# Patient Record
Sex: FEMALE | Race: WHITE | NOT HISPANIC OR LATINO
[De-identification: names, ages, dates, MRNs, and addresses within clinical notes are randomized per-mention and may not be internally consistent; named-entity substitution may affect disease eponyms.]

---

## 2017-07-13 ENCOUNTER — RX ONLY (OUTPATIENT)
Age: 47
Setting detail: RX ONLY
End: 2017-07-13

## 2017-07-13 PROBLEM — D22.62 MELANOCYTIC NEVI OF LEFT UPPER LIMB, INCLUDING SHOULDER: Status: ACTIVE | Noted: 2017-07-13

## 2017-07-27 PROBLEM — D49.2 NEOPLASM OF UNSPECIFIED BEHAVIOR OF BONE, SOFT TISSUE, AND SKIN: Status: RESOLVED | Noted: 2017-07-13 | Resolved: 2017-07-27

## 2017-10-23 ENCOUNTER — HOSPITAL ENCOUNTER (OUTPATIENT)
Dept: RADIOLOGY | Facility: MEDICAL CENTER | Age: 47
End: 2017-10-23
Attending: FAMILY MEDICINE
Payer: COMMERCIAL

## 2017-10-23 DIAGNOSIS — E04.9 ENLARGEMENT OF THYROID: ICD-10-CM

## 2017-10-23 PROCEDURE — 76536 US EXAM OF HEAD AND NECK: CPT

## 2017-12-13 ENCOUNTER — HOSPITAL ENCOUNTER (OUTPATIENT)
Dept: RADIOLOGY | Facility: MEDICAL CENTER | Age: 47
End: 2017-12-13
Attending: FAMILY MEDICINE
Payer: COMMERCIAL

## 2017-12-13 DIAGNOSIS — Z12.31 SCREENING MAMMOGRAM, ENCOUNTER FOR: ICD-10-CM

## 2017-12-13 PROCEDURE — G0202 SCR MAMMO BI INCL CAD: HCPCS

## 2017-12-26 ENCOUNTER — HOSPITAL ENCOUNTER (OUTPATIENT)
Dept: RADIOLOGY | Facility: MEDICAL CENTER | Age: 47
End: 2017-12-26
Attending: FAMILY MEDICINE
Payer: COMMERCIAL

## 2017-12-26 DIAGNOSIS — R74.01 NONSPECIFIC ELEVATION OF LEVELS OF TRANSAMINASE OR LACTIC ACID DEHYDROGENASE (LDH): ICD-10-CM

## 2017-12-26 DIAGNOSIS — R74.02 NONSPECIFIC ELEVATION OF LEVELS OF TRANSAMINASE OR LACTIC ACID DEHYDROGENASE (LDH): ICD-10-CM

## 2017-12-26 PROCEDURE — 76705 ECHO EXAM OF ABDOMEN: CPT

## 2018-01-07 ENCOUNTER — OUTPATIENT INFUSION SERVICES (OUTPATIENT)
Dept: ONCOLOGY | Facility: MEDICAL CENTER | Age: 48
End: 2018-01-07
Attending: FAMILY MEDICINE
Payer: COMMERCIAL

## 2018-01-07 VITALS
WEIGHT: 186.07 LBS | TEMPERATURE: 97.6 F | DIASTOLIC BLOOD PRESSURE: 82 MMHG | SYSTOLIC BLOOD PRESSURE: 133 MMHG | HEIGHT: 66 IN | HEART RATE: 92 BPM | RESPIRATION RATE: 20 BRPM | OXYGEN SATURATION: 99 % | BODY MASS INDEX: 29.9 KG/M2

## 2018-01-07 DIAGNOSIS — E83.110 HEMOCHROMATOSIS, HEREDITARY (HCC): ICD-10-CM

## 2018-01-07 LAB
FERRITIN SERPL-MCNC: 551.4 NG/ML (ref 10–291)
HCT VFR BLD CALC: 42 % (ref 37–47)
HGB BLD-MCNC: 14.3 G/DL (ref 12–16)

## 2018-01-07 PROCEDURE — 36415 COLL VENOUS BLD VENIPUNCTURE: CPT

## 2018-01-07 PROCEDURE — 99195 PHLEBOTOMY: CPT

## 2018-01-07 PROCEDURE — 85014 HEMATOCRIT: CPT

## 2018-01-07 PROCEDURE — 82728 ASSAY OF FERRITIN: CPT

## 2018-01-07 RX ORDER — LORAZEPAM 1 MG/1
1 TABLET ORAL EVERY 4 HOURS PRN
COMMUNITY
End: 2019-11-06 | Stop reason: CLARIF

## 2018-01-07 ASSESSMENT — PAIN SCALES - GENERAL: PAINLEVEL: NO PAIN

## 2018-01-08 NOTE — PROGRESS NOTES
Patient arrived to clinic for first therapeutic phlebotomy.  PIV established with good blood return.  Ferritin drawn and H/H.  Hgb 14.3.  500ml whole blood removed from patient.  Bottle had to be changed half way through due to blood clotting.  Patient tolerated well.  Orthostatic VS checked 10 minutes after (see EPIC).  Pt denies any discomfort.  Future appointments scheduled and pt ambulated out of clinic in no apparent distress.

## 2018-01-21 ENCOUNTER — OUTPATIENT INFUSION SERVICES (OUTPATIENT)
Dept: ONCOLOGY | Facility: MEDICAL CENTER | Age: 48
End: 2018-01-21
Attending: FAMILY MEDICINE
Payer: COMMERCIAL

## 2018-01-21 VITALS
TEMPERATURE: 97.4 F | RESPIRATION RATE: 20 BRPM | OXYGEN SATURATION: 98 % | HEART RATE: 112 BPM | BODY MASS INDEX: 29.83 KG/M2 | SYSTOLIC BLOOD PRESSURE: 134 MMHG | DIASTOLIC BLOOD PRESSURE: 82 MMHG | HEIGHT: 66 IN | WEIGHT: 185.63 LBS

## 2018-01-21 LAB
HCT VFR BLD CALC: 39 % (ref 37–47)
HGB BLD-MCNC: 13.3 G/DL (ref 12–16)

## 2018-01-21 PROCEDURE — 85014 HEMATOCRIT: CPT

## 2018-01-21 PROCEDURE — 99195 PHLEBOTOMY: CPT

## 2018-01-21 PROCEDURE — 36415 COLL VENOUS BLD VENIPUNCTURE: CPT

## 2018-01-21 ASSESSMENT — PAIN SCALES - GENERAL: PAINLEVEL: NO PAIN

## 2018-01-22 NOTE — PROGRESS NOTES
Patient arrived to clinic for therapeutic phlebotomy.  PIV established with good blood return.  Hgb drawn with results of 13.3.  500ml whole blood removed from patient per order.  Patient tolerated well.  Orthostatic VS checked 10 min after (see EPIC).  Pt denies any dizziness or other discomfort.  Next appointment scheduled and pt ambulated out of clinic in no apparent distress.

## 2018-02-04 ENCOUNTER — OUTPATIENT INFUSION SERVICES (OUTPATIENT)
Dept: ONCOLOGY | Facility: MEDICAL CENTER | Age: 48
End: 2018-02-04
Attending: FAMILY MEDICINE
Payer: COMMERCIAL

## 2018-02-04 VITALS
SYSTOLIC BLOOD PRESSURE: 130 MMHG | HEART RATE: 90 BPM | DIASTOLIC BLOOD PRESSURE: 96 MMHG | RESPIRATION RATE: 16 BRPM | TEMPERATURE: 98.6 F | OXYGEN SATURATION: 93 %

## 2018-02-04 DIAGNOSIS — E83.110 HEREDITARY HEMOCHROMATOSIS (HCC): ICD-10-CM

## 2018-02-04 LAB
FERRITIN SERPL-MCNC: 542.8 NG/ML (ref 10–291)
HCT VFR BLD CALC: 37 % (ref 37–47)
HGB BLD-MCNC: 12.6 G/DL (ref 12–16)

## 2018-02-04 PROCEDURE — 82728 ASSAY OF FERRITIN: CPT

## 2018-02-04 PROCEDURE — 99195 PHLEBOTOMY: CPT

## 2018-02-04 PROCEDURE — 85014 HEMATOCRIT: CPT

## 2018-02-04 PROCEDURE — 36415 COLL VENOUS BLD VENIPUNCTURE: CPT

## 2018-02-04 NOTE — PROGRESS NOTES
Patient arrived to clinic for therapeutic phlebotomy.  PIV established and labs drawn.  H/H 12.6/37, within parameters to treat.  500ml whole blood removed per order, pt tolerated well.  Orthostatic vital signs taken after 15 minutes (see flowsheet).  Patient denies any discomfort.  PIV flushed, removed, and gauze/coban placed.  Next appointment scheduled and patient ambulated out of clinic in no apparent distress.

## 2018-02-18 ENCOUNTER — OUTPATIENT INFUSION SERVICES (OUTPATIENT)
Dept: ONCOLOGY | Facility: MEDICAL CENTER | Age: 48
End: 2018-02-18
Attending: FAMILY MEDICINE
Payer: COMMERCIAL

## 2018-02-18 VITALS
OXYGEN SATURATION: 96 % | WEIGHT: 187.39 LBS | DIASTOLIC BLOOD PRESSURE: 96 MMHG | BODY MASS INDEX: 30.12 KG/M2 | RESPIRATION RATE: 16 BRPM | HEART RATE: 108 BPM | SYSTOLIC BLOOD PRESSURE: 121 MMHG | TEMPERATURE: 97.6 F | HEIGHT: 66 IN

## 2018-02-18 LAB
HCT VFR BLD CALC: 39 % (ref 37–47)
HGB BLD-MCNC: 13.3 G/DL (ref 12–16)

## 2018-02-18 PROCEDURE — 85014 HEMATOCRIT: CPT

## 2018-02-18 PROCEDURE — 36415 COLL VENOUS BLD VENIPUNCTURE: CPT

## 2018-02-18 PROCEDURE — 99195 PHLEBOTOMY: CPT

## 2018-02-18 ASSESSMENT — PAIN SCALES - GENERAL: PAINLEVEL: NO PAIN

## 2018-02-18 NOTE — PROGRESS NOTES
Patient arrived ambulatory to the [facility] for TP. Reviewed vital signs, labs, and physician order. Patient denies S&S of infection or S&S of bleeding. IV access established in left AC, visualized brisk blood return, labs collected. Hgb13.3/Hct39, within parameters to receive TP. 500ml's of whole blood removed via phlebotomy per MD order without incident. Orthostatic vial signs assessed. Patient provided upcoming apt dates and times. Patient left the [facility] ambulatory in no signs of distress.

## 2018-03-04 ENCOUNTER — OUTPATIENT INFUSION SERVICES (OUTPATIENT)
Dept: ONCOLOGY | Facility: MEDICAL CENTER | Age: 48
End: 2018-03-04
Attending: FAMILY MEDICINE
Payer: COMMERCIAL

## 2018-03-04 VITALS
DIASTOLIC BLOOD PRESSURE: 92 MMHG | SYSTOLIC BLOOD PRESSURE: 143 MMHG | HEIGHT: 66 IN | WEIGHT: 187.39 LBS | OXYGEN SATURATION: 98 % | BODY MASS INDEX: 30.12 KG/M2 | HEART RATE: 107 BPM | TEMPERATURE: 97.9 F | RESPIRATION RATE: 16 BRPM

## 2018-03-04 DIAGNOSIS — E83.110 HEREDITARY HEMOCHROMATOSIS (HCC): ICD-10-CM

## 2018-03-04 LAB
FERRITIN SERPL-MCNC: 103.4 NG/ML (ref 10–291)
HCT VFR BLD CALC: 38 % (ref 37–47)
HGB BLD-MCNC: 12.9 G/DL (ref 12–16)

## 2018-03-04 PROCEDURE — 85014 HEMATOCRIT: CPT

## 2018-03-04 PROCEDURE — 36415 COLL VENOUS BLD VENIPUNCTURE: CPT

## 2018-03-04 PROCEDURE — 99195 PHLEBOTOMY: CPT

## 2018-03-04 PROCEDURE — 82728 ASSAY OF FERRITIN: CPT

## 2018-03-04 ASSESSMENT — PAIN SCALES - GENERAL: PAINLEVEL: NO PAIN

## 2018-03-04 NOTE — PROGRESS NOTES
Mónica arrives for the therapeutic phlebotomy. Labs drawn as ordered by MD.  Mónica's Hgb 12.9 , Hct 38 %. 500 cc blood removed. Mónica tolerated well. Orthostatic vitals stable, see flowsheet. Mónica denies chest pain, shortness of breath, dizziness, or lightheadedness after treatment. Next appointment scheduled. Discharged to self care; no apparent distress noted.

## 2018-03-18 ENCOUNTER — OUTPATIENT INFUSION SERVICES (OUTPATIENT)
Dept: ONCOLOGY | Facility: MEDICAL CENTER | Age: 48
End: 2018-03-18
Attending: FAMILY MEDICINE
Payer: COMMERCIAL

## 2018-03-18 VITALS
HEART RATE: 90 BPM | WEIGHT: 185.85 LBS | DIASTOLIC BLOOD PRESSURE: 85 MMHG | TEMPERATURE: 98.2 F | BODY MASS INDEX: 29.87 KG/M2 | SYSTOLIC BLOOD PRESSURE: 159 MMHG | OXYGEN SATURATION: 97 % | RESPIRATION RATE: 18 BRPM | HEIGHT: 66 IN

## 2018-03-18 LAB
HCT VFR BLD CALC: 34 % (ref 37–47)
HGB BLD-MCNC: 11.6 G/DL (ref 12–16)

## 2018-03-18 PROCEDURE — 85014 HEMATOCRIT: CPT

## 2018-03-18 PROCEDURE — 36415 COLL VENOUS BLD VENIPUNCTURE: CPT

## 2018-03-18 PROCEDURE — 306780 HCHG STAT FOR TRANSFUSION PER CASE

## 2018-03-18 PROCEDURE — 99195 PHLEBOTOMY: CPT

## 2018-03-18 ASSESSMENT — PAIN SCALES - GENERAL: PAINLEVEL: NO PAIN

## 2018-03-18 NOTE — PROGRESS NOTES
Patient arrived to clinic for therapeutic phlebotomy.  PIV established and labs drawn.  Hgb/Hct 11.6/34.  Parameters within limits to treat.  Discussed with patient that cut off for TP is Hgb of 11 per order and she is close to parameter.  Patient denied any discomfort/symtoms and requested to proceed with treatment today.  She stated she had an appointment with Dr. Oh on Tuesday to discuss diagnosis and treatment.  400 ml of whole blood removed and patient stated she was feeling hot and noted to be diaphoretic.  TP stopped.  Apple juice and water given to patient to drink and cool wash cloth applied to forehead.  Patient rested in chair and monitored.  After 20 minutes patient reported 'feeling much better'.  Orthostatic vital signs taken (see flowsheet).  Patient denied any chest pain, shortness of breath, dizziness, or other discomfort.  Next appointment scheduled. PIV removed and pt ambulated out of clinic in no apparent distress.

## 2018-04-01 ENCOUNTER — OUTPATIENT INFUSION SERVICES (OUTPATIENT)
Dept: ONCOLOGY | Facility: MEDICAL CENTER | Age: 48
End: 2018-04-01
Attending: FAMILY MEDICINE
Payer: COMMERCIAL

## 2018-04-01 VITALS
SYSTOLIC BLOOD PRESSURE: 141 MMHG | OXYGEN SATURATION: 100 % | DIASTOLIC BLOOD PRESSURE: 87 MMHG | WEIGHT: 185.85 LBS | HEART RATE: 90 BPM | HEIGHT: 66 IN | BODY MASS INDEX: 29.87 KG/M2 | RESPIRATION RATE: 18 BRPM | TEMPERATURE: 98.3 F

## 2018-04-01 DIAGNOSIS — E83.110 HEREDITARY HEMOCHROMATOSIS (HCC): ICD-10-CM

## 2018-04-01 LAB
FERRITIN SERPL-MCNC: 14 NG/ML (ref 10–291)
HCT VFR BLD CALC: 32 % (ref 37–47)
HGB BLD-MCNC: 10.9 G/DL (ref 12–16)

## 2018-04-01 PROCEDURE — 85014 HEMATOCRIT: CPT

## 2018-04-01 PROCEDURE — 82728 ASSAY OF FERRITIN: CPT

## 2018-04-01 PROCEDURE — 36415 COLL VENOUS BLD VENIPUNCTURE: CPT

## 2018-04-01 ASSESSMENT — PAIN SCALES - GENERAL: PAINLEVEL: NO PAIN

## 2018-04-01 NOTE — PROGRESS NOTES
Patient present for therapeutic phlebotomy today, no concerns voiced at this time. She reports she has not noticed much of a difference in energy level since starting TP. Peripheral IV placed and H/H and ferritin level drawn per order. Hgb 10.9 today via istat; parameters to phlebotomize if Hgb greater than 11, treatment held today. Plan of care reviewed with patient and next appointment on 4/15/18 at 1600 confirmed. Patient discharged ambulatory in no apparent distress, instructed to call clinic with any further concerns.

## 2018-04-15 ENCOUNTER — APPOINTMENT (OUTPATIENT)
Dept: ONCOLOGY | Facility: MEDICAL CENTER | Age: 48
End: 2018-04-15
Attending: FAMILY MEDICINE
Payer: COMMERCIAL

## 2018-04-29 ENCOUNTER — APPOINTMENT (OUTPATIENT)
Dept: ONCOLOGY | Facility: MEDICAL CENTER | Age: 48
End: 2018-04-29
Attending: FAMILY MEDICINE
Payer: COMMERCIAL

## 2018-08-08 ENCOUNTER — APPOINTMENT (RX ONLY)
Dept: URBAN - METROPOLITAN AREA CLINIC 4 | Facility: CLINIC | Age: 48
Setting detail: DERMATOLOGY
End: 2018-08-08

## 2018-08-08 DIAGNOSIS — L81.4 OTHER MELANIN HYPERPIGMENTATION: ICD-10-CM

## 2018-08-08 DIAGNOSIS — D22 MELANOCYTIC NEVI: ICD-10-CM

## 2018-08-08 DIAGNOSIS — D18.0 HEMANGIOMA: ICD-10-CM

## 2018-08-08 DIAGNOSIS — Z71.89 OTHER SPECIFIED COUNSELING: ICD-10-CM

## 2018-08-08 DIAGNOSIS — F42.4 EXCORIATION (SKIN-PICKING) DISORDER: ICD-10-CM

## 2018-08-08 PROBLEM — D22.61 MELANOCYTIC NEVI OF RIGHT UPPER LIMB, INCLUDING SHOULDER: Status: ACTIVE | Noted: 2018-08-08

## 2018-08-08 PROBLEM — D22.62 MELANOCYTIC NEVI OF LEFT UPPER LIMB, INCLUDING SHOULDER: Status: ACTIVE | Noted: 2018-08-08

## 2018-08-08 PROBLEM — D22.39 MELANOCYTIC NEVI OF OTHER PARTS OF FACE: Status: ACTIVE | Noted: 2018-08-08

## 2018-08-08 PROBLEM — D18.01 HEMANGIOMA OF SKIN AND SUBCUTANEOUS TISSUE: Status: ACTIVE | Noted: 2018-08-08

## 2018-08-08 PROBLEM — D22.72 MELANOCYTIC NEVI OF LEFT LOWER LIMB, INCLUDING HIP: Status: ACTIVE | Noted: 2018-08-08

## 2018-08-08 PROBLEM — S00.80XA UNSPECIFIED SUPERFICIAL INJURY OF OTHER PART OF HEAD, INITIAL ENCOUNTER: Status: ACTIVE | Noted: 2018-08-08

## 2018-08-08 PROBLEM — D22.71 MELANOCYTIC NEVI OF RIGHT LOWER LIMB, INCLUDING HIP: Status: ACTIVE | Noted: 2018-08-08

## 2018-08-08 PROBLEM — D22.5 MELANOCYTIC NEVI OF TRUNK: Status: ACTIVE | Noted: 2018-08-08

## 2018-08-08 PROBLEM — D23.61 OTHER BENIGN NEOPLASM OF SKIN OF RIGHT UPPER LIMB, INCLUDING SHOULDER: Status: ACTIVE | Noted: 2018-08-08

## 2018-08-08 PROCEDURE — ? DIAGNOSIS COMMENT

## 2018-08-08 PROCEDURE — 99214 OFFICE O/P EST MOD 30 MIN: CPT

## 2018-08-08 PROCEDURE — ? COUNSELING

## 2018-08-08 ASSESSMENT — LOCATION SIMPLE DESCRIPTION DERM
LOCATION SIMPLE: RIGHT THIGH
LOCATION SIMPLE: LEFT UPPER BACK
LOCATION SIMPLE: CHEST
LOCATION SIMPLE: ABDOMEN
LOCATION SIMPLE: LEFT FOREHEAD
LOCATION SIMPLE: LEFT UPPER ARM
LOCATION SIMPLE: RIGHT FOREHEAD
LOCATION SIMPLE: LEFT THIGH
LOCATION SIMPLE: RIGHT UPPER ARM
LOCATION SIMPLE: RIGHT TEMPLE
LOCATION SIMPLE: UPPER BACK

## 2018-08-08 ASSESSMENT — LOCATION DETAILED DESCRIPTION DERM
LOCATION DETAILED: LEFT ANTERIOR PROXIMAL THIGH
LOCATION DETAILED: RIGHT LATERAL TEMPLE
LOCATION DETAILED: UPPER STERNUM
LOCATION DETAILED: LEFT SUPERIOR MEDIAL UPPER BACK
LOCATION DETAILED: LEFT ANTERIOR DISTAL UPPER ARM
LOCATION DETAILED: LOWER STERNUM
LOCATION DETAILED: RIGHT ANTERIOR PROXIMAL UPPER ARM
LOCATION DETAILED: EPIGASTRIC SKIN
LOCATION DETAILED: MIDDLE STERNUM
LOCATION DETAILED: LEFT ANTERIOR PROXIMAL UPPER ARM
LOCATION DETAILED: LEFT INFERIOR MEDIAL FOREHEAD
LOCATION DETAILED: SUPERIOR THORACIC SPINE
LOCATION DETAILED: RIGHT ANTERIOR PROXIMAL THIGH
LOCATION DETAILED: RIGHT MEDIAL FOREHEAD
LOCATION DETAILED: RIGHT ANTERIOR DISTAL UPPER ARM
LOCATION DETAILED: INFERIOR THORACIC SPINE

## 2018-08-08 ASSESSMENT — LOCATION ZONE DERM
LOCATION ZONE: ARM
LOCATION ZONE: FACE
LOCATION ZONE: LEG
LOCATION ZONE: TRUNK

## 2018-08-08 NOTE — PROCEDURE: DIAGNOSIS COMMENT
Detail Level: Simple
Comment: Lesion previously biopsied as lichen simplex chronicus with an underlying benign nevus

## 2018-08-08 NOTE — PROCEDURE: COUNSELING
Detail Level: Zone
Detail Level: Generalized
Detail Level: Simple
Patient Specific Counseling (Will Not Stick From Patient To Patient): Patient advised to apply Vaseline to affected area BID for 2 weeks. \\nPatient advised to keep fingernails short. \\nPatient advised to contact our office if lesion is not healed within 2 weeks.

## 2018-08-08 NOTE — HPI: EVALUATION OF SKIN LESION(S)
How Severe Are Your Spot(S)?: moderate
Have Your Spot(S) Been Treated In The Past?: has been treated
Hpi Title: Evaluation of Skin Lesions
Additional History: The biopsy results from last year was Lichen Simplex Chronicus with underlying intradermal melanocytic nevus. Patient would like fbe.
When Was It Treated?: Biopsied on 7/13/2017

## 2019-02-21 ENCOUNTER — TELEPHONE (OUTPATIENT)
Dept: SCHEDULING | Facility: IMAGING CENTER | Age: 49
End: 2019-02-21

## 2019-04-22 ENCOUNTER — TELEPHONE (OUTPATIENT)
Dept: SCHEDULING | Facility: IMAGING CENTER | Age: 49
End: 2019-04-22

## 2019-06-12 ENCOUNTER — OFFICE VISIT (OUTPATIENT)
Dept: MEDICAL GROUP | Facility: PHYSICIAN GROUP | Age: 49
End: 2019-06-12
Payer: COMMERCIAL

## 2019-06-12 VITALS
HEART RATE: 100 BPM | RESPIRATION RATE: 16 BRPM | BODY MASS INDEX: 27.83 KG/M2 | HEIGHT: 68 IN | DIASTOLIC BLOOD PRESSURE: 86 MMHG | WEIGHT: 183.6 LBS | SYSTOLIC BLOOD PRESSURE: 124 MMHG | OXYGEN SATURATION: 95 % | TEMPERATURE: 97.1 F

## 2019-06-12 DIAGNOSIS — R53.82 CHRONIC FATIGUE: ICD-10-CM

## 2019-06-12 DIAGNOSIS — I10 ESSENTIAL HYPERTENSION: ICD-10-CM

## 2019-06-12 DIAGNOSIS — F41.9 ANXIETY: ICD-10-CM

## 2019-06-12 DIAGNOSIS — Z00.00 WELLNESS EXAMINATION: ICD-10-CM

## 2019-06-12 DIAGNOSIS — E83.119 HEMOCHROMATOSIS, UNSPECIFIED HEMOCHROMATOSIS TYPE: ICD-10-CM

## 2019-06-12 DIAGNOSIS — K21.9 GASTROESOPHAGEAL REFLUX DISEASE WITHOUT ESOPHAGITIS: ICD-10-CM

## 2019-06-12 DIAGNOSIS — Z12.31 ENCOUNTER FOR SCREENING MAMMOGRAM FOR BREAST CANCER: ICD-10-CM

## 2019-06-12 PROCEDURE — 99204 OFFICE O/P NEW MOD 45 MIN: CPT | Performed by: PHYSICIAN ASSISTANT

## 2019-06-12 RX ORDER — ASPIRIN 81 MG/1
81 TABLET, CHEWABLE ORAL DAILY
COMMUNITY
End: 2022-06-14

## 2019-06-12 RX ORDER — DEXTROAMPHETAMINE SACCHARATE, AMPHETAMINE ASPARTATE, DEXTROAMPHETAMINE SULFATE AND AMPHETAMINE SULFATE 5; 5; 5; 5 MG/1; MG/1; MG/1; MG/1
20 TABLET ORAL 2 TIMES DAILY
COMMUNITY
End: 2019-11-06 | Stop reason: CLARIF

## 2019-06-12 RX ORDER — VIT C/B6/B5/MAGNESIUM/HERB 173 50-5-6-5MG
CAPSULE ORAL
COMMUNITY
End: 2021-05-11

## 2019-06-14 ENCOUNTER — TELEPHONE (OUTPATIENT)
Dept: HEMATOLOGY ONCOLOGY | Facility: MEDICAL CENTER | Age: 49
End: 2019-06-14

## 2019-06-14 NOTE — TELEPHONE ENCOUNTER
1st attempt to contact the patient.  LM on voicemail for patient requesting a call back to schedule a new patient hematology appointment.  NP/Tate Gonzalez/ Hemochromzev/Grace Riddle

## 2019-06-18 NOTE — TELEPHONE ENCOUNTER
2nd attempt to contact the patient.  LM on voicemail for patient requesting a call back to schedule a new patient hematology appointment.  NP/Tate Gonzalez/ Hemochromzev/Grace Riddle

## 2019-06-20 LAB
ALBUMIN SERPL-MCNC: 4.5 G/DL (ref 3.5–5.5)
ALBUMIN/GLOB SERPL: 1.6 {RATIO} (ref 1.2–2.2)
ALP SERPL-CCNC: 66 IU/L (ref 39–117)
ALT SERPL-CCNC: 160 IU/L (ref 0–32)
AST SERPL-CCNC: 151 IU/L (ref 0–40)
BASOPHILS # BLD AUTO: 0 X10E3/UL (ref 0–0.2)
BASOPHILS NFR BLD AUTO: 1 %
BILIRUB SERPL-MCNC: 0.5 MG/DL (ref 0–1.2)
BUN SERPL-MCNC: 11 MG/DL (ref 6–24)
BUN/CREAT SERPL: 13 (ref 9–23)
CALCIUM SERPL-MCNC: 9.1 MG/DL (ref 8.7–10.2)
CHLORIDE SERPL-SCNC: 101 MMOL/L (ref 96–106)
CHOLEST SERPL-MCNC: 115 MG/DL (ref 100–199)
CO2 SERPL-SCNC: 23 MMOL/L (ref 20–29)
CREAT SERPL-MCNC: 0.83 MG/DL (ref 0.57–1)
EOSINOPHIL # BLD AUTO: 0.1 X10E3/UL (ref 0–0.4)
EOSINOPHIL NFR BLD AUTO: 2 %
ERYTHROCYTE [DISTWIDTH] IN BLOOD BY AUTOMATED COUNT: 17.4 % (ref 12.3–15.4)
FERRITIN SERPL-MCNC: 25 NG/ML (ref 15–150)
GLOBULIN SER CALC-MCNC: 2.8 G/DL (ref 1.5–4.5)
GLUCOSE SERPL-MCNC: 129 MG/DL (ref 65–99)
HCT VFR BLD AUTO: 38 % (ref 34–46.6)
HDLC SERPL-MCNC: 61 MG/DL
HGB BLD-MCNC: 11.6 G/DL (ref 11.1–15.9)
IMM GRANULOCYTES # BLD AUTO: 0 X10E3/UL (ref 0–0.1)
IMM GRANULOCYTES NFR BLD AUTO: 0 %
IMMATURE CELLS  115398: ABNORMAL
IRON SATN MFR SERPL: 9 % (ref 15–55)
IRON SATN MFR SERPL: 9 % SATURATION
IRON SERPL-MCNC: 54 UG/DL (ref 27–159)
IRON SERPL-MCNC: 62 UG/DL
LABORATORY COMMENT REPORT: NORMAL
LDLC SERPL CALC-MCNC: 45 MG/DL (ref 0–99)
LYMPHOCYTES # BLD AUTO: 1.7 X10E3/UL (ref 0.7–3.1)
LYMPHOCYTES NFR BLD AUTO: 26 %
MCH RBC QN AUTO: 21.3 PG (ref 26.6–33)
MCHC RBC AUTO-ENTMCNC: 30.5 G/DL (ref 31.5–35.7)
MCV RBC AUTO: 70 FL (ref 79–97)
MONOCYTES # BLD AUTO: 0.4 X10E3/UL (ref 0.1–0.9)
MONOCYTES NFR BLD AUTO: 7 %
MORPHOLOGY BLD-IMP: ABNORMAL
NEUTROPHILS # BLD AUTO: 4.2 X10E3/UL (ref 1.4–7)
NEUTROPHILS NFR BLD AUTO: 64 %
NRBC BLD AUTO-RTO: ABNORMAL %
PLATELET # BLD AUTO: 287 X10E3/UL (ref 150–450)
POTASSIUM SERPL-SCNC: 4.3 MMOL/L (ref 3.5–5.2)
PROT SERPL-MCNC: 7.3 G/DL (ref 6–8.5)
RBC # BLD AUTO: 5.45 X10E6/UL (ref 3.77–5.28)
SODIUM SERPL-SCNC: 142 MMOL/L (ref 134–144)
TIBC SERPL-MCNC: 569 UG/DL (ref 250–450)
TRANSFERRIN SERPL-MCNC: 475 MG/DL
TRIGL SERPL-MCNC: 44 MG/DL (ref 0–149)
UIBC SERPL-MCNC: 515 UG/DL (ref 131–425)
VLDLC SERPL CALC-MCNC: 9 MG/DL (ref 5–40)
WBC # BLD AUTO: 6.5 X10E3/UL (ref 3.4–10.8)

## 2019-06-23 PROBLEM — I10 HYPERTENSION: Status: ACTIVE | Noted: 2019-06-23

## 2019-06-24 ENCOUNTER — OFFICE VISIT (OUTPATIENT)
Dept: HEMATOLOGY ONCOLOGY | Facility: MEDICAL CENTER | Age: 49
End: 2019-06-24
Payer: COMMERCIAL

## 2019-06-24 VITALS
SYSTOLIC BLOOD PRESSURE: 122 MMHG | OXYGEN SATURATION: 96 % | RESPIRATION RATE: 16 BRPM | HEIGHT: 67 IN | HEART RATE: 100 BPM | DIASTOLIC BLOOD PRESSURE: 82 MMHG | TEMPERATURE: 97.8 F | BODY MASS INDEX: 28.55 KG/M2 | WEIGHT: 181.88 LBS

## 2019-06-24 DIAGNOSIS — E83.119 HEMOCHROMATOSIS, UNSPECIFIED HEMOCHROMATOSIS TYPE: ICD-10-CM

## 2019-06-24 PROCEDURE — 99203 OFFICE O/P NEW LOW 30 MIN: CPT | Performed by: INTERNAL MEDICINE

## 2019-06-24 ASSESSMENT — PAIN SCALES - GENERAL: PAINLEVEL: NO PAIN

## 2019-06-24 NOTE — PROGRESS NOTES
06/24/19    Subjective    Chief Complaint:      HPI:      ROS:    PMH:    No Known Allergies    Medications:    Current Outpatient Prescriptions on File Prior to Visit   Medication Sig Dispense Refill   • amphetamine-dextroamphetamine (ADDERALL, 20MG,) 20 MG Tab Take 20 mg by mouth 2 times a day.     • Multiple Vitamin (CALCIUM COMPLEX PO) Take  by mouth.     • MILK THISTLE PO Take  by mouth.     • Turmeric 500 MG Cap Take  by mouth.     • Green Tea, Camillia sinensis, (GREEN TEA PO) Take  by mouth.     • Biotin 5000 MCG Cap Take  by mouth.     • aspirin (ASA) 81 MG Chew Tab chewable tablet Take 81 mg by mouth every day.     • lorazepam (ATIVAN) 1 MG Tab Take 1 mg by mouth every four hours as needed for Anxiety.     • omeprazole (PRILOSEC) 20 MG CPDR Take 20 mg by mouth every day.     • escitalopram (LEXAPRO) 10 MG TABS Take 40 mg by mouth every day.     • lisinopril (PRINIVIL) 20 MG TABS Take 10 mg by mouth every day.     • buPROPion SR (WELLBUTRIN-SR) 150 MG TB12 Take 300 mg by mouth 2 times a day.       No current facility-administered medications on file prior to visit.          Objective    Vitals:    LMP 06/14/2012     Physical Exam:    HEENT - PERRL  Neck - supple  Node - none  Chest - clear  Breasts -   COR - RSR  Abd - No palpable   Ext - No edema  Neuro -   Skin -     Labs:  Results for ELVIS CARRASCO (MRN [id number]) as of 6/24/2019 11:17   Ref. Range 6/18/2019 10:57   WBC Latest Ref Range: 3.4 - 10.8 x10E3/uL 6.5   RBC Latest Ref Range: 3.77 - 5.28 x10E6/uL 5.45 (H)   Hemoglobin Latest Ref Range: 11.1 - 15.9 g/dL 11.6   Hematocrit Latest Ref Range: 34.0 - 46.6 % 38.0   MCV Latest Ref Range: 79 - 97 fL 70 (L)   MCH Latest Ref Range: 26.6 - 33.0 pg 21.3 (L)   MCHC Latest Ref Range: 31.5 - 35.7 g/dL 30.5 (L)   RDW Latest Ref Range: 12.3 - 15.4 % 17.4 (H)   Platelet Count Latest Ref Range: 150 - 450 x10E3/uL 287   Results for ELVIS CARRASCO (MRN [id number]) as of 6/24/2019 11:17   Ref. Range 6/28/2012  17:20 6/17/2014 03:54 6/18/2019 10:57 6/18/2019 10:57   A-G Ratio Latest Ref Range: 1.2 - 2.2  1.1 1.4 1.6    Transferrin Latest Units: mg/dL   475 (H)    Iron Latest Ref Range: 27 - 159 ug/dL 13 (L)  62 54   Total Iron Binding Latest Ref Range: 250 - 450 ug/dL 483 (H)  569 (HH)    % Saturation Latest Ref Range: 15 - 55 % 3 (L)  9 (L) 9 (LL)     :Results for ELVIS CARRASCO (MRN [id number]) as of 6/24/2019 11:17   Ref. Range 1/7/2018 16:33 2/4/2018 14:15 3/4/2018 15:21 4/1/2018 15:20 6/18/2019 10:57   Ferritin Latest Ref Range: 15 - 150 ng/mL 551.4 (H) 542.8 (H) 103.4 14.0 25             Assessment    Imp        Visit Diagnosis:  No diagnosis found.      Plan:      Nik Lawrence M.D.

## 2019-06-24 NOTE — PROGRESS NOTES
"06/24/19    Subjective    Chief Complaint:  New Patient (Hemochromatosis/Grace Riddle)      HPI:  Patient apparently diagnosed as hemochromatosis in December of 2017. Seen by Dr. Oh at that time. Ferritins were greater than 500. Had I unit of blood removed q.o.q until 3/2018. Now Fe is low c/c Fe deficiency. She had a prior hx of menorrhagia and had been on oral Fe BID x 4 years, until she had a hysterectomy. She says she had an MRI of the liver although I cannot find it in Epic including media tab. 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    ROS: Negative for HA, cough, SOB, cardiac, GI,  or musculoskeletal complaints    PMH:  C-Sex x 2 and WILI but no BS&O    No Known Allergies    Medications:    Current Outpatient Prescriptions on File Prior to Visit   Medication Sig Dispense Refill   • amphetamine-dextroamphetamine (ADDERALL, 20MG,) 20 MG Tab Take 20 mg by mouth 2 times a day.     • Multiple Vitamin (CALCIUM COMPLEX PO) Take  by mouth.     • MILK THISTLE PO Take  by mouth.     • Turmeric 500 MG Cap Take  by mouth.     • Green Tea, Camillia sinensis, (GREEN TEA PO) Take  by mouth.     • Biotin 5000 MCG Cap Take  by mouth.     • aspirin (ASA) 81 MG Chew Tab chewable tablet Take 81 mg by mouth every day.     • lorazepam (ATIVAN) 1 MG Tab Take 1 mg by mouth every four hours as needed for Anxiety.     • omeprazole (PRILOSEC) 20 MG CPDR Take 20 mg by mouth every day.     • escitalopram (LEXAPRO) 10 MG TABS Take 40 mg by mouth every day.     • lisinopril (PRINIVIL) 20 MG TABS Take 10 mg by mouth every day.     • buPROPion SR (WELLBUTRIN-SR) 150 MG TB12 Take 300 mg by mouth 2 times a day.       No current facility-administered medications on file prior to visit.    SH  No smoking  MN\o ETOH      Objective    Vitals:    /82 (BP Location: Right arm, Patient Position: Sitting, BP Cuff Size: Adult)   Pulse 100   Temp 36.6 °C (97.8 °F) (Temporal)   Resp 16   Ht 1.702 m (5' 7\")   Wt " 82.5 kg (181 lb 14.1 oz)   LMP 06/14/2012   SpO2 96%   BMI 28.49 kg/m²     Physical Exam: WD WN NAD    HEENT - PERRL  Neck - supple  Node - none  Chest - clear  Breasts - No mass  COR - RSR no murmur  Abd - No palpable liver, spleen  Ext - No edema   Skin - Rosacea    Labs:  Results for ELVIS CARRASCO (MRN [id number]) as of 6/24/2019 15:23   Ref. Range 6/18/2019 10:57   WBC Latest Ref Range: 3.4 - 10.8 x10E3/uL 6.5   RBC Latest Ref Range: 3.77 - 5.28 x10E6/uL 5.45 (H)   Hemoglobin Latest Ref Range: 11.1 - 15.9 g/dL 11.6   Hematocrit Latest Ref Range: 34.0 - 46.6 % 38.0   MCV Latest Ref Range: 79 - 97 fL 70 (L)   MCH Latest Ref Range: 26.6 - 33.0 pg 21.3 (L)   MCHC Latest Ref Range: 31.5 - 35.7 g/dL 30.5 (L)   RDW Latest Ref Range: 12.3 - 15.4 % 17.4 (H)   Platelet Count Latest Ref Range: 150 - 450 x10E3/uL 287   Immature Cells Unknown CANCELED   Neutrophils-Polys Latest Ref Range: Not Estab. % 64   Neutrophils (Absolute) Latest Ref Range: 1.4 - 7.0 x10E3/uL 4.2   Lymphocytes Latest Ref Range: Not Estab. % 26   Lymphs (Absolute) Latest Ref Range: 0.7 - 3.1 x10E3/uL 1.7   Monocytes Latest Ref Range: Not Estab. % 7   Monos (Absolute) Latest Ref Range: 0.1 - 0.9 x10E3/uL 0.4   Eosinophils Latest Ref Range: Not Estab. % 2   Eos (Absolute) Latest Ref Range: 0.0 - 0.4 x10E3/uL 0.1   Basophils Latest Ref Range: Not Estab. % 1   Baso (Absolute) Latest Ref Range: 0.0 - 0.2 x10E3/uL 0.0   Immature Granulocytes Latest Ref Range: Not Estab. % 0   Immature Granulocytes (abs) Latest Ref Range: 0.0 - 0.1 x10E3/uL 0.0   Nucleated RBC Unknown CANCELED   Comments-Diff Unknown CANCELED   Sodium Latest Ref Range: 134 - 144 mmol/L 142   Potassium Latest Ref Range: 3.5 - 5.2 mmol/L 4.3   Chloride Latest Ref Range: 96 - 106 mmol/L 101   Co2 Latest Ref Range: 20 - 29 mmol/L 23   Glucose Latest Ref Range: 65 - 99 mg/dL 129 (H)   Bun Latest Ref Range: 6 - 24 mg/dL 11   Creatinine Latest Ref Range: 0.57 - 1.00 mg/dL 0.83   GFR If African  American Latest Ref Range: >59 mL/min/1.73 97   GFR If Non  Latest Ref Range: >59 mL/min/1.73 84   Bun-Creatinine Ratio Latest Ref Range: 9 - 23  13   Calcium Latest Ref Range: 8.7 - 10.2 mg/dL 9.1   AST(SGOT) Latest Ref Range: 0 - 40 IU/L 151 (H)   ALT(SGPT) Latest Ref Range: 0 - 32 IU/L 160 (H)   Alkaline Phosphatase Latest Ref Range: 39 - 117 IU/L 66   Total Bilirubin Latest Ref Range: 0.0 - 1.2 mg/dL 0.5   Albumin Latest Ref Range: 3.5 - 5.5 g/dL 4.5   Total Protein Latest Ref Range: 6.0 - 8.5 g/dL 7.3   Globulin Latest Ref Range: 1.5 - 4.5 g/dL 2.8   A-G Ratio Latest Ref Range: 1.2 - 2.2  1.6   Transferrin Latest Units: mg/dL 475 (H)   Iron Latest Units: ug/dL 62   Total Iron Binding Latest Ref Range: 250 - 450 ug/dL 569 (HH)   % Saturation Latest Units: % Saturation 9 (L)   Unsat Iron Binding Latest Ref Range: 131 - 425 ug/dL 515 (H)     Results for ELVIS CARRASCO (MRN [id number]) as of 6/24/2019 15:23   Ref. Range 3/18/2018 15:21 4/1/2018 15:20 4/1/2018 15:26 6/18/2019 10:57 6/18/2019 10:57   Ferritin Latest Ref Range: 15 - 150 ng/mL  14.0  25      Assessment    Imp:    Visit Diagnosis:    1. Hemochromatosis, unspecified hemochromatosis type  CBC WITH DIFFERENTIAL    FERRITIN    IRON/TOTAL IRON BIND         Plan:    Records from Dr. Oh  Monitor Ferritin, CBC, Fe/TIBC  RTC 1 mo after lab available      Nik Lawrence M.D.

## 2019-06-24 NOTE — PROGRESS NOTES
Chief Complaint   Patient presents with   • Establish Care     est with nallely britton pcp Isaura       HISTORY OF THE PRESENT ILLNESS: Mónica Keita is a 49 y.o. female new patient to our practice. This pleasant patient is here today to establish care and to discuss the evaluation and management of:    Patient is a pleasant 49-year-old female here today to establish care.  She has a positive medical history for hemochromatosis and anxiety.  She tells me that she is been following up with Dr. Oh hematologist oncologist in regards to hemochromatosis.  States she is past due for lab work.  She tells me last lab work was completed in 12/17.  Will order lab work and refer patient to hematology oncology.  She tells me she has been treating hemochromatosis with tumor, milk thistle, green tea and calcium.  He mentions in 2017 she underwent 6 phlebotomy over a 3-month span.  States she was having phlebotomy completed every 2 weeks.  States she is chronically fatigued.    She tells me for anxiety she is prescribed Wellbutrin 300 mg standard release capsule once daily and Lexapro 40 mg tab once daily.  States she is compliant with medication experience is no side effects or complications medication.  She mentions that she takes Ativan as needed.  She tells me that she follows up with psychiatrist once a month and medications are managed by her psychiatrist.  Denies homicidal or suicidal ideation.    Patient's blood pressure is 124/86 mmHg.  Admits to monitoring blood pressure at home.  She tells me that she takes 10 mg of lisinopril once daily.  Denies side effects or comp occasions or medication.  Denies dry cough.  Denies chest pain, shortness of breath, heart palpations, dizziness, syncope, severe headache, vision changes.    Acid reflux symptoms are managed with omeprazole 20 mg cap once daily.  She tells me that she is compliant with medication expenses no side effects or complications medication.  Denies dry cough,  nausea, vomiting, abdominal pain, pain with swallowing or difficulty with swallowing.  Denies hemoptysis.    She tells me that her liver enzymes have been elevated in the past.  States she is followed up with digestive health in [place] 7/18 and no further work-up was indicated.      She tells me that she eats late at night.  States she does not sleep well.  She mentions in the summer she notices that she experiences more sleep deprivation.  States on average she sleeps 6-7 hours per night.  Admits to waking up often.    Past Medical History:   Diagnosis Date   • Anemia    • Anxiety    • Bronchitis 2009   • Hypertension    • Pneumonia 2009       Past Surgical History:   Procedure Laterality Date   • PELVIC EXAM UNDER ANESTHESIA  6/17/2014    Performed by Enedina Grant M.D. at SURGERY [facility]   • VAGINAL HYSTERECTOMY TOTAL  6/17/2014    Performed by Enedina Grant M.D. at SURGERY [facility]   • HYSTERECTOMY, VAGINAL  06/04/2014    [facility]   • APPENDECTOMY  2014       Family Status   Relation Status   • Mo Alive   • Fa Alive   • Sis Alive   • Bro Alive   • Son Alive   • Tyree Alive     Family History   Problem Relation Age of Onset   • Heart Disease Father    • No Known Problems Sister    • Heart Attack Brother         CAD. 3 MI's.    • No Known Problems Son    • No Known Problems Daughter        Social History   Substance Use Topics   • Smoking status: Never Smoker   • Smokeless tobacco: Not on file   • Alcohol use No       Allergies: Patient has no known allergies.    Current Outpatient Prescriptions Ordered in [facility]   Medication Sig Dispense Refill   • amphetamine-dextroamphetamine (ADDERALL, 20MG,) 20 MG Tab Take 20 mg by mouth 2 times a day.     • Multiple Vitamin (CALCIUM COMPLEX PO) Take  by mouth.     • MILK THISTLE PO Take  by mouth.     • Turmeric 500 MG Cap Take  by mouth.     • Green Tea, Camillia sinensis, (GREEN TEA PO) Take  by mouth.     • Biotin 5000 MCG Cap Take  by  "mouth.     • aspirin (ASA) 81 MG Chew Tab chewable tablet Take 81 mg by mouth every day.     • omeprazole (PRILOSEC) 20 MG CPDR Take 20 mg by mouth every day.     • escitalopram (LEXAPRO) 10 MG TABS Take 40 mg by mouth every day.     • lisinopril (PRINIVIL) 20 MG TABS Take 10 mg by mouth every day.     • buPROPion SR (WELLBUTRIN-SR) 150 MG TB12 Take 300 mg by mouth 2 times a day.     • lorazepam (ATIVAN) 1 MG Tab Take 1 mg by mouth every four hours as needed for Anxiety.       No current Epic-ordered facility-administered medications on file.        Review of Systems   Constitutional: Negative for fever, chills, weight loss. + for malaise/fatigue.   HENT: Negative for ear pain, nosebleeds, congestion, sore throat and neck pain.    Eyes: Negative for blurred vision.   Respiratory: Negative for cough, sputum production, shortness of breath and wheezing.    Cardiovascular: Negative for chest pain, palpitations, orthopnea and leg swelling.   Gastrointestinal: Negative for heartburn, nausea, vomiting and abdominal pain.   Genitourinary: Negative for dysuria, urgency and frequency.   Musculoskeletal: Negative for myalgias, back pain and joint pain.   Skin: Negative for rash and itching.   Neurological: Negative for dizziness, tingling, tremors, sensory change, focal weakness and headaches.   Endo/Heme/Allergies: Does not bruise/bleed easily.   Psychiatric/Behavioral: Negative for depression, anxiety, or memory loss.     All other systems reviewed and are negative except as in HPI.    Exam: /86 (BP Location: Left arm, Patient Position: Sitting, BP Cuff Size: Adult)   Pulse 100   Temp 36.2 °C (97.1 °F) (Temporal)   Resp 16   Ht 1.721 m (5' 7.75\")   Wt 83.3 kg (183 lb 9.6 oz)   SpO2 95%  Body mass index is 28.12 kg/m².  General: Normal appearing. No distress.  HEENT: Normocephalic. Eyes conjunctiva clear lids without ptosis, pupils equal and reactive to light accommodation, ears normal shape and contour, canals " are clear bilaterally, tympanic membranes are benign, nasal mucosa benign, oropharynx is without erythema, edema or exudates.   Neck: Supple without JVD or bruit. Thyroid is not enlarged.  Pulmonary: Clear to ausculation.  Normal effort. No rales, ronchi, or wheezing.  Cardiovascular: Regular rate and rhythm without murmur.   Abdomen: Soft, nontender, nondistended. Normal bowel sounds. Liver and spleen are not palpable  Neurologic: Grossly nonfocal  Lymph: No cervical, supraclavicular or axillary lymph nodes are palpable  Skin: Warm and dry.  No obvious lesions.  Musculoskeletal: Normal gait. No extremity cyanosis, clubbing, or edema.  Psych: Normal mood and affect. Alert and oriented x3. Judgment and insight is normal.      Medical decision-making and discussion:  1. Anxiety  Patient is feeling well on current medications. Will continue. Denies any suicidal or homicidal ideation. Emphasized importance of healthy diet and exercise. Discussed that should the patient have any symptoms they should call suicide prevention hotline or report to the emergency room immediately.  Any following up with psychiatry as indicated.  Continue working on positive talk and developing healthy coping mechanisms.  Continue work on sleep hygiene.    2. Chronic fatigue  Lab work has been ordered to further evaluate patient.  Patient has hemochromatosis.  States since diagnosis she has suffered from chronic fatigue.  Patient has been referred to heme-onc to establish care.  Continue work on diet, exercise, sleep hygiene.  Continue working on hydration.    - CBC WITH DIFFERENTIAL; Future  - Comp Metabolic Panel; Future  - IRON; Future  - IRON/TOTAL IRON BIND; Future  - FERRITIN; Future  - TRANSFERRIN SATURATION      3. Hemochromatosis, unspecified hemochromatosis type  Chronic problem.  Patient has not had lab work completed since 2017.  Lab work will be ordered.  Patient will be contacted with results.  Patient has been referred to  hematology oncology for further evaluation.    - REFERRAL TO HEMATOLOGY ONCOLOGY Referral to? [facility]  - CBC WITH DIFFERENTIAL; Future  - Comp Metabolic Panel; Future  - IRON; Future  - IRON/TOTAL IRON BIND; Future  - FERRITIN; Future  - TRANSFERRIN SATURATION    4. Encounter for screening mammogram for breast cancer  Discussed importance of being screened for breast cancer with patient.  Mammogram has been ordered.  Patient will be contacted with results.  - MA-SCREEN MAMMO W/CAD-BILAT; Future    5. Wellness examination    - CBC WITH DIFFERENTIAL; Future  - Comp Metabolic Panel; Future  - Lipid Profile; Future    6. Essential hypertension  Well-controlled. Labs as indicated. Continue antihypertensive medications. Discussed decreasing salt intake. Emphasized benefits of exercise and diet. Continue to monitor.      7. Gastroesophageal reflux disease without esophagitis  This is a chronic and stable problem. Patient is doing well. No red flags. Continue PPI and monitor.        Please note that this dictation was created using voice recognition software. I have made every reasonable attempt to correct obvious errors, but I expect that there are errors of grammar and possibly content that I did not discover before finalizing the note.      Assessment/Plan  1. Anxiety     2. Chronic fatigue     3. Hemochromatosis, unspecified hemochromatosis type  REFERRAL TO HEMATOLOGY ONCOLOGY Referral to? [facility]    CBC WITH DIFFERENTIAL    Comp Metabolic Panel    IRON    IRON/TOTAL IRON BIND    FERRITIN    TRANSFERRIN SATURATION   4. Encounter for screening mammogram for breast cancer  MA-SCREEN MAMMO W/CAD-BILAT   5. Wellness examination  CBC WITH DIFFERENTIAL    Comp Metabolic Panel    Lipid Profile       Return in about 3 weeks (around 7/3/2019), or if symptoms worsen or fail to improve.

## 2019-06-26 ENCOUNTER — HOSPITAL ENCOUNTER (OUTPATIENT)
Dept: RADIOLOGY | Facility: MEDICAL CENTER | Age: 49
End: 2019-06-26
Attending: PHYSICIAN ASSISTANT
Payer: COMMERCIAL

## 2019-06-26 DIAGNOSIS — Z12.31 ENCOUNTER FOR SCREENING MAMMOGRAM FOR BREAST CANCER: ICD-10-CM

## 2019-06-26 PROCEDURE — 77067 SCR MAMMO BI INCL CAD: CPT

## 2019-07-02 ENCOUNTER — OFFICE VISIT (OUTPATIENT)
Dept: MEDICAL GROUP | Facility: PHYSICIAN GROUP | Age: 49
End: 2019-07-02
Payer: COMMERCIAL

## 2019-07-02 VITALS
BODY MASS INDEX: 27.94 KG/M2 | WEIGHT: 178 LBS | SYSTOLIC BLOOD PRESSURE: 132 MMHG | HEART RATE: 86 BPM | RESPIRATION RATE: 14 BRPM | HEIGHT: 67 IN | DIASTOLIC BLOOD PRESSURE: 72 MMHG | TEMPERATURE: 98 F | OXYGEN SATURATION: 98 %

## 2019-07-02 DIAGNOSIS — K76.0 FATTY INFILTRATION OF LIVER: ICD-10-CM

## 2019-07-02 DIAGNOSIS — E83.119 HEMOCHROMATOSIS, UNSPECIFIED HEMOCHROMATOSIS TYPE: ICD-10-CM

## 2019-07-02 DIAGNOSIS — R73.01 ELEVATED FASTING GLUCOSE: ICD-10-CM

## 2019-07-02 DIAGNOSIS — E11.42 TYPE 2 DIABETES MELLITUS WITH DIABETIC POLYNEUROPATHY, WITHOUT LONG-TERM CURRENT USE OF INSULIN (HCC): ICD-10-CM

## 2019-07-02 DIAGNOSIS — R74.01 TRANSAMINITIS: ICD-10-CM

## 2019-07-02 LAB
HBA1C MFR BLD: 6.6 % (ref 0–5.6)
INT CON NEG: NEGATIVE
INT CON POS: POSITIVE

## 2019-07-02 PROCEDURE — 83036 HEMOGLOBIN GLYCOSYLATED A1C: CPT | Performed by: PHYSICIAN ASSISTANT

## 2019-07-02 PROCEDURE — 99214 OFFICE O/P EST MOD 30 MIN: CPT | Performed by: PHYSICIAN ASSISTANT

## 2019-07-02 ASSESSMENT — PATIENT HEALTH QUESTIONNAIRE - PHQ9: CLINICAL INTERPRETATION OF PHQ2 SCORE: 0

## 2019-07-02 NOTE — PROGRESS NOTES
Chief Complaint   Patient presents with   • Results     Labs        HISTORY OF PRESENT ILLNESS: Mónica Keita is an established 49 y.o. female here to discuss the evaluation and management of:    Patient is a pleasant 49-year-old female here today to follow-up on elevated glucose lab work results, hemochromatosis, and elevated liver enzymes.  POCT hemoglobin was 6.6.  Discussed with patient that she is diabetic.  She admits that she intermittently experiences burning of the bilateral balls of her feet.  Denies polyuria, polydipsia, poor wound healing, dizziness, syncope, nausea, vomiting.    Patient was referred to oncology/hematology for further evaluation of hemochromatosis.  She tells me that her hematologist, Dr. Lawrence does not believe that she has hemochromatosis.  She is undergoing further work-up.    CMP lab work from 6/18/2019 results are as follows:    Glucose 129   65 - 99 mg/dL Final   Bun 11  6 - 24 mg/dL Final   Creatinine 0.83  0.57 - 1.00 mg/dL Final   GFR If Non  84  >59 mL/min/1.73 Final   GFR If  97  >59 mL/min/1.73 Final   Bun-Creatinine Ratio 13  9 - 23 Final   Sodium 142  134 - 144 mmol/L Final   Potassium 4.3  3.5 - 5.2 mmol/L Final   Chloride 101  96 - 106 mmol/L Final   Co2 23  20 - 29 mmol/L Final   Calcium 9.1  8.7 - 10.2 mg/dL Final   Total Protein 7.3  6.0 - 8.5 g/dL Final   Albumin 4.5  3.5 - 5.5 g/dL Final   Globulin 2.8  1.5 - 4.5 g/dL Final   A-G Ratio 1.6  1.2 - 2.2 Final   Total Bilirubin 0.5  0.0 - 1.2 mg/dL Final   Alkaline Phosphatase 66  39 - 117 IU/L Final   AST(SGOT) 151   0 - 40 IU/L Final   ALT(SGPT) 160   0 - 32 IU/L Final     Per chart review right upper quadrant ultrasound results from twelve 7/26/17 results are as follows:    1.  Diffuse fatty infiltration of the liver.    2.  No cholelithiasis or biliary dilatation.    3.  Otherwise negative right upper quadrant ultrasound.    Patient tells me that she was diagnosed with a fatty  liver.  No complications.  Denies history of alcohol abuse.  Denies nausea, vomiting, right upper quadrant pain, changes in bowel habits, melena, hematochezia, fever, chills, unintentional weight loss.        Patient Active Problem List    Diagnosis Date Noted   • Hypertension 06/23/2019   • Anxiety 06/12/2019   • Hemochromatosis 12/15/2017       Allergies:Patient has no known allergies.    Current Outpatient Prescriptions   Medication Sig Dispense Refill   • metFORMIN (GLUCOPHAGE) 500 MG Tab Take 2 tablets by mouth twice a day with food. 120 Tab 3   • amphetamine-dextroamphetamine (ADDERALL, 20MG,) 20 MG Tab Take 20 mg by mouth 2 times a day.     • Multiple Vitamin (CALCIUM COMPLEX PO) Take  by mouth.     • MILK THISTLE PO Take  by mouth.     • Turmeric 500 MG Cap Take  by mouth.     • Green Tea, Camillia sinensis, (GREEN TEA PO) Take  by mouth.     • Biotin 5000 MCG Cap Take  by mouth.     • aspirin (ASA) 81 MG Chew Tab chewable tablet Take 81 mg by mouth every day.     • lorazepam (ATIVAN) 1 MG Tab Take 1 mg by mouth every four hours as needed for Anxiety.     • omeprazole (PRILOSEC) 20 MG CPDR Take 20 mg by mouth every day.     • escitalopram (LEXAPRO) 10 MG TABS Take 40 mg by mouth every day.     • lisinopril (PRINIVIL) 20 MG TABS Take 10 mg by mouth every day.     • buPROPion SR (WELLBUTRIN-SR) 150 MG TB12 Take 300 mg by mouth 2 times a day.       No current facility-administered medications for this visit.        Social History   Substance Use Topics   • Smoking status: Never Smoker   • Smokeless tobacco: Never Used   • Alcohol use No       Family Status   Relation Status   • Mo Alive   • Fa Alive   • Sis Alive   • Bro Alive   • Son Alive   • Tyree Alive     Family History   Problem Relation Age of Onset   • Heart Disease Father    • No Known Problems Sister    • Heart Attack Brother         CAD. 3 MI's.    • No Known Problems Son    • No Known Problems Daughter        ROS:  Review of Systems   Constitutional:  "Negative for fever, chills, weight loss and malaise/fatigue.   HENT: Negative for ear pain, nosebleeds, congestion, sore throat and neck pain.    Eyes: Negative for blurred vision.   Respiratory: Negative for cough, sputum production, shortness of breath and wheezing.    Cardiovascular: Negative for chest pain, palpitations, orthopnea and leg swelling.   Gastrointestinal: Negative for heartburn, nausea, vomiting and abdominal pain.   Genitourinary: Negative for dysuria, urgency and frequency.   Musculoskeletal: Negative for myalgias, back pain and joint pain.   Skin: Negative for rash and itching.   Neurological: Negative for dizziness, tingling, tremors, sensory change, focal weakness and headaches.   Endo/Heme/Allergies: Does not bruise/bleed easily.   Psychiatric/Behavioral: Negative for depression, suicidal ideas and memory loss.  The patient is not nervous/anxious and does not have insomnia.    All other systems reviewed and are negative except as in HPI.    Exam: /72 (BP Location: Left arm, Patient Position: Sitting, BP Cuff Size: Adult)   Pulse 86   Temp 36.7 °C (98 °F) (Temporal)   Resp 14   Ht 1.702 m (5' 7\")   Wt 80.7 kg (178 lb)   SpO2 98%  Body mass index is 27.88 kg/m².  General: Normal appearing. No distress.  HEENT: Normocephalic. Eyes conjunctiva clear lids without ptosis, ears normal shape and contour.  Neck: Supple without JVD or bruit. Thyroid is not enlarged.  Pulmonary: Clear to ausculation.  Normal effort. No rales, ronchi, or wheezing.  Cardiovascular: Regular rate and rhythm without murmur.   Abdomen: Soft, nontender, nondistended.    Neurologic: Grossly nonfocal.  Cranial nerves are normal.   Lymph: No cervical, supraclavicular or axillary lymph nodes are palpable  Skin: Warm and dry.  No rashes or suspicious skin lesions.  Musculoskeletal: Normal gait. No extremity cyanosis, clubbing, or edema.  Psych: Normal mood and affect. Alert and oriented x3. Judgment and insight is " normal.    Medical decision-making and discussion:  1. Type 2 diabetes mellitus with diabetic polyneuropathy, without long-term current use of insulin (HCC)  Fasting glucose from 6/18/2019 was 129.  POCT hemoglobin A1c 6.6.    Patient has been prescribed metformin 500 mg tab.  Advised to the following:  Week 1: Take 1 tablet by mouth once daily with food.  Week 2: Take 1 tablet by mouth twice a day with food.  Week 3: Take 2 tablets by mouth twice a day with food.    [place], side effects and adverse reactions of medication with patient.  Advised patient was, side effect is loose stools.  Patient will follow-up in 2 weeks with diabetic nurse Daysi Mazariegos and myself for reevaluation.  Patient is currently taking lisinopril Milgram tab once daily for hypertension.  Patient is not on a statin medication.  Recent lipid profile lab work results were within normal limits.  Will continue to monitor.    Diabetic foot exam will be completed during follow-up appointment.      - metFORMIN (GLUCOPHAGE) 500 MG Tab; Take 2 tablets by mouth twice a day with food.  Dispense: 120 Tab; Refill: 3    2. Elevated fasting glucose  Fasting glucose from 6/18/2019 was 129.  POCT hemoglobin A1c 6.6.    - POCT Hemoglobin A1C    3. Fatty infiltration of liver  4. Transaminitis    CMP lab work from 6/18/2019 results are as follows:    Glucose 129   65 - 99 mg/dL Final   Bun 11  6 - 24 mg/dL Final   Creatinine 0.83  0.57 - 1.00 mg/dL Final   GFR If Non  84  >59 mL/min/1.73 Final   GFR If  97  >59 mL/min/1.73 Final   Bun-Creatinine Ratio 13  9 - 23 Final   Sodium 142  134 - 144 mmol/L Final   Potassium 4.3  3.5 - 5.2 mmol/L Final   Chloride 101  96 - 106 mmol/L Final   Co2 23  20 - 29 mmol/L Final   Calcium 9.1  8.7 - 10.2 mg/dL Final   Total Protein 7.3  6.0 - 8.5 g/dL Final   Albumin 4.5  3.5 - 5.5 g/dL Final   Globulin 2.8  1.5 - 4.5 g/dL Final   A-G Ratio 1.6  1.2 - 2.2 Final   Total Bilirubin 0.5  0.0 - 1.2  mg/dL Final   Alkaline Phosphatase 66  39 - 117 IU/L Final   AST(SGOT) 151   0 - 40 IU/L Final   ALT(SGPT) 160   0 - 32 IU/L Final     Per chart review right upper quadrant ultrasound results from twelve 7/26/17 results are as follows:    1.  Diffuse fatty infiltration of the liver.    2.  No cholelithiasis or biliary dilatation.    3.  Otherwise negative right upper quadrant ultrasound.      Additional lab work has been ordered to further evaluate patient.  Will discuss during follow-up appointment on 7/7/2019.    - HEP B SURFACE AB; Future  - HEP B SURFACE ANTIGEN; Future  - HEP B CORE AB TOTAL; Future  - US-RUQ; Future  - HEP C VIRUS ANTIBODY; Future      5. Hemochromatosis, unspecified hemochromatosis type  Patient is following up with hematology/oncology.  She is undergoing further work-up.  Continue to monitor.    Please note that this dictation was created using voice recognition software. I have made every reasonable attempt to correct obvious errors, but I expect that there are errors of grammar and possibly content that I did not discover before finalizing the note.    Assessment/Plan:  1. Type 2 diabetes mellitus with diabetic polyneuropathy, without long-term current use of insulin (HCC)  metFORMIN (GLUCOPHAGE) 500 MG Tab   2. Elevated fasting glucose  POCT Hemoglobin A1C   3. Fatty infiltration of liver     4. Elevated liver enzymes     5. Hemochromatosis, unspecified hemochromatosis type         Return in about 2 weeks (around 7/16/2019), or if symptoms worsen or fail to improve.

## 2019-07-17 ENCOUNTER — OFFICE VISIT (OUTPATIENT)
Dept: MEDICAL GROUP | Facility: PHYSICIAN GROUP | Age: 49
End: 2019-07-17
Payer: COMMERCIAL

## 2019-07-17 ENCOUNTER — TELEPHONE (OUTPATIENT)
Dept: MEDICAL GROUP | Facility: PHYSICIAN GROUP | Age: 49
End: 2019-07-17

## 2019-07-17 VITALS
HEIGHT: 67 IN | WEIGHT: 175.6 LBS | SYSTOLIC BLOOD PRESSURE: 114 MMHG | TEMPERATURE: 96.3 F | HEART RATE: 99 BPM | BODY MASS INDEX: 27.56 KG/M2 | DIASTOLIC BLOOD PRESSURE: 76 MMHG | OXYGEN SATURATION: 97 %

## 2019-07-17 DIAGNOSIS — R74.01 TRANSAMINITIS: ICD-10-CM

## 2019-07-17 DIAGNOSIS — K76.0 FATTY INFILTRATION OF LIVER: ICD-10-CM

## 2019-07-17 DIAGNOSIS — E11.9 TYPE 2 DIABETES MELLITUS WITHOUT COMPLICATION, WITHOUT LONG-TERM CURRENT USE OF INSULIN (HCC): ICD-10-CM

## 2019-07-17 PROCEDURE — 99214 OFFICE O/P EST MOD 30 MIN: CPT | Performed by: PHYSICIAN ASSISTANT

## 2019-07-17 NOTE — PROGRESS NOTES
RN-CJE Note    Subjective:     Health changes since last visit/interval Hx: Mónica is a new patient to me.  She has new onset type II DM    Medications (including changes made today)  Current Outpatient Prescriptions   Medication Sig Dispense Refill   • vitamin D (CHOLECALCIFEROL) 1000 UNIT Tab Take 1,000 Units by mouth every day.     • metFORMIN (GLUCOPHAGE) 500 MG Tab Take 2 tablets by mouth twice a day with food. 120 Tab 3   • amphetamine-dextroamphetamine (ADDERALL, 20MG,) 20 MG Tab Take 20 mg by mouth 2 times a day.     • MILK THISTLE PO Take  by mouth.     • Turmeric 500 MG Cap Take  by mouth.     • Green Tea, Camillia sinensis, (GREEN TEA PO) Take  by mouth.     • Biotin 5000 MCG Cap Take  by mouth.     • aspirin (ASA) 81 MG Chew Tab chewable tablet Take 81 mg by mouth every day.     • lorazepam (ATIVAN) 1 MG Tab Take 1 mg by mouth every four hours as needed for Anxiety.     • omeprazole (PRILOSEC) 20 MG CPDR Take 20 mg by mouth every day.     • escitalopram (LEXAPRO) 10 MG TABS Take 40 mg by mouth every day.     • lisinopril (PRINIVIL) 20 MG TABS Take 10 mg by mouth every day.     • buPROPion SR (WELLBUTRIN-SR) 150 MG TB12 Take 300 mg by mouth 2 times a day.     • Multiple Vitamin (CALCIUM COMPLEX PO) Take  by mouth.       No current facility-administered medications for this visit.        Taking daily ASA: Yes  Taking above medications as prescribed: yes  SIDE EFFECTS: Patient denies side effects to medications    Exercise: runs up/down stairs, trampoline, weights  Diet: meals per day on average: 3, healthy in general, high carb breakfast  Patient's body mass index is 27.5 kg/m². Exercise and nutrition counseling were performed at this visit.      Health Maintenance:   There are no preventive care reminders to display for this patient.    Immunizations:   PPSV23: unknown  [id number]: unknown  Tdap: unknown  Flu: N/A  Hep B: unknown    DM:   Last A1c:   Lab Results   Component Value Date/Time    HBA1C 6.6 (A)  07/02/2019 11:31 AM      A1C GOAL: < 7    Glucose monitoring frequency: several times weekly    Hypoglycemic episodes: no    Last Retinal Exam: [facility] Provider: last week Abnormal: early onset cataracts  Daily Foot Exam: No advised  Routine Dental Exams: Yes    No results found for: MICROALBCALC, MALBCRT, MALBEXCR, [id number], MICROALBUR, MICRALB, UMICROALBUM, MICROALBTIM     ACR Albumin/Creatinine Ratio goal <30     HTN:   Blood pressure goal <140/<80 yes.   Currently Rx ACE/ARB: Yes    Dyslipidemia:    Lab Results   Component Value Date/Time    CHOLSTRLTOT 115 06/18/2019 10:57 AM    LDL 45 06/18/2019 10:57 AM    HDL 61 06/18/2019 10:57 AM    TRIGLYCERIDE 44 06/18/2019 10:57 AM       Lab Results   Component Value Date/Time    SODIUM 142 06/18/2019 10:57 AM    SODIUM 136 06/17/2014 03:54 AM    POTASSIUM 4.3 06/18/2019 10:57 AM    POTASSIUM 3.5 (L) 06/17/2014 03:54 AM    CHLORIDE 101 06/18/2019 10:57 AM    CHLORIDE 105 06/17/2014 03:54 AM    CO2 23 06/18/2019 10:57 AM    CO2 21 06/17/2014 03:54 AM    GLUCOSE 129 (H) 06/18/2019 10:57 AM    GLUCOSE 138 (H) 06/17/2014 03:54 AM    BUN 11 06/18/2019 10:57 AM    BUN 11 06/17/2014 03:54 AM    CREATININE 0.83 06/18/2019 10:57 AM    CREATININE 0.68 06/17/2014 03:54 AM    BUNCREATRAT 13 06/18/2019 10:57 AM     Lab Results   Component Value Date/Time    ALKPHOSPHAT 66 06/18/2019 10:57 AM    ALKPHOSPHAT 40 06/17/2014 03:54 AM    ASTSGOT 151 (H) 06/18/2019 10:57 AM    ASTSGOT 30 06/17/2014 03:54 AM    ALTSGPT 160 (H) 06/18/2019 10:57 AM    ALTSGPT 56 (H) 06/17/2014 03:54 AM    TBILIRUBIN 0.5 06/18/2019 10:57 AM    TBILIRUBIN 0.3 06/17/2014 03:54 AM        Currently Rx Statin: No    She  reports that she has never smoked. She has never used smokeless tobacco.    Objective:     Exam:  Monofilament: done   Monofilament testing with a 10 gram force: sensation intact: intact bilaterally  Visual Inspection: Feet without maceration, ulcers, fissures.  Pedal pulses: intact  bilaterally    Plan:     Discussed and educated on:   - All medications, side effects and compliance (discussed carefully)  - Annual eye examinations at Ophthalmology  - Diabetic Meal Plan: foods that contain carbs and plate method  - Factors Affecting Blood Glucose Control: food, illness, medication and stress  - Foot Care: what to look for when checking feet every day and when to contact HCP  - HbA1C: target and what A1C is  - Home glucose monitoring emphasized  - Interpretation of Lab Results  - Long term diabetic complications  - Weight control and daily exercise    Recommended medication changes: none

## 2019-07-17 NOTE — PROGRESS NOTES
Chief Complaint   Patient presents with   • Diabetes       HISTORY OF PRESENT ILLNESS: Mónica Keita is an established 49 y.o. female here to discuss the evaluation and management of:      Type 2 diabetes mellitus without complication, without long-term current use of insulin (HCC)  Patient is a pleasant 49-year-old female here today to follow-up on diabetes.  POCT Hemoglobin A1C was 6.6 during her last appointment on 07/02/19.   During patient's last appointment on 7/2/2019 patient was prescribed metformin and was provided handwritten instructions on how to increase metformin dosage.  Patient tells me that she is currently taking metformin 500 mg twice daily.  She tells me that she is tolerating medication well.  States when first initiating medication she did experience intermittent episodes of loose stools but symptoms have resolved.  States her diet has improved and she is exercising more frequently.  Patient experiences intermittent episodes of burning of bilateral balls of feet.    Fatty infiltration of liver  Transaminitis  Also during her last appointment we discussed elevated liver enzymes.  Additional lab work was ordered to further evaluate patient.  She tells me that she has been extremely busy at work and has been able to complete lab work.  Advised patient to do so as soon as possible.     CMP lab work from 6/18/2019 results are as follows:     Glucose 129   65 - 99 mg/dL Final   Bun 11  6 - 24 mg/dL Final   Creatinine 0.83  0.57 - 1.00 mg/dL Final   GFR If Non  84  >59 mL/min/1.73 Final   GFR If  97  >59 mL/min/1.73 Final   Bun-Creatinine Ratio 13  9 - 23 Final   Sodium 142  134 - 144 mmol/L Final   Potassium 4.3  3.5 - 5.2 mmol/L Final   Chloride 101  96 - 106 mmol/L Final   Co2 23  20 - 29 mmol/L Final   Calcium 9.1  8.7 - 10.2 mg/dL Final   Total Protein 7.3  6.0 - 8.5 g/dL Final   Albumin 4.5  3.5 - 5.5 g/dL Final   Globulin 2.8  1.5 - 4.5 g/dL Final   A-G  Ratio 1.6  1.2 - 2.2 Final   Total Bilirubin 0.5  0.0 - 1.2 mg/dL Final   Alkaline Phosphatase 66  39 - 117 IU/L Final   AST(SGOT) 151   0 - 40 IU/L Final   ALT(SGPT) 160   0 - 32 IU/L Final      Per chart review right upper quadrant ultrasound results from twelve 7/26/17 results are as follows:     1.  Diffuse fatty infiltration of the liver.    2.  No cholelithiasis or biliary dilatation.    3.  Otherwise negative right upper quadrant ultrasound.     Patient tells me that she was diagnosed with a fatty liver in the past.  No complications.  Denies history of alcohol abuse.  Denies nausea, vomiting, right upper quadrant pain, changes in bowel habits, melena, hematochezia, fever, chills, unintentional weight loss.          Patient Active Problem List    Diagnosis Date Noted   • Type 2 diabetes mellitus without complication, without long-term current use of insulin (HCC) 07/17/2019   • Hypertension 06/23/2019   • Anxiety 06/12/2019   • Hemochromatosis 12/15/2017       Allergies:Patient has no known allergies.    Current Outpatient Prescriptions   Medication Sig Dispense Refill   • vitamin D (CHOLECALCIFEROL) 1000 UNIT Tab Take 1,000 Units by mouth every day.     • metFORMIN (GLUCOPHAGE) 500 MG Tab Take 2 tablets by mouth twice a day with food. 120 Tab 3   • amphetamine-dextroamphetamine (ADDERALL, 20MG,) 20 MG Tab Take 20 mg by mouth 2 times a day.     • MILK THISTLE PO Take  by mouth.     • Turmeric 500 MG Cap Take  by mouth.     • Green Tea, Camillia sinensis, (GREEN TEA PO) Take  by mouth.     • Biotin 5000 MCG Cap Take  by mouth.     • aspirin (ASA) 81 MG Chew Tab chewable tablet Take 81 mg by mouth every day.     • lorazepam (ATIVAN) 1 MG Tab Take 1 mg by mouth every four hours as needed for Anxiety.     • omeprazole (PRILOSEC) 20 MG CPDR Take 20 mg by mouth every day.     • escitalopram (LEXAPRO) 10 MG TABS Take 40 mg by mouth every day.     • lisinopril (PRINIVIL) 20 MG TABS Take 10 mg by mouth every day.      "  • buPROPion SR (WELLBUTRIN-SR) 150 MG TB12 Take 300 mg by mouth 2 times a day.     • Multiple Vitamin (CALCIUM COMPLEX PO) Take  by mouth.       No current facility-administered medications for this visit.        Social History   Substance Use Topics   • Smoking status: Never Smoker   • Smokeless tobacco: Never Used   • Alcohol use No       Family Status   Relation Status   • Mo Alive   • Fa Alive   • Sis Alive   • Bro Alive   • Son Alive   • Tyree Alive     Family History   Problem Relation Age of Onset   • Heart Disease Father    • No Known Problems Sister    • Heart Attack Brother         CAD. 3 MI's.    • No Known Problems Son    • No Known Problems Daughter        ROS:  Review of Systems   Constitutional: Negative for fever, chills, weight loss and malaise/fatigue.   HENT: Negative for ear pain, nosebleeds, congestion, sore throat and neck pain.    Eyes: Negative for blurred vision.   Respiratory: Negative for cough, sputum production, shortness of breath and wheezing.    Cardiovascular: Negative for chest pain, palpitations, orthopnea and leg swelling.   Gastrointestinal: Negative for heartburn, nausea, vomiting and abdominal pain.   Genitourinary: Negative for dysuria, urgency and frequency.   Musculoskeletal: Negative for myalgias, back pain and joint pain.   Skin: Negative for rash and itching.   Neurological: Negative for dizziness, tingling, tremors, sensory change, focal weakness and headaches.   Endo/Heme/Allergies: Does not bruise/bleed easily.   Psychiatric/Behavioral: Negative for depression, suicidal ideas and memory loss.  The patient is not nervous/anxious and does not have insomnia.    All other systems reviewed and are negative except as in HPI.    Exam: /76   Pulse 99   Temp (!) 35.7 °C (96.3 °F)   Ht 1.702 m (5' 7\")   Wt 79.7 kg (175 lb 9.6 oz)   SpO2 97%  Body mass index is 27.5 kg/m².  General: Normal appearing. No distress.  HEENT: Normocephalic. Eyes conjunctiva clear lids " without ptosis, ears normal shape and contour.  Neck: Supple without JVD or bruit. Thyroid is not enlarged.  Pulmonary: Clear to ausculation.  Normal effort. No rales, ronchi, or wheezing.  Cardiovascular: Regular rate and rhythm without murmur.   Abdomen: Soft, nontender, nondistended.   Neurologic: Grossly nonfocal.  Cranial nerves are normal.   Lymph: No cervical, supraclavicular or axillary lymph nodes are palpable  Skin: Warm and dry.  No rashes or suspicious skin lesions.  Musculoskeletal: Normal gait. No extremity cyanosis, clubbing, or edema.  Psych: Normal mood and affect. Alert and oriented x3. Judgment and insight is normal.    Medical decision-making and discussion:  1. Type 2 diabetes mellitus without complication, without long-term current use of insulin (HCC)  Diabetic foot exam was completed by Daysi Mazariegos.  No abnormal findings.  Additional lab work has been ordered.  Patient will complete microalbumin creatinine ratio in the near future.  Hemoglobin A1c will be completed prior to follow-up appointment in 4 months.  Patient will increase metformin 500 mg twice daily to taking 2 tablets of metformin 500 mg by mouth twice daily.  Continue work on diet and exercise.  Patient is referred to diabetic education.    She tells me that her retinal screening is up-to-date.  DELBERT has been signed by patient to obtain ophthalmology results.  Patient declined pneumonia and hep B vaccination during today's appointment.    - MICROALBUMIN CREAT RATIO URINE; Future  - Diabetic Monofilament LE Exam  - REFERRAL TO DIABETIC EDUCATION Diabetes Self Management Education / Training (DSME/T) and Medical Nutrition Therapy (MNT): Initial Group DSME/MNT as authorized by payor, Follow-Up DSME/MNT as authorized by payor; DSME/T Content: Monitoring Diabete...  - HEMOGLOBIN A1C; Future    2. Fatty infiltration of liver  3. Transaminitis  Additional lab work has been ordered to further evaluate patient.    If lab work findings  warrant a follow-up appointment a follow-up appointment will be made at that time.  Continue to monitor.  Continue work on diet and exercise.    Please note that this dictation was created using voice recognition software. I have made every reasonable attempt to correct obvious errors, but I expect that there are errors of grammar and possibly content that I did not discover before finalizing the note.    Assessment/Plan:  1. Type 2 diabetes mellitus without complication, without long-term current use of insulin (HCC)  MICROALBUMIN CREAT RATIO URINE    Diabetic Monofilament LE Exam    REFERRAL TO DIABETIC EDUCATION Diabetes Self Management Education / Training (DSME/T) and Medical Nutrition Therapy (MNT): Initial Group DSME/MNT as authorized by payor, Follow-Up DSME/MNT as authorized by payor; DSME/T Content: Monitoring Diabete...    HEMOGLOBIN A1C   2. Fatty infiltration of liver     3. Transaminitis         Return in about 4 months (around 11/17/2019), or if symptoms worsen or fail to improve.

## 2019-07-17 NOTE — LETTER
[facility]  Grace Riddle P.A.-C.  [address]  [place] [address]  Fax: [phone]   Authorization for Release/Disclosure of   Protected Health Information   Name: MÓNICA ANTUNEZGINI : 1970 SSN: [id number]   Address: [address] [place] [address] [address] [place] [address] Phone:    [phone] (home) [phone] (work)   I authorize the entity listed below to release/disclose the PHI below to:   [facility]/Grace Riddle P.A.-C. and Grace Riddle P.A.-C.   Provider or Entity Name:  LOLLY GORE    Address   City, [facility], [facility]   Phone:      Fax:  [phone]   Reason for request: continuity of care   Information to be released:    [  ] LAST COLONOSCOPY,  including any PATH REPORT and follow-up  [  ] LAST FIT/COLOGUARD RESULT [  ] LAST DEXA  [  ] LAST MAMMOGRAM  [  ] LAST PAP  [  ] LAST LABS [x ] RETINA EXAM REPORT  [  ] IMMUNIZATION RECORDS  [  ] Release all info      [  ] Check here and initial the line next to each item to release ALL health information INCLUDING  _____ Care and treatment for drug and / or alcohol abuse  _____ HIV testing, infection status, or AIDS  _____ Genetic Testing    DATES OF SERVICE OR TIME PERIOD TO BE DISCLOSED: _____________  I understand and acknowledge that:  * This Authorization may be revoked at any time by you in writing, except if your health information has already been used or disclosed.  * Your health information that will be used or disclosed as a result of you signing this authorization could be re-disclosed by the recipient. If this occurs, your re-disclosed health information may no longer be protected by State or Federal laws.  * You may refuse to sign this Authorization. Your refusal will not affect your ability to obtain treatment.  * This Authorization becomes effective upon signing and will  on (date) __________.      If no date is indicated, this Authorization will  one (1) year from the signature date.    Name: Mónica Roy Bossman    Signature:   Date:     7/17/2019       PLEASE FAX REQUESTED RECORDS BACK TO: [phone]

## 2019-07-17 NOTE — TELEPHONE ENCOUNTER
Phone Number Called: [phone] (home) [phone] (work)      Call outcome: left message for patient to call back regarding message below    Message: Told Pt that I misunderstood when making her appt and that she only needs to see Grace for the future appt we made together. I said that we have changed her appt time from 1 to 1:20PM on 11/6 and to call if she has any further questions.

## 2019-07-22 ENCOUNTER — HOSPITAL ENCOUNTER (OUTPATIENT)
Dept: LAB | Facility: MEDICAL CENTER | Age: 49
End: 2019-07-22
Attending: INTERNAL MEDICINE
Payer: COMMERCIAL

## 2019-07-22 ENCOUNTER — HOSPITAL ENCOUNTER (OUTPATIENT)
Dept: LAB | Facility: MEDICAL CENTER | Age: 49
End: 2019-07-22
Attending: PHYSICIAN ASSISTANT
Payer: COMMERCIAL

## 2019-07-22 ENCOUNTER — OFFICE VISIT (OUTPATIENT)
Dept: HEMATOLOGY ONCOLOGY | Facility: MEDICAL CENTER | Age: 49
End: 2019-07-22
Payer: COMMERCIAL

## 2019-07-22 VITALS
DIASTOLIC BLOOD PRESSURE: 68 MMHG | SYSTOLIC BLOOD PRESSURE: 124 MMHG | TEMPERATURE: 96.7 F | HEART RATE: 100 BPM | WEIGHT: 176.92 LBS | RESPIRATION RATE: 18 BRPM | OXYGEN SATURATION: 95 % | BODY MASS INDEX: 27.71 KG/M2

## 2019-07-22 DIAGNOSIS — E11.9 TYPE 2 DIABETES MELLITUS WITHOUT COMPLICATION, WITHOUT LONG-TERM CURRENT USE OF INSULIN (HCC): ICD-10-CM

## 2019-07-22 DIAGNOSIS — E83.110 HEREDITARY HEMOCHROMATOSIS (HCC): ICD-10-CM

## 2019-07-22 DIAGNOSIS — E83.119 HEMOCHROMATOSIS, UNSPECIFIED HEMOCHROMATOSIS TYPE: ICD-10-CM

## 2019-07-22 DIAGNOSIS — R74.01 TRANSAMINITIS: ICD-10-CM

## 2019-07-22 LAB
ANISOCYTOSIS BLD QL SMEAR: ABNORMAL
BASOPHILS # BLD AUTO: 0.6 % (ref 0–1.8)
BASOPHILS # BLD: 0.03 K/UL (ref 0–0.12)
BURR CELLS BLD QL SMEAR: NORMAL
COMMENT 1642: NORMAL
EOSINOPHIL # BLD AUTO: 0.07 K/UL (ref 0–0.51)
EOSINOPHIL NFR BLD: 1.4 % (ref 0–6.9)
ERYTHROCYTE [DISTWIDTH] IN BLOOD BY AUTOMATED COUNT: 46.1 FL (ref 35.9–50)
FERRITIN SERPL-MCNC: 5.3 NG/ML (ref 10–291)
HBV CORE AB SERPL QL IA: NEGATIVE
HBV SURFACE AB SERPL IA-ACNC: 3.93 MIU/ML (ref 0–10)
HBV SURFACE AG SER QL: NEGATIVE
HCT VFR BLD AUTO: 37.4 % (ref 37–47)
HCV AB SER QL: NEGATIVE
HGB BLD-MCNC: 10.9 G/DL (ref 12–16)
HYPOCHROMIA BLD QL SMEAR: ABNORMAL
IMM GRANULOCYTES # BLD AUTO: 0.01 K/UL (ref 0–0.11)
IMM GRANULOCYTES NFR BLD AUTO: 0.2 % (ref 0–0.9)
IRON SATN MFR SERPL: 3 % (ref 15–55)
IRON SERPL-MCNC: 20 UG/DL (ref 40–170)
LG PLATELETS BLD QL SMEAR: NORMAL
LYMPHOCYTES # BLD AUTO: 1.86 K/UL (ref 1–4.8)
LYMPHOCYTES NFR BLD: 37 % (ref 22–41)
MCH RBC QN AUTO: 21.9 PG (ref 27–33)
MCHC RBC AUTO-ENTMCNC: 29.1 G/DL (ref 33.6–35)
MCV RBC AUTO: 75.3 FL (ref 81.4–97.8)
MICROCYTES BLD QL SMEAR: ABNORMAL
MONOCYTES # BLD AUTO: 0.33 K/UL (ref 0–0.85)
MONOCYTES NFR BLD AUTO: 6.6 % (ref 0–13.4)
MORPHOLOGY BLD-IMP: NORMAL
NEUTROPHILS # BLD AUTO: 2.73 K/UL (ref 2–7.15)
NEUTROPHILS NFR BLD: 54.2 % (ref 44–72)
NRBC # BLD AUTO: 0 K/UL
NRBC BLD-RTO: 0 /100 WBC
OVALOCYTES BLD QL SMEAR: NORMAL
PLATELET # BLD AUTO: 246 K/UL (ref 164–446)
PLATELET BLD QL SMEAR: NORMAL
PMV BLD AUTO: 10.6 FL (ref 9–12.9)
POIKILOCYTOSIS BLD QL SMEAR: NORMAL
POLYCHROMASIA BLD QL SMEAR: NORMAL
RBC # BLD AUTO: 4.97 M/UL (ref 4.2–5.4)
RBC BLD AUTO: PRESENT
TIBC SERPL-MCNC: 585 UG/DL (ref 250–450)
WBC # BLD AUTO: 5 K/UL (ref 4.8–10.8)

## 2019-07-22 PROCEDURE — 87340 HEPATITIS B SURFACE AG IA: CPT

## 2019-07-22 PROCEDURE — 83540 ASSAY OF IRON: CPT

## 2019-07-22 PROCEDURE — 82728 ASSAY OF FERRITIN: CPT

## 2019-07-22 PROCEDURE — 86706 HEP B SURFACE ANTIBODY: CPT

## 2019-07-22 PROCEDURE — 86803 HEPATITIS C AB TEST: CPT

## 2019-07-22 PROCEDURE — 86704 HEP B CORE ANTIBODY TOTAL: CPT

## 2019-07-22 PROCEDURE — 85025 COMPLETE CBC W/AUTO DIFF WBC: CPT

## 2019-07-22 PROCEDURE — 83550 IRON BINDING TEST: CPT

## 2019-07-22 PROCEDURE — 36415 COLL VENOUS BLD VENIPUNCTURE: CPT

## 2019-07-22 PROCEDURE — 99212 OFFICE O/P EST SF 10 MIN: CPT | Performed by: INTERNAL MEDICINE

## 2019-07-22 ASSESSMENT — PAIN SCALES - GENERAL: PAINLEVEL: NO PAIN

## 2019-07-23 DIAGNOSIS — E83.110 HEREDITARY HEMOCHROMATOSIS (HCC): Primary | ICD-10-CM

## 2019-07-24 ENCOUNTER — TELEPHONE (OUTPATIENT)
Dept: MEDICAL GROUP | Facility: PHYSICIAN GROUP | Age: 49
End: 2019-07-24

## 2019-07-24 NOTE — TELEPHONE ENCOUNTER
----- Message from Grace Riddle P.A.-C. sent at 7/23/2019  6:01 PM PDT -----  Please call patient. I have reviewed patient's lab work and results are negative for hep C and hep B.  Patient has never been vaccinated for hep B.  We can discuss during follow-up appointment.    Thank you,    Grisel HELTON

## 2019-07-24 NOTE — TELEPHONE ENCOUNTER
Phone Number Called: [phone] (home)   Call outcome: left message for patient to call back regarding message below    Message: Left message for patient to call back for lab results. Patient has appointment 7/25/2019 to discuss

## 2019-07-25 ENCOUNTER — HOSPITAL ENCOUNTER (OUTPATIENT)
Dept: RADIOLOGY | Facility: MEDICAL CENTER | Age: 49
End: 2019-07-25
Attending: PHYSICIAN ASSISTANT
Payer: COMMERCIAL

## 2019-07-25 ENCOUNTER — TELEPHONE (OUTPATIENT)
Dept: HEMATOLOGY ONCOLOGY | Facility: MEDICAL CENTER | Age: 49
End: 2019-07-25

## 2019-07-25 DIAGNOSIS — R74.01 TRANSAMINITIS: ICD-10-CM

## 2019-07-25 PROCEDURE — 76705 ECHO EXAM OF ABDOMEN: CPT

## 2019-07-25 NOTE — TELEPHONE ENCOUNTER
Patient called she stated that she would like to ask some questions in regards to her imaging results?  I also asked her if Dr. Lawrence is not able to call her if a nurse called would that be okay patient states yes that would be fine.  We may contact Mónica at [phone].

## 2019-07-25 NOTE — TELEPHONE ENCOUNTER
"Returned pt's call to find out which test results she had questions about.  Pt stated that she wanted to know why her blood counts were so low.  Explained to pt that there were no significant changes since the last blood test even though the flags were slightly different.  Also explained that when the number are near the threshold, small changes can then become \"flagged\" values.  Pt verbalized understanding and stated that she had no additional questions.  "

## 2019-07-26 NOTE — TELEPHONE ENCOUNTER
"After review with Dr. Lawrence, called pt back to give additional information and explanation re \"flagged\" values.  Explained that those flags are low because she is now iron deficient which is the goal in patients with hemochromatosis.  Pt indicated she was grateful for the additional explanation and stated no further questions.  "

## 2019-08-28 ENCOUNTER — NON-PROVIDER VISIT (OUTPATIENT)
Dept: MEDICAL GROUP | Facility: MEDICAL CENTER | Age: 49
End: 2019-08-28
Payer: COMMERCIAL

## 2019-08-28 DIAGNOSIS — E11.9 TYPE 2 DIABETES MELLITUS WITHOUT COMPLICATION, WITHOUT LONG-TERM CURRENT USE OF INSULIN (HCC): ICD-10-CM

## 2019-08-28 PROCEDURE — G0109 DIAB MANAGE TRN IND/GROUP: HCPCS | Performed by: INTERNAL MEDICINE

## 2019-08-28 NOTE — PROGRESS NOTES
Jessica a 49 year old female with a new diagnosis of type 2 diabetes.  She attended the first part of the Diabetes Self Management Class today.   She is currently on Metformin 1000 mg bid with meals  Exercise is variable.     Diabetes Education Content    Introduction To Diabetes   Define Type 2 diabetes: Education taught  Define Type 1 diabetes: Education taught  Understand feaures and benefits of education and management: Education taught  Describe who is responsible for diabetes management: Education taught  Describe impact of diabetes on family/friends: Education taught  Discuss role of significant other in management: Education taught  Diabetes Lifestyle Changes / Goals  State benefits of making appropriate lifestyle changes: Education taught  Identify lifestyle behaviors participant wants to change: Education taught  Identify risk factors that interfere with health and strategies to reduce : Education taught  Verbalize need for and frequency of health care follow-up: Education taught  Develop behavioral objectives and expected health outcomes: Education taught  Diabetes Exercise and Activity  Describe role of exercise in diabetes management: Education taught  State relationship of exercise to blood sugar: Education taught  State the benefits/risk(s) of exercise and precautions to follow: Education taught  Diabetes Self Blood Glucose Monitoring  Discuss rationale and importance of SBGM: Education taught  Discuss appropriate record keeping: Education taught  Discuss how to use results from blood glucose testing: Education taught  Evaluation and interpretation of blood glucose patterns: Education taught  Diabetes Disease Process  Discuss signs, symptoms, TX and prevention of hyperglycemia: Education taught  Discuss beta cell dysfunction and insulin resistance: Education taught  Discuss Insulin and its role in the body: Education taught  Discuss the role of the liver in glucose metabolism: Education taught  Discuss  "hormonal regulation: Education taught  Define benefits of good control and discuss what it means to be in \"good control\": Education taught  Discuss impact of exercise, food, meds, stress, and special factors on diabetes: Education taught  Discuss when to confer with HCP for possible treatment plan adjustments: Education taught  Diabetes Insulin and Medications  Action of oral medications, onset and duration: Education taught  Discuss incretin secretagogs and their use in diabetes management: Education taught  Identify the onset, peak and duration of different insulin: Education taught  Discuss proper injection technique and site rotation: Education taught  Discuss storage of insulin and disposal of sharps: Education taught  Hypoglycemia  List signs, symptoms and causes of hypoglycemia: Education taught  Discuss physiology of hypoglycemic reactions: Education taught  Accurately describe appropriate treatment and prevention: Education taught  Discuss when to contact HCP: Education taught  Sick Day Care  Verbalize important items to monitor when sick and when to contact HCP: Education taught  Review sick day box: Education taught  State diabetes medication adjustments on sick days: Education taught  Complications (Chronic)  Explain prevention, TX, signs/symptoms of: retinopathy, neuropathy, nephropathy, infections: Education taught  Explain prevention, TX, signs/symptoms of: CAD, cerebral-vascular disease and sexual dysfunction: Education taught  Identify when to notify HCP of complications: Education taught  State principles of skin, dental and foot care.  Discuss proper foot care, prevention of foot probelms when to notify HCP>: Education taught  Demonstrate how to examine feet and what to look for: Education taught  Discuss immunizations: Education taught  Psychosocial adjustment  Identify sources of stress: Education taught  Identify resources and techniques for stress reduction: Education taught  Discuss health " care referral network / community resources for support: Education taught  Define proper precautions to use when traveling: Education taught     Define proper precautions to use for emergency preparedness: Education taught  Discuss emergency preparedness with diabetes: Education taught

## 2019-08-28 NOTE — LETTER
RE: Mónica ELDRIDGE 1970    Dear: Grace Riddle PA-c    The above referenced patient received 2.5 hours of diabetes education from [facility].    Topics taught (may include but not limited to):  Introduction to diabetes, benefits and responsibilities of patient, physiology of diabetes and the diease process, benefits of blood glucose monitoring and record keeping, medication action and possible side effects, hypoglycemia, sick day management, exercise, stress reduction, disaster preparedness and travel with diabetes.     Provided meter and instructed in use: no.  Patient currently testing his/her blood sugarsyes    Dilated eye exam within the past year: yes Goal is for patient to have yearly.   Dental exam within the past year yes   Monofilament exam performed by HCP in the past year yes .  Patient currently checking their feet daily yes   Most recent A1c 6.6 dated on 07/02/2019, goal is for A1c to be less than 7  Current Medications used to manage diabetes  Metformin 1000 mg bid  Patient currently exercisingyes Goal is for 30 minutes per day or 150 minutes per week.   Patient made any adjustments to eating since diagnosis of diabetes  yes   Eating less sweets, tends to graze throughout the day  Patient will follow up with dietitian for further meal planning on 08/30/2019    Patient/caregiver appeared to understand the content as demonstrated by appropriate questions.         Thank you for allowing me to participate in this patients care.   Brigette Sandoval RN, CDE  Diabetes Nurse Specialist

## 2019-08-30 ENCOUNTER — NON-PROVIDER VISIT (OUTPATIENT)
Dept: MEDICAL GROUP | Facility: MEDICAL CENTER | Age: 49
End: 2019-08-30
Payer: COMMERCIAL

## 2019-08-30 DIAGNOSIS — E11.9 TYPE 2 DIABETES MELLITUS WITHOUT COMPLICATION, WITHOUT LONG-TERM CURRENT USE OF INSULIN (HCC): ICD-10-CM

## 2019-08-30 PROCEDURE — G0109 DIAB MANAGE TRN IND/GROUP: HCPCS | Performed by: INTERNAL MEDICINE

## 2019-08-30 NOTE — PROGRESS NOTES
8/30/2019    Grace Riddle P.A.-C.  49 y.o.   Time in/out: 900 - 1107    Subjective:  -Here for day 2 of type 2 class    Nutrition Diagnosis (PES Statement)    Altered nutrition-related lab values related to endocrine dysfunction as evidenced by HbA1c of 6.6%.    Biochemical data, medical test and procedures  Lab Results   Component Value Date/Time    HBA1C 6.6 (A) 07/02/2019 11:31 AM     Lab Results   Component Value Date/Time    CHOLSTRLTOT 115 06/18/2019 10:57 AM    LDL 45 06/18/2019 10:57 AM    HDL 61 06/18/2019 10:57 AM    TRIGLYCERIDE 44 06/18/2019 10:57 AM         Nutrition Intervention  Meal and Snack  Recommend a general/healthful diet    Comprehensive Nutrition education Instruction or training leading to in-depth nutrition related knowledge about:  Benefits to following meal plan, Combine carb, protein and fat at each meal, Eating out, Fast food, Meal timing and spacing, Menu Planning, Metabolism of carb, protein, fat, Physical activity/exercise, Portion control, Sweets and alcohol in moderation, Heart-healthy guidelines, Increasing/Decreasing PO intake, Label Reading and Handouts provided regarding topics discussed    Monitoring & Evaluation Plan  Behavioral-Environmental:  Behavior:  Consistent CHO intake throughout the day  Physical activity:  Increase as tolerated    Food / Nutrient Intake:  Food intake:  Use the plate method recommendations for portions/balance at meals  Macronutrient intake:  Up to 45 grams CHO with meals, 15-20 grams CHO with snacks    Physical Signs / Symptoms:  HbA1c profiles:  Within ADA guidelines      Assessment Notes:  Mónica attended day 2 of the Type 2 class today.  She was taught that exercise works as a medication for controlling DM.  Different exercises were shown that can be done easily at home, like walking in place, lifting light weights while sitting, etc.  It was emphasized that if exercise is a consistent part of Mónica’s habits that DM control will be the most  ideal it can be.     Food was then discussed next, with a brief review of Mónica’s current eating habits.  She was taught the differences between CHO/protein/fat and their effects on BG’s.  This information was related to the plate method to help with meal planning/portions at meals; she was also taught to use their hands as measuring tools (fist for starch, palm for protein) to help when eating out or on a large plate.  Non-starchy vegetables were emphasized as foods that will help satisfy without raising BG’s at meals and snacks.  I stressed to the patient to not go more than 4 hours without eating and if a snack is necessary she was taught how to construct a proper snack of ~15 grams CHO and ~7 grams protein.  Finally, we moved onto the food label and how to effectively read a label using serving size, trans fat, total CHO, and % sodium to evaluate a food.  An alternative way of meal planning was given by using the food label and CHO counting; the patient can eat up to 45 grams CHO at meals and 15-20 grams CHO at snacks, if needed.  Mónica set goals to try to achieve in the next 3 months to help with DM control; she can follow-up with me, if needed, for a more intensive meal plan and CHO counting education.  F/u prn.

## 2019-08-30 NOTE — LETTER
Grace Riddle,    Your patient, Mónica Dunlap, was seen August 30, 2019 for 2 hours regarding nutrition/exercise recommendations for diabetes as part of day 2 of our Type 2 diabetes management class.  She was taught the following information:     -The basics of nutrition  -The plate method for portion control (¼ plate starch, ¼ plate protein, ½ plate non-starchy vegetables)  -Appropriate snacking recommendations  -Heart-healthy eating, including controlling/managing/improving hyperlipidemia and HTN  -Food label reading, including CHO counting  -Exercise recommendations of at least 150 minutes/week of moderate-intensity exercise on a minimum of 3 days each week    Mónica was encouraged to use community resources to help improve their glycemic control and given a number of resources available to her.  She was also given our phone number in the case of any question that may arise.      Thank you for your referral for this patient.  Feel free to contact us with any questions you may have at [phone].    Sincerely,    ALVARO Smiley Jr, GUSTAVO, LD, CDE  Outpatient Dietary Educator  [facility]

## 2019-10-22 RX ORDER — LISINOPRIL 20 MG/1
10 TABLET ORAL DAILY
Qty: 90 TAB | Refills: 1 | Status: SHIPPED | OUTPATIENT
Start: 2019-10-22 | End: 2020-10-22 | Stop reason: SDUPTHER

## 2019-11-06 ENCOUNTER — OFFICE VISIT (OUTPATIENT)
Dept: MEDICAL GROUP | Facility: PHYSICIAN GROUP | Age: 49
End: 2019-11-06
Payer: COMMERCIAL

## 2019-11-06 ENCOUNTER — HOSPITAL ENCOUNTER (OUTPATIENT)
Facility: MEDICAL CENTER | Age: 49
End: 2019-11-06
Attending: PHYSICIAN ASSISTANT
Payer: COMMERCIAL

## 2019-11-06 VITALS
HEIGHT: 67 IN | BODY MASS INDEX: 26.81 KG/M2 | WEIGHT: 170.8 LBS | HEART RATE: 96 BPM | SYSTOLIC BLOOD PRESSURE: 122 MMHG | RESPIRATION RATE: 16 BRPM | TEMPERATURE: 97 F | OXYGEN SATURATION: 98 % | DIASTOLIC BLOOD PRESSURE: 82 MMHG

## 2019-11-06 DIAGNOSIS — K21.9 GASTROESOPHAGEAL REFLUX DISEASE WITHOUT ESOPHAGITIS: ICD-10-CM

## 2019-11-06 DIAGNOSIS — E11.9 TYPE 2 DIABETES MELLITUS WITHOUT COMPLICATION, WITHOUT LONG-TERM CURRENT USE OF INSULIN (HCC): ICD-10-CM

## 2019-11-06 LAB
HBA1C MFR BLD: 5.5 % (ref 0–5.6)
INT CON NEG: NEGATIVE
INT CON POS: POSITIVE

## 2019-11-06 PROCEDURE — 83036 HEMOGLOBIN GLYCOSYLATED A1C: CPT | Performed by: PHYSICIAN ASSISTANT

## 2019-11-06 PROCEDURE — 82043 UR ALBUMIN QUANTITATIVE: CPT

## 2019-11-06 PROCEDURE — 99213 OFFICE O/P EST LOW 20 MIN: CPT | Performed by: PHYSICIAN ASSISTANT

## 2019-11-06 PROCEDURE — 82570 ASSAY OF URINE CREATININE: CPT

## 2019-11-06 RX ORDER — DEXTROAMPHETAMINE SACCHARATE, AMPHETAMINE ASPARTATE, DEXTROAMPHETAMINE SULFATE AND AMPHETAMINE SULFATE 5; 5; 5; 5 MG/1; MG/1; MG/1; MG/1
1 TABLET ORAL DAILY
COMMUNITY
End: 2020-05-07

## 2019-11-06 RX ORDER — LORAZEPAM 0.5 MG/1
0.5 TABLET ORAL EVERY 8 HOURS PRN
Refills: 2 | COMMUNITY
Start: 2019-08-21 | End: 2022-07-25

## 2019-11-06 RX ORDER — OMEPRAZOLE 20 MG/1
20 CAPSULE, DELAYED RELEASE ORAL DAILY
Qty: 30 CAP | Refills: 11 | Status: SHIPPED | OUTPATIENT
Start: 2019-11-06 | End: 2020-10-05

## 2019-11-06 RX ORDER — BUPROPION HYDROCHLORIDE 150 MG/1
1 TABLET, EXTENDED RELEASE ORAL DAILY
COMMUNITY
End: 2021-12-10

## 2019-11-06 NOTE — PROGRESS NOTES
Chief Complaint   Patient presents with   • Diabetes     follow up       HISTORY OF PRESENT ILLNESS: Mónica Keita is an established 49 y.o. female here to discuss the evaluation and management of:    Type 2 diabetes mellitus without complication, without long-term current use of insulin ([facility])  Chronic with stable problem.  Patient is currently taking 2, 500 mg metformin tablets by mouth twice a day.  She tells me she is compliant with medication and experiences no side effects or complications medication.  States since her last appointment she has been working on her diet and and exercise routine.  States on average she walks 1 mile 5 days a week.  States she is also been jumping on a trampoline and walking the steps at her work.  States when she walks the stairs she will do calf raises.  States she is decrease sugar consumption and carbohydrate consumption.  Patient has lost 6 lbs since her last appointment on 7/22/2019.  She tells me that she is also taking lisinopril and aspirin as prescribed.  Denies dry cough.  Denies polyuria, polydipsia, poor wound healing, vision changes.  Patient did not complete micro-BUN/creatinine ratio lab work or hemoglobin A1c lab work prior to today's appointment.  POCT hemoglobin will be completed during today's appointment.    Gastroesophageal reflux disease without esophagitis  Chronic but stable problem.  Patient currently takes Prilosec 20 mg delayed release capsule once daily.  States he is compliant with medication and experience is no side effects or complications medication.  States medication manages symptoms.  Patient is requesting refill.  Denies dry cough, hoarseness of voice, pain with swelling, hemoptysis, nausea, vomiting, abdominal pain.      Patient Active Problem List    Diagnosis Date Noted   • Type 2 diabetes mellitus without complication, without long-term current use of insulin (H[facility]) 07/17/2019   • Hypertension 06/23/2019   • Anxiety 06/12/2019   •  Hemochromatosis 12/15/2017       Allergies:Patient has no known allergies.    Current Outpatient Medications   Medication Sig Dispense Refill   • omeprazole (PRILOSEC) 20 MG delayed-release capsule Take 1 Cap by mouth every day. 30 Cap 11   • amphetamine-dextroamphetamine (ADDERALL) 20 MG Tab Take 1 tablet by mouth every day.     • buPROPion SR (WELLBUTRIN-SR) 150 MG TABLET SR 12 HR sustained-release tablet Take 1 Tab by mouth every day.     • Cholecalciferol (VITAMIN D3) 5000 UNIT/0.5ML Liquid Take 1 Tab by mouth every day.     • Coenzyme Q10 (COQ10 PO) Take 1 Tab by mouth every day.     • LORazepam (ATIVAN) 0.5 MG Tab   2   • metFORMIN (GLUCOPHAGE) 500 MG Tab TAKE TWO TABLETS BY MOUTH TWICE DAILY WITH FOOD 180 Tab 2   • lisinopril (PRINIVIL) 20 MG Tab Take 0.5 Tabs by mouth every day. 90 Tab 1   • Multiple Vitamin (CALCIUM COMPLEX PO) Take  by mouth.     • MILK THISTLE PO Take  by mouth.     • Turmeric 500 MG Cap Take  by mouth.     • Green Tea, Camillia sinensis, (GREEN TEA PO) Take  by mouth.     • Biotin 5000 MCG Cap Take  by mouth.     • aspirin (ASA) 81 MG Chew Tab chewable tablet Take 81 mg by mouth every day.     • escitalopram (LEXAPRO) 10 MG TABS Take 40 mg by mouth every day.       No current facility-administered medications for this visit.        Social History     Tobacco Use   • Smoking status: Never Smoker   • Smokeless tobacco: Never Used   Substance Use Topics   • Alcohol use: No   • Drug use: No       Family Status   Relation Name Status   • Mo  Alive   • Fa  Alive   • Sis  Alive   • Bro  Alive   • Son  Alive   • Tyree  Alive     Family History   Problem Relation Age of Onset   • Heart Disease Father    • No Known Problems Sister    • Heart Attack Brother         CAD. 3 MI's.    • No Known Problems Son    • No Known Problems Daughter        ROS:  Review of Systems   Constitutional: Negative for fever, chills, weight loss and malaise/fatigue.   HENT: Negative for ear pain, nosebleeds, congestion,  "sore throat and neck pain.    Eyes: Negative for blurred vision.   Respiratory: Negative for cough, sputum production, shortness of breath and wheezing.    Cardiovascular: Negative for chest pain, palpitations, orthopnea and leg swelling.   Gastrointestinal: Negative for heartburn, nausea, vomiting and abdominal pain.   Genitourinary: Negative for dysuria, urgency and frequency.   Musculoskeletal: Negative for myalgias, back pain and joint pain.   Skin: Negative for rash and itching.   Neurological: Negative for dizziness, tingling, tremors, sensory change, focal weakness and headaches.   Endo/Heme/Allergies: Does not bruise/bleed easily.   Psychiatric/Behavioral: Negative for depression, suicidal ideas and memory loss.  The patient is not nervous/anxious and does not have insomnia.    All other systems reviewed and are negative except as in HPI.    Exam: /82 (BP Location: Left arm, Patient Position: Sitting)   Pulse 96   Temp 36.1 °C (97 °F) (Temporal)   Resp 16   Ht 1.702 m (5' 7\")   Wt 77.5 kg (170 lb 12.8 oz)   SpO2 98%  Body mass index is 26.75 kg/m².  General: Normal appearing. No distress.  HEENT: Normocephalic. Eyes conjunctiva clear lids without ptosis, ears normal shape and contour.  Neck: Supple without JVD. Thyroid is not enlarged.  Pulmonary: Clear to ausculation.  Normal effort. No rales, ronchi, or wheezing.  Cardiovascular: Regular rate and rhythm without murmur.   Abdomen:  nondistended.  Neurologic: Grossly nonfocal.  Cranial nerves are normal.   Skin: Warm and dry.  No rashes or suspicious skin lesions.  Musculoskeletal: Normal gait. No extremity cyanosis, clubbing, or edema.  Psych: Normal mood and affect. Alert and oriented x3. Judgment and insight is normal.    Medical decision-making and discussion:  1. Type 2 diabetes mellitus without complication, without long-term current use of insulin ([facility])  POCT hemoglobin A1c: 5.5.    Patient's hemoglobin A1c results are trending " downward.  Diabetes currently controlled. Continue current medications. Patient also taking ACE-I and ASA as instructed. Labwork as indicated. Diabetic foot exam and eye exams up to date.  She will follow-up in 6 months for reevaluation.  She will complete lab work 1 week prior to follow-up appointment so we can discuss during her follow-up appointment.    Advised patient to continue working on diet and exercise.    - POCT Hemoglobin A1C  - Comp Metabolic Panel; Future  - Lipid Profile; Future  - HEMOGLOBIN A1C; Future    2. Gastroesophageal reflux disease without esophagitis  This is a chronic and stable problem. Patient is doing well. No red flags. Continue PPI and monitor.    - omeprazole (PRILOSEC) 20 MG delayed-release capsule; Take 1 Cap by mouth every day.  Dispense: 30 Cap; Refill: 11      Please note that this dictation was created using voice recognition software. I have made every reasonable attempt to correct obvious errors, but I expect that there are errors of grammar and possibly content that I did not discover before finalizing the note.    Assessment/Plan:  1. Type 2 diabetes mellitus without complication, without long-term current use of insulin ([facility])  POCT Hemoglobin A1C    Comp Metabolic Panel    Lipid Profile    HEMOGLOBIN A1C   2. Gastroesophageal reflux disease without esophagitis  omeprazole (PRILOSEC) 20 MG delayed-release capsule       No follow-ups on file.

## 2019-11-07 LAB
CREAT UR-MCNC: 50.4 MG/DL
MICROALBUMIN UR-MCNC: <0.7 MG/DL
MICROALBUMIN/CREAT UR: NORMAL MG/G (ref 0–30)

## 2019-12-19 ENCOUNTER — OFFICE VISIT (OUTPATIENT)
Dept: MEDICAL GROUP | Facility: PHYSICIAN GROUP | Age: 49
End: 2019-12-19
Payer: COMMERCIAL

## 2019-12-19 VITALS
TEMPERATURE: 98.1 F | RESPIRATION RATE: 16 BRPM | HEART RATE: 84 BPM | HEIGHT: 67 IN | OXYGEN SATURATION: 98 % | WEIGHT: 170.8 LBS | BODY MASS INDEX: 26.81 KG/M2 | DIASTOLIC BLOOD PRESSURE: 72 MMHG | SYSTOLIC BLOOD PRESSURE: 116 MMHG

## 2019-12-19 DIAGNOSIS — M77.12 LATERAL EPICONDYLITIS OF LEFT ELBOW: ICD-10-CM

## 2019-12-19 PROCEDURE — 99214 OFFICE O/P EST MOD 30 MIN: CPT | Performed by: PHYSICIAN ASSISTANT

## 2019-12-23 RX ORDER — MELOXICAM 15 MG/1
15 TABLET ORAL DAILY
Qty: 30 TAB | Refills: 0 | Status: SHIPPED | OUTPATIENT
Start: 2019-12-23 | End: 2020-05-07

## 2019-12-24 NOTE — PROGRESS NOTES
Chief Complaint   Patient presents with   • Elbow Pain     L,pt states hurt when  objects x few months       HISTORY OF PRESENT ILLNESS: Mónica Keita is an established 49 y.o. female here to discuss the evaluation and management of:    Neck problem.  Patient states for the past few months she has been experiencing left elbow pain.  States for the past 1+ months pain symptoms have become constant.  Describes pain as an aching pain she tells me lateral epicondyle is slightly tender to palpation.  Activity makes symptoms worse.  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  States she is tried over-the-counter creams and a counterforce brace with no improvement of symptoms.  States ibuprofen does not help.  States she has been doing some stretches for her elbow with no improvement of symptoms.  Patient is inquiring what treatment options.      Patient Active Problem List    Diagnosis Date Noted   • Type 2 diabetes mellitus without complication, without long-term current use of insulin ([facility]) 07/17/2019   • Hypertension 06/23/2019   • Anxiety 06/12/2019   • Hemochromatosis 12/15/2017       Allergies:Patient has no known allergies.    Current Outpatient Medications   Medication Sig Dispense Refill   • Diclofenac Sodium 1 % Gel Apply 2 g of 1% gel to affected area 4 times daily (maximum: 8 g per joint per day) 1 Tube 2   • meloxicam (MOBIC) 15 MG tablet Take 1 Tab by mouth every day. 30 Tab 0   • amphetamine-dextroamphetamine (ADDERALL) 20 MG Tab Take 1 tablet by mouth every day.     • buPROPion SR (WELLBUTRIN-SR) 150 MG TABLET SR 12 HR sustained-release tablet Take 1 Tab by mouth every day.     • omeprazole (PRILOSEC) 20 MG delayed-release capsule Take 1 Cap by mouth every day. 30 Cap 11   • Cholecalciferol (VITAMIN D3) 5000 UNIT/0.5ML Liquid Take 1 Tab by mouth every day.     • Coenzyme Q10 (COQ10 PO) Take 1 Tab by mouth every day.     • LORazepam  (ATIVAN) 0.5 MG Tab   2   • metFORMIN (GLUCOPHAGE) 500 MG Tab TAKE TWO TABLETS BY MOUTH TWICE DAILY WITH FOOD 180 Tab 2   • lisinopril (PRINIVIL) 20 MG Tab Take 0.5 Tabs by mouth every day. 90 Tab 1   • Multiple Vitamin (CALCIUM COMPLEX PO) Take  by mouth.     • MILK THISTLE PO Take  by mouth.     • Turmeric 500 MG Cap Take  by mouth.     • Green Tea, Camillia sinensis, (GREEN TEA PO) Take  by mouth.     • Biotin 5000 MCG Cap Take  by mouth.     • aspirin (ASA) 81 MG Chew Tab chewable tablet Take 81 mg by mouth every day.     • escitalopram (LEXAPRO) 10 MG TABS Take 40 mg by mouth every day.       No current facility-administered medications for this visit.        Social History     Tobacco Use   • Smoking status: Never Smoker   • Smokeless tobacco: Never Used   Substance Use Topics   • Alcohol use: No   • Drug use: No       Family Status   Relation Name Status   • Mo  Alive   • Fa  Alive   • Sis  Alive   • Bro  Alive   • Son  Alive   • Tyree  Alive     Family History   Problem Relation Age of Onset   • Heart Disease Father    • No Known Problems Sister    • Heart Attack Brother         CAD. 3 MI's.    • No Known Problems Son    • No Known Problems Daughter        ROS:  Review of Systems   Constitutional: Negative for fever, chills, weight loss and malaise/fatigue.   HENT: Negative for ear pain, nosebleeds, congestion, sore throat and neck pain.    Eyes: Negative for blurred vision.   Respiratory: Negative for cough, sputum production, shortness of breath and wheezing.    Cardiovascular: Negative for chest pain, palpitations, orthopnea and leg swelling.   Gastrointestinal: Negative for heartburn, nausea, vomiting and abdominal pain.   Genitourinary: Negative for dysuria, urgency and frequency.   Musculoskeletal: Negative for myalgias, back pain.  Positive for left elbow pain.  Skin: Negative for rash and itching.   Neurological: Negative for dizziness, tingling, tremors, sensory change, focal weakness and headaches.  "  Endo/Heme/Allergies: Does not bruise/bleed easily.   Psychiatric/Behavioral: Negative for depression, suicidal ideas and memory loss.  The patient is not nervous/anxious and does not have insomnia.    All other systems reviewed and are negative except as in HPI.    Exam: /72 (BP Location: Left arm, Patient Position: Sitting)   Pulse 84   Temp 36.7 °C (98.1 °F) (Temporal)   Resp 16   Ht 1.702 m (5' 7\")   Wt 77.5 kg (170 lb 12.8 oz)   SpO2 98%  Body mass index is 26.75 kg/m².  General: Normal appearing. No distress.  HEENT: Normocephalic. Eyes conjunctiva clear lids without ptosis, ears normal shape and contour.  Neck: Supple without JVD. Thyroid is not enlarged.  Pulmonary: Clear to ausculation.  Normal effort. No rales, ronchi, or wheezing.  Cardiovascular: Regular rate and rhythm without murmur.   Abdomen: Soft, nontender, nondistended. Normal bowel sounds. Liver and spleen are not palpable  Neurologic: Grossly nonfocal.  Cranial nerves are normal.   Lymph: No cervical or supraclavicular lymph nodes are palpable  Skin: Warm and dry.  No rashes or suspicious skin lesions.  Musculoskeletal: Normal gait. No extremity cyanosis, clubbing, or edema.  Left lateral epicondyle region is tender to palpation.  Positive for pain with flexion.  No signs of trauma.  Psych: Normal mood and affect. Alert and oriented x3. Judgment and insight is normal.    Medical decision-making and discussion:   1. Lateral epicondylitis of left elbow  Patient has been referred to sports medicine for further evaluation.  Patient has been prescribed Mobic 50 mg tab advised take 1 tab by mouth every day for 2 weeks.  Advised patient take medication with food.  Discussed common side effects and adverse reactions of medication.  Do not use NSAIDs for chronic use.  Discussed side effects associated with chronic use of NSAIDs.  Topical diclofenac has also been prescribed.  Discussed comments effects and adverse reactions medication with " patient.   Patient with up-to-date eccentric exercises to do.  Suggested patient use a counterforce brace.  Avoid known triggers.    - REFERRAL TO SPORTS MEDICINE  - Diclofenac Sodium 1 % Gel; Apply 2 g of 1% gel to affected area 4 times daily (maximum: 8 g per joint per day)  Dispense: 1 Tube; Refill: 2  - meloxicam (MOBIC) 15 MG tablet; Take 1 Tab by mouth every day.  Dispense: 30 Tab; Refill: 0      Please note that this dictation was created using voice recognition software. I have made every reasonable attempt to correct obvious errors, but I expect that there are errors of grammar and possibly content that I did not discover before finalizing the note.    Assessment/Plan:  1. Lateral epicondylitis of left elbow  REFERRAL TO SPORTS MEDICINE    Diclofenac Sodium 1 % Gel    meloxicam (MOBIC) 15 MG tablet       No follow-ups on file.

## 2020-03-01 DIAGNOSIS — E11.42 TYPE 2 DIABETES MELLITUS WITH DIABETIC POLYNEUROPATHY, WITHOUT LONG-TERM CURRENT USE OF INSULIN (HCC): ICD-10-CM

## 2020-05-04 ENCOUNTER — HOSPITAL ENCOUNTER (OUTPATIENT)
Dept: LAB | Facility: MEDICAL CENTER | Age: 50
End: 2020-05-04
Attending: PHYSICIAN ASSISTANT
Payer: COMMERCIAL

## 2020-05-04 ENCOUNTER — HOSPITAL ENCOUNTER (OUTPATIENT)
Dept: LAB | Facility: MEDICAL CENTER | Age: 50
End: 2020-05-04
Attending: INTERNAL MEDICINE
Payer: COMMERCIAL

## 2020-05-04 DIAGNOSIS — E11.9 TYPE 2 DIABETES MELLITUS WITHOUT COMPLICATION, WITHOUT LONG-TERM CURRENT USE OF INSULIN (HCC): ICD-10-CM

## 2020-05-04 DIAGNOSIS — E83.110 HEREDITARY HEMOCHROMATOSIS (HCC): ICD-10-CM

## 2020-05-04 LAB
ALBUMIN SERPL BCP-MCNC: 4.2 G/DL (ref 3.2–4.9)
ALBUMIN/GLOB SERPL: 1.6 G/DL
ALP SERPL-CCNC: 48 U/L (ref 30–99)
ALT SERPL-CCNC: 70 U/L (ref 2–50)
ANION GAP SERPL CALC-SCNC: 11 MMOL/L (ref 7–16)
ANISOCYTOSIS BLD QL SMEAR: ABNORMAL
AST SERPL-CCNC: 56 U/L (ref 12–45)
BASOPHILS # BLD AUTO: 0.9 % (ref 0–1.8)
BASOPHILS # BLD: 0.05 K/UL (ref 0–0.12)
BILIRUB SERPL-MCNC: 0.3 MG/DL (ref 0.1–1.5)
BUN SERPL-MCNC: 8 MG/DL (ref 8–22)
CALCIUM SERPL-MCNC: 8.4 MG/DL (ref 8.5–10.5)
CHLORIDE SERPL-SCNC: 99 MMOL/L (ref 96–112)
CHOLEST SERPL-MCNC: 117 MG/DL (ref 100–199)
CO2 SERPL-SCNC: 26 MMOL/L (ref 20–33)
CREAT SERPL-MCNC: 0.71 MG/DL (ref 0.5–1.4)
EOSINOPHIL # BLD AUTO: 0.23 K/UL (ref 0–0.51)
EOSINOPHIL NFR BLD: 4.3 % (ref 0–6.9)
ERYTHROCYTE [DISTWIDTH] IN BLOOD BY AUTOMATED COUNT: 41.9 FL (ref 35.9–50)
EST. AVERAGE GLUCOSE BLD GHB EST-MCNC: 131 MG/DL
FERRITIN SERPL-MCNC: 8.7 NG/ML (ref 10–291)
GLOBULIN SER CALC-MCNC: 2.7 G/DL (ref 1.9–3.5)
GLUCOSE SERPL-MCNC: 87 MG/DL (ref 65–99)
HBA1C MFR BLD: 6.2 % (ref 0–5.6)
HCT VFR BLD AUTO: 37 % (ref 37–47)
HDLC SERPL-MCNC: 57 MG/DL
HGB BLD-MCNC: 10.9 G/DL (ref 12–16)
IRON SATN MFR SERPL: 8 % (ref 15–55)
IRON SERPL-MCNC: 40 UG/DL (ref 40–170)
LDLC SERPL CALC-MCNC: 52 MG/DL
LYMPHOCYTES # BLD AUTO: 1.53 K/UL (ref 1–4.8)
LYMPHOCYTES NFR BLD: 28.4 % (ref 22–41)
MANUAL DIFF BLD: NORMAL
MCH RBC QN AUTO: 21.8 PG (ref 27–33)
MCHC RBC AUTO-ENTMCNC: 29.5 G/DL (ref 33.6–35)
MCV RBC AUTO: 74.1 FL (ref 81.4–97.8)
MICROCYTES BLD QL SMEAR: ABNORMAL
MONOCYTES # BLD AUTO: 0.14 K/UL (ref 0–0.85)
MONOCYTES NFR BLD AUTO: 2.6 % (ref 0–13.4)
MORPHOLOGY BLD-IMP: NORMAL
NEUTROPHILS # BLD AUTO: 3.45 K/UL (ref 2–7.15)
NEUTROPHILS NFR BLD: 63.8 % (ref 44–72)
NRBC # BLD AUTO: 0 K/UL
NRBC BLD-RTO: 0 /100 WBC
PLATELET # BLD AUTO: 253 K/UL (ref 164–446)
PLATELET BLD QL SMEAR: NORMAL
PMV BLD AUTO: 9.7 FL (ref 9–12.9)
POIKILOCYTOSIS BLD QL SMEAR: NORMAL
POTASSIUM SERPL-SCNC: 4.1 MMOL/L (ref 3.6–5.5)
PROT SERPL-MCNC: 6.9 G/DL (ref 6–8.2)
RBC # BLD AUTO: 4.99 M/UL (ref 4.2–5.4)
RBC BLD AUTO: PRESENT
SODIUM SERPL-SCNC: 136 MMOL/L (ref 135–145)
TIBC SERPL-MCNC: 486 UG/DL (ref 250–450)
TRIGL SERPL-MCNC: 38 MG/DL (ref 0–149)
UIBC SERPL-MCNC: 446 UG/DL (ref 110–370)
WBC # BLD AUTO: 5.4 K/UL (ref 4.8–10.8)

## 2020-05-04 PROCEDURE — 82728 ASSAY OF FERRITIN: CPT

## 2020-05-04 PROCEDURE — 80053 COMPREHEN METABOLIC PANEL: CPT

## 2020-05-04 PROCEDURE — 85027 COMPLETE CBC AUTOMATED: CPT

## 2020-05-04 PROCEDURE — 83550 IRON BINDING TEST: CPT

## 2020-05-04 PROCEDURE — 83036 HEMOGLOBIN GLYCOSYLATED A1C: CPT

## 2020-05-04 PROCEDURE — 80061 LIPID PANEL: CPT

## 2020-05-04 PROCEDURE — 85007 BL SMEAR W/DIFF WBC COUNT: CPT

## 2020-05-04 PROCEDURE — 83540 ASSAY OF IRON: CPT

## 2020-05-07 ENCOUNTER — TELEMEDICINE (OUTPATIENT)
Dept: MEDICAL GROUP | Facility: PHYSICIAN GROUP | Age: 50
End: 2020-05-07
Payer: COMMERCIAL

## 2020-05-07 VITALS
HEIGHT: 67 IN | TEMPERATURE: 97.7 F | DIASTOLIC BLOOD PRESSURE: 96 MMHG | SYSTOLIC BLOOD PRESSURE: 134 MMHG | RESPIRATION RATE: 18 BRPM | BODY MASS INDEX: 26.37 KG/M2 | HEART RATE: 86 BPM | WEIGHT: 168 LBS

## 2020-05-07 DIAGNOSIS — I10 ESSENTIAL HYPERTENSION: ICD-10-CM

## 2020-05-07 DIAGNOSIS — E11.9 TYPE 2 DIABETES MELLITUS WITHOUT COMPLICATION, WITHOUT LONG-TERM CURRENT USE OF INSULIN (HCC): ICD-10-CM

## 2020-05-07 DIAGNOSIS — R74.01 TRANSAMINITIS: ICD-10-CM

## 2020-05-07 DIAGNOSIS — E83.110 HEREDITARY HEMOCHROMATOSIS (HCC): ICD-10-CM

## 2020-05-07 PROCEDURE — 99214 OFFICE O/P EST MOD 30 MIN: CPT | Mod: 95,CR | Performed by: PHYSICIAN ASSISTANT

## 2020-05-07 ASSESSMENT — FIBROSIS 4 INDEX: FIB4 SCORE: 1.27

## 2020-05-07 NOTE — PROGRESS NOTES
Telemedicine Visit: Established Patient     This encounter was conducted via Zoom .   Verbal consent was obtained. Patient's identity was verified.    Subjective:   CC: Lab work results and diabetes  Mónica Keita is a 50 y.o. female presenting for evaluation and management of:    Type 2 diabetes mellitus without complication, without long-term current use of insulin (HCC)  Reviewed patient's hemoglobin A1c results from 5/4/2020.  Hemoglobin A1c 6.2.  Discussed with patient hemoglobin A1c is trending upward.  She tells me that she feels her diet is healthy but feels that she could still make improvements.  She tells me that she is exercising daily and exercise routine consist of walking 1 mile per day.  Patient is currently taking metformin 500 mg twice daily.  She tells me that she is compliant with medication experiences no side effects or complications or medication.    Essential hypertension  Patient has been monitoring her blood pressure at home.  Today's reading 138/96 mmHg.  Patient's blood pressure monitor has not been evaluated.  Is currently taking lisinopril 10 mg once daily.  States she is compliant with medication experiences no side effects or complications of medication.  Denies dry cough.  Denies chest pain, shortness breath, heart palpitations, dizziness, syncope, severity, vision changes, peripheral edema.    Hereditary hemochromatosis (HCC)  Chronic but stable problem.  Reviewed patient's most recent lab work results.  Positive for possible anemia.  Advised patient to follow-up with hematology oncology.    Transaminitis  Chronic but stable problem.  Discussed with patient liver enzymes are trending downward.  Continue to monitor.  Patient is asymptomatic.      ROS   Denies any recent fevers or chills. No nausea or vomiting. No chest pains or shortness of breath.     No Known Allergies    Current medicines (including changes today)  Current Outpatient Medications   Medication Sig Dispense  Refill   • metFORMIN (GLUCOPHAGE) 500 MG Tab TAKE TWO TABLETS BY MOUTH TWICE A DAY WITH FOOD 180 Tab 0   • buPROPion SR (WELLBUTRIN-SR) 150 MG TABLET SR 12 HR sustained-release tablet Take 1 Tab by mouth every day.     • omeprazole (PRILOSEC) 20 MG delayed-release capsule Take 1 Cap by mouth every day. 30 Cap 11   • Cholecalciferol (VITAMIN D3) 5000 UNIT/0.5ML Liquid Take 1 Tab by mouth every day.     • Coenzyme Q10 (COQ10 PO) Take 1 Tab by mouth every day.     • LORazepam (ATIVAN) 0.5 MG Tab   2   • lisinopril (PRINIVIL) 20 MG Tab Take 0.5 Tabs by mouth every day. 90 Tab 1   • Multiple Vitamin (CALCIUM COMPLEX PO) Take  by mouth.     • MILK THISTLE PO Take  by mouth.     • Turmeric 500 MG Cap Take  by mouth.     • Green Tea, Camillia sinensis, (GREEN TEA PO) Take  by mouth.     • Biotin 5000 MCG Cap Take  by mouth.     • aspirin (ASA) 81 MG Chew Tab chewable tablet Take 81 mg by mouth every day.     • escitalopram (LEXAPRO) 10 MG TABS Take 40 mg by mouth every day.       No current facility-administered medications for this visit.        Patient Active Problem List    Diagnosis Date Noted   • Type 2 diabetes mellitus without complication, without long-term current use of insulin (HCC) 07/17/2019   • Hypertension 06/23/2019   • Anxiety 06/12/2019   • Hemochromatosis 12/15/2017       Family History   Problem Relation Age of Onset   • Heart Disease Father    • No Known Problems Sister    • Heart Attack Brother         CAD. 3 MI's.    • No Known Problems Son    • No Known Problems Daughter        She  has a past medical history of Anemia, Anxiety, Bronchitis (2009), Hypertension, and Pneumonia (2009).  She  has a past surgical history that includes hysterectomy, vaginal (06/04/2014); pelvic exam under anesthesia (6/17/2014); vaginal hysterectomy total (6/17/2014); and appendectomy (2014).       Objective:   Vitals obtained by patient:  5/7/20 1:19 PM       BP  134/96     BP Location       Patient Position  Sitting      "Pulse  86     Resp  18     Temp  36.5 C ( 97.7 F)     Temp src  Oral     Weight   76.2 kg (168 lb)     Height  1.702 m (5' 7\")          Physical Exam:  Constitutional: Alert, no distress, well-groomed.  Skin: No rashes in visible areas.  Eye: Round. Conjunctiva clear, lids normal. No icterus.   ENMT: Lips pink without lesions, good dentition, moist mucous membranes. Phonation normal.  Neck: No masses, no thyromegaly. Moves freely without pain.  CV: Pulse as reported by patient  Respiratory: Unlabored respiratory effort, no cough or audible wheeze  Psych: Alert and oriented x3, normal affect and mood.       Assessment and Plan:   The following treatment plan was discussed:     1. Type 2 diabetes mellitus without complication, without long-term current use of insulin (HCC)  Chronic but stable problem.  Discussed recent hemoglobin A1c results with patient.  Hemoglobin A1c is trending upward but is still controlled.  Continue metformin as prescribed.  Continue lisinopril and aspirin.     Diabetic foot exam and eye exams up to date.    Patient will complete lab work prior to follow-up appointment in 6 months.  We will discuss lab work results and follow-up appointment.    - HEMOGLOBIN A1C; Future  - MICROALB/CREAT RATIO RAND. UR    2. Essential hypertension  Chronic problem.  Unknown is stable.  Patient is at home blood pressure monitor has not been evaluated.  Advised patient if readings are consistently elevated to make a follow-up appointment for further evaluation of blood pressure.  Patient read to plan.  Advised patient to bring in at home document blood pressure readings and blood pressure monitor to follow-up appointment.  Continue taking lisinopril as prescribed.  Continue work on diet, exercise, sleep hygiene, hydration, decreasing sodium consumption.    - MICROALB/CREAT RATIO RAND. UR    3. Hereditary hemochromatosis (HCC)  Chronic with stable problem.  Advised patient to follow-up with hematology/oncology for " further evaluation of recent lab work results.  Positive for possible anemia.    4. Transaminitis  Chronic with stable problem.  Liver enzymes are trending downward.  Patient repeat lab work in 6 months.  We will discuss lab work results during follow-up appoint.  Continue work on diet and exercise.      - HEPATIC FUNCTION PANEL; Future        Follow-up: Return in about 6 months (around 11/7/2020).

## 2020-05-14 DIAGNOSIS — E11.42 TYPE 2 DIABETES MELLITUS WITH DIABETIC POLYNEUROPATHY, WITHOUT LONG-TERM CURRENT USE OF INSULIN (HCC): ICD-10-CM

## 2020-05-14 NOTE — TELEPHONE ENCOUNTER
Received request via: Patient    Was the patient seen in the last year in this department? Yes    Does the patient have an active prescription (recently filled or refills available) for medication(s) requested? No  
18-Oct-2019 18:20

## 2020-07-23 DIAGNOSIS — E11.42 TYPE 2 DIABETES MELLITUS WITH DIABETIC POLYNEUROPATHY, WITHOUT LONG-TERM CURRENT USE OF INSULIN (HCC): ICD-10-CM

## 2020-08-07 DIAGNOSIS — E11.42 TYPE 2 DIABETES MELLITUS WITH DIABETIC POLYNEUROPATHY, WITHOUT LONG-TERM CURRENT USE OF INSULIN (HCC): ICD-10-CM

## 2020-08-07 NOTE — TELEPHONE ENCOUNTER
Received request via: Pharmacy    Was the patient seen in the last year in this department? Yes    Does the patient have an active prescription (recently filled or refills available) for medication(s) requested? No     Future Appointments       Provider Department Center    11/10/2020 1:20 PM Grace Riddle P.A.-C. [facility] - [address]

## 2020-09-09 DIAGNOSIS — E11.42 TYPE 2 DIABETES MELLITUS WITH DIABETIC POLYNEUROPATHY, WITHOUT LONG-TERM CURRENT USE OF INSULIN (HCC): ICD-10-CM

## 2020-10-05 DIAGNOSIS — K21.9 GASTROESOPHAGEAL REFLUX DISEASE WITHOUT ESOPHAGITIS: ICD-10-CM

## 2020-10-05 RX ORDER — OMEPRAZOLE 20 MG/1
CAPSULE, DELAYED RELEASE ORAL
Qty: 30 CAP | Refills: 0 | Status: SHIPPED | OUTPATIENT
Start: 2020-10-05 | End: 2020-11-23

## 2020-10-22 RX ORDER — LISINOPRIL 20 MG/1
10 TABLET ORAL DAILY
Qty: 90 TAB | Refills: 0 | Status: SHIPPED | OUTPATIENT
Start: 2020-10-22 | End: 2021-04-19

## 2020-11-07 ENCOUNTER — HOSPITAL ENCOUNTER (OUTPATIENT)
Dept: LAB | Facility: MEDICAL CENTER | Age: 50
End: 2020-11-07
Attending: PHYSICIAN ASSISTANT
Payer: COMMERCIAL

## 2020-11-07 DIAGNOSIS — R74.01 TRANSAMINITIS: ICD-10-CM

## 2020-11-07 DIAGNOSIS — E11.9 TYPE 2 DIABETES MELLITUS WITHOUT COMPLICATION, WITHOUT LONG-TERM CURRENT USE OF INSULIN (HCC): ICD-10-CM

## 2020-11-07 LAB
ALBUMIN SERPL BCP-MCNC: 4.7 G/DL (ref 3.2–4.9)
ALP SERPL-CCNC: 60 U/L (ref 30–99)
ALT SERPL-CCNC: 168 U/L (ref 2–50)
AST SERPL-CCNC: 124 U/L (ref 12–45)
BILIRUB CONJ SERPL-MCNC: <0.2 MG/DL (ref 0.1–0.5)
BILIRUB INDIRECT SERPL-MCNC: ABNORMAL MG/DL (ref 0–1)
BILIRUB SERPL-MCNC: 0.4 MG/DL (ref 0.1–1.5)
CREAT UR-MCNC: 90.06 MG/DL
EST. AVERAGE GLUCOSE BLD GHB EST-MCNC: 137 MG/DL
HBA1C MFR BLD: 6.4 % (ref 0–5.6)
MICROALBUMIN UR-MCNC: <1.2 MG/DL
MICROALBUMIN/CREAT UR: NORMAL MG/G (ref 0–30)
PROT SERPL-MCNC: 7.5 G/DL (ref 6–8.2)

## 2020-11-07 PROCEDURE — 82570 ASSAY OF URINE CREATININE: CPT

## 2020-11-07 PROCEDURE — 83036 HEMOGLOBIN GLYCOSYLATED A1C: CPT

## 2020-11-07 PROCEDURE — 80076 HEPATIC FUNCTION PANEL: CPT

## 2020-11-07 PROCEDURE — 36415 COLL VENOUS BLD VENIPUNCTURE: CPT

## 2020-11-07 PROCEDURE — 82043 UR ALBUMIN QUANTITATIVE: CPT

## 2020-11-10 ENCOUNTER — OFFICE VISIT (OUTPATIENT)
Dept: MEDICAL GROUP | Facility: PHYSICIAN GROUP | Age: 50
End: 2020-11-10
Payer: COMMERCIAL

## 2020-11-10 ENCOUNTER — HOSPITAL ENCOUNTER (OUTPATIENT)
Dept: RADIOLOGY | Facility: MEDICAL CENTER | Age: 50
End: 2020-11-10
Attending: PHYSICIAN ASSISTANT
Payer: COMMERCIAL

## 2020-11-10 VITALS
DIASTOLIC BLOOD PRESSURE: 76 MMHG | OXYGEN SATURATION: 97 % | WEIGHT: 176 LBS | TEMPERATURE: 97.2 F | RESPIRATION RATE: 16 BRPM | BODY MASS INDEX: 27.62 KG/M2 | SYSTOLIC BLOOD PRESSURE: 110 MMHG | HEART RATE: 76 BPM | HEIGHT: 67 IN

## 2020-11-10 DIAGNOSIS — I10 ESSENTIAL HYPERTENSION: ICD-10-CM

## 2020-11-10 DIAGNOSIS — R74.01 TRANSAMINITIS: ICD-10-CM

## 2020-11-10 DIAGNOSIS — E11.9 TYPE 2 DIABETES MELLITUS WITHOUT COMPLICATION, WITHOUT LONG-TERM CURRENT USE OF INSULIN (HCC): ICD-10-CM

## 2020-11-10 DIAGNOSIS — Z12.31 VISIT FOR SCREENING MAMMOGRAM: ICD-10-CM

## 2020-11-10 DIAGNOSIS — E83.110 HEREDITARY HEMOCHROMATOSIS (HCC): ICD-10-CM

## 2020-11-10 PROCEDURE — 99214 OFFICE O/P EST MOD 30 MIN: CPT | Performed by: PHYSICIAN ASSISTANT

## 2020-11-10 PROCEDURE — 77067 SCR MAMMO BI INCL CAD: CPT

## 2020-11-10 RX ORDER — TRIAMCINOLONE ACETONIDE 5 MG/G
CREAM TOPICAL
COMMUNITY
End: 2020-11-10

## 2020-11-10 RX ORDER — AZITHROMYCIN 250 MG/1
TABLET, FILM COATED ORAL
COMMUNITY
End: 2020-11-10

## 2020-11-10 RX ORDER — AZELASTINE HYDROCHLORIDE, FLUTICASONE PROPIONATE 137; 50 UG/1; UG/1
SPRAY, METERED NASAL
COMMUNITY
End: 2020-11-10

## 2020-11-10 RX ORDER — CODEINE PHOSPHATE AND GUAIFENESIN 10; 100 MG/5ML; MG/5ML
10 SOLUTION ORAL
COMMUNITY
End: 2020-11-10

## 2020-11-10 RX ORDER — ESCITALOPRAM OXALATE 20 MG/1
40 TABLET ORAL DAILY
COMMUNITY
End: 2022-06-14 | Stop reason: SDUPTHER

## 2020-11-10 RX ORDER — LISINOPRIL 10 MG/1
TABLET ORAL
COMMUNITY
End: 2020-11-10

## 2020-11-10 ASSESSMENT — PATIENT HEALTH QUESTIONNAIRE - PHQ9: CLINICAL INTERPRETATION OF PHQ2 SCORE: 0

## 2020-11-10 ASSESSMENT — FIBROSIS 4 INDEX: FIB4 SCORE: 1.82

## 2020-11-10 NOTE — PROGRESS NOTES
Chief Complaint   Patient presents with   • Follow-Up     DM        HISTORY OF PRESENT ILLNESS: Mónica Keita is an established 50 y.o. female here to discuss the evaluation and management of:    Type 2 diabetes mellitus without complication, without long-term current use of insulin (HCC)  Chronic but stable problem.  Patient is currently taking Metformin 1000 mg twice daily, 10 mg tablets daily, 81 mg aspirin once daily.  Denies side effects or complications of medication.  Discussed with patient recent hemoglobin A1c results from 9/7/2020 indicated hemoglobin A1c of 6.4.  Discussed with patient hemoglobin A1c is trending up.  She tells me that she has a sweet tooth and has been eating more sweets than she should.  States she does not have a regular exercise routine at this time.  Denies polyuria, polydipsia, poor wound healing, vision changes, peripheral neuropathy.    Transaminitis  Chronic problem.  Discussed recent hepatic panel results with patient.  AST and ALT are elevated.  Suspect this is secondary to hereditary hemochromatosis.  Patient states she has not followed back up with hematology with plans to in the near future.    Hereditary hemochromatosis (HCC)  Chronic problem.  Unknown if stable.  Patient has not followed back up with hematology.  States she is feeling well.  Advised patient to follow-up with hematology.  Continue to monitor.    Essential hypertension  Chronic but stable problem.  Patient's blood pressure during today's point 110/76 of Hg.  She is taking lisinopril 10 mg tab once daily.  Denies side effects or complications of medication.  Denies dry cough.  Denies chest pain, shortness breath, heart palpitations, dizziness, syncope constipated, vision changes, peripheral edema.        Patient Active Problem List    Diagnosis Date Noted   • Transaminitis 05/07/2020   • Type 2 diabetes mellitus without complication, without long-term current use of insulin (HCC) 07/17/2019   •  Hypertension 06/23/2019   • Anxiety 06/12/2019   • Hemochromatosis 12/15/2017       Allergies:Patient has no known allergies.    Current Outpatient Medications   Medication Sig Dispense Refill   • escitalopram (LEXAPRO) 20 MG tablet Lexapro 20 mg tablet   Take 2 tablets every day by oral route.     • omeprazole (PRILOSEC) 20 MG delayed-release capsule TAKE ONE CAPSULE BY MOUTH ONE TIME DAILY  30 Cap 0   • metFORMIN (GLUCOPHAGE) 500 MG Tab TAKE TWO TABLETS BY MOUTH TWICE DAILY with food 180 Tab 0   • buPROPion SR (WELLBUTRIN-SR) 150 MG TABLET SR 12 HR sustained-release tablet Take 1 Tab by mouth every day.     • Cholecalciferol (VITAMIN D3) 5000 UNIT/0.5ML Liquid Take 1 Tab by mouth every day.     • Coenzyme Q10 (COQ10 PO) Take 1 Tab by mouth every day.     • LORazepam (ATIVAN) 0.5 MG Tab   2   • MILK THISTLE PO Take  by mouth.     • Green Tea, Camillia sinensis, (GREEN TEA PO) Take  by mouth.     • Biotin 5000 MCG Cap Take  by mouth.     • aspirin (ASA) 81 MG Chew Tab chewable tablet Take 81 mg by mouth every day.     • lisinopril (PRINIVIL) 20 MG Tab Take 0.5 Tabs by mouth every day. (Patient not taking: Reported on 11/10/2020) 90 Tab 0   • Multiple Vitamin (CALCIUM COMPLEX PO) Take  by mouth.     • Turmeric 500 MG Cap Take  by mouth.       No current facility-administered medications for this visit.        Social History     Tobacco Use   • Smoking status: Never Smoker   • Smokeless tobacco: Never Used   Substance Use Topics   • Alcohol use: No   • Drug use: No       Family Status   Relation Name Status   • Mo  Alive   • Fa  Alive   • Sis  Alive   • Bro  Alive   • Son  Alive   • Tyree  Alive     Family History   Problem Relation Age of Onset   • Heart Disease Father    • No Known Problems Sister    • Heart Attack Brother         CAD. 3 MI's.    • No Known Problems Son    • No Known Problems Daughter        ROS:  Review of Systems   Constitutional: Negative for fever, chills, weight loss and malaise/fatigue.   HENT:  "Negative for ear pain, nosebleeds, congestion, sore throat and neck pain.    Eyes: Negative for blurred vision.   Respiratory: Negative for cough, sputum production, shortness of breath and wheezing.    Cardiovascular: Negative for chest pain, palpitations, orthopnea and leg swelling.   Gastrointestinal: Negative for heartburn, nausea, vomiting and abdominal pain.   Genitourinary: Negative for dysuria, urgency and frequency.   Musculoskeletal: Negative for myalgias, back pain and joint pain.   Skin: Negative for rash and itching.   Neurological: Negative for dizziness, tingling, tremors, sensory change, focal weakness and headaches.   Endo/Heme/Allergies: Does not bruise/bleed easily.   Psychiatric/Behavioral: Negative for depression, suicidal ideas and memory loss.  The patient is not nervous/anxious and does not have insomnia.    All other systems reviewed and are negative except as in HPI.    Exam: /76 (BP Location: Left arm, Patient Position: Sitting, BP Cuff Size: Adult)   Pulse 76   Temp 36.2 °C (97.2 °F) (Temporal)   Resp 16   Ht 1.702 m (5' 7\")   Wt 79.8 kg (176 lb)   SpO2 97%  Body mass index is 27.57 kg/m².  General: Normal appearing. No distress.  HEENT: Normocephalic. Eyes conjunctiva clear lids without ptosis,ears normal shape and contour.  Neck: Supple without JVD. Thyroid is not enlarged.  Pulmonary: Clear to ausculation.  Normal effort. No rales, ronchi, or wheezing.  Cardiovascular: Regular rate and rhythm without murmur.   Abdomen: Nondistended.    Neurologic: Grossly nonfocal.  Cranial nerves are normal.   Skin: Warm and dry.  No rashes or suspicious skin lesions.  Musculoskeletal: Normal gait. No extremity cyanosis, clubbing, or edema.  Psych: Normal mood and affect. Alert and oriented x3. Judgment and insight is normal.    Medical decision-making and discussion:  1. Type 2 diabetes mellitus without complication, without long-term current use of insulin (HCC)  Diabetes currently " controlled.  Discussed with patient hemoglobin A1c is trending upward.  Diabetes is still controlled.  Hemoglobin A1c from 11/7/2020 was 6.4.  Continue current medications. Patient also taking  ACE-I and ASA as instructed. Labwork as indicated.  Patient will follow-up in 6 months for evaluation.    - Comp Metabolic Panel; Future  - Lipid Profile; Future  - HEMOGLOBIN A1C; Future  - MICROALB/CREAT RATIO RAND. UR    2. Transaminitis  Chronic but stable problem.  Suspect transaminitis is secondary to hereditary hemochromatosis.  Continue to monitor.  Patient repeat lab work in 6 months.  - Comp Metabolic Panel; Future    3. Hereditary hemochromatosis (HCC)  Chronic Problem.  Unknown is stable.  Advised patient to follow-up with hematology.  Continue monitor.    Follow-up for worsening symptoms,lack of expected recovery, or should new symptoms or problems arise.        4. Essential hypertension  Well-controlled. Labs as indicated. Continue antihypertensive medications. Discussed decreasing salt intake. Emphasized benefits of exercise and diet. Continue to monitor.    - Comp Metabolic Panel; Future  - MICROALB/CREAT RATIO RAND. UR      Please note that this dictation was created using voice recognition software. I have made every reasonable attempt to correct obvious errors, but I expect that there are errors of grammar and possibly content that I did not discover before finalizing the note.    Assessment/Plan:  1. Type 2 diabetes mellitus without complication, without long-term current use of insulin (HCC)  Comp Metabolic Panel    Lipid Profile    HEMOGLOBIN A1C    MICROALB/CREAT RATIO RAND. UR   2. Transaminitis  Comp Metabolic Panel   3. Hereditary hemochromatosis (HCC)     4. Essential hypertension  Comp Metabolic Panel    MICROALB/CREAT RATIO RAND. UR       Return in about 6 months (around 5/10/2021) for DM, HTN, HLD.

## 2020-12-27 DIAGNOSIS — E11.42 TYPE 2 DIABETES MELLITUS WITH DIABETIC POLYNEUROPATHY, WITHOUT LONG-TERM CURRENT USE OF INSULIN (HCC): ICD-10-CM

## 2021-01-07 ENCOUNTER — OFFICE VISIT (OUTPATIENT)
Dept: MEDICAL GROUP | Facility: PHYSICIAN GROUP | Age: 51
End: 2021-01-07
Payer: COMMERCIAL

## 2021-01-07 VITALS
DIASTOLIC BLOOD PRESSURE: 80 MMHG | TEMPERATURE: 97.8 F | RESPIRATION RATE: 15 BRPM | WEIGHT: 181 LBS | BODY MASS INDEX: 28.41 KG/M2 | HEIGHT: 67 IN | SYSTOLIC BLOOD PRESSURE: 125 MMHG | OXYGEN SATURATION: 98 % | HEART RATE: 91 BPM

## 2021-01-07 DIAGNOSIS — G89.29 CHRONIC RIGHT SHOULDER PAIN: ICD-10-CM

## 2021-01-07 DIAGNOSIS — M25.511 CHRONIC RIGHT SHOULDER PAIN: ICD-10-CM

## 2021-01-07 PROCEDURE — 99214 OFFICE O/P EST MOD 30 MIN: CPT | Performed by: PHYSICIAN ASSISTANT

## 2021-01-07 RX ORDER — MELOXICAM 15 MG/1
15 TABLET ORAL DAILY
Qty: 30 TAB | Refills: 0 | Status: SHIPPED | OUTPATIENT
Start: 2021-01-07 | End: 2021-05-11

## 2021-01-07 ASSESSMENT — PATIENT HEALTH QUESTIONNAIRE - PHQ9: CLINICAL INTERPRETATION OF PHQ2 SCORE: 0

## 2021-01-07 ASSESSMENT — FIBROSIS 4 INDEX: FIB4 SCORE: 1.82

## 2021-01-07 NOTE — PROGRESS NOTES
Chief Complaint   Patient presents with   • Pain     elbow        HISTORY OF PRESENT ILLNESS: Mónica Keita is an established 50 y.o. female here to discuss the evaluation and management of:    Patient is a pleasant 51-year-old female here today to discuss chronic right shoulder pain symptoms.  She tells me for the past 3 months she has been experiencing intermittent aching pain of right shoulder and pain symptoms vary in severity.  Shoulder is nontender to palpation.  States pain symptoms are exacerbated if she sleeps on her shoulder.  She notices symptoms when she first wakes up and symptoms improve with activity.  She states she tried over-the-counter Advil with no improvement of symptoms.  She is also tried 500 mg of Tylenol 2-3 times a day with no improvement of symptoms.  States she is also tried over-the-counter Salonpas with minimal improvement of symptoms.  Patient states she cannot tolerate topical diclofenac gel.  She does not like the smell of it.  Patient is inquiring what treatment options.  She denies muscle atrophy, weakness, decreased  strength, numbness/tingling of upper extremity.  Denies history of neck injury or upper extremity injuries.      Patient Active Problem List    Diagnosis Date Noted   • Transaminitis 05/07/2020   • Type 2 diabetes mellitus without complication, without long-term current use of insulin (HCC) 07/17/2019   • Hypertension 06/23/2019   • Anxiety 06/12/2019   • Hemochromatosis 12/15/2017       Allergies:Patient has no known allergies.    Current Outpatient Medications   Medication Sig Dispense Refill   • meloxicam (MOBIC) 15 MG tablet Take 1 Tab by mouth every day. 30 Tab 0   • metFORMIN (GLUCOPHAGE) 500 MG Tab TAKE TWO TABLETS BY MOUTH TWICE DAILY WITH FOOD 180 Tab 0   • omeprazole (PRILOSEC) 20 MG delayed-release capsule TAKE ONE CAPSULE BY MOUTH ONE TIME DAILY  30 Cap 6   • escitalopram (LEXAPRO) 20 MG tablet Lexapro 20 mg tablet   Take 2 tablets every day by  oral route.     • lisinopril (PRINIVIL) 20 MG Tab Take 0.5 Tabs by mouth every day. 90 Tab 0   • buPROPion SR (WELLBUTRIN-SR) 150 MG TABLET SR 12 HR sustained-release tablet Take 1 Tab by mouth every day.     • Cholecalciferol (VITAMIN D3) 5000 UNIT/0.5ML Liquid Take 1 Tab by mouth every day.     • Coenzyme Q10 (COQ10 PO) Take 1 Tab by mouth every day.     • LORazepam (ATIVAN) 0.5 MG Tab   2   • Multiple Vitamin (CALCIUM COMPLEX PO) Take  by mouth.     • MILK THISTLE PO Take  by mouth.     • Turmeric 500 MG Cap Take  by mouth.     • Green Tea, Camillia sinensis, (GREEN TEA PO) Take  by mouth.     • Biotin 5000 MCG Cap Take  by mouth.     • aspirin (ASA) 81 MG Chew Tab chewable tablet Take 81 mg by mouth every day.       No current facility-administered medications for this visit.        Social History     Tobacco Use   • Smoking status: Never Smoker   • Smokeless tobacco: Never Used   Substance Use Topics   • Alcohol use: No   • Drug use: No       Family Status   Relation Name Status   • Mo  Alive   • Fa  Alive   • Sis  Alive   • Bro  Alive   • Son  Alive   • Tyree  Alive     Family History   Problem Relation Age of Onset   • Heart Disease Father    • No Known Problems Sister    • Heart Attack Brother         CAD. 3 MI's.    • No Known Problems Son    • No Known Problems Daughter        ROS:  Review of Systems   Constitutional: Negative for fever, chills, weight loss and malaise/fatigue.   HENT: Negative for ear pain, nosebleeds, congestion, sore throat and neck pain.    Eyes: Negative for blurred vision.   Respiratory: Negative for cough, sputum production, shortness of breath and wheezing.    Cardiovascular: Negative for chest pain, palpitations, orthopnea and leg swelling.   Gastrointestinal: Negative for heartburn, nausea, vomiting and abdominal pain.   Genitourinary: Negative for dysuria, urgency and frequency.   Musculoskeletal: Negative for myalgias, back pain. + for right shoulder pain.   Skin: Negative for  "rash and itching.   Neurological: Negative for dizziness, tingling, tremors, sensory change, focal weakness and headaches.   Endo/Heme/Allergies: Does not bruise/bleed easily.   Psychiatric/Behavioral: Negative for depression, suicidal ideas and memory loss.  The patient is not nervous/anxious and does not have insomnia.    All other systems reviewed and are negative except as in HPI.    Exam: /80 (BP Location: Left arm, Patient Position: Sitting, BP Cuff Size: Adult)   Pulse 91   Temp 36.6 °C (97.8 °F) (Temporal)   Resp 15   Ht 1.702 m (5' 7\")   Wt 82.1 kg (181 lb)   SpO2 98%  Body mass index is 28.35 kg/m².  General: Normal appearing. No distress.  HEENT: Normocephalic. Eyes conjunctiva clear lids without ptosis, ears normal shape and contour.  Neck: Supple without JVD. Thyroid is not enlarged.  Pulmonary: Clear to ausculation.  Normal effort. No rales, ronchi, or wheezing.  Cardiovascular: Regular rate and rhythm without murmur.   Abdomen: Soft, nontender, nondistended.   Neurologic: Grossly nonfocal.  Cranial nerves are normal.   Skin: Warm and dry.  No rashes or suspicious skin lesions.  Musculoskeletal: Normal gait. No extremity cyanosis, clubbing, or edema.  Normal range of motion of bilateral upper extremities.  Right shoulder is nontender to palpation.  Bicipital groove is nontender to palpation.   strength is equal bilaterally.  Psych: Normal mood and affect. Alert and oriented x3. Judgment and insight is normal.    Medical decision-making and discussion:  1. Chronic right shoulder pain  X-ray of right shoulder has been ordered.  Patient will follow up in 2 weeks to discuss lab work results.  Continue work on range of motion.  Continue stretching.  Use ice and heat as needed.  Suggested over-the-counter Tylenol and advised patient to not exceed more than 3000 mg of Tylenol in a 24-hour span.  Patient has prescribed Mobic 50 mg tab advised take 1 tab by mouth once daily with food for 10-14 " days.  Discussed common side effects and adverse reaction medication.  Discussed chronic side effects associated with chronic NSAID use.    - DX-SHOULDER 2+ RIGHT; Future  - meloxicam (MOBIC) 15 MG tablet; Take 1 Tab by mouth every day.  Dispense: 30 Tab; Refill: 0      Please note that this dictation was created using voice recognition software. I have made every reasonable attempt to correct obvious errors, but I expect that there are errors of grammar and possibly content that I did not discover before finalizing the note.    Assessment/Plan:  1. Chronic right shoulder pain  DX-SHOULDER 2+ RIGHT    meloxicam (MOBIC) 15 MG tablet       Return in about 2 weeks (around 1/21/2021).

## 2021-01-15 ENCOUNTER — HOSPITAL ENCOUNTER (OUTPATIENT)
Dept: RADIOLOGY | Facility: MEDICAL CENTER | Age: 51
End: 2021-01-15
Attending: PHYSICIAN ASSISTANT
Payer: COMMERCIAL

## 2021-01-15 DIAGNOSIS — M25.511 ACUTE PAIN OF RIGHT SHOULDER: ICD-10-CM

## 2021-01-15 PROCEDURE — 73030 X-RAY EXAM OF SHOULDER: CPT | Mod: RT

## 2021-01-20 ENCOUNTER — OFFICE VISIT (OUTPATIENT)
Dept: MEDICAL GROUP | Facility: PHYSICIAN GROUP | Age: 51
End: 2021-01-20
Payer: COMMERCIAL

## 2021-01-20 VITALS
WEIGHT: 179 LBS | TEMPERATURE: 97.1 F | DIASTOLIC BLOOD PRESSURE: 60 MMHG | RESPIRATION RATE: 15 BRPM | HEART RATE: 88 BPM | BODY MASS INDEX: 28.09 KG/M2 | HEIGHT: 67 IN | OXYGEN SATURATION: 99 % | SYSTOLIC BLOOD PRESSURE: 120 MMHG

## 2021-01-20 DIAGNOSIS — M19.019 OSTEOARTHRITIS OF AC (ACROMIOCLAVICULAR) JOINT: ICD-10-CM

## 2021-01-20 DIAGNOSIS — E11.9 TYPE 2 DIABETES MELLITUS WITHOUT COMPLICATION, WITHOUT LONG-TERM CURRENT USE OF INSULIN (HCC): ICD-10-CM

## 2021-01-20 PROCEDURE — 99214 OFFICE O/P EST MOD 30 MIN: CPT | Performed by: PHYSICIAN ASSISTANT

## 2021-01-20 ASSESSMENT — FIBROSIS 4 INDEX: FIB4 SCORE: 1.85

## 2021-01-25 DIAGNOSIS — E83.110 HEREDITARY HEMOCHROMATOSIS (HCC): ICD-10-CM

## 2021-01-26 NOTE — PROGRESS NOTES
Chief Complaint   Patient presents with   • Elbow Pain     little bit better        HISTORY OF PRESENT ILLNESS: Mónica Keita is an established 51 y.o. female here to discuss the evaluation and management of:    Osteoarthritis of AC (acromioclavicular) joint  Discussed right shoulder x-ray results from 1/15/2021 with patient.  Results are as follows:    IMPRESSION:     Mild AC joint osteoarthritis     Mild humeral head cystic change may indicate internal impingement    Pain symptoms are unchanged from following appointment.  Patient experiences an intermittent aching pain of right shoulder.  Shoulder is nontender to palpation.  Denies decreased range of motion.  Oral analgesics do not help alleviate symptoms.  Patient is inquiring about treatment options.    Type 2 diabetes mellitus without complication, without long-term current use of insulin (HCC)  Chronic with stable problem.  Patient is currently taking Metformin 1000 mg twice daily.  Hemoglobin A1c is up-to-date.  Patient states for the past few months she has been experiencing loose stools.  Patient is concerned that it could be due to Metformin.  She is inquiring about other treatment options.  She denies recent antibiotic use.  Admits that she has been more stressed than usual.  Denies recent travel.  Denies fever, chills, nausea, vomiting, unintentional weight loss, melena, hematochezia, lymphadenopathy.  Denies polyuria competency, poor wound healing, vision changes, peripheral neuropathy.      Patient Active Problem List    Diagnosis Date Noted   • Transaminitis 05/07/2020   • Type 2 diabetes mellitus without complication, without long-term current use of insulin (HCC) 07/17/2019   • Hypertension 06/23/2019   • Anxiety 06/12/2019   • Hemochromatosis 12/15/2017       Allergies:Patient has no known allergies.    Current Outpatient Medications   Medication Sig Dispense Refill   • meloxicam (MOBIC) 15 MG tablet Take 1 Tab by mouth every day. 30 Tab 0    • metFORMIN (GLUCOPHAGE) 500 MG Tab TAKE TWO TABLETS BY MOUTH TWICE DAILY WITH FOOD 180 Tab 0   • omeprazole (PRILOSEC) 20 MG delayed-release capsule TAKE ONE CAPSULE BY MOUTH ONE TIME DAILY  30 Cap 6   • escitalopram (LEXAPRO) 20 MG tablet Lexapro 20 mg tablet   Take 2 tablets every day by oral route.     • lisinopril (PRINIVIL) 20 MG Tab Take 0.5 Tabs by mouth every day. 90 Tab 0   • buPROPion SR (WELLBUTRIN-SR) 150 MG TABLET SR 12 HR sustained-release tablet Take 1 Tab by mouth every day.     • Cholecalciferol (VITAMIN D3) 5000 UNIT/0.5ML Liquid Take 1 Tab by mouth every day.     • Coenzyme Q10 (COQ10 PO) Take 1 Tab by mouth every day.     • LORazepam (ATIVAN) 0.5 MG Tab   2   • Multiple Vitamin (CALCIUM COMPLEX PO) Take  by mouth.     • MILK THISTLE PO Take  by mouth.     • Turmeric 500 MG Cap Take  by mouth.     • Green Tea, Camillia sinensis, (GREEN TEA PO) Take  by mouth.     • Biotin 5000 MCG Cap Take  by mouth.     • aspirin (ASA) 81 MG Chew Tab chewable tablet Take 81 mg by mouth every day.       No current facility-administered medications for this visit.        Social History     Tobacco Use   • Smoking status: Never Smoker   • Smokeless tobacco: Never Used   Substance Use Topics   • Alcohol use: No   • Drug use: No       Family Status   Relation Name Status   • Mo  Alive   • Fa  Alive   • Sis  Alive   • Bro  Alive   • Son  Alive   • Tyree  Alive     Family History   Problem Relation Age of Onset   • Heart Disease Father    • No Known Problems Sister    • Heart Attack Brother         CAD. 3 MI's.    • No Known Problems Son    • No Known Problems Daughter        ROS:  Review of Systems   Constitutional: Negative for fever, chills, weight loss and malaise/fatigue.   HENT: Negative for ear pain, nosebleeds, congestion, sore throat and neck pain.    Eyes: Negative for blurred vision.   Respiratory: Negative for cough, sputum production, shortness of breath and wheezing.    Cardiovascular: Negative for chest  "pain, palpitations, orthopnea and leg swelling.   Gastrointestinal: Negative for heartburn, nausea, vomiting and abdominal pain.   Genitourinary: Negative for dysuria, urgency and frequency.   Musculoskeletal: Negative for myalgias, back pain. + for joint pain.   Skin: Negative for rash and itching.   Neurological: Negative for dizziness, tingling, tremors, sensory change, focal weakness and headaches.   Endo/Heme/Allergies: Does not bruise/bleed easily.   Psychiatric/Behavioral: Negative for depression, suicidal ideas and memory loss.  The patient is not nervous/anxious and does not have insomnia.    All other systems reviewed and are negative except as in HPI.    Exam: /60 (BP Location: Left arm, Patient Position: Sitting, BP Cuff Size: Adult)   Pulse 88   Temp 36.2 °C (97.1 °F) (Temporal)   Resp 15   Ht 1.702 m (5' 7\")   Wt 81.2 kg (179 lb)   SpO2 99%  Body mass index is 28.04 kg/m².  General: Normal appearing. No distress.  HEENT: Normocephalic. Eyes conjunctiva clear lids without ptosis, ears normal shape and contour.  Neck: Supple without JVD. Thyroid is not enlarged.  Pulmonary: Clear to ausculation.  Normal effort. No rales, ronchi, or wheezing.  Cardiovascular: Regular rate and rhythm without murmur.  Abdomen: Soft, nontender, nondistended. Normal bowel sounds. Liver and spleen are not palpable  Neurologic: Grossly nonfocal.  Cranial nerves are normal.   Lymph: No cervical, supraclavicular or axillary lymph nodes are palpable  Skin: Warm and dry.  No rashes or suspicious skin lesions.  Musculoskeletal: Normal gait. No extremity cyanosis, clubbing, or edema.  Right shoulder is nontender to palpation.  Positive for right upper extremity stiffness with abduction.  Strength equal bilaterally.  Psych: Normal mood and affect. Alert and oriented x3. Judgment and insight is normal.    Medical decision-making and discussion:  1. Osteoarthritis of AC (acromioclavicular) joint  Reviewed right shoulder " x-ray results with patient.  Positive for possible impingement.  Positive for AC joint arthritis.  Advised patient to continue to proper body mechanics.  Suggested patient take over-the-counter Tylenol.  Do not exceed more than 3000 mg in 24-hour span.  Suggested weightbearing exercises.  Continue stretching and working on range of motion.  Patient deferred physical therapy for further evaluation.    - REFERRAL TO PHYSICAL THERAPY    2. Type 2 diabetes mellitus without complication, without long-term current use of insulin (HCC)  Chronic but stable problem.  Advised patient to decrease Metformin from 1000 mg twice daily to 500 mg twice daily.  Continue work on diet and exercise.  Patient repeat lab work prior to follow-up appointment on May 11, 2021.  We will discuss lab work results at that time.    Follow-up for worsening symptoms,lack of expected recovery, or should new symptoms or problems arise.          Please note that this dictation was created using voice recognition software. I have made every reasonable attempt to correct obvious errors, but I expect that there are errors of grammar and possibly content that I did not discover before finalizing the note.    Assessment/Plan:  1. Osteoarthritis of AC (acromioclavicular) joint  REFERRAL TO PHYSICAL THERAPY   2. Type 2 diabetes mellitus without complication, without long-term current use of insulin (HCC)         Return if symptoms worsen or fail to improve.

## 2021-01-29 NOTE — PROGRESS NOTES
02/01/21    Subjective    Chief Complaint:  Hemochromatosis heterozygosity    HPI:  51 female seen by me in June 2019 with history of heterozygosity for hemochromatosis H63D by 25 and Me. Previously seen by Dr. Oh. Has not been seen in follow up for 18 months. Serial ferritins however have all been below 20. RBCs are microcytic and when tested in July 2019 she was iron deficient. Requested but never receioved records from Dr. Oh. No recent lab.     ROS:    Constitutional: No weight loss  Skin: No rash or jaundice  HENT: No change in eyesight or hearing  Cardiovascular:No chest pain or arrythmia  Respiratory:No cough or SOB  GI:No nausea, vomiting, diarrhea, constipation  :No dysuria or frequency  Musculoskeletal: Shoulder issues that she relates to having to  her 21 yo daughter who was injured in a MVA  Neuro:No sx's of neuropathy  Psych: No complaints    PMH:      No Known Allergies    Past Medical History:   Diagnosis Date   • Anemia    • Anxiety    • Bronchitis 2009   • Hypertension    • Pneumonia 2009        Past Surgical History:   Procedure Laterality Date   • PELVIC EXAM UNDER ANESTHESIA  6/17/2014    Performed by Enedina Grant M.D. at [facility]   • VAGINAL HYSTERECTOMY TOTAL  6/17/2014    Performed by Enedina Grant M.D. at [facility]   • HYSTERECTOMY, VAGINAL  06/04/2014    [facility]   • APPENDECTOMY  2014        Medications:    Current Outpatient Medications on File Prior to Visit   Medication Sig Dispense Refill   • metFORMIN (GLUCOPHAGE) 500 MG Tab TAKE TWO TABLETS BY MOUTH TWICE DAILY WITH FOOD 180 Tab 0   • omeprazole (PRILOSEC) 20 MG delayed-release capsule TAKE ONE CAPSULE BY MOUTH ONE TIME DAILY  30 Cap 6   • escitalopram (LEXAPRO) 20 MG tablet Lexapro 20 mg tablet   Take 2 tablets every day by oral route.     • lisinopril (PRINIVIL) 20 MG Tab Take 0.5 Tabs by mouth every day. 90 Tab 0   • buPROPion SR (WELLBUTRIN-SR) 150 MG TABLET SR  "12 HR sustained-release tablet Take 1 Tab by mouth every day.     • Cholecalciferol (VITAMIN D3) 5000 UNIT/0.5ML Liquid Take 1 Tab by mouth every day.     • Coenzyme Q10 (COQ10 PO) Take 1 Tab by mouth every day.     • LORazepam (ATIVAN) 0.5 MG Tab   2   • Multiple Vitamin (CALCIUM COMPLEX PO) Take  by mouth.     • MILK THISTLE PO Take  by mouth.     • Turmeric 500 MG Cap Take  by mouth.     • Green Tea, Camillia sinensis, (GREEN TEA PO) Take  by mouth.     • Biotin 5000 MCG Cap Take  by mouth.     • aspirin (ASA) 81 MG Chew Tab chewable tablet Take 81 mg by mouth every day.     • meloxicam (MOBIC) 15 MG tablet Take 1 Tab by mouth every day. (Patient not taking: Reported on 2/1/2021) 30 Tab 0     No current facility-administered medications on file prior to visit.        Social History     Tobacco Use   • Smoking status: Never Smoker   • Smokeless tobacco: Never Used   Substance Use Topics   • Alcohol use: No        Family History   Problem Relation Age of Onset   • Heart Disease Father    • No Known Problems Sister    • Heart Attack Brother         CAD. 3 MI's.    • No Known Problems Son    • No Known Problems Daughter         Objective    Vitals:    /78 (BP Location: Right arm, Patient Position: Sitting, BP Cuff Size: Adult)   Pulse 100   Temp 36.2 °C (97.2 °F) (Temporal)   Resp 16   Ht 1.715 m (5' 7.52\")   Wt 80.6 kg (177 lb 11.1 oz)   LMP 06/14/2012   SpO2 93%   BMI 27.40 kg/m²     Physical Exam:    Appears well-developed and well-nourished. No distress.    Head -  Normocephalic .   Eyes - Pupils are equal, round, and reactive to light. Conjunctivae and EOM are normal. No scleral icterus.   Ears - normal hearing  Neck - Normal range of motion. Neck supple. No thyromegaly  Cardiovascular - Normal rate, regular rhythm, normal heart sounds and intact distal pulses. No  gallop, murmur or rub  Pulmonary - Normal breath sounds.  No wheeze, rales or rhonci  Breast - symmetrical. No mass on " indentation.  Abdominal -Soft. No distension, tenderness, organomegaly or mass  Extremities-  No edema or tenderness.    Nodes - No submental, submandibular, preauricular, cervical, axillary or inguinal adenopathy.    Neurological -   Alert and oriented to person, place, and time. No focal findings  Skin - Skin is warm and dry. No rash noted. Not diaphoretic. No erythema. No pallor. No jaundice   Psychiatric -  Normal mood and affect.    Labs:    Results for ELVIS CARRASCO (MRN [id number])    Ref. Range 4/1/2018 15:20 6/18/2019 10:57 7/22/2019 11:44 7/22/2019 11:45 5/4/2020 12:43   Ferritin Latest Ref Range: 10.0 - 291.0 ng/mL 14.0 25 5.3 (L)  8.7 (L)     Assessment    Imp:    Visit Diagnosis:    1. Hereditary hemochromatosis (HCC)  CBC WITH DIFFERENTIAL    IRON/TOTAL IRON BIND    FERRITIN         Plan:    Above lab and then call or [employer] message  If still Fe deficient needs GI w/u  If ferritins back up may need therapeutic phlebotomies    Nik Lawrence M.D.

## 2021-02-01 ENCOUNTER — OFFICE VISIT (OUTPATIENT)
Dept: HEMATOLOGY ONCOLOGY | Facility: MEDICAL CENTER | Age: 51
End: 2021-02-01
Payer: COMMERCIAL

## 2021-02-01 VITALS
WEIGHT: 177.69 LBS | HEIGHT: 68 IN | DIASTOLIC BLOOD PRESSURE: 78 MMHG | BODY MASS INDEX: 26.93 KG/M2 | RESPIRATION RATE: 16 BRPM | HEART RATE: 100 BPM | TEMPERATURE: 97.2 F | SYSTOLIC BLOOD PRESSURE: 124 MMHG | OXYGEN SATURATION: 93 %

## 2021-02-01 DIAGNOSIS — E83.110 HEREDITARY HEMOCHROMATOSIS (HCC): ICD-10-CM

## 2021-02-01 PROCEDURE — 99214 OFFICE O/P EST MOD 30 MIN: CPT | Performed by: INTERNAL MEDICINE

## 2021-02-01 ASSESSMENT — PAIN SCALES - GENERAL: PAINLEVEL: NO PAIN

## 2021-02-01 ASSESSMENT — FIBROSIS 4 INDEX: FIB4 SCORE: 1.85

## 2021-02-05 ENCOUNTER — HOSPITAL ENCOUNTER (OUTPATIENT)
Dept: LAB | Facility: MEDICAL CENTER | Age: 51
End: 2021-02-05
Attending: INTERNAL MEDICINE
Payer: COMMERCIAL

## 2021-02-05 DIAGNOSIS — E83.110 HEREDITARY HEMOCHROMATOSIS (HCC): ICD-10-CM

## 2021-02-05 LAB
BASOPHILS # BLD AUTO: 0.5 % (ref 0–1.8)
BASOPHILS # BLD: 0.03 K/UL (ref 0–0.12)
COMMENT 1642: NORMAL
EOSINOPHIL # BLD AUTO: 0.1 K/UL (ref 0–0.51)
EOSINOPHIL NFR BLD: 1.5 % (ref 0–6.9)
ERYTHROCYTE [DISTWIDTH] IN BLOOD BY AUTOMATED COUNT: 43.2 FL (ref 35.9–50)
HCT VFR BLD AUTO: 35.3 % (ref 37–47)
HGB BLD-MCNC: 10.4 G/DL (ref 12–16)
IMM GRANULOCYTES # BLD AUTO: 0.02 K/UL (ref 0–0.11)
IMM GRANULOCYTES NFR BLD AUTO: 0.3 % (ref 0–0.9)
LYMPHOCYTES # BLD AUTO: 1.95 K/UL (ref 1–4.8)
LYMPHOCYTES NFR BLD: 29.6 % (ref 22–41)
MCH RBC QN AUTO: 21 PG (ref 27–33)
MCHC RBC AUTO-ENTMCNC: 29.5 G/DL (ref 33.6–35)
MCV RBC AUTO: 71.3 FL (ref 81.4–97.8)
MONOCYTES # BLD AUTO: 0.39 K/UL (ref 0–0.85)
MONOCYTES NFR BLD AUTO: 5.9 % (ref 0–13.4)
MORPHOLOGY BLD-IMP: NORMAL
NEUTROPHILS # BLD AUTO: 4.09 K/UL (ref 2–7.15)
NEUTROPHILS NFR BLD: 62.2 % (ref 44–72)
NRBC # BLD AUTO: 0 K/UL
NRBC BLD-RTO: 0 /100 WBC
OVALOCYTES BLD QL SMEAR: NORMAL
PLATELET # BLD AUTO: 263 K/UL (ref 164–446)
PLATELET BLD QL SMEAR: NORMAL
PMV BLD AUTO: 10.2 FL (ref 9–12.9)
POIKILOCYTOSIS BLD QL SMEAR: NORMAL
RBC # BLD AUTO: 4.95 M/UL (ref 4.2–5.4)
RBC BLD AUTO: PRESENT
WBC # BLD AUTO: 6.6 K/UL (ref 4.8–10.8)

## 2021-02-05 PROCEDURE — 83540 ASSAY OF IRON: CPT

## 2021-02-05 PROCEDURE — 85025 COMPLETE CBC W/AUTO DIFF WBC: CPT

## 2021-02-05 PROCEDURE — 82728 ASSAY OF FERRITIN: CPT

## 2021-02-05 PROCEDURE — 83550 IRON BINDING TEST: CPT

## 2021-02-05 PROCEDURE — 36415 COLL VENOUS BLD VENIPUNCTURE: CPT

## 2021-02-06 LAB
FERRITIN SERPL-MCNC: 14.1 NG/ML (ref 10–291)
IRON SATN MFR SERPL: 6 % (ref 15–55)
IRON SERPL-MCNC: 31 UG/DL (ref 40–170)
TIBC SERPL-MCNC: 533 UG/DL (ref 250–450)
UIBC SERPL-MCNC: 502 UG/DL (ref 110–370)

## 2021-02-09 ENCOUNTER — TELEPHONE (OUTPATIENT)
Dept: MEDICAL GROUP | Facility: PHYSICIAN GROUP | Age: 51
End: 2021-02-09

## 2021-02-09 DIAGNOSIS — D50.9 IRON DEFICIENCY ANEMIA, UNSPECIFIED IRON DEFICIENCY ANEMIA TYPE: ICD-10-CM

## 2021-02-10 NOTE — TELEPHONE ENCOUNTER
Patient LVM returning call.    Phone Number Called: [phone] (home) [phone] (work)    Call outcome: Spoke to patient regarding message below.    Message: Patient informed GI referral has been placed by PCP.

## 2021-02-10 NOTE — TELEPHONE ENCOUNTER
----- Message from Grace Riddle P.A.-C. sent at 2/9/2021  9:09 AM PST -----  Please let patient know referred to gastroenterologist.  Referral department should be calling her in 7-10 days.  If she does not hear back from the referral department to call them in 7-10 days.      Thank you,    Grisel HELTON  ----- Message -----  From: Nik Lawrence M.D.  Sent: 2/8/2021  10:01 AM PST  To: Grace Riddle P.A.-C.    Despite her diagnosis of hemochromatosis she has not had a phlebotomy in over a year and she is iron deficient. I told her via [employer] she should see GI for w/u. Do you mind placing a referral to your favorite gastroenterologist?Thanks.  Ruslan

## 2021-02-10 NOTE — TELEPHONE ENCOUNTER
Phone Number Called:   Telephone Information:   Mobile [phone]       Call outcome: Did not leave a detailed message. Requested patient to call back.    Message: LVM for Pt to cb about message below.

## 2021-02-11 ENCOUNTER — APPOINTMENT (OUTPATIENT)
Dept: PHYSICAL THERAPY | Facility: REHABILITATION | Age: 51
End: 2021-02-11
Attending: PHYSICIAN ASSISTANT
Payer: COMMERCIAL

## 2021-03-15 ENCOUNTER — PHYSICAL THERAPY (OUTPATIENT)
Dept: PHYSICAL THERAPY | Facility: REHABILITATION | Age: 51
End: 2021-03-15
Attending: PHYSICIAN ASSISTANT
Payer: COMMERCIAL

## 2021-03-15 DIAGNOSIS — M19.019 OSTEOARTHRITIS OF AC (ACROMIOCLAVICULAR) JOINT: ICD-10-CM

## 2021-03-15 PROCEDURE — 97110 THERAPEUTIC EXERCISES: CPT

## 2021-03-15 PROCEDURE — 97162 PT EVAL MOD COMPLEX 30 MIN: CPT

## 2021-03-15 SDOH — ECONOMIC STABILITY: GENERAL: QUALITY OF LIFE: GOOD

## 2021-03-15 ASSESSMENT — ENCOUNTER SYMPTOMS
EXACERBATED BY: SLEEPING
QUALITY: SHARP
PAIN SCALE: 2
EXACERBATED BY: LIFTING
PAIN SCALE AT HIGHEST: 6
PAIN TIMING: OCCASIONAL
QUALITY: DULL ACHE
QUALITY: THROBBING
ALLEVIATING FACTORS: COLD/ICE
PAIN SCALE AT LOWEST: 0
AWAKENINGS PER NIGHT: 3
PAIN TIMING: INTERMITTENT
ALLEVIATING FACTORS: HEAT APPLICATION

## 2021-03-15 NOTE — OP THERAPY EVALUATION
Outpatient Physical Therapy  INITIAL EVALUATION    [facility] [place]  [address] [facility], [address]  [place] [address]  Phone:  [phone]    Date of Evaluation: 03/15/2021    Patient: Mónica Keita  YOB: 1970  MRN: [id number]     Referring Provider: Grace Riddle P.A.-C.  [address]  [place] [address]   Referring Diagnosis Osteoarthritis of AC (acromioclavicular) joint [M19.019]     Time Calculation    Start time: 0230  Stop time: 0330 Time Calculation (min): 60 minutes         Chief Complaint: Shoulder Problem    Visit Diagnoses     ICD-10-CM   1. Osteoarthritis of AC (acromioclavicular) joint  M19.019       No data found  Subjective:   History of Present Illness:     Date of onset:  2021    Mechanism of injury:  The patient is a 51 year old male who reports (R) shoulder pain following lifting her daughter from her WC to the toilet and shower throughout the day as her daughter had been in a MVA. The patient has no recollection of any past injuries to the (R) shoulder. The patient has difficulty sleeping to the (R) side at night. She wakes up with tingling sensation in the morning and has an ache. Occasionally lifting and using the (R arm to lift light to medium weighted objects.  Quality of life:  Good  Headaches:  no headaches  Ear problems: none  Sleep disturbance:  Interrupted sleep and not disrupted  # Times/Night awakened:  3  Pain:     Current pain ratin    At best pain ratin    At worst pain ratin    Quality:  Dull ache, sharp and throbbing    Pain timing:  Occasional and intermittent    Relieving factors:  Cold/ice and heat application    Aggravating factors:  Lifting and sleeping    Pain Comments::  Overhead activity above her head pulling with her arms above her head  Hand dominance:  Right  Treatments:     Previous treatment:  Massage    Current treatment:  Ice and massage  Patient Goals:     Patient goals for therapy:  Decreased  pain and increased motion    Other patient goals:  Decrease pain and increase mobility to the (R) shoulder      Past Medical History:   Diagnosis Date   • Anemia    • Anxiety    • Bronchitis 2009   • Hypertension    • Pneumonia 2009     Past Surgical History:   Procedure Laterality Date   • PELVIC EXAM UNDER ANESTHESIA  6/17/2014    Performed by Enedina Grant M.D. at SUR[facility]ERY [facility]   • VAGINAL HYSTERECTOMY TOTAL  6/17/2014    Performed by Enedina Grant M.D. at SURGERY [facility]   • HYSTERECTOMY, VAGINAL  06/04/2014    [facility]   • APPENDECTOMY  2014     Social History     Tobacco Use   • Smoking status: Never Smoker   • Smokeless tobacco: Never Used   Substance Use Topics   • Alcohol use: No     Family and Occupational History     Socioeconomic History   • Marital status:      Spouse name: Not on file   • Number of children: Not on file   • Years of education: Not on file   • Highest education level: Not on file   Occupational History   • Not on file       Objective     Postural Observations  Seated posture: fair  Standing posture: fair  Correction of posture: makes symptoms better        Tenderness     Right Shoulder  Tenderness in the AC joint and acromion.     Active Range of Motion   Left Shoulder   Flexion: WFL  Extension: WFL  Abduction: WFL  External rotation 90°: WFL  External rotation BTH: C7   Internal rotation BTB: T5     Right Shoulder   Flexion: WFL  Extension: WFL  Abduction: WFL  External rotation 90°: WFL  External rotation BTH: C7 with pain  Internal rotation BTB: T7 with pain    Passive Range of Motion   Left Shoulder   Flexion: WFL  Extension: WFL  Abduction: WFL  Adduction: WFL  External rotation 90°: WFL  Internal rotation 90°: WFL    Right Shoulder   Flexion: WFL  Extension: WFL  Abduction: WFL  Adduction: WFL  External rotation 90°: with pain  Internal rotation 90°: with pain    Additional Passive Range of Motion Details  (R) shoulder IR at T7 BTB  with pain     Strength:      Left Shoulder   Normal muscle strength    Right Shoulder   Planes of Motion   Flexion: 4   Extension: 5   Abduction: 4+   Adduction: 4+   External rotation at 90°: 4+   External rotation BTH: 4   Internal rotation BTB: 4+   Horizontal abduction: 4+   Horizontal adduction: 5     Tests     Right Shoulder   Positive crossover, Hawkin's, lift-off, Neer's and Speed's.   Negative belly press, empty can and full can.         Therapeutic Exercises (CPT 84594):     1. AROM (R) shoulder flexion     2. Hands behind head Shoulder ER    3. Corner calf stretch    4. Towel stretch    5. Wall push ups      Time-based treatments/modalities:    Physical Therapy Timed Treatment Charges  Manual therapy minutes (CPT 51562): 10 minutes  Therapeutic exercise minutes (CPT 28581): 10 minutes      Assessment, Response and Plan:   Impairments: impaired physical strength, lacks appropriate home exercise program, limited mobility and pain with function    Assessment details:  The patient is a 51 year old female who reports (R) shoulder pain with lifting light to medium weighted objects, helping daughter from  to transfer to toilet, sleeping on her (R) side at night and rolling, overhead activity pulling with her (R) UE. Patient would benefit from skilled physical therapy to address (R) shoulder impingement and OA of the AC joint working on manual therapy techniques, AROM/PROM and mobility, strength and conditioning, postural positioning education, functional training and activity tolerance.  Barriers to therapy:  None  Prognosis: good    Goals:   Short Term Goals:   1) Indep with HEP   2) increase PROM /AROM in (R) shoulder IR to T4-5   3) Increase (R) shoulder strength 1/2-1 MM grade in all planes  Short term goal time span:  2-4 weeks      Long Term Goals:    1) Progressions/regressions advancing HEP   2) Patient able to put on her bra without pain   3) Able to transfer her daughter from her  without pain or  lift heavy objects during ADL  4) Able to sleep on her (R) side at night with 50% less pain   Long term goal time span:  4-6 weeks    Plan:   Therapy options:  Physical therapy treatment to continue  Planned therapy interventions:  E Stim Unattended (CPT 42794), Functional Training, Self Care (CPT 53381), Manual Therapy (CPT 03677), Neuromuscular Re-education (CPT 46825), Self Care ADL Training (CPT 78022), Therapeutic Activities (CPT 43799) and Therapeutic Exercise (CPT 43001)  Frequency:  2x week  Duration in weeks:  6  Duration in visits:  12  Discussed with:  Patient      Functional Assessment Used  Quickdash General Total Score: 25     Referring provider co-signature:  I have reviewed this plan of care and my co-signature certifies the need for services.    Certification Period: 03/15/2021 to  04/27/21    Physician Signature: ________________________________ Date: ______________

## 2021-03-17 ENCOUNTER — PHYSICAL THERAPY (OUTPATIENT)
Dept: PHYSICAL THERAPY | Facility: REHABILITATION | Age: 51
End: 2021-03-17
Attending: PHYSICIAN ASSISTANT
Payer: COMMERCIAL

## 2021-03-17 DIAGNOSIS — M19.019 OSTEOARTHRITIS OF AC (ACROMIOCLAVICULAR) JOINT: ICD-10-CM

## 2021-03-17 PROCEDURE — 97110 THERAPEUTIC EXERCISES: CPT

## 2021-03-17 PROCEDURE — 97140 MANUAL THERAPY 1/> REGIONS: CPT

## 2021-03-17 NOTE — OP THERAPY DAILY TREATMENT
"  Outpatient Physical Therapy  DAILY TREATMENT     [facility] [address] [facility], [address]  [place] [address]  Phone:  [phone]    Date: 03/17/2021    Patient: Mónica Keita  YOB: 1970  MRN: [id number]     Time Calculation    Start time: 0300  Stop time: 0330 Time Calculation (min): 30 minutes         Chief Complaint: Shoulder Problem    Visit #: 2    SUBJECTIVE:  The patient reports that her (R) shoulder does not feel right when she is sleeping or being active with it.    OBJECTIVE:  Current objective measures:       Increase (R) shoulder mobility in flexion and abduction    Therapeutic Exercises (CPT 54265):     1. AROM (R) shoulder flexion , 20x    2. Hands behind head Shoulder ER    3. Corner calf stretch    4. Towel stretch    5. Wall push ups    6. Prone (R) shoulder flexion 2#, 1 x20 reps    7. High pulley pulldowns     8. Sleeper's stretch , 5 x 20 sec    9. Wall lift off's      Time-based treatments/modalities:    Physical Therapy Timed Treatment Charges  Manual therapy minutes (CPT 44077): 15 minutes  Therapeutic exercise minutes (CPT 35319): 15 minutes      ASSESSMENT:   Response to treatment:   AROM in (R) shoulder flexion using dowel; increases (R) shoulder symptoms of tightness to the anterior aspect of the (R) shoulder. Performed prone (R) shoulder flexion with \"popping\" in flexion.    PLAN/RECOMMENDATIONS:   Plan for treatment: therapy treatment to continue next visit.  Planned interventions for next visit: continue with current treatment.       "

## 2021-03-23 ENCOUNTER — PHYSICAL THERAPY (OUTPATIENT)
Dept: PHYSICAL THERAPY | Facility: REHABILITATION | Age: 51
End: 2021-03-23
Attending: PHYSICIAN ASSISTANT
Payer: COMMERCIAL

## 2021-03-23 DIAGNOSIS — M19.019 OSTEOARTHRITIS OF AC (ACROMIOCLAVICULAR) JOINT: ICD-10-CM

## 2021-03-23 PROCEDURE — 97110 THERAPEUTIC EXERCISES: CPT

## 2021-03-23 PROCEDURE — 97140 MANUAL THERAPY 1/> REGIONS: CPT

## 2021-03-23 NOTE — OP THERAPY DAILY TREATMENT
"  Outpatient Physical Therapy  DAILY TREATMENT     [facility] [place] [address] [facility], [address]  [place] [address]  Phone:  [phone]    Date: 03/23/2021    Patient: Mónica Keita  YOB: 1970  MRN: [id number]     Time Calculation    Start time: 0200  Stop time: 0230 Time Calculation (min): 30 minutes         Chief Complaint: Shoulder Problem    Visit #: 3    SUBJECTIVE:  The patient reports doing exercises and feeling better with less pain; still has trouble lifting and reaching in full range with use of (R) shoulder.    OBJECTIVE:  Current objective measures:       Decrease pain to the (R) shoulder ; increase mobility in (R) shoulder IR    Therapeutic Exercises (CPT 03820):     1. AROM (R) shoulder #3 dowel flexion , 20x    2. Hands behind head Shoulder ER    3. Corner calf stretch    4. Towel stretch    5. Wall push ups    6. Prone (R) shoulder flexion 2#, 1 x20 reps    7. High pulley pulldowns     8. Sleeper's stretch , 5 x 20 sec    9. Wall lift off's, (white tb) 2 x10 reps    10. Standing (R) shoulder ER, (red tb) 2 x10 reps    11. Standing (R) shoulder flexion /abduction, 2 x15 reps    Therapeutic Treatments and Modalities:     1. Manual Therapy (CPT 70186), (R) shoulder AC joint, STM to acromio-clavicular joint; less tenderness     Time-based treatments/modalities:    Physical Therapy Timed Treatment Charges  Manual therapy minutes (CPT 24435): 10 minutes  Therapeutic exercise minutes (CPT 62622): 20 minutes      ASSESSMENT:   Response to treatment:   PROM to the (R) shoulder in flexion abduction and ER; end range ER and flexion increased \"twinge\" and sensitivity upon overpressure; patient performed (R) shoulder strengthening exercises with light resistance tolerated well.    PLAN/RECOMMENDATIONS:   Plan for treatment: therapy treatment to continue next visit.  Planned interventions for next visit: continue with current treatment.       "

## 2021-03-25 ENCOUNTER — PHYSICAL THERAPY (OUTPATIENT)
Dept: PHYSICAL THERAPY | Facility: REHABILITATION | Age: 51
End: 2021-03-25
Attending: PHYSICIAN ASSISTANT
Payer: COMMERCIAL

## 2021-03-25 DIAGNOSIS — M19.019 OSTEOARTHRITIS OF AC (ACROMIOCLAVICULAR) JOINT: ICD-10-CM

## 2021-03-25 PROCEDURE — 97110 THERAPEUTIC EXERCISES: CPT

## 2021-03-25 PROCEDURE — 97140 MANUAL THERAPY 1/> REGIONS: CPT

## 2021-03-25 NOTE — OP THERAPY DAILY TREATMENT
Outpatient Physical Therapy  DAILY TREATMENT     [facility] Out[facility] Physical Therapy [place] [address] [facility], [address]  [place] [address]  Phone:  [phone]    Date: 03/25/2021    Patient: Mónica Keita  YOB: 1970  MRN: [id number]     Time Calculation    Start time: 0300  Stop time: 0330 Time Calculation (min): 30 minutes         Chief Complaint: Shoulder Problem    Visit #: 4    SUBJECTIVE:  The patient reports having increased soreness to the top of the (R) shoulder the last day or so.    OBJECTIVE:  Current objective measures:       Decrease pain in (R) sh flexion;  improve mobility    Therapeutic Exercises (CPT 28328):     1. AROM (R) shoulder #3 dowel flexion , 20x    2. Hands behind head Shoulder ER    3. Corner calf stretch, 3 x30 sec    4. Towel stretch    5. Wall push ups    6. Prone (R) shoulder flexion 2#, 1 x20 reps    7. High pulley pulldowns (green tb), 20x    8. Sleeper's stretch , 5 x 20 sec    9. Wall lift off's, (white tb) 2 x10 reps    10. Standing (R) shoulder ER, (red tb) 2 x10 reps    11. Standing (R) shoulder flexion /abduction, 2 x15 reps    12. Pulleys   flexion, abduction, ER/IR, 20x     Therapeutic Treatments and Modalities:     1. Manual Therapy (CPT 55841), (R) shoulder AC joint, STM to acromio-clavicular joint; less tenderness     2. E Stim Unattended (CPT 13926), (R) shoulder , IFC to the (R) shoulder AC joint with ice pack for 15 minutes     Time-based treatments/modalities:    Physical Therapy Timed Treatment Charges  Manual therapy minutes (CPT 98880): 10 minutes  Therapeutic exercise minutes (CPT 95050): 20 minutes      ASSESSMENT:   Response to treatment:   Patient given manual therapy techniques to (R) shoulder; PA's grade 2-3; no pain; increased flexion to ~5-10 deg with less soreness. AC joint AP and inferior mobs grade 2 with some discomfort    PLAN/RECOMMENDATIONS:   Plan for treatment: continue with current treatment   Planned interventions for next  visit: continue with current treatment.

## 2021-03-30 ENCOUNTER — PHYSICAL THERAPY (OUTPATIENT)
Dept: PHYSICAL THERAPY | Facility: REHABILITATION | Age: 51
End: 2021-03-30
Attending: PHYSICIAN ASSISTANT
Payer: COMMERCIAL

## 2021-03-30 DIAGNOSIS — M19.019 OSTEOARTHRITIS OF AC (ACROMIOCLAVICULAR) JOINT: ICD-10-CM

## 2021-03-30 PROCEDURE — 97014 ELECTRIC STIMULATION THERAPY: CPT

## 2021-03-30 PROCEDURE — 97140 MANUAL THERAPY 1/> REGIONS: CPT

## 2021-03-30 PROCEDURE — 97110 THERAPEUTIC EXERCISES: CPT

## 2021-03-30 NOTE — OP THERAPY DAILY TREATMENT
"  Outpatient Physical Therapy  DAILY TREATMENT     [facility] Therapy [address] [facility], [address]  [place] [address]  Phone:  [phone]    Date: 03/30/2021    Patient: Mónica Keita  YOB: 1970  MRN: [id number]     Time Calculation    Start time: 0200  Stop time: 0230 Time Calculation (min): 30 minutes         Chief Complaint: Shoulder Problem    Visit #: 5    SUBJECTIVE:  The patient reports having increased discomfort sleeping on her (R) shoulder at night.    OBJECTIVE:  Current objective measures:       Increase (R) shoulder IR BTB at T6 without pain; increase strength in (R) sh ER/IR 4+/5 to 5/5    Therapeutic Exercises (CPT 45978):     1. AROM (R) shoulder #3 dowel flexion , 20x    3. Corner calf stretch, 3 x30 sec    4. Towel stretch, NT    5. Wall push ups    6. Prone (R) shoulder flexion 1#, 1 x20 reps    7. High pulley pulldowns (green tb), 20x    8. Sleeper's stretch , 5 x 20 sec    9. Wall lift off's, (white tb) 2 x10 reps, orange tb    10. Standing (R) shoulder ER, (red tb) 2 x10 reps, increase to green tb    11. Standing (R) shoulder flexion /abduction, 2 x15 reps, \"   \"    12. Pulleys   flexion, abduction, ER/IR, 20x     Therapeutic Treatments and Modalities:     1. Manual Therapy (CPT 66372), (R) shoulder AC joint, STM to acromio-clavicular joint; less tenderness     2. E Stim Unattended (CPT 18504), (R) shoulder , IFC to the (R) shoulder AC joint with ice pack for 15 minutes     Time-based treatments/modalities:    Physical Therapy Timed Treatment Charges  Manual therapy minutes (CPT 58221): 10 minutes  Therapeutic exercise minutes (CPT 72358): 20 minutes      ASSESSMENT:   Response to treatment:   (R) shoulder PAMS in flexion, ER and abduction; tolerated well; some clicking and popping during (R) sh flexion and abduction but no pain; patient able to reach BTB in (R) sh IR to T6-7  PLAN/RECOMMENDATIONS:   Plan for treatment: therapy treatment to continue next " visit.  Planned interventions for next visit: continue with current treatment.

## 2021-03-31 ENCOUNTER — IMMUNIZATION (OUTPATIENT)
Dept: FAMILY PLANNING/WOMEN'S HEALTH CLINIC | Facility: IMMUNIZATION CENTER | Age: 51
End: 2021-03-31
Payer: COMMERCIAL

## 2021-03-31 DIAGNOSIS — Z23 ENCOUNTER FOR VACCINATION: Primary | ICD-10-CM

## 2021-03-31 PROCEDURE — 0001A PFIZER SARS-COV-2 VACCINE: CPT | Performed by: INTERNAL MEDICINE

## 2021-03-31 PROCEDURE — 91300 PFIZER SARS-COV-2 VACCINE: CPT | Performed by: INTERNAL MEDICINE

## 2021-04-02 ENCOUNTER — PHYSICAL THERAPY (OUTPATIENT)
Dept: PHYSICAL THERAPY | Facility: REHABILITATION | Age: 51
End: 2021-04-02
Attending: PHYSICIAN ASSISTANT
Payer: COMMERCIAL

## 2021-04-02 DIAGNOSIS — M19.019 OSTEOARTHRITIS OF AC (ACROMIOCLAVICULAR) JOINT: ICD-10-CM

## 2021-04-02 PROCEDURE — 97014 ELECTRIC STIMULATION THERAPY: CPT

## 2021-04-02 PROCEDURE — 97140 MANUAL THERAPY 1/> REGIONS: CPT

## 2021-04-02 PROCEDURE — 97110 THERAPEUTIC EXERCISES: CPT

## 2021-04-02 NOTE — OP THERAPY DAILY TREATMENT
"  Outpatient Physical Therapy  DAILY TREATMENT     [facility] Physical Therapy [place] [address] [facility], [address]  [place] [address]  Phone:  [phone]    Date: 04/02/2021    Patient: Mónica Keita  YOB: 1970  MRN: [id number]     Time Calculation    Start time: 0200  Stop time: 0230 Time Calculation (min): 30 minutes         Chief Complaint: No chief complaint on file.    Visit #: 6    SUBJECTIVE:  The patient reports having continues success with her (R) shoulder     OBJECTIVE:  Current objective measures:       Increase (R sh IR reaching BTH (C7)    Therapeutic Exercises (CPT 13233):     1. AROM (R) shoulder #3 dowel flexion , 20x    2. Snow angels foam roller, 3'    3. Corner calf stretch, 3 x30 sec    4. Towel stretch, NT    5. Wall push ups    6. Prone (R) shoulder flexion 1#, 1 x20 reps    7. High pulley pulldowns (green tb), 20x    8. Sleeper's stretch , 5 x 20 sec    9. Wall lift off's, (white tb) 2 x10 reps, orange tb    10. Standing (R) shoulder ER, ( tb) 2 x10 reps    11. Standing (R) shoulder flexion /abduction, 2 x15 reps, \"   \"    12. Pulleys   flexion, abduction, ER/IR, 20x     Therapeutic Treatments and Modalities:     1. Manual Therapy (CPT 12427), (R) shoulder AC joint, STM to acromio-clavicular joint; less tenderness     2. E Stim Unattended (CPT 65605), (R) shoulder , IFC to the (R) shoulder AC joint with ice pack for 15 minutes     Time-based treatments/modalities:    Physical Therapy Timed Treatment Charges  Manual therapy minutes (CPT 70686): 10 minutes  Therapeutic exercise minutes (CPT 40937): 20 minutes      ASSESSMENT:   Response to treatment:   PROM to the (R) shoulder in IR BTB to T5 without pain; patient still has pain with (R) sh ER/IR with manual strength testing from BTH and BTB positions.     PLAN/RECOMMENDATIONS:   Plan for treatment: therapy treatment to continue next visit.  Planned interventions for next visit: continue with current treatment.       "

## 2021-04-06 NOTE — OP THERAPY DAILY TREATMENT
"  Outpatient Physical Therapy  DAILY TREATMENT     [facility] Outpatient Physical Therapy [place] [address] [facility], [address]  [place] [address]  Phone:  [phone]    Date: 04/07/2021    Patient: Mónica Keita  YOB: 1970  MRN: [id number]     Time Calculation    Start time: 0930  Stop time: 1000 Time Calculation (min): 30 minutes         Chief Complaint: Shoulder Problem    Visit #: 7    SUBJECTIVE:  The patient reports continued progress with (R) shoulder movement but occasional pain when pushing down onto (R) UE like onto table or ground levels to get up from floor    OBJECTIVE:  Current objective measures:       Improve strength reaching BTB in IR using (R) UE     Therapeutic Exercises (CPT 25428):     1. AROM (R) shoulder #3 dowel flexion , 20x    2. Snow angels foam roller w/wo 4#, 3'    3. Corner calf stretch, 3 x 30 sec    5. 45 deg table push ups, 20x    6. Prone (R) shoulder flexion 2#    7. High pulley pulldowns (black tb), 20x    8. Sleeper's stretch , HEP    9. Wall lift off's, (orange tb) 2 x10 reps    10. Standing (R) shoulder ER  (black tb), 2 x10 reps    11. Standing (R) shoulder flexion /abduction, 2 x15 reps, \"   \"    12. Pulleys   flexion, abduction, ER/IR, 20x     13. Overhead shoulder presses (R) UE     Therapeutic Treatments and Modalities:     1. Manual Therapy (CPT 37121), (R) shoulder AC joint, STM to acromio-clavicular joint; less tenderness     Time-based treatments/modalities:    Physical Therapy Timed Treatment Charges  Manual therapy minutes (CPT 45061): 15 minutes  Therapeutic exercise minutes (CPT 33865): 15 minutes      ASSESSMENT:   Response to treatment:   Manual therapy techniques applied to (R) shoulder; tenderness at AC joint with point palpation and upper trap stretch; mild; patient had some pain with (R) UE BTB lift off manual testing 4+/5     PLAN/RECOMMENDATIONS:   Plan for treatment: therapy treatment to continue next visit.  Planned interventions for next " visit: continue with current treatment.

## 2021-04-07 ENCOUNTER — PHYSICAL THERAPY (OUTPATIENT)
Dept: PHYSICAL THERAPY | Facility: REHABILITATION | Age: 51
End: 2021-04-07
Attending: PHYSICIAN ASSISTANT
Payer: COMMERCIAL

## 2021-04-07 DIAGNOSIS — M19.019 OSTEOARTHRITIS OF AC (ACROMIOCLAVICULAR) JOINT: ICD-10-CM

## 2021-04-07 PROCEDURE — 97110 THERAPEUTIC EXERCISES: CPT

## 2021-04-07 PROCEDURE — 97140 MANUAL THERAPY 1/> REGIONS: CPT

## 2021-04-09 ENCOUNTER — PHYSICAL THERAPY (OUTPATIENT)
Dept: PHYSICAL THERAPY | Facility: REHABILITATION | Age: 51
End: 2021-04-09
Attending: PHYSICIAN ASSISTANT
Payer: COMMERCIAL

## 2021-04-09 DIAGNOSIS — M19.019 OSTEOARTHRITIS OF AC (ACROMIOCLAVICULAR) JOINT: ICD-10-CM

## 2021-04-09 PROCEDURE — 97110 THERAPEUTIC EXERCISES: CPT

## 2021-04-09 PROCEDURE — 97140 MANUAL THERAPY 1/> REGIONS: CPT

## 2021-04-09 NOTE — OP THERAPY DAILY TREATMENT
"  Outpatient Physical Therapy  DAILY TREATMENT     [facility] Out[facility] Physical Therapy [place] [address] [facility], [address]  [place] [address]  Phone:  [phone]    Date: 04/09/2021    Patient: Mónica Keita  YOB: 1970  MRN: [id number]     Time Calculation    Start time: 0230  Stop time: 0300 Time Calculation (min): 30 minutes         Chief Complaint: Shoulder Problem    Visit #: 8    SUBJECTIVE:  The patient reports feeling improvement with less discomfort in reaching behind the back but still sensitive with overhead positions    OBJECTIVE:  Current objective measures:       Increase strength to 4+/5 in ER and BTB at T7    Therapeutic Exercises (CPT 62387):     1. AROM (R) shoulder #3 dowel flexion , 20x    2. Snow angels foam roller w/wo 4#, 3'    3. Corner calf stretch, 3 x 30 sec    4. Prone T's (R) sh     5. 45 deg table push ups, 20x    6. Prone (R) shoulder flexion 2#    7. High pulley pulldowns (black tb)    8. Sleeper's stretch , HEP    9. Wall lift off's, (orange tb) 2 x10 reps    10. Standing (R) shoulder ER  (black tb), 2 x10 reps    11. Standing (R) shoulder flexion /abduction, 2 x15 reps, \"   \"    12. Pulleys   flexion, abduction, ER/IR, 20x     13. Overhead shoulder presses (R) UE     Therapeutic Treatments and Modalities:     1. Manual Therapy (CPT 41038), (R) shoulder AC joint, STM to acromio-clavicular joint; less tenderness     Time-based treatments/modalities:    Physical Therapy Timed Treatment Charges  Manual therapy minutes (CPT 46217): 10 minutes  Therapeutic exercise minutes (CPT 39487): 20 minutes      ASSESSMENT:   Response to treatment:      Performed strengthening exercises for upper traps and mid deltoids with increased resistance; completed 2 sets to fatigue response and no pain; also performed (R) shoulder ER with increased resistance; some discomfort upon movement to end ranges but tolerated. Strength manually (R) sh ER BTB at 5/5 but with discomfort/pain to anterior " shoulder    PLAN/RECOMMENDATIONS:   Plan for treatment: therapy treatment to continue next visit.  Planned interventions for next visit: continue with current treatment.

## 2021-04-13 ENCOUNTER — PHYSICAL THERAPY (OUTPATIENT)
Dept: PHYSICAL THERAPY | Facility: REHABILITATION | Age: 51
End: 2021-04-13
Attending: PHYSICIAN ASSISTANT
Payer: COMMERCIAL

## 2021-04-13 DIAGNOSIS — M19.019 OSTEOARTHRITIS OF AC (ACROMIOCLAVICULAR) JOINT: ICD-10-CM

## 2021-04-13 PROCEDURE — 97140 MANUAL THERAPY 1/> REGIONS: CPT

## 2021-04-13 PROCEDURE — 97110 THERAPEUTIC EXERCISES: CPT

## 2021-04-13 NOTE — OP THERAPY DAILY TREATMENT
"  Outpatient Physical Therapy  DAILY TREATMENT     [facility] Outpatient Physical Therapy [address] [facility], [address]  [place] [address]  Phone:  [phone]    Date: 04/13/2021    Patient: Mónica Keita  YOB: 1970  MRN: [id number]     Time Calculation                   Chief Complaint: No chief complaint on file.    Visit #: 9    SUBJECTIVE:  The patient reports less pain to the (R) shoulder especially in the mornings    OBJECTIVE:  Current objective measures:       Increase strength to the (L) UE in sh flexion     Therapeutic Exercises (CPT 54682):     1. AROM (R) shoulder #3 dowel flexion , 20x    2. Snow angels foam roller w/wo 4#, 3'    3. Corner calf stretch, 3 x 30 sec    4. Prone T's (R) sh     5. 45 deg table push ups, 20x    6. Prone (R) shoulder flexion 2#    7. High pulley pulldowns (black tb)    8. Overhead high low pulley sh flexion , 20x    9. Wall lift off's, (orange tb) 2 x10 reps    10. Standing (R) shoulder ER  (black tb), 2 x10 reps    11. Standing (R) shoulder flexion /abduction, 2 x15 reps, \"   \"    12. Pulleys   flexion, abduction, ER/IR, 20x     13. Overhead shoulder presses (R) UE     Therapeutic Treatments and Modalities:     1. Manual Therapy (CPT 26188), (R) shoulder AC joint, STM to acromio-clavicular joint; less tenderness     Time-based treatments/modalities:           ASSESSMENT:   Response to treatment:   (R) GH joint mobs AP's and PA's    PLAN/RECOMMENDATIONS:   Plan for treatment: .  Planned interventions for next visit: continue with current treatment.       "

## 2021-04-13 NOTE — OP THERAPY DAILY TREATMENT
"  Outpatient Physical Therapy  DAILY TREATMENT     [facility] Therapy [place] [address] [address] [facility], [address]  [place] [address]  Phone:  [phone]    Date: 04/13/2021    Patient: Mónica Keita  YOB: 1970  MRN: [id number]     Time Calculation    Start time: 0200  Stop time: 0230 Time Calculation (min): 30 minutes         Chief Complaint: Shoulder Problem    Visit #: 9    SUBJECTIVE:  The patient reports having progress with her (R) shoulder movement but still experiences pain to the anterior shoulder at end range points reaching above and behind her back.    OBJECTIVE:  Current objective measures:     increase (R) shoulder AROM in (R) shoulder IR/ER and strength        Therapeutic Exercises (CPT 66574):     1. AROM (R) shoulder #3 dowel flexion , 20x    2. Snow angels foam roller w/wo 4#, 3'    3. Corner calf stretch, 3 x 30 sec    4. Prone T's (R) sh     5. 45 deg table push ups, 20x    6. Prone (R) shoulder flexion 2#    7. High pulley pulldowns (black tb), 2 x 15 reps     8. Overhead high low pulley sh flexion , 20x    9. Wall lift off's, (orange tb) 2 x10 reps    10. Standing (R) shoulder ER  (black tb), 2 x10 reps    11. Standing (R) shoulder flexion /abduction, 2 x15 reps, \"   \"    12. Pulleys   flexion, abduction, ER/IR, 20x     13. Overhead shoulder presses (R) UE     Therapeutic Treatments and Modalities:     1. Manual Therapy (CPT 61458), (R) shoulder AC joint, STM to acromio-clavicular joint; less tenderness     Time-based treatments/modalities:    Physical Therapy Timed Treatment Charges  Manual therapy minutes (CPT 64880): 10 minutes  Therapeutic exercise minutes (CPT 37608): 20 minutes        ASSESSMENT:   Response to treatment:   Patient performed overhead low high lifts with (R) shoulder using medium resistance with black resistance cord; tolerated well without pain; patient in prone position with (R) sh in IR BTB with manual strength testing performed 4+/5 but with " discomfort to anterior shoulder     PLAN/RECOMMENDATIONS:   Plan for treatment: therapy treatment to continue next visit.  Planned interventions for next visit: continue with current treatment.

## 2021-04-16 NOTE — OP THERAPY PROGRESS SUMMARY
Outpatient Physical Therapy  PROGRESS SUMMARY NOTE      [facility] [place]  [address], [address]  [place] [address]  Phone:  [phone]    Date of Visit: 04/13/2021    Patient: Mónica Keita  YOB: 1970  MRN: [id number]     Referring Provider: Grace Riddle P.A.-C.  [address]  [place] [address]   Referring Diagnosis Osteoarthritis of AC (acromioclavicular) joint [M19.019]     Visit Diagnoses     ICD-10-CM   1. Osteoarthritis of AC (acromioclavicular) joint  M19.019       Rehab Potential: good    Progress Report Period: 03/15/21-4/27/21    Functional Assessment Used          Objective Findings and Assessment:   Patient progression towards goals: The patient reports decreased (R) shoulder pain with sleep and activity to the (R) side, however still has sensitivity and discomfort with overhead activity at end ranges of movement and while pushing against objects.    Objective findings and assessment details: some clicking and popping during (R) sh flexion and abduction but no pain; patient able to reach BTB in (R) sh IR to T6-7;  tenderness at AC joint with point palpation and upper trap stretch; mild; patient had some pain with (R) UE BTB lift off manual testing 4+/5     Goals:   Short Term Goals:   1) Indep with HEP: met  2) increase PROM /AROM in (R) shoulder IR to T4-5; progression   3) Increase (R) shoulder strength 1/2-1 MM grade in all planes; progression   Short term goal time span:  2-4 weeks  Short term goal time span:  2-4 weeks      Long Term Goals:    1) Progressions/regressions advancing HEP: met   2) Patient able to put on her bra without pain: progression   3) Able to transfer her daughter from her WC without pain or lift heavy objects during ADL: partially met  4) Able to sleep on her (R) side at night with 50% less pain: improved    Long term goal time span:  4-6 weeks  Long term goal time span:  4-6 weeks    Plan:   Planned therapy  interventions:  E Stim Unattended (CPT 46475), Functional Training, Self Care (CPT 83774), Manual Therapy (CPT 04139), Neuromuscular Re-education (CPT 51278), Self Care ADL Training (CPT 10738), Therapeutic Activities (CPT 67304) and Therapeutic Exercise (CPT 80783)  Frequency:  2x week  Duration in weeks:  6  Duration in visits:  12      Referring provider co-signature:  I have reviewed this plan of care and my co-signature certifies the need for services.     Certification Period: 04/13/2021 to 05/28/21    Physician Signature: ________________________________ Date: ______________

## 2021-04-20 ENCOUNTER — APPOINTMENT (OUTPATIENT)
Dept: PHYSICAL THERAPY | Facility: REHABILITATION | Age: 51
End: 2021-04-20
Attending: PHYSICIAN ASSISTANT
Payer: COMMERCIAL

## 2021-04-20 DIAGNOSIS — M19.019 OSTEOARTHRITIS OF AC (ACROMIOCLAVICULAR) JOINT: ICD-10-CM

## 2021-04-20 PROCEDURE — 97110 THERAPEUTIC EXERCISES: CPT

## 2021-04-20 NOTE — OP THERAPY DAILY TREATMENT
"  Outpatient Physical Therapy  DAILY TREATMENT     [facility] Therapy [place] [address] [address] [facility], [address]  [place] [address]  Phone:  [phone]    Date: 04/20/2021    Patient: Mónica Keita  YOB: 1970  MRN: [id number]     Time Calculation    Start time: 0300  Stop time: 0330 Time Calculation (min): 30 minutes         Chief Complaint: Shoulder Problem    Visit #: 10    SUBJECTIVE:  The patient reports that her (R) shoulder has been improving with less soreness     OBJECTIVE:  Current objective measures:       Increase strength to (R) shoulder in flexion abduction and IR    Therapeutic Exercises (CPT 64545):     1. AROM (R) shoulder #3 dowel flexion , 20x    2. Snow angels foam roller w/wo 4#, 2 x10 reps at 4#    3. Corner calf stretch, 3 x 30 sec    4. Prone T's (R) sh     5. 45 deg table push ups, 25x    6. Prone (R) shoulder flexion 2#    7. High pulley pulldowns (black tb), 2 x 15 reps     8. Overhead high low pulley sh flexion , 20x    9. Wall lift off's, (orange tb) 2 x10 reps    10. Standing (R) shoulder ER  (black tb), 2 x10 reps    11. Standing (R) shoulder flexion /abduction, 2 x15 reps, \"   \"    12. Pulleys   flexion, abduction, ER/IR, 20x     13. Overhead shoulder presses (R) UE , 2 x10 reps at 4#    Therapeutic Treatments and Modalities:     1. Manual Therapy (CPT 58546), (R) shoulder AC joint, STM to acromio-clavicular joint; less tenderness     Time-based treatments/modalities:    Physical Therapy Timed Treatment Charges  Therapeutic exercise minutes (CPT 57434): 30 minutes      ASSESSMENT:   Response to treatment:   Strengthening exercises for the (R) shoulder with increased resistance using free weights and theraband.    PLAN/RECOMMENDATIONS:   Plan for treatment: therapy treatment to continue next visit.  Planned interventions for next visit: continue with current treatment.       "

## 2021-04-22 ENCOUNTER — APPOINTMENT (OUTPATIENT)
Dept: PHYSICAL THERAPY | Facility: REHABILITATION | Age: 51
End: 2021-04-22
Attending: PHYSICIAN ASSISTANT
Payer: COMMERCIAL

## 2021-04-22 ENCOUNTER — IMMUNIZATION (OUTPATIENT)
Dept: FAMILY PLANNING/WOMEN'S HEALTH CLINIC | Facility: IMMUNIZATION CENTER | Age: 51
End: 2021-04-22

## 2021-04-22 DIAGNOSIS — M19.019 OSTEOARTHRITIS OF AC (ACROMIOCLAVICULAR) JOINT: ICD-10-CM

## 2021-04-22 DIAGNOSIS — Z23 ENCOUNTER FOR VACCINATION: Primary | ICD-10-CM

## 2021-04-22 PROCEDURE — 91300 PFIZER SARS-COV-2 VACCINE: CPT | Performed by: INTERNAL MEDICINE

## 2021-04-22 PROCEDURE — 97110 THERAPEUTIC EXERCISES: CPT

## 2021-04-22 PROCEDURE — 0002A PFIZER SARS-COV-2 VACCINE: CPT | Performed by: INTERNAL MEDICINE

## 2021-04-22 NOTE — OP THERAPY DAILY TREATMENT
"  Outpatient Physical Therapy  DAILY TREATMENT     [facility] Physical Therapy [address] [facility], [address]  [place] [address]  Phone:  [phone]    Date: 04/22/2021    Patient: Mónica Keita  YOB: 1970  MRN: [id number]     Time Calculation    Start time: 1100  Stop time: 1130 Time Calculation (min): 30 minutes         Chief Complaint: Back Problem and Shoulder Problem    Visit #: 11    SUBJECTIVE:  The patient reports more soreness to the (R) anterior shoulder and chest     OBJECTIVE:  Current objective measures:       Increase strength to (R) shoulder anterior/mid/posterior delts    Therapeutic Exercises (CPT 02599):     1. AROM (R) shoulder #3 dowel flexion , 20x    2. Snow angels foam roller w/wo 4#, 2 x10 reps at 3#    3. Corner calf stretch, 3 x 30 sec    4. Prone T's (R) sh     5. 30 deg table push ups, 25x    6. Prone (R) shoulder flexion 2#, 2 x10 reps     7. High pulley pulldowns (black tb), 2 x 15 reps     8. Overhead high low pulley sh flexion , 20x    9. Wall lift off's, (pink tb) 2 x10 reps    10. Standing (R) shoulder ER  (black tb), 2 x10 reps    11. Standing (R) shoulder flexion /abduction, 2 x15 reps, \"   \"    12. Pulleys   flexion, abduction, ER/IR, 20x     13. Overhead shoulder presses (R) UE , 2 x10 reps at 3#    Therapeutic Treatments and Modalities:     1. Manual Therapy (CPT 71958), (R) shoulder AC joint, STM to acromio-clavicular joint; less tenderness     Time-based treatments/modalities:    Physical Therapy Timed Treatment Charges  Therapeutic exercise minutes (CPT 82923): 30 minutes      ASSESSMENT:   Response to treatment:   The patient still has some discomfort to the (R) anterior shoulder upon wall lift off's in end range AROM; patient has been progressively using increased resistance; patient performed 30 deg table push-ups without (R) shoulder pain.    PLAN/RECOMMENDATIONS:   Plan for treatment: therapy treatment to continue next visit.  Planned " interventions for next visit: continue with current treatment.

## 2021-05-06 ENCOUNTER — HOSPITAL ENCOUNTER (OUTPATIENT)
Dept: LAB | Facility: MEDICAL CENTER | Age: 51
End: 2021-05-06
Attending: PHYSICIAN ASSISTANT
Payer: COMMERCIAL

## 2021-05-06 DIAGNOSIS — E11.9 TYPE 2 DIABETES MELLITUS WITHOUT COMPLICATION, WITHOUT LONG-TERM CURRENT USE OF INSULIN (HCC): ICD-10-CM

## 2021-05-06 DIAGNOSIS — R74.01 TRANSAMINITIS: ICD-10-CM

## 2021-05-06 DIAGNOSIS — I10 ESSENTIAL HYPERTENSION: ICD-10-CM

## 2021-05-06 LAB
ALBUMIN SERPL BCP-MCNC: 4.1 G/DL (ref 3.2–4.9)
ALBUMIN/GLOB SERPL: 1.3 G/DL
ALP SERPL-CCNC: 60 U/L (ref 30–99)
ALT SERPL-CCNC: 120 U/L (ref 2–50)
ANION GAP SERPL CALC-SCNC: 10 MMOL/L (ref 7–16)
AST SERPL-CCNC: 104 U/L (ref 12–45)
BILIRUB SERPL-MCNC: 0.3 MG/DL (ref 0.1–1.5)
BUN SERPL-MCNC: 11 MG/DL (ref 8–22)
CALCIUM SERPL-MCNC: 8.8 MG/DL (ref 8.5–10.5)
CHLORIDE SERPL-SCNC: 101 MMOL/L (ref 96–112)
CHOLEST SERPL-MCNC: 101 MG/DL (ref 100–199)
CO2 SERPL-SCNC: 27 MMOL/L (ref 20–33)
CREAT SERPL-MCNC: 0.73 MG/DL (ref 0.5–1.4)
CREAT UR-MCNC: 228.73 MG/DL
EST. AVERAGE GLUCOSE BLD GHB EST-MCNC: 128 MG/DL
GLOBULIN SER CALC-MCNC: 3.1 G/DL (ref 1.9–3.5)
GLUCOSE SERPL-MCNC: 94 MG/DL (ref 65–99)
HBA1C MFR BLD: 6.1 % (ref 4–5.6)
HDLC SERPL-MCNC: 51 MG/DL
LDLC SERPL CALC-MCNC: 41 MG/DL
MICROALBUMIN UR-MCNC: 2.6 MG/DL
MICROALBUMIN/CREAT UR: 11 MG/G (ref 0–30)
POTASSIUM SERPL-SCNC: 4.5 MMOL/L (ref 3.6–5.5)
PROT SERPL-MCNC: 7.2 G/DL (ref 6–8.2)
SODIUM SERPL-SCNC: 138 MMOL/L (ref 135–145)
TRIGL SERPL-MCNC: 43 MG/DL (ref 0–149)

## 2021-05-06 PROCEDURE — 80061 LIPID PANEL: CPT

## 2021-05-06 PROCEDURE — 83036 HEMOGLOBIN GLYCOSYLATED A1C: CPT

## 2021-05-06 PROCEDURE — 82570 ASSAY OF URINE CREATININE: CPT

## 2021-05-06 PROCEDURE — 36415 COLL VENOUS BLD VENIPUNCTURE: CPT

## 2021-05-06 PROCEDURE — 80053 COMPREHEN METABOLIC PANEL: CPT

## 2021-05-06 PROCEDURE — 82043 UR ALBUMIN QUANTITATIVE: CPT

## 2021-05-11 ENCOUNTER — OFFICE VISIT (OUTPATIENT)
Dept: MEDICAL GROUP | Facility: PHYSICIAN GROUP | Age: 51
End: 2021-05-11
Payer: COMMERCIAL

## 2021-05-11 VITALS
OXYGEN SATURATION: 100 % | TEMPERATURE: 97.4 F | RESPIRATION RATE: 12 BRPM | BODY MASS INDEX: 28.12 KG/M2 | DIASTOLIC BLOOD PRESSURE: 72 MMHG | HEIGHT: 67 IN | SYSTOLIC BLOOD PRESSURE: 112 MMHG | WEIGHT: 179.2 LBS | HEART RATE: 88 BPM

## 2021-05-11 DIAGNOSIS — E11.9 TYPE 2 DIABETES MELLITUS WITHOUT COMPLICATION, WITHOUT LONG-TERM CURRENT USE OF INSULIN (HCC): ICD-10-CM

## 2021-05-11 DIAGNOSIS — K76.9 CHRONIC NONALCOHOLIC LIVER DISEASE: ICD-10-CM

## 2021-05-11 PROBLEM — J30.9 ALLERGIC RHINITIS: Status: ACTIVE | Noted: 2021-05-11

## 2021-05-11 PROBLEM — K21.9 GASTROESOPHAGEAL REFLUX DISEASE: Status: ACTIVE | Noted: 2021-05-11

## 2021-05-11 PROCEDURE — 99213 OFFICE O/P EST LOW 20 MIN: CPT | Performed by: PHYSICIAN ASSISTANT

## 2021-05-11 ASSESSMENT — FIBROSIS 4 INDEX: FIB4 SCORE: 1.77

## 2021-05-11 NOTE — PROGRESS NOTES
Chief Complaint   Patient presents with   • Diabetes Follow-up     doing good        HISTORY OF PRESENT ILLNESS: Mónica Keita is an established 51 y.o. female here to discuss the evaluation and management of:    Patient is a pleasant 51-year-old female here to follow-up on recent lab work results.  Patient has type 2 diabetes that is a chronic but stable problem.  Diabetes is controlled.   Hemoglobin A1c on 5/6/2021 was 6.1.  Hemoglobin A1c is trending downward.  Patient takes Metformin 500 mg twice daily, lisinopril 20 mg tablets daily, and 81 mg aspirin once daily.  Denies side effects or complications from medication.  Patient states she has been decreasing carbohydrate and sugar consumption.  She has been focusing on eating a well-balanced diet.  She is been walking 3 days a week 40 minutes per time.  Patient is asymptomatic.    Patient has nonalcoholic fatty liver disease.  Discussed recent lab work results with patient.  AST and ALT is trending downward.  Patient is asymptomatic.  She will be establishing care with a GI specialist in future.  She will discuss this with her GI specialist during her appointment.      Patient Active Problem List    Diagnosis Date Noted   • Gastroesophageal reflux disease 05/11/2021   • Chronic nonalcoholic liver disease 05/11/2021   • Allergic rhinitis 05/11/2021   • Transaminitis 05/07/2020   • Type 2 diabetes mellitus without complication, without long-term current use of insulin (HCC) 07/17/2019   • Hypertension 06/23/2019   • Anxiety 06/12/2019   • Hemochromatosis 12/15/2017       Allergies:Patient has no known allergies.    Current Outpatient Medications   Medication Sig Dispense Refill   • lisinopril (PRINIVIL) 20 MG Tab TAKE 1/2 TABLET BY MOUTH ONCE DAILY 90 tablet 1   • metFORMIN (GLUCOPHAGE) 500 MG Tab TAKE TWO TABLETS BY MOUTH TWICE DAILY WITH FOOD (Patient taking differently: Take 1 Tablet qAM and 1 tablet qPM) 180 Tab 0   • omeprazole (PRILOSEC) 20 MG  delayed-release capsule TAKE ONE CAPSULE BY MOUTH ONE TIME DAILY  30 Cap 6   • escitalopram (LEXAPRO) 20 MG tablet Lexapro 20 mg tablet   Take 2 tablets every day by oral route.     • buPROPion SR (WELLBUTRIN-SR) 150 MG TABLET SR 12 HR sustained-release tablet Take 1 Tab by mouth every day.     • Cholecalciferol (VITAMIN D3) 5000 UNIT/0.5ML Liquid Take 1 Tab by mouth every day.     • Coenzyme Q10 (COQ10 PO) Take 1 Tab by mouth every day.     • LORazepam (ATIVAN) 0.5 MG Tab   2   • Multiple Vitamin (CALCIUM COMPLEX PO) Take  by mouth.     • Biotin 5000 MCG Cap Take  by mouth.     • aspirin (ASA) 81 MG Chew Tab chewable tablet Take 81 mg by mouth every day.       No current facility-administered medications for this visit.       Social History     Tobacco Use   • Smoking status: Never Smoker   • Smokeless tobacco: Never Used   Substance Use Topics   • Alcohol use: No   • Drug use: No       Family Status   Relation Name Status   • Mo  Alive   • Fa  Alive   • Sis  Alive   • Bro  Alive   • Son  Alive   • Tyree  Alive     Family History   Problem Relation Age of Onset   • Heart Disease Father    • No Known Problems Sister    • Heart Attack Brother         CAD. 3 MI's.    • No Known Problems Son    • No Known Problems Daughter        ROS:  Review of Systems   Constitutional: Negative for fever, chills, weight loss and malaise/fatigue.   HENT: Negative for ear pain, nosebleeds, congestion, sore throat and neck pain.    Eyes: Negative for blurred vision.   Respiratory: Negative for cough, sputum production, shortness of breath and wheezing.    Cardiovascular: Negative for chest pain, palpitations, orthopnea and leg swelling.   Gastrointestinal: Negative for heartburn, nausea, vomiting and abdominal pain.   Genitourinary: Negative for dysuria, urgency and frequency.   Musculoskeletal: Negative for myalgias, back pain and joint pain.   Skin: Negative for rash and itching.   Neurological: Negative for dizziness, tingling,  "tremors, sensory change, focal weakness and headaches.   Endo/Heme/Allergies: Does not bruise/bleed easily.   Psychiatric/Behavioral: Negative for depression, suicidal ideas and memory loss.  The patient is not nervous/anxious and does not have insomnia.    All other systems reviewed and are negative except as in HPI.    Exam: /72 (BP Location: Left arm, Patient Position: Sitting, BP Cuff Size: Adult)   Pulse 88   Temp 36.3 °C (97.4 °F) (Temporal)   Resp 12   Ht 1.702 m (5' 7\")   Wt 81.3 kg (179 lb 3.2 oz)   SpO2 100%  Body mass index is 28.07 kg/m².  General: Normal appearing. No distress.  HEENT: Normocephalic. Eyes conjunctiva clear lids without ptosis, ears normal shape and contour.  Neck: Supple without JVD. Thyroid is not enlarged.  Pulmonary: Clear to ausculation.  Normal effort. No rales, ronchi, or wheezing.  Cardiovascular: Regular rate and rhythm without murmur.   Abdomen: Nondistended.  Neurologic: Grossly nonfocal.  Cranial nerves are normal.   Skin: Warm and dry.  No rashes or suspicious skin lesions.  Musculoskeletal: Normal gait. No extremity cyanosis, clubbing, or edema.  Psych: Normal mood and affect. Alert and oriented x3. Judgment and insight is normal.  Monofilament testing with a 10 gram force: sensation intact: intact bilaterally  Visual Inspection: Feet without maceration, ulcers, fissures.  Pedal pulses: intact bilaterally      Medical decision-making and discussion:    1. Type 2 diabetes mellitus without complication, without long-term current use of insulin (HCC)  Diabetes currently controlled. Continue current medications. Patient also taking ACE-I and ASA as instructed. Labwork as indicated. Diabetic foot exam is up to date.  Patient states she recently completed her retinal eye exam.  DELBERT has been signed by patient to obtain these medical records.  Patient repeat lab work in 6 months.  She will follow-up in 6 months to discuss lab work results.  Advised patient to continue " working on diet and exercise.    - Diabetic Monofilament Lower Extremity Exam  - Hemoglobin A1c; Future  - Comp Metabolic Panel; Future    2. Chronic nonalcoholic liver disease  Chronic but stable problem.  Advised patient to continue working on diet and exercise.  Discussed recent lab work results with patient.  Liver enzymes are trending downward.  Advised patient discuss chronic nonalcoholic liver disease with her GI provider in the near future.  Patient agreed to plan.    - Comp Metabolic Panel; Future      Please note that this dictation was created using voice recognition software. I have made every reasonable attempt to correct obvious errors, but I expect that there are errors of grammar and possibly content that I did not discover before finalizing the note.    Assessment/Plan:  1. Type 2 diabetes mellitus without complication, without long-term current use of insulin (HCC)  Diabetic Monofilament Lower Extremity Exam    Hemoglobin A1c    Comp Metabolic Panel   2. Chronic nonalcoholic liver disease         Return in about 6 months (around 11/11/2021) for DM.

## 2021-05-11 NOTE — LETTER
Critical access hospital  Grace Riddle P.A.-C.  [address]  [place] [address]  Fax: [phone]   Authorization for Release/Disclosure of   Protected Health Information   Name: MÓNICA CARRASCO : 1970 SSN: [id number]   Address: [address] [place] [address] [place] [address] Phone:    [phone] (home) [phone] (work)   I authorize the entity listed below to release/disclose the PHI below to:   Critical access hospital/Grace Riddle P.A.-C. and Grace Riddle P.A.-C.   Provider or Entity Name: [facility]    Address   City, [facility]   Phone: [phone]      Fax:     Reason for request: continuity of care   Information to be released:    [  ] LAST COLONOSCOPY,  including any PATH REPORT and follow-up  [  ] LAST FIT/COLOGUARD RESULT [  ] LAST DEXA  [  ] LAST MAMMOGRAM  [  ] LAST PAP  [  ] LAST LABS [x] RETINA EXAM REPORT  [  ] IMMUNIZATION RECORDS  [  ] Release all info      [  ] Check here and initial the line next to each item to release ALL health information INCLUDING  _____ Care and treatment for drug and / or alcohol abuse  _____ HIV testing, infection status, or AIDS  _____ Genetic Testing    DATES OF SERVICE OR TIME PERIOD TO BE DISCLOSED: _____________  I understand and acknowledge that:  * This Authorization may be revoked at any time by you in writing, except if your health information has already been used or disclosed.  * Your health information that will be used or disclosed as a result of you signing this authorization could be re-disclosed by the recipient. If this occurs, your re-disclosed health information may no longer be protected by State or Federal laws.  * You may refuse to sign this Authorization. Your refusal will not affect your ability to obtain treatment.  * This Authorization becomes effective upon signing and will  on (date) __________.      If no date is indicated, this Authorization will  one (1) year from the signature date.    Name: Mónica Roy  Bossman    Signature:   Date:     5/11/2021       PLEASE FAX REQUESTED RECORDS BACK TO: [phone]

## 2021-06-12 DIAGNOSIS — E11.42 TYPE 2 DIABETES MELLITUS WITH DIABETIC POLYNEUROPATHY, WITHOUT LONG-TERM CURRENT USE OF INSULIN (HCC): ICD-10-CM

## 2021-06-14 DIAGNOSIS — K21.9 GASTROESOPHAGEAL REFLUX DISEASE WITHOUT ESOPHAGITIS: ICD-10-CM

## 2021-06-14 RX ORDER — OMEPRAZOLE 20 MG/1
20 CAPSULE, DELAYED RELEASE ORAL DAILY
Qty: 30 CAPSULE | Refills: 0 | Status: SHIPPED | OUTPATIENT
Start: 2021-06-14 | End: 2021-07-21 | Stop reason: SDUPTHER

## 2021-07-26 ENCOUNTER — HOSPITAL ENCOUNTER (OUTPATIENT)
Dept: LAB | Facility: MEDICAL CENTER | Age: 51
End: 2021-07-26
Attending: PHYSICIAN ASSISTANT
Payer: COMMERCIAL

## 2021-07-26 LAB
ANISOCYTOSIS BLD QL SMEAR: ABNORMAL
BASOPHILS # BLD AUTO: 0.7 % (ref 0–1.8)
BASOPHILS # BLD: 0.04 K/UL (ref 0–0.12)
COMMENT 1642: NORMAL
EOSINOPHIL # BLD AUTO: 0.1 K/UL (ref 0–0.51)
EOSINOPHIL NFR BLD: 1.6 % (ref 0–6.9)
ERYTHROCYTE [DISTWIDTH] IN BLOOD BY AUTOMATED COUNT: 41.9 FL (ref 35.9–50)
FERRITIN SERPL-MCNC: 15.8 NG/ML (ref 10–291)
HCT VFR BLD AUTO: 34.3 % (ref 37–47)
HGB BLD-MCNC: 9.9 G/DL (ref 12–16)
HYPOCHROMIA BLD QL SMEAR: ABNORMAL
IMM GRANULOCYTES # BLD AUTO: 0.04 K/UL (ref 0–0.11)
IMM GRANULOCYTES NFR BLD AUTO: 0.7 % (ref 0–0.9)
IRON SATN MFR SERPL: 7 % (ref 15–55)
IRON SERPL-MCNC: 37 UG/DL (ref 40–170)
LYMPHOCYTES # BLD AUTO: 1.39 K/UL (ref 1–4.8)
LYMPHOCYTES NFR BLD: 22.6 % (ref 22–41)
MCH RBC QN AUTO: 19.8 PG (ref 27–33)
MCHC RBC AUTO-ENTMCNC: 28.9 G/DL (ref 33.6–35)
MCV RBC AUTO: 68.7 FL (ref 81.4–97.8)
MICROCYTES BLD QL SMEAR: ABNORMAL
MONOCYTES # BLD AUTO: 0.35 K/UL (ref 0–0.85)
MONOCYTES NFR BLD AUTO: 5.7 % (ref 0–13.4)
MORPHOLOGY BLD-IMP: NORMAL
NEUTROPHILS # BLD AUTO: 4.23 K/UL (ref 2–7.15)
NEUTROPHILS NFR BLD: 68.7 % (ref 44–72)
NRBC # BLD AUTO: 0 K/UL
NRBC BLD-RTO: 0 /100 WBC
PLATELET # BLD AUTO: 231 K/UL (ref 164–446)
PLATELET BLD QL SMEAR: NORMAL
PMV BLD AUTO: 9.5 FL (ref 9–12.9)
POLYCHROMASIA BLD QL SMEAR: NORMAL
RBC # BLD AUTO: 4.99 M/UL (ref 4.2–5.4)
RBC BLD AUTO: PRESENT
TIBC SERPL-MCNC: 522 UG/DL (ref 250–450)
UIBC SERPL-MCNC: 485 UG/DL (ref 110–370)
WBC # BLD AUTO: 6.2 K/UL (ref 4.8–10.8)

## 2021-07-26 PROCEDURE — 85025 COMPLETE CBC W/AUTO DIFF WBC: CPT

## 2021-07-26 PROCEDURE — 82784 ASSAY IGA/IGD/IGG/IGM EACH: CPT

## 2021-07-26 PROCEDURE — 83550 IRON BINDING TEST: CPT

## 2021-07-26 PROCEDURE — 83516 IMMUNOASSAY NONANTIBODY: CPT

## 2021-07-26 PROCEDURE — 83540 ASSAY OF IRON: CPT

## 2021-07-26 PROCEDURE — 82728 ASSAY OF FERRITIN: CPT

## 2021-07-26 PROCEDURE — 36415 COLL VENOUS BLD VENIPUNCTURE: CPT

## 2021-07-28 LAB
IGA SERPL-MCNC: 306 MG/DL (ref 68–408)
TTG IGA SER IA-ACNC: <2 U/ML (ref 0–3)

## 2021-09-16 ENCOUNTER — HOSPITAL ENCOUNTER (OUTPATIENT)
Dept: LAB | Facility: MEDICAL CENTER | Age: 51
End: 2021-09-16
Attending: PHYSICIAN ASSISTANT
Payer: COMMERCIAL

## 2021-09-16 LAB
ALBUMIN SERPL BCP-MCNC: 4.4 G/DL (ref 3.2–4.9)
ALBUMIN/GLOB SERPL: 1.6 G/DL
ALP SERPL-CCNC: 64 U/L (ref 30–99)
ALT SERPL-CCNC: 122 U/L (ref 2–50)
ANION GAP SERPL CALC-SCNC: 12 MMOL/L (ref 7–16)
ANISOCYTOSIS BLD QL SMEAR: ABNORMAL
AST SERPL-CCNC: 95 U/L (ref 12–45)
BASOPHILS # BLD AUTO: 0.6 % (ref 0–1.8)
BASOPHILS # BLD: 0.03 K/UL (ref 0–0.12)
BILIRUB SERPL-MCNC: 0.3 MG/DL (ref 0.1–1.5)
BUN SERPL-MCNC: 10 MG/DL (ref 8–22)
CALCIUM SERPL-MCNC: 8.9 MG/DL (ref 8.4–10.2)
CHLORIDE SERPL-SCNC: 100 MMOL/L (ref 96–112)
CO2 SERPL-SCNC: 27 MMOL/L (ref 20–33)
COMMENT 1642: NORMAL
CREAT SERPL-MCNC: 0.71 MG/DL (ref 0.5–1.4)
EOSINOPHIL # BLD AUTO: 0.21 K/UL (ref 0–0.51)
EOSINOPHIL NFR BLD: 4.1 % (ref 0–6.9)
ERYTHROCYTE [DISTWIDTH] IN BLOOD BY AUTOMATED COUNT: 62.8 FL (ref 35.9–50)
FASTING STATUS PATIENT QL REPORTED: NORMAL
FERRITIN SERPL-MCNC: 65.8 NG/ML (ref 10–291)
GLOBULIN SER CALC-MCNC: 2.8 G/DL (ref 1.9–3.5)
GLUCOSE SERPL-MCNC: 133 MG/DL (ref 65–99)
HCT VFR BLD AUTO: 38.5 % (ref 37–47)
HGB BLD-MCNC: 11.4 G/DL (ref 12–16)
IMM GRANULOCYTES # BLD AUTO: 0.02 K/UL (ref 0–0.11)
IMM GRANULOCYTES NFR BLD AUTO: 0.4 % (ref 0–0.9)
IRON SATN MFR SERPL: 12 % (ref 15–55)
IRON SERPL-MCNC: 55 UG/DL (ref 40–170)
LYMPHOCYTES # BLD AUTO: 1.49 K/UL (ref 1–4.8)
LYMPHOCYTES NFR BLD: 29.2 % (ref 22–41)
MACROCYTES BLD QL SMEAR: ABNORMAL
MCH RBC QN AUTO: 22.2 PG (ref 27–33)
MCHC RBC AUTO-ENTMCNC: 29.6 G/DL (ref 33.6–35)
MCV RBC AUTO: 75 FL (ref 81.4–97.8)
MONOCYTES # BLD AUTO: 0.4 K/UL (ref 0–0.85)
MONOCYTES NFR BLD AUTO: 7.8 % (ref 0–13.4)
NEUTROPHILS # BLD AUTO: 2.96 K/UL (ref 2–7.15)
NEUTROPHILS NFR BLD: 57.9 % (ref 44–72)
NRBC # BLD AUTO: 0 K/UL
NRBC BLD-RTO: 0 /100 WBC
PLATELET # BLD AUTO: 234 K/UL (ref 164–446)
PLATELET BLD QL SMEAR: NORMAL
PMV BLD AUTO: 9.7 FL (ref 9–12.9)
POLYCHROMASIA BLD QL SMEAR: NORMAL
POTASSIUM SERPL-SCNC: 4.4 MMOL/L (ref 3.6–5.5)
PROT SERPL-MCNC: 7.2 G/DL (ref 6–8.2)
RBC # BLD AUTO: 5.13 M/UL (ref 4.2–5.4)
RBC BLD AUTO: PRESENT
SODIUM SERPL-SCNC: 139 MMOL/L (ref 135–145)
TIBC SERPL-MCNC: 442 UG/DL (ref 250–450)
UIBC SERPL-MCNC: 387 UG/DL (ref 110–370)
WBC # BLD AUTO: 5.1 K/UL (ref 4.8–10.8)

## 2021-09-16 PROCEDURE — 83516 IMMUNOASSAY NONANTIBODY: CPT

## 2021-09-16 PROCEDURE — 82728 ASSAY OF FERRITIN: CPT

## 2021-09-16 PROCEDURE — 83540 ASSAY OF IRON: CPT

## 2021-09-16 PROCEDURE — 80053 COMPREHEN METABOLIC PANEL: CPT

## 2021-09-16 PROCEDURE — 36415 COLL VENOUS BLD VENIPUNCTURE: CPT

## 2021-09-16 PROCEDURE — 86038 ANTINUCLEAR ANTIBODIES: CPT

## 2021-09-16 PROCEDURE — 85025 COMPLETE CBC W/AUTO DIFF WBC: CPT

## 2021-09-16 PROCEDURE — 83550 IRON BINDING TEST: CPT

## 2021-09-16 PROCEDURE — 86316 IMMUNOASSAY TUMOR OTHER: CPT

## 2021-09-18 ENCOUNTER — HOSPITAL ENCOUNTER (OUTPATIENT)
Facility: MEDICAL CENTER | Age: 51
End: 2021-09-18
Attending: PHYSICIAN ASSISTANT
Payer: COMMERCIAL

## 2021-09-18 LAB
NUCLEAR IGG SER QL IA: NORMAL
SMA IGG SER-ACNC: 5 UNITS (ref 0–19)

## 2021-09-18 PROCEDURE — 83497 ASSAY OF 5-HIAA: CPT

## 2021-09-20 LAB — CGA SERPL-MCNC: 329 NG/ML (ref 0–103)

## 2021-09-20 NOTE — PROGRESS NOTES
09/21/21    Subjective    Chief Complaint:  Hemochromatosis    HPI:  See note of 2/1/21. 51 female said to have hemochromatosis heterozygosity - formerly patient of Dr. Oh. Has microcytic indices although ferritin has come up from 8.7 to 65.8 between 5/20 and this month. Since last seen she [facility] and according to the notes she had a 15 mm polyp that was biopsied and revealed a grade 1 neuroendocrine cancer. They requested but did not schedule at PET dototate scan. We do not have a path report. Her chromogranin however is markedly elevated at 329 (nl < 103)    ROS:    Constitutional: No weight loss  Skin: No rash or jaundice  HENT: No change in eyesight or hearing  Cardiovascular:No chest pain or arrythmia  Respiratory:No cough or SOB  GI:No nausea, vomiting, diarrhea, constipation  :No dysuria or frequency  Musculoskeletal:No bone or joint pain  Neuro:No sx's of neuropathy  Psych: No complaints    PMH:      No Known Allergies    Past Medical History:   Diagnosis Date   • Anemia    • Anxiety    • Bronchitis 2009   • Hypertension    • Pneumonia 2009        Past Surgical History:   Procedure Laterality Date   • PELVIC EXAM UNDER ANESTHESIA  6/17/2014    Performed by Enedina Grant M.D. at SURGERY [facility]   • VAGINAL HYSTERECTOMY TOTAL  6/17/2014    Performed by Enedina Grant M.D. at SURGERY [facility]   • HYSTERECTOMY, VAGINAL  06/04/2014    [facility]   • APPENDECTOMY  2014        Medications:    Current Outpatient Medications on File Prior to Visit   Medication Sig Dispense Refill   • metFORMIN (GLUCOPHAGE) 500 MG Tab TAKE TWO TABLETS BY MOUTH TWICE DAILY with food 180 tablet 2   • omeprazole (PRILOSEC) 20 MG delayed-release capsule Take 1 capsule by mouth every day. 90 capsule 2   • lisinopril (PRINIVIL) 20 MG Tab TAKE 1/2 TABLET BY MOUTH ONCE DAILY 90 tablet 1   • escitalopram (LEXAPRO) 20 MG tablet Lexapro 20 mg tablet   Take 2 tablets every day by oral  "route.     • buPROPion SR (WELLBUTRIN-SR) 150 MG TABLET SR 12 HR sustained-release tablet Take 1 Tab by mouth every day.     • Cholecalciferol (VITAMIN D3) 5000 UNIT/0.5ML Liquid Take 1 Tab by mouth every day.     • Coenzyme Q10 (COQ10 PO) Take 1 Tab by mouth every day.     • LORazepam (ATIVAN) 0.5 MG Tab   2   • Multiple Vitamin (CALCIUM COMPLEX PO) Take  by mouth.     • Biotin 5000 MCG Cap Take  by mouth.     • aspirin (ASA) 81 MG Chew Tab chewable tablet Take 81 mg by mouth every day.       No current facility-administered medications on file prior to visit.       Social History     Tobacco Use   • Smoking status: Never Smoker   • Smokeless tobacco: Never Used   Substance Use Topics   • Alcohol use: No        Family History   Problem Relation Age of Onset   • Heart Disease Father    • No Known Problems Sister    • Heart Attack Brother         CAD. 3 MI's.    • No Known Problems Son    • No Known Problems Daughter         Objective    Vitals:    /92 (BP Location: Right arm, Patient Position: Sitting, BP Cuff Size: Adult)   Pulse (!) 106   Temp 36.3 °C (97.4 °F) (Temporal)   Resp 16   Ht 1.702 m (5' 7.01\")   Wt 84.1 kg (185 lb 8.3 oz)   LMP 06/14/2012   SpO2 94%   BMI 29.05 kg/m²     Physical Exam:    Appears well-developed and well-nourished. No distress.    Head -  Normocephalic .   Eyes - Pupils are equal.. Conjunctivae normal. No scleral icterus.   Ears - normal hearing  Neck - Normal range of motion. Neck supple. No thyromegaly  Cardiovascular - Normal rate, regular rhythm, normal heart sounds and intact distal pulses. No  gallop, murmur or rub  Pulmonary - Normal breath sounds.  No wheeze, rales or rhonci  Breast - Not examined  Abdominal -Soft. No distension, tenderness, organomegaly or mass  Extremities-  No edema or tenderness.    Nodes - No submental, submandibular, preauricular, cervical, axillary or inguinal adenopathy.    Neurological -   Alert and oriented.  Skin - Skin is warm and dry. " No rash noted. Not diaphoretic. No erythema. No pallor. No jaundice   Psychiatric -  Normal mood and affect.    Labs:    Results for ELVIS CARRASCO (MRN [id number]) as of 9/20/2021 13:03   Ref. Range 2/5/2021 16:20 7/26/2021 11:25 9/16/2021 08:05   WBC Latest Ref Range: 4.8 - 10.8 K/uL 6.6 6.2 5.1   RBC Latest Ref Range: 4.20 - 5.40 M/uL 4.95 4.99 5.13   Hemoglobin Latest Ref Range: 12.0 - 16.0 g/dL 10.4 (L) 9.9 (L) 11.4 (L)   Hematocrit Latest Ref Range: 37.0 - 47.0 % 35.3 (L) 34.3 (L) 38.5   MCV Latest Ref Range: 81.4 - 97.8 fL 71.3 (L) 68.7 (L) 75.0 (L)   MCH Latest Ref Range: 27.0 - 33.0 pg 21.0 (L) 19.8 (L) 22.2 (L)   MCHC Latest Ref Range: 33.6 - 35.0 g/dL 29.5 (L) 28.9 (L) 29.6 (L)   RDW Latest Ref Range: 35.9 - 50.0 fL 43.2 41.9 62.8 (H)   Platelet Count Latest Ref Range: 164 - 446 K/uL 263 231 234   Results for ELVIS CARRASCO (MRN [id number])    Ref. Range 7/26/2021 11:25 9/16/2021 07:58 9/16/2021 08:05   Iron Latest Ref Range: 40 - 170 ug/dL 37 (L)  55   Total Iron Binding Latest Ref Range: 250 - 450 ug/dL 522 (H)  442   % Saturation Latest Ref Range: 15 - 55 % 7 (L)  12 (L)   Results for ELVIS CARRASCO (MRN [id number])    Ref. Range 5/4/2020 12:43 2/5/2021 16:20 7/26/2021 11:25 9/16/2021 08:05   Ferritin Latest Ref Range: 10.0 - 291.0 ng/mL 8.7 (L) 14.1 15.8 65.8     Assessment    Imp:    Visit Diagnosis:    1. Hereditary hemochromatosis (HCC)  FERRITIN    IRON/TOTAL IRON BIND    CBC WITH DIFFERENTIAL    Hemochromatosis Genotype   2. 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

## 2021-09-21 ENCOUNTER — OFFICE VISIT (OUTPATIENT)
Dept: HEMATOLOGY ONCOLOGY | Facility: MEDICAL CENTER | Age: 51
End: 2021-09-21
Payer: COMMERCIAL

## 2021-09-21 VITALS
RESPIRATION RATE: 16 BRPM | HEART RATE: 106 BPM | OXYGEN SATURATION: 94 % | HEIGHT: 67 IN | TEMPERATURE: 97.4 F | SYSTOLIC BLOOD PRESSURE: 142 MMHG | DIASTOLIC BLOOD PRESSURE: 92 MMHG | WEIGHT: 185.52 LBS | BODY MASS INDEX: 29.12 KG/M2

## 2021-09-21 DIAGNOSIS — E83.110 HEREDITARY HEMOCHROMATOSIS (HCC): ICD-10-CM

## 2021-09-21 DIAGNOSIS — C7A.8 PRIMARY MALIGNANT NEUROENDOCRINE TUMOR OF BODY OF STOMACH (HCC): ICD-10-CM

## 2021-09-21 PROCEDURE — 99214 OFFICE O/P EST MOD 30 MIN: CPT | Performed by: INTERNAL MEDICINE

## 2021-09-21 ASSESSMENT — FIBROSIS 4 INDEX: FIB4 SCORE: 1.8

## 2021-09-21 ASSESSMENT — PAIN SCALES - GENERAL: PAINLEVEL: NO PAIN

## 2021-09-23 LAB
5HIAA & CREATININE UR-IMP: NORMAL
5OH-INDOLEACETATE 24H UR-MCNC: 1.9 MG/L
5OH-INDOLEACETATE 24H UR-MRATE: 5 MG/D (ref 0–15)
5OH-INDOLEACETATE/CREAT 24H UR: 4 MG/GCR (ref 0–14)
COLLECT DURATION TIME SPEC: 24 HRS
CREAT 24H UR-MCNC: 48 MG/DL
CREAT 24H UR-MRATE: 1200 MG/D (ref 700–1600)
SPECIMEN VOL ?TM UR: 2500 ML

## 2021-10-18 ENCOUNTER — TELEPHONE (OUTPATIENT)
Dept: MEDICAL GROUP | Facility: PHYSICIAN GROUP | Age: 51
End: 2021-10-18

## 2021-10-18 VITALS — DIASTOLIC BLOOD PRESSURE: 89 MMHG | SYSTOLIC BLOOD PRESSURE: 140 MMHG

## 2021-10-27 ENCOUNTER — APPOINTMENT (OUTPATIENT)
Dept: ADMISSIONS | Facility: MEDICAL CENTER | Age: 51
End: 2021-10-27
Payer: COMMERCIAL

## 2021-10-28 ENCOUNTER — PRE-ADMISSION TESTING (OUTPATIENT)
Dept: ADMISSIONS | Facility: MEDICAL CENTER | Age: 51
End: 2021-10-28
Attending: INTERNAL MEDICINE
Payer: COMMERCIAL

## 2021-10-28 DIAGNOSIS — Z01.812 PRE-OPERATIVE LABORATORY EXAMINATION: ICD-10-CM

## 2021-10-28 LAB
ANION GAP SERPL CALC-SCNC: 10 MMOL/L (ref 7–16)
BUN SERPL-MCNC: 8 MG/DL (ref 8–22)
CALCIUM SERPL-MCNC: 9.3 MG/DL (ref 8.4–10.2)
CHLORIDE SERPL-SCNC: 97 MMOL/L (ref 96–112)
CO2 SERPL-SCNC: 30 MMOL/L (ref 20–33)
CREAT SERPL-MCNC: 0.84 MG/DL (ref 0.5–1.4)
EKG IMPRESSION: NORMAL
ERYTHROCYTE [DISTWIDTH] IN BLOOD BY AUTOMATED COUNT: 57.8 FL (ref 35.9–50)
GLUCOSE SERPL-MCNC: 67 MG/DL (ref 65–99)
HCT VFR BLD AUTO: 42.2 % (ref 37–47)
HGB BLD-MCNC: 13.1 G/DL (ref 12–16)
MCH RBC QN AUTO: 24.3 PG (ref 27–33)
MCHC RBC AUTO-ENTMCNC: 31 G/DL (ref 33.6–35)
MCV RBC AUTO: 78.4 FL (ref 81.4–97.8)
PLATELET # BLD AUTO: 226 K/UL (ref 164–446)
PMV BLD AUTO: 9.5 FL (ref 9–12.9)
POTASSIUM SERPL-SCNC: 4.1 MMOL/L (ref 3.6–5.5)
RBC # BLD AUTO: 5.38 M/UL (ref 4.2–5.4)
SODIUM SERPL-SCNC: 137 MMOL/L (ref 135–145)
WBC # BLD AUTO: 6.2 K/UL (ref 4.8–10.8)

## 2021-10-28 PROCEDURE — 80048 BASIC METABOLIC PNL TOTAL CA: CPT

## 2021-10-28 PROCEDURE — U0003 INFECTIOUS AGENT DETECTION BY NUCLEIC ACID (DNA OR RNA); SEVERE ACUTE RESPIRATORY SYNDROME CORONAVIRUS 2 (SARS-COV-2) (CORONAVIRUS DISEASE [COVID-19]), AMPLIFIED PROBE TECHNIQUE, MAKING USE OF HIGH THROUGHPUT TECHNOLOGIES AS DESCRIBED BY CMS-2020-01-R: HCPCS

## 2021-10-28 PROCEDURE — C9803 HOPD COVID-19 SPEC COLLECT: HCPCS

## 2021-10-28 PROCEDURE — 93005 ELECTROCARDIOGRAM TRACING: CPT

## 2021-10-28 PROCEDURE — U0005 INFEC AGEN DETEC AMPLI PROBE: HCPCS

## 2021-10-28 PROCEDURE — 36415 COLL VENOUS BLD VENIPUNCTURE: CPT

## 2021-10-28 PROCEDURE — 93010 ELECTROCARDIOGRAM REPORT: CPT | Performed by: INTERNAL MEDICINE

## 2021-10-28 PROCEDURE — 85027 COMPLETE CBC AUTOMATED: CPT

## 2021-10-28 ASSESSMENT — FIBROSIS 4 INDEX: FIB4 SCORE: 1.8

## 2021-10-29 LAB
SARS-COV-2 RNA RESP QL NAA+PROBE: NOTDETECTED
SPECIMEN SOURCE: NORMAL

## 2021-11-01 ENCOUNTER — HOSPITAL ENCOUNTER (OUTPATIENT)
Facility: MEDICAL CENTER | Age: 51
End: 2021-11-01
Attending: INTERNAL MEDICINE | Admitting: INTERNAL MEDICINE
Payer: COMMERCIAL

## 2021-11-01 ENCOUNTER — ANESTHESIA EVENT (OUTPATIENT)
Dept: SURGERY | Facility: MEDICAL CENTER | Age: 51
End: 2021-11-01
Payer: COMMERCIAL

## 2021-11-01 ENCOUNTER — ANESTHESIA (OUTPATIENT)
Dept: SURGERY | Facility: MEDICAL CENTER | Age: 51
End: 2021-11-01
Payer: COMMERCIAL

## 2021-11-01 VITALS
DIASTOLIC BLOOD PRESSURE: 92 MMHG | RESPIRATION RATE: 16 BRPM | HEIGHT: 67 IN | WEIGHT: 182.98 LBS | OXYGEN SATURATION: 96 % | SYSTOLIC BLOOD PRESSURE: 146 MMHG | TEMPERATURE: 97.2 F | BODY MASS INDEX: 28.72 KG/M2 | HEART RATE: 89 BPM

## 2021-11-01 LAB
GLUCOSE BLD-MCNC: 117 MG/DL (ref 65–99)
PATHOLOGY CONSULT NOTE: NORMAL

## 2021-11-01 PROCEDURE — 88360 TUMOR IMMUNOHISTOCHEM/MANUAL: CPT

## 2021-11-01 PROCEDURE — 700111 HCHG RX REV CODE 636 W/ 250 OVERRIDE (IP): Performed by: ANESTHESIOLOGY

## 2021-11-01 PROCEDURE — 700111 HCHG RX REV CODE 636 W/ 250 OVERRIDE (IP)

## 2021-11-01 PROCEDURE — 700105 HCHG RX REV CODE 258: Performed by: INTERNAL MEDICINE

## 2021-11-01 PROCEDURE — 160002 HCHG RECOVERY MINUTES (STAT): Performed by: INTERNAL MEDICINE

## 2021-11-01 PROCEDURE — 88305 TISSUE EXAM BY PATHOLOGIST: CPT | Mod: 59

## 2021-11-01 PROCEDURE — 82962 GLUCOSE BLOOD TEST: CPT

## 2021-11-01 PROCEDURE — 160046 HCHG PACU - 1ST 60 MINS PHASE II: Performed by: INTERNAL MEDICINE

## 2021-11-01 PROCEDURE — 88342 IMHCHEM/IMCYTCHM 1ST ANTB: CPT

## 2021-11-01 PROCEDURE — 501629 HCHG TUBE, LUKI TRAP STERILE DISP: Performed by: INTERNAL MEDICINE

## 2021-11-01 PROCEDURE — 160035 HCHG PACU - 1ST 60 MINS PHASE I: Performed by: INTERNAL MEDICINE

## 2021-11-01 PROCEDURE — 160208 HCHG ENDO MINUTES - EA ADDL 1 MIN LEVEL 4: Performed by: INTERNAL MEDICINE

## 2021-11-01 PROCEDURE — 160025 RECOVERY II MINUTES (STATS): Performed by: INTERNAL MEDICINE

## 2021-11-01 PROCEDURE — 160203 HCHG ENDO MINUTES - 1ST 30 MINS LEVEL 4: Performed by: INTERNAL MEDICINE

## 2021-11-01 PROCEDURE — 700101 HCHG RX REV CODE 250: Performed by: ANESTHESIOLOGY

## 2021-11-01 PROCEDURE — 88341 IMHCHEM/IMCYTCHM EA ADD ANTB: CPT

## 2021-11-01 PROCEDURE — 160036 HCHG PACU - EA ADDL 30 MINS PHASE I: Performed by: INTERNAL MEDICINE

## 2021-11-01 PROCEDURE — 160009 HCHG ANES TIME/MIN: Performed by: INTERNAL MEDICINE

## 2021-11-01 PROCEDURE — 160048 HCHG OR STATISTICAL LEVEL 1-5: Performed by: INTERNAL MEDICINE

## 2021-11-01 RX ORDER — OXYCODONE HCL 5 MG/5 ML
5 SOLUTION, ORAL ORAL
Status: DISCONTINUED | OUTPATIENT
Start: 2021-11-01 | End: 2021-11-01 | Stop reason: HOSPADM

## 2021-11-01 RX ORDER — PHENYLEPHRINE HCL IN 0.9% NACL 0.5 MG/5ML
SYRINGE (ML) INTRAVENOUS PRN
Status: DISCONTINUED | OUTPATIENT
Start: 2021-11-01 | End: 2021-11-01 | Stop reason: SURG

## 2021-11-01 RX ORDER — DIPHENHYDRAMINE HYDROCHLORIDE 50 MG/ML
12.5 INJECTION INTRAMUSCULAR; INTRAVENOUS
Status: DISCONTINUED | OUTPATIENT
Start: 2021-11-01 | End: 2021-11-01 | Stop reason: HOSPADM

## 2021-11-01 RX ORDER — SODIUM CHLORIDE, SODIUM LACTATE, POTASSIUM CHLORIDE, CALCIUM CHLORIDE 600; 310; 30; 20 MG/100ML; MG/100ML; MG/100ML; MG/100ML
INJECTION, SOLUTION INTRAVENOUS CONTINUOUS
Status: DISCONTINUED | OUTPATIENT
Start: 2021-11-01 | End: 2021-11-01 | Stop reason: HOSPADM

## 2021-11-01 RX ORDER — HYDROMORPHONE HYDROCHLORIDE 1 MG/ML
0.2 INJECTION, SOLUTION INTRAMUSCULAR; INTRAVENOUS; SUBCUTANEOUS
Status: DISCONTINUED | OUTPATIENT
Start: 2021-11-01 | End: 2021-11-01 | Stop reason: HOSPADM

## 2021-11-01 RX ORDER — KETOROLAC TROMETHAMINE 30 MG/ML
INJECTION, SOLUTION INTRAMUSCULAR; INTRAVENOUS PRN
Status: DISCONTINUED | OUTPATIENT
Start: 2021-11-01 | End: 2021-11-01 | Stop reason: SURG

## 2021-11-01 RX ORDER — IPRATROPIUM BROMIDE AND ALBUTEROL SULFATE 2.5; .5 MG/3ML; MG/3ML
3 SOLUTION RESPIRATORY (INHALATION)
Status: DISCONTINUED | OUTPATIENT
Start: 2021-11-01 | End: 2021-11-01 | Stop reason: HOSPADM

## 2021-11-01 RX ORDER — MIDAZOLAM HYDROCHLORIDE 1 MG/ML
1 INJECTION INTRAMUSCULAR; INTRAVENOUS
Status: DISCONTINUED | OUTPATIENT
Start: 2021-11-01 | End: 2021-11-01 | Stop reason: HOSPADM

## 2021-11-01 RX ORDER — DEXAMETHASONE SODIUM PHOSPHATE 4 MG/ML
INJECTION, SOLUTION INTRA-ARTICULAR; INTRALESIONAL; INTRAMUSCULAR; INTRAVENOUS; SOFT TISSUE PRN
Status: DISCONTINUED | OUTPATIENT
Start: 2021-11-01 | End: 2021-11-01 | Stop reason: SURG

## 2021-11-01 RX ORDER — LIDOCAINE HYDROCHLORIDE 10 MG/ML
INJECTION, SOLUTION EPIDURAL; INFILTRATION; INTRACAUDAL; PERINEURAL
Status: COMPLETED
Start: 2021-11-01 | End: 2021-11-01

## 2021-11-01 RX ORDER — LIDOCAINE HYDROCHLORIDE 20 MG/ML
INJECTION, SOLUTION EPIDURAL; INFILTRATION; INTRACAUDAL; PERINEURAL PRN
Status: DISCONTINUED | OUTPATIENT
Start: 2021-11-01 | End: 2021-11-01 | Stop reason: SURG

## 2021-11-01 RX ORDER — HYDROMORPHONE HYDROCHLORIDE 1 MG/ML
0.1 INJECTION, SOLUTION INTRAMUSCULAR; INTRAVENOUS; SUBCUTANEOUS
Status: DISCONTINUED | OUTPATIENT
Start: 2021-11-01 | End: 2021-11-01 | Stop reason: HOSPADM

## 2021-11-01 RX ORDER — MEPERIDINE HYDROCHLORIDE 25 MG/ML
25 INJECTION INTRAMUSCULAR; INTRAVENOUS; SUBCUTANEOUS
Status: DISCONTINUED | OUTPATIENT
Start: 2021-11-01 | End: 2021-11-01 | Stop reason: HOSPADM

## 2021-11-01 RX ORDER — OXYCODONE HCL 5 MG/5 ML
10 SOLUTION, ORAL ORAL
Status: DISCONTINUED | OUTPATIENT
Start: 2021-11-01 | End: 2021-11-01 | Stop reason: HOSPADM

## 2021-11-01 RX ORDER — ONDANSETRON 2 MG/ML
INJECTION INTRAMUSCULAR; INTRAVENOUS PRN
Status: DISCONTINUED | OUTPATIENT
Start: 2021-11-01 | End: 2021-11-01 | Stop reason: SURG

## 2021-11-01 RX ORDER — HYDROMORPHONE HYDROCHLORIDE 1 MG/ML
0.5 INJECTION, SOLUTION INTRAMUSCULAR; INTRAVENOUS; SUBCUTANEOUS
Status: DISCONTINUED | OUTPATIENT
Start: 2021-11-01 | End: 2021-11-01 | Stop reason: HOSPADM

## 2021-11-01 RX ORDER — ONDANSETRON 2 MG/ML
4 INJECTION INTRAMUSCULAR; INTRAVENOUS
Status: DISCONTINUED | OUTPATIENT
Start: 2021-11-01 | End: 2021-11-01 | Stop reason: HOSPADM

## 2021-11-01 RX ADMIN — LIDOCAINE HYDROCHLORIDE 0.5 ML: 10 INJECTION, SOLUTION EPIDURAL; INFILTRATION; INTRACAUDAL; PERINEURAL at 07:52

## 2021-11-01 RX ADMIN — MIDAZOLAM HYDROCHLORIDE 2 MG: 1 INJECTION, SOLUTION INTRAMUSCULAR; INTRAVENOUS at 08:57

## 2021-11-01 RX ADMIN — PROPOFOL 150 MG: 10 INJECTION, EMULSION INTRAVENOUS at 09:02

## 2021-11-01 RX ADMIN — LIDOCAINE HYDROCHLORIDE 30 MG: 20 INJECTION, SOLUTION EPIDURAL; INFILTRATION; INTRACAUDAL; PERINEURAL at 09:02

## 2021-11-01 RX ADMIN — Medication 100 MCG: at 09:28

## 2021-11-01 RX ADMIN — SODIUM CHLORIDE, POTASSIUM CHLORIDE, SODIUM LACTATE AND CALCIUM CHLORIDE: 600; 310; 30; 20 INJECTION, SOLUTION INTRAVENOUS at 07:51

## 2021-11-01 RX ADMIN — ONDANSETRON 4 MG: 2 INJECTION INTRAMUSCULAR; INTRAVENOUS at 09:12

## 2021-11-01 RX ADMIN — FENTANYL CITRATE 100 MCG: 50 INJECTION, SOLUTION INTRAMUSCULAR; INTRAVENOUS at 09:02

## 2021-11-01 RX ADMIN — Medication 100 MCG: at 09:21

## 2021-11-01 RX ADMIN — DEXAMETHASONE SODIUM PHOSPHATE 4 MG: 4 INJECTION, SOLUTION INTRAMUSCULAR; INTRAVENOUS at 09:12

## 2021-11-01 RX ADMIN — KETOROLAC TROMETHAMINE 30 MG: 30 INJECTION, SOLUTION INTRAMUSCULAR at 09:12

## 2021-11-01 RX ADMIN — ROCURONIUM BROMIDE 40 MG: 10 INJECTION INTRAVENOUS at 09:02

## 2021-11-01 RX ADMIN — SUGAMMADEX 200 MG: 100 INJECTION, SOLUTION INTRAVENOUS at 09:30

## 2021-11-01 RX ADMIN — Medication 0.5 ML: at 07:52

## 2021-11-01 ASSESSMENT — PAIN SCALES - GENERAL: PAIN_LEVEL: 1

## 2021-11-01 ASSESSMENT — FIBROSIS 4 INDEX: FIB4 SCORE: 1.86

## 2021-11-01 NOTE — ANESTHESIA PROCEDURE NOTES
Airway    Date/Time: 11/1/2021 9:03 AM  Performed by: Jacinto Gracia M.D.  Authorized by: Jacinto Gracia M.D.     Location:  OR  Urgency:  Elective  Indications for Airway Management:  Anesthesia      Spontaneous Ventilation: absent    Sedation Level:  Deep  Preoxygenated: Yes    Patient Position:  Sniffing  Final Airway Type:  Endotracheal airway  Final Endotracheal Airway:  ETT  Cuffed: Yes    Technique Used for Successful ETT Placement:  Direct laryngoscopy    Insertion Site:  Oral  Blade Type:  Hernan  Laryngoscope Blade/Videolaryngoscope Blade Size:  3  ETT Size (mm):  7.0  Measured from:  Teeth  ETT to Teeth (cm):  22  Placement Verified by: auscultation and capnometry    Cormack-Lehane Classification:  Grade IIa - partial view of glottis  Number of Attempts at Approach:  1

## 2021-11-01 NOTE — ANESTHESIA POSTPROCEDURE EVALUATION
Patient: Mónica Keita    Procedure Summary     Date: 11/01/21 Room / Location:  ENDOSCOPIC ULTRASOUND ROOM / SURGERY [facility]    Anesthesia Start: 0857 Anesthesia Stop: 0942    Procedures:       GASTROSCOPY- WITH POSSIBLE ENDOSCOPIC MUCOSAL RESECTION (N/A )      EGD, WITH ENDOSCOPIC US - UPPER RADIAL (N/A ) Diagnosis:       Neuroendocrine tumor      (NEUROENDOCRINE TUMOR)    Surgeons: Edwin Aguillon M.D. Responsible Provider: Jacinto Gracia M.D.    Anesthesia Type: general ASA Status: 3          Final Anesthesia Type: general  Last vitals  BP   Blood Pressure: 158/97    Temp   37.1 °C (98.8 °F)    Pulse   83   Resp   16    SpO2   93 %      Anesthesia Post Evaluation    Patient location during evaluation: PACU  Patient participation: complete - patient participated  Level of consciousness: awake and alert  Pain score: 1    Airway patency: patent  Anesthetic complications: no  Cardiovascular status: hemodynamically stable  Respiratory status: acceptable  Hydration status: euvolemic    PONV: none          No complications documented.     Nurse Pain Score: 0 (NPRS)

## 2021-11-01 NOTE — OR NURSING
1050 REPORT RECEIVED FROM LEE SAUER TO ASSUME CARE OF THIS PT.     1100 PT TO RA.   1003 SPO2 DROPS TO 84%. DB&C INSTRUCTIONS GIVEN TO PT WITH RETURN DEMONSTRATION. SPO2 95%.     1105 REPORT GIVEN TO LEE SAUER TO RESUME CARE OF THIS PT.

## 2021-11-01 NOTE — ANESTHESIA PREPROCEDURE EVALUATION
Relevant Problems   CARDIAC   (positive) Hypertension      GI   (positive) Gastroesophageal reflux disease         (positive) Chronic nonalcoholic liver disease      ENDO   (positive) Type 2 diabetes mellitus without complication, without long-term current use of insulin (HCC)       Physical Exam    Airway   Mallampati: II  TM distance: >3 FB  Neck ROM: full       Cardiovascular - normal exam  Rhythm: regular  Rate: normal  (-) murmur     Dental - normal exam           Pulmonary - normal exam  Breath sounds clear to auscultation     Abdominal    Neurological - normal exam                 Anesthesia Plan    ASA 3       Plan - general       Airway plan will be ETT          Induction: intravenous    Postoperative Plan: Postoperative administration of opioids is intended.    Pertinent diagnostic labs and testing reviewed    Informed Consent:    Anesthetic plan and risks discussed with patient.    Use of blood products discussed with: patient whom consented to blood products.

## 2021-11-01 NOTE — OR SURGEON
Immediate Post OP Note    PreOp Diagnosis: Gastric neuroendocrine tumor/carcinoid      PostOp Diagnosis: Same      Procedure(s):  GASTROSCOPY- WITH POSSIBLE ENDOSCOPIC MUCOSAL RESECTION - Wound Class: Clean Contaminated  EGD, WITH ENDOSCOPIC US - UPPER RADIAL - Wound Class: Clean Contaminated    Surgeon(s):  Edwin Aguillon M.D.    Anesthesiologist/Type of Anesthesia:  Anesthesiologist: Jacinto Gracia M.D./General    Surgical Staff:  Circulator: Jun Barragan R.N.  Endoscopy Technician: Juanita Verduzco; Amanda Gonzalez    Specimens removed if any:  ID Type Source Tests Collected by Time Destination   A : tumor bx Tissue Gastric PATHOLOGY SPECIMEN Edwin Aguillon M.D. 11/1/2021  9:21 AM    B : polyp bx Tissue Gastric PATHOLOGY SPECIMEN Edwin Aguillon M.D. 11/1/2021  9:22 AM        Dr. Aguillon  [facility]  [facility]  [phone]    EGD with: Endoscopic mucosal resection -submucosal injection, snare with cautery resection    EUS upper    Indication: Gastric neuroendocrine tumor (carcinoid)    Sedation: GA ([place])    Findings:   EGD    Esophagus     Unremarkable mucosa    Stomach     Mass      15 mm, semipedunculated, ulcerated surface      Proximal body posterior aspect      Resected at end of case with EMR technique     Polyps      Fundic gland polyp appearance      3 polyps resected with cold snare -8 mm, 10 mm and 10 mm    Duodenum     Unremarkable mucosa     EUS    Celiac axis     No adenopathy    Pancreas body/tail     Grossly unremarkable parenchyma    Pancreatic duct     Normal course and caliber    Gastric mass     Involving mucosa and muscularis mucosa     No invasive features     Clear demarcation of deeper layers    Perigastric space     No adenopathy appreciated    Plan:  Follow-up final pathology    Agree with proceeding with dotatate scan    11/1/2021 9:31 AM Edwin Aguillon M.D.

## 2021-11-01 NOTE — OR NURSING
1118: To Stage 2 from PACU.  Pt denies nausea and pain at this time.   Patient settled in recliner chair. Pt dressed with assist by CNA.     1137: Discharge instructions and medications gone over with Pt and ride. All questions answered. Pt meets discharge criteria. PIV DC'd.  Pt transported to PO via wheelchair in stable condition.   Discharge to care of family post uneventful stay in PACU 2.

## 2021-11-01 NOTE — OR NURSING
0839 To PACU from [facility] via [facility] s/p gastroscopy. Pt sleeping, airway obstructing, jaw thrust maneuver utilized. Abdomen soft.      0950 Pt waking up, has no complaints.     1005 No changes.    1020 No changes.     1025 Pt's  updated.    1035 No changes.     1046 Report to CRISTIANE Coffey.    1105 Resumed care of pt. No changes.    1118 No changes. Report to discharge RN Em.

## 2021-11-01 NOTE — OR NURSING
Pt allergies and NPO status verified, home meds reconcilled. Belongings secured. Pt verbalizes understanding of the pain scale, expected course of stay, and plan of care.  Procedure vverified with pt.  IV access established.

## 2021-11-01 NOTE — PROCEDURES
DATE OF PROCEDURE:  11/01/2021     PROCEDURES:  1.  Esophagogastroduodenoscopy with endoscopic mucosal resection.  2.  Endoscopic ultrasound, upper.     INDICATIONS:  Gastric neuroendocrine tumor/carcinoid.     FINAL IMPRESSION:     EGD FINDINGS:  1.  Esophagus:  Unremarkable mucosa.  2.  Proximal body gastric mass 15 mm semi-pedunculated with ulcerated surface.  3.  Fundic gland polyps present 3 largest polyps measuring 8, 10 and 10 mm   resected with cold snare.  4.  Duodenum:  Unremarkable mucosa.     ENDOSCOPIC ULTRASOUND FINDINGS:  1.  Celiac axis, no adenopathy.  2.  Pancreatic body and tail, grossly unremarkable parenchyma, but this was   not the focus of the exam.  3.  Pancreatic duct, normal course and caliber.  4.  Gastric mass involving the mucosa and muscularis mucosa.  No invasive   features with clear demarcation of deeper layers.  5.  Perigastric space, no adenopathy appreciated.     RECOMMENDATIONS:  1.  Follow up final pathology.  2.  Agree with proceeding with Dotatate scan to rule out any neuroendocrine   metastases.     PROCEDURE:  Prior to the procedure, physical exam was stable.  During   procedure, vital signs remained within normal limits.  Prior to sedation,   informed consent was obtained.  Risks, benefits, alternatives including, but   not limited to risk of bleeding, infection, perforation, adverse reaction to   medication, failure to identify pathology and death explained to the patient   who accepted all risks.  The patient was prepped in the left lateral position   after intubation and sedation by anesthesia.     EGD:  Scope tip of the Olympus flexible gastroscope passed in the proximal   esophagus.  Proximal middle and distal thirds of the esophagus were well   visualized and were unremarkable.  Stomach was entered.  Gastric pool   suctioned, air insufflated, scope retroflexed to view the body, fundus, and   cardia, then straightened to view the antrum and incisura.  Along the  proximal   body of the posterior aspect of the stomach, there was a 15 mm   semi-pedunculated mass with an ulcerated surface.  This was semi-pedunculated.    Throughout the greater curvature of the body, there were multiple fundic   gland polyps visible, the largest of which measured 8 mm, 10 mm and 10 mm.    Antrum was unremarkable.  The pylorus was patent.  Duodenal bulb sweep second   and third portions were unremarkable.  The gastroscope was withdrawn and kept   in the room.  We proceeded with endoscopic ultrasound.     EUS:  Flexible radial echoendoscope passed in the proximal stomach.  Imaging   of the celiac axis was unremarkable with no adenopathy.  Incidental imaging of   the pancreatic body and tail demonstrated a grossly unremarkable gland   throughout.  The pancreatic duct had a normal course and caliber.  The   grazyna-gastric space was visualized and no adenopathy was appreciated.  The   stomach was instilled with approximately 240 mL of water for acoustic coupling   after the air was suctioned as dry as possible.  The gastric wall was well   visualized and had a normal 5-layer structure throughout.  Attention was   placed to the proximal gastric mass and this was clearly seen to be involving   the mucosa and muscularis mucosa, but no invasion whatsoever of deeper   structures.  It was very well defined without any suspicious features.    Multiple passes were made with the same findings.  No other lesions were seen.    Liquid was suctioned from the stomach.  The scope was withdrawn and we   proceeded with endoscopic mucosal resection.     EMR:  The flexible gastroscope was repassed into the stomach.  Sclerotherapy   needle was passed down the scope channel and the mass lesion was raised with   ORISE gel with excellent lifting.  Following this, a snare attached to cautery   was passed down the scope channel and the entire lesion with good margins was   grasped in the snare tightly.  With cautery, this was  resected.  The sample   was withdrawn and saved for pathology.  Scope was repassed into the stomach   and the 3 larger fundic gland polyps were identified and targeted with removal   with cold snare method of the 8, 10 and 10 mm polyps.  These were saved in a   second jar.  Once this was complete, the procedure was deemed complete.  The   scope was withdrawn.  Air and liquid were suctioned.  The patient tolerated   the procedure well and was sent to recovery without immediate complications.        ______________________________  MD MANISH OAKES/STACIE    DD:  11/01/2021 09:41  DT:  11/01/2021 10:33    Job#:  [id number]

## 2021-11-01 NOTE — ANESTHESIA TIME REPORT
Anesthesia Start and Stop Event Times     Date Time Event    11/1/2021 0825 Ready for Procedure     0857 Anesthesia Start     0942 Anesthesia Stop        Responsible Staff  11/01/21    Name Role Begin End    Jacinto Gracia M.D. Anesth 0857 0942        Preop Diagnosis (Free Text):  Pre-op Diagnosis     NEUROENDOCRINE TUMOR        Preop Diagnosis (Codes):  Diagnosis Information     Diagnosis Code(s): Neuroendocrine tumor [D3A.8]        Premium Reason  Non-Premium    Comments:

## 2021-11-09 ENCOUNTER — HOSPITAL ENCOUNTER (OUTPATIENT)
Dept: LAB | Facility: MEDICAL CENTER | Age: 51
End: 2021-11-09
Attending: PHYSICIAN ASSISTANT
Payer: COMMERCIAL

## 2021-11-09 ENCOUNTER — HOSPITAL ENCOUNTER (OUTPATIENT)
Dept: LAB | Facility: MEDICAL CENTER | Age: 51
End: 2021-11-09
Attending: INTERNAL MEDICINE
Payer: COMMERCIAL

## 2021-11-09 DIAGNOSIS — E83.110 HEREDITARY HEMOCHROMATOSIS (HCC): ICD-10-CM

## 2021-11-09 DIAGNOSIS — C7A.8 PRIMARY MALIGNANT NEUROENDOCRINE TUMOR OF BODY OF STOMACH (HCC): ICD-10-CM

## 2021-11-09 DIAGNOSIS — E11.9 TYPE 2 DIABETES MELLITUS WITHOUT COMPLICATION, WITHOUT LONG-TERM CURRENT USE OF INSULIN (HCC): ICD-10-CM

## 2021-11-09 DIAGNOSIS — K76.9 CHRONIC NONALCOHOLIC LIVER DISEASE: ICD-10-CM

## 2021-11-09 LAB
ALBUMIN SERPL BCP-MCNC: 4.2 G/DL (ref 3.2–4.9)
ALBUMIN SERPL BCP-MCNC: 4.5 G/DL (ref 3.2–4.9)
ALBUMIN/GLOB SERPL: 1.4 G/DL
ALBUMIN/GLOB SERPL: 1.7 G/DL
ALP SERPL-CCNC: 72 U/L (ref 30–99)
ALP SERPL-CCNC: 72 U/L (ref 30–99)
ALT SERPL-CCNC: 138 U/L (ref 2–50)
ALT SERPL-CCNC: 145 U/L (ref 2–50)
ANION GAP SERPL CALC-SCNC: 10 MMOL/L (ref 7–16)
ANION GAP SERPL CALC-SCNC: 12 MMOL/L (ref 7–16)
AST SERPL-CCNC: 119 U/L (ref 12–45)
AST SERPL-CCNC: 126 U/L (ref 12–45)
BASOPHILS # BLD AUTO: 0.6 % (ref 0–1.8)
BASOPHILS # BLD AUTO: 0.7 % (ref 0–1.8)
BASOPHILS # BLD: 0.03 K/UL (ref 0–0.12)
BASOPHILS # BLD: 0.04 K/UL (ref 0–0.12)
BILIRUB SERPL-MCNC: 0.4 MG/DL (ref 0.1–1.5)
BILIRUB SERPL-MCNC: 0.4 MG/DL (ref 0.1–1.5)
BUN SERPL-MCNC: 10 MG/DL (ref 8–22)
BUN SERPL-MCNC: 9 MG/DL (ref 8–22)
CALCIUM SERPL-MCNC: 8.8 MG/DL (ref 8.5–10.5)
CALCIUM SERPL-MCNC: 8.8 MG/DL (ref 8.5–10.5)
CHLORIDE SERPL-SCNC: 100 MMOL/L (ref 96–112)
CHLORIDE SERPL-SCNC: 101 MMOL/L (ref 96–112)
CO2 SERPL-SCNC: 26 MMOL/L (ref 20–33)
CO2 SERPL-SCNC: 27 MMOL/L (ref 20–33)
CREAT SERPL-MCNC: 0.68 MG/DL (ref 0.5–1.4)
CREAT SERPL-MCNC: 0.69 MG/DL (ref 0.5–1.4)
EOSINOPHIL # BLD AUTO: 0.17 K/UL (ref 0–0.51)
EOSINOPHIL # BLD AUTO: 0.19 K/UL (ref 0–0.51)
EOSINOPHIL NFR BLD: 3.2 % (ref 0–6.9)
EOSINOPHIL NFR BLD: 3.6 % (ref 0–6.9)
ERYTHROCYTE [DISTWIDTH] IN BLOOD BY AUTOMATED COUNT: 54.5 FL (ref 35.9–50)
ERYTHROCYTE [DISTWIDTH] IN BLOOD BY AUTOMATED COUNT: 54.9 FL (ref 35.9–50)
EST. AVERAGE GLUCOSE BLD GHB EST-MCNC: 126 MG/DL
FERRITIN SERPL-MCNC: 88.2 NG/ML (ref 10–291)
FERRITIN SERPL-MCNC: 90.4 NG/ML (ref 10–291)
GLOBULIN SER CALC-MCNC: 2.7 G/DL (ref 1.9–3.5)
GLOBULIN SER CALC-MCNC: 3.1 G/DL (ref 1.9–3.5)
GLUCOSE SERPL-MCNC: 97 MG/DL (ref 65–99)
GLUCOSE SERPL-MCNC: 99 MG/DL (ref 65–99)
HBA1C MFR BLD: 6 % (ref 4–5.6)
HCT VFR BLD AUTO: 42.1 % (ref 37–47)
HCT VFR BLD AUTO: 42.5 % (ref 37–47)
HGB BLD-MCNC: 13.6 G/DL (ref 12–16)
HGB BLD-MCNC: 13.7 G/DL (ref 12–16)
IMM GRANULOCYTES # BLD AUTO: 0.02 K/UL (ref 0–0.11)
IMM GRANULOCYTES # BLD AUTO: 0.03 K/UL (ref 0–0.11)
IMM GRANULOCYTES NFR BLD AUTO: 0.4 % (ref 0–0.9)
IMM GRANULOCYTES NFR BLD AUTO: 0.6 % (ref 0–0.9)
IRON SATN MFR SERPL: 18 % (ref 15–55)
IRON SATN MFR SERPL: 18 % (ref 15–55)
IRON SERPL-MCNC: 81 UG/DL (ref 40–170)
IRON SERPL-MCNC: 81 UG/DL (ref 40–170)
LYMPHOCYTES # BLD AUTO: 1.66 K/UL (ref 1–4.8)
LYMPHOCYTES # BLD AUTO: 1.75 K/UL (ref 1–4.8)
LYMPHOCYTES NFR BLD: 31.9 % (ref 22–41)
LYMPHOCYTES NFR BLD: 32.5 % (ref 22–41)
MCH RBC QN AUTO: 25.3 PG (ref 27–33)
MCH RBC QN AUTO: 25.8 PG (ref 27–33)
MCHC RBC AUTO-ENTMCNC: 32 G/DL (ref 33.6–35)
MCHC RBC AUTO-ENTMCNC: 32.5 G/DL (ref 33.6–35)
MCV RBC AUTO: 79.1 FL (ref 81.4–97.8)
MCV RBC AUTO: 79.3 FL (ref 81.4–97.8)
MONOCYTES # BLD AUTO: 0.37 K/UL (ref 0–0.85)
MONOCYTES # BLD AUTO: 0.41 K/UL (ref 0–0.85)
MONOCYTES NFR BLD AUTO: 7.1 % (ref 0–13.4)
MONOCYTES NFR BLD AUTO: 7.6 % (ref 0–13.4)
NEUTROPHILS # BLD AUTO: 2.94 K/UL (ref 2–7.15)
NEUTROPHILS # BLD AUTO: 2.99 K/UL (ref 2–7.15)
NEUTROPHILS NFR BLD: 55.4 % (ref 44–72)
NEUTROPHILS NFR BLD: 56.4 % (ref 44–72)
NRBC # BLD AUTO: 0 K/UL
NRBC # BLD AUTO: 0 K/UL
NRBC BLD-RTO: 0 /100 WBC
NRBC BLD-RTO: 0 /100 WBC
PLATELET # BLD AUTO: 234 K/UL (ref 164–446)
PLATELET # BLD AUTO: 239 K/UL (ref 164–446)
PMV BLD AUTO: 10.2 FL (ref 9–12.9)
PMV BLD AUTO: 9.8 FL (ref 9–12.9)
POTASSIUM SERPL-SCNC: 4.5 MMOL/L (ref 3.6–5.5)
POTASSIUM SERPL-SCNC: 4.5 MMOL/L (ref 3.6–5.5)
PROT SERPL-MCNC: 7.2 G/DL (ref 6–8.2)
PROT SERPL-MCNC: 7.3 G/DL (ref 6–8.2)
RBC # BLD AUTO: 5.31 M/UL (ref 4.2–5.4)
RBC # BLD AUTO: 5.37 M/UL (ref 4.2–5.4)
SODIUM SERPL-SCNC: 137 MMOL/L (ref 135–145)
SODIUM SERPL-SCNC: 139 MMOL/L (ref 135–145)
TIBC SERPL-MCNC: 441 UG/DL (ref 250–450)
TIBC SERPL-MCNC: 442 UG/DL (ref 250–450)
UIBC SERPL-MCNC: 360 UG/DL (ref 110–370)
UIBC SERPL-MCNC: 361 UG/DL (ref 110–370)
WBC # BLD AUTO: 5.2 K/UL (ref 4.8–10.8)
WBC # BLD AUTO: 5.4 K/UL (ref 4.8–10.8)

## 2021-11-09 PROCEDURE — 83036 HEMOGLOBIN GLYCOSYLATED A1C: CPT

## 2021-11-09 PROCEDURE — 80053 COMPREHEN METABOLIC PANEL: CPT

## 2021-11-09 PROCEDURE — 83540 ASSAY OF IRON: CPT

## 2021-11-09 PROCEDURE — 81256 HFE GENE: CPT

## 2021-11-09 PROCEDURE — 36415 COLL VENOUS BLD VENIPUNCTURE: CPT

## 2021-11-09 PROCEDURE — 80053 COMPREHEN METABOLIC PANEL: CPT | Mod: 91

## 2021-11-09 PROCEDURE — 85025 COMPLETE CBC W/AUTO DIFF WBC: CPT

## 2021-11-09 PROCEDURE — 83540 ASSAY OF IRON: CPT | Mod: 91

## 2021-11-09 PROCEDURE — 82728 ASSAY OF FERRITIN: CPT

## 2021-11-09 PROCEDURE — 85025 COMPLETE CBC W/AUTO DIFF WBC: CPT | Mod: 91

## 2021-11-09 PROCEDURE — 83550 IRON BINDING TEST: CPT | Mod: 91

## 2021-11-09 PROCEDURE — 82728 ASSAY OF FERRITIN: CPT | Mod: 91

## 2021-11-09 PROCEDURE — 83550 IRON BINDING TEST: CPT

## 2021-11-12 ENCOUNTER — OFFICE VISIT (OUTPATIENT)
Dept: MEDICAL GROUP | Facility: PHYSICIAN GROUP | Age: 51
End: 2021-11-12
Payer: COMMERCIAL

## 2021-11-12 VITALS
OXYGEN SATURATION: 98 % | DIASTOLIC BLOOD PRESSURE: 74 MMHG | BODY MASS INDEX: 28.88 KG/M2 | HEIGHT: 67 IN | RESPIRATION RATE: 16 BRPM | HEART RATE: 89 BPM | WEIGHT: 184 LBS | SYSTOLIC BLOOD PRESSURE: 110 MMHG | TEMPERATURE: 98.2 F

## 2021-11-12 DIAGNOSIS — I10 PRIMARY HYPERTENSION: ICD-10-CM

## 2021-11-12 DIAGNOSIS — K21.9 GASTROESOPHAGEAL REFLUX DISEASE WITHOUT ESOPHAGITIS: ICD-10-CM

## 2021-11-12 DIAGNOSIS — D3A.8 NEUROENDOCRINE NEOPLASM OF STOMACH: ICD-10-CM

## 2021-11-12 PROCEDURE — 99214 OFFICE O/P EST MOD 30 MIN: CPT | Performed by: PHYSICIAN ASSISTANT

## 2021-11-12 RX ORDER — LISINOPRIL 20 MG/1
20 TABLET ORAL DAILY
Qty: 90 TABLET | Refills: 3 | Status: SHIPPED | OUTPATIENT
Start: 2021-11-12 | End: 2023-02-15 | Stop reason: SDUPTHER

## 2021-11-12 ASSESSMENT — FIBROSIS 4 INDEX: FIB4 SCORE: 2.25

## 2021-11-12 NOTE — PROGRESS NOTES
Chief Complaint   Patient presents with   • Other     discuss multpile issues       HISTORY OF PRESENT ILLNESS: Mónica Keita is an established 51 y.o. female here to discuss the evaluation and management of:    Patient is a pleasant 51-year-old female here today discuss hypertension and new diagnosis of of carcinoid neuroendocrine tumor of stomach.  Patient states on 11/1/2021 she had an endoscopy and tumor was discovered.  States she has an appointment with her gastroenterologist Dr. Medina on December 6, 2021 to discuss further work-up.  She tells me that she needs to complete a PET scan but refuses to follow-up with cancer care specialist because she found that oncologist, Dr. Oh had poor bedside manner.  She tells me that she is reached out to her hematologist/oncologist Dr. Lawrence who is managing her hemochromatosis to find out where she can complete PET scan.  Patient states acid reflux is controlled with Prilosec 20 mg tab once daily.  Denies side effects or complications of medication.  She tells me overall she is feeling well.  Denies abdominal pain, nausea, vomiting, fever, chills, lymphadenopathy, unintentional weight loss, night sweats, melena, medic easier, changes in bowel habit.    She daily for the past 1 week she is been monitoring her blood pressure at home and systolic has been averaging in the mid 140s up to 159 and diastolic has been in the low 90 to mid 90 range.  States during her last hematology/oncology appointment with Dr. Lawrence on 9/21/2021 her blood pressure was elevated and during her most recent gastroenterologist appointment her blood pressure was elevated.  She admits that she does get anxious prior to these appointments.  During today's appointment patient denies feeling anxious and her blood pressure is 110/74 mmHg.  She is currently take lisinopril 10 mg tab once daily.  Denies side effects or complications of medication.  Denies chest pain, shortness of breath, heart  palpitations, dizziness, syncope, severe headache with vision changes, peripheral edema.  She feels her diet is fairly healthy but denies having regular exercise routine.      Patient Active Problem List    Diagnosis Date Noted   • Neuroendocrine neoplasm of stomach 11/12/2021   • Gastroesophageal reflux disease 05/11/2021   • Chronic nonalcoholic liver disease 05/11/2021   • Allergic rhinitis 05/11/2021   • Transaminitis 05/07/2020   • Type 2 diabetes mellitus without complication, without long-term current use of insulin (HCC) 07/17/2019   • Hypertension 06/23/2019   • Anxiety 06/12/2019   • Hemochromatosis 12/15/2017       Allergies:Patient has no known allergies.    Current Outpatient Medications   Medication Sig Dispense Refill   • lisinopril (PRINIVIL) 20 MG Tab Take 1 Tablet by mouth every day. 90 Tablet 3   • metFORMIN (GLUCOPHAGE) 500 MG Tab TAKE TWO TABLETS BY MOUTH TWICE DAILY with food 180 tablet 2   • omeprazole (PRILOSEC) 20 MG delayed-release capsule Take 1 capsule by mouth every day. 90 capsule 2   • escitalopram (LEXAPRO) 20 MG tablet Lexapro 20 mg tablet   Take 2 tablets every day by oral route.     • buPROPion SR (WELLBUTRIN-SR) 150 MG TABLET SR 12 HR sustained-release tablet Take 1 Tab by mouth every day.     • Cholecalciferol (VITAMIN D3) 5000 UNIT/0.5ML Liquid Take 1 Tab by mouth every day.     • Coenzyme Q10 (COQ10 PO) Take 1 Tab by mouth every day.     • LORazepam (ATIVAN) 0.5 MG Tab   2   • Biotin 5000 MCG Cap Take  by mouth.     • aspirin (ASA) 81 MG Chew Tab chewable tablet Take 81 mg by mouth every day.       No current facility-administered medications for this visit.       Social History     Tobacco Use   • Smoking status: Never Smoker   • Smokeless tobacco: Never Used   Vaping Use   • Vaping Use: Never used   Substance Use Topics   • Alcohol use: No   • Drug use: No       Family Status   Relation Name Status   • Mo  Alive   • Fa  Alive   • Sis  Alive   • Bro  Alive   • Son  Alive   •  "Tyree  Alive     Family History   Problem Relation Age of Onset   • Heart Disease Father    • No Known Problems Sister    • Heart Attack Brother         CAD. 3 MI's.    • No Known Problems Son    • No Known Problems Daughter        ROS:  Review of Systems   Constitutional: Negative for fever, chills, weight loss and malaise/fatigue.   HENT: Negative for ear pain, nosebleeds, congestion, sore throat and neck pain.    Eyes: Negative for blurred vision.   Respiratory: Negative for cough, sputum production, shortness of breath and wheezing.    Cardiovascular: Negative for chest pain, palpitations, orthopnea and leg swelling.   Gastrointestinal: Negative for heartburn, nausea, vomiting and abdominal pain.   Genitourinary: Negative for dysuria, urgency and frequency.   Musculoskeletal: Negative for myalgias, back pain and joint pain.   Skin: Negative for rash and itching.   Neurological: Negative for dizziness, tingling, tremors, sensory change, focal weakness and headaches.   Endo/Heme/Allergies: Does not bruise/bleed easily.   Psychiatric/Behavioral: Negative for depression, suicidal ideas and memory loss.  The patient is not nervous/anxious and does not have insomnia.    All other systems reviewed and are negative except as in HPI.    Exam: /74 (BP Location: Left arm, Patient Position: Sitting, BP Cuff Size: Adult)   Pulse 89   Temp 36.8 °C (98.2 °F) (Temporal)   Resp 16   Ht 1.702 m (5' 7\")   Wt 83.5 kg (184 lb)   SpO2 98%  Body mass index is 28.82 kg/m².  General: Normal appearing. No distress.  HEENT: Normocephalic. Eyes conjunctiva clear lids without ptosis, ears normal shape and contour.   Neck: Supple without JVD. Thyroid is not enlarged.  Pulmonary: Clear to ausculation.  Normal effort. No rales, ronchi, or wheezing.  Cardiovascular: Regular rate and rhythm without murmur.   Abdomen: Nondistended.   Neurologic: Grossly nonfocal.  Cranial nerves are normal.  Skin: Warm and dry.  No rashes or suspicious " skin lesions.  Musculoskeletal: Normal gait. No extremity cyanosis, clubbing, or edema.  Psych: Normal mood and affect. Alert and oriented x3. Judgment and insight is normal.    Medical decision-making and discussion:  1. Neuroendocrine neoplasm of stomach  Advised patient continue following up with gastroenterologist and oncologist as indicated.  Advised patient to call her gastroenterologist to see if she can get a sooner appointment to discuss results and treatment plan.  Continue taking Prilosec as prescribed.  Continue working on diet and exercise.    Follow-up for worsening symptoms,lack of expected recovery, or should new symptoms or problems arise.        2. Primary hypertension  Chronic problem.  Controlled during today's appointment.  Patient states at home readings have been elevated and during the last few provider appointments her blood pressure has been elevated.  She would like lisinopril dosage to be increased.  During today's appointment lisinopril has been increased from 10 mg once daily to 20 mg once daily.  Discussed with patient if at home blood pressure drops too low to decrease back down to lisinopril 10 mg tab once daily.  Discussed with patient anxiety can increase blood pressure.  Discussed with patient acute elevated blood pressure readings could be due to underlying anxiety.  Advised patient continue work on diet, exercise, sleep hygiene, hydration, decreasing sodium consumption, and developing healthy coping mechanisms for stress anxiety.    Follow-up for worsening symptoms,lack of expected recovery, or should new symptoms or problems arise.      - lisinopril (PRINIVIL) 20 MG Tab; Take 1 Tablet by mouth every day.  Dispense: 90 Tablet; Refill: 3    3. Gastroesophageal reflux disease without esophagitis  This is a chronic and stable problem. Patient is doing well. No red flags. Continue PPI and monitor.        Please note that this dictation was created using voice recognition software. I  have made every reasonable attempt to correct obvious errors, but I expect that there are errors of grammar and possibly content that I did not discover before finalizing the note.    Assessment/Plan:  1. Neuroendocrine neoplasm of stomach     2. Primary hypertension  lisinopril (PRINIVIL) 20 MG Tab   3. Gastroesophageal reflux disease without esophagitis         Return if symptoms worsen or fail to improve.

## 2021-11-19 ENCOUNTER — HOSPITAL ENCOUNTER (OUTPATIENT)
Dept: RADIOLOGY | Facility: MEDICAL CENTER | Age: 51
End: 2021-11-19
Attending: PHYSICIAN ASSISTANT
Payer: COMMERCIAL

## 2021-11-19 DIAGNOSIS — Z12.31 VISIT FOR SCREENING MAMMOGRAM: ICD-10-CM

## 2021-11-19 PROCEDURE — 77063 BREAST TOMOSYNTHESIS BI: CPT

## 2021-11-24 DIAGNOSIS — C7A.8 PRIMARY MALIGNANT NEUROENDOCRINE TUMOR OF BODY OF STOMACH (HCC): ICD-10-CM

## 2021-11-29 LAB
HFE GENE MUT ANL BLD/T: NORMAL
HFE P.C282Y BLD/T QL: NEGATIVE
HFE P.H63D BLD/T QL: NORMAL
HFE P.S65C BLD/T QL: NEGATIVE

## 2021-12-10 ENCOUNTER — OFFICE VISIT (OUTPATIENT)
Dept: MEDICAL GROUP | Facility: PHYSICIAN GROUP | Age: 51
End: 2021-12-10
Payer: COMMERCIAL

## 2021-12-10 VITALS
HEART RATE: 82 BPM | BODY MASS INDEX: 29.07 KG/M2 | TEMPERATURE: 98.8 F | OXYGEN SATURATION: 98 % | HEIGHT: 67 IN | DIASTOLIC BLOOD PRESSURE: 80 MMHG | WEIGHT: 185.2 LBS | SYSTOLIC BLOOD PRESSURE: 126 MMHG

## 2021-12-10 DIAGNOSIS — F41.9 ANXIETY: ICD-10-CM

## 2021-12-10 DIAGNOSIS — R74.01 TRANSAMINITIS: ICD-10-CM

## 2021-12-10 DIAGNOSIS — E11.9 TYPE 2 DIABETES MELLITUS WITHOUT COMPLICATION, WITHOUT LONG-TERM CURRENT USE OF INSULIN (HCC): ICD-10-CM

## 2021-12-10 DIAGNOSIS — D3A.8 NEUROENDOCRINE NEOPLASM OF STOMACH: ICD-10-CM

## 2021-12-10 DIAGNOSIS — I10 PRIMARY HYPERTENSION: ICD-10-CM

## 2021-12-10 PROCEDURE — 99214 OFFICE O/P EST MOD 30 MIN: CPT | Performed by: FAMILY MEDICINE

## 2021-12-10 RX ORDER — BUPROPION HYDROCHLORIDE 300 MG/1
300 TABLET ORAL EVERY MORNING
COMMUNITY
Start: 2021-11-24 | End: 2022-06-14 | Stop reason: SDUPTHER

## 2021-12-10 ASSESSMENT — FIBROSIS 4 INDEX: FIB4 SCORE: 2.25

## 2021-12-10 NOTE — PROGRESS NOTES
Subjective:     Chief Complaint   Patient presents with   • Establish Care       HPI:   Mónica presents today to discuss the following.  Prior PCP: Grace Riddle PA-C    Type 2 diabetes mellitus without complication, without long-term current use of insulin (HCC)  Chronic issue  On metformin 500mg twice daily   A1c at 6.0 11/21      Anxiety  Chronic issue  On wellbutrin and lexapro   And ativan PRN    Hypertension  Stable. Monitoring BP at home. Currently taking lisinopril 20mg as directed. Also taking baby aspirin.     Transaminitis  Chronic issue  [facility] with [facility]  To undergo liver bx     Neuroendocrine neoplasm of stomach  S/p removal 11/21  To undergo PET done this month  Following up with GI      Past Medical History:   Diagnosis Date   • Adverse effect of anesthesia     Laryngospasm in the past with extubation   • Anemia    • Anxiety    • Bronchitis 2009   • Diabetes (HCC)    • Hypertension    • Pneumonia 2009       Current Outpatient Medications Ordered in Epic   Medication Sig Dispense Refill   • lisinopril (PRINIVIL) 20 MG Tab Take 1 Tablet by mouth every day. 90 Tablet 3   • metFORMIN (GLUCOPHAGE) 500 MG Tab TAKE TWO TABLETS BY MOUTH TWICE DAILY with food 180 tablet 2   • omeprazole (PRILOSEC) 20 MG delayed-release capsule Take 1 capsule by mouth every day. 90 capsule 2   • escitalopram (LEXAPRO) 20 MG tablet Lexapro 20 mg tablet   Take 2 tablets every day by oral route.     • Cholecalciferol (VITAMIN D3) 5000 UNIT/0.5ML Liquid Take 1 Tab by mouth every day.     • Coenzyme Q10 (COQ10 PO) Take 1 Tab by mouth every day.     • LORazepam (ATIVAN) 0.5 MG Tab   2   • Biotin 5000 MCG Cap Take  by mouth.     • aspirin (ASA) 81 MG Chew Tab chewable tablet Take 81 mg by mouth every day.     • buPROPion (WELLBUTRIN XL) 300 MG XL tablet        No current Epic-ordered facility-administered medications on file.       Allergies:  Patient has no known allergies.    Health Maintenance: Completed    ROS:  Gen:  "no fevers/chills, no changes in weight  Eyes: no changes in vision  Pulm: no sob, no cough  CV: no chest pain, no palpitations  GI: no nausea/vomiting, no diarrhea      Objective:     Exam:  /80 (BP Location: Right arm, Patient Position: Sitting, BP Cuff Size: Adult)   Pulse 82   Temp 37.1 °C (98.8 °F) (Temporal)   Ht 1.702 m (5' 7\")   Wt 84 kg (185 lb 3.2 oz)   LMP 06/14/2012   SpO2 98%   BMI 29.01 kg/m²  Body mass index is 29.01 kg/m².      Constitutional: Alert, no distress, well-groomed.  Skin: Warm, dry, good turgor, no rashes in visible areas.  Eye: Equal, round and reactive, conjunctiva clear, lids normal.  ENMT: Lips without lesions, good dentition, moist mucous membranes.  Neck: Trachea midline, no masses, no thyromegaly.  Respiratory: Unlabored respiratory effort, no cough.  MSK: Normal gait, moves all extremities.  Neuro: Grossly non-focal.   Psych: Alert and oriented x3, normal affect and mood.        Assessment & Plan:     51 y.o. female with the following -     1. Type 2 diabetes mellitus without complication, without long-term current use of insulin (HCC)  Chronic, stable condition.  Very well controlled with Metformin 500 mg 2 times a day.  Most recent A1c is 6.0.  Continue with current regimen.    2. Anxiety  Chronic, stable condition.  Continue to follow-up with psychiatry.  Continue current regimen.    3. Primary hypertension  Chronic, stable condition.  Continue with lisinopril.    4. Transaminitis  Chronic, unchanged condition.  Following up with you undergo biopsy.    5. Neuroendocrine neoplasm of stomach  Chronic, stable condition.  Status post removal last month.  To undergo PET scan later this month to ensure stability.      Return in about 6 months (around 6/10/2022).    Please note that this dictation was created using voice recognition software. I have made every reasonable attempt to correct obvious errors, but I expect that there are errors of grammar and possibly content " that I did not discover before finalizing the note.

## 2021-12-10 NOTE — ASSESSMENT & PLAN NOTE
Stable. Monitoring BP at home. Currently taking lisinopril 20mg as directed. Also taking baby aspirin.

## 2022-01-18 ENCOUNTER — HOSPITAL ENCOUNTER (OUTPATIENT)
Dept: RADIOLOGY | Facility: MEDICAL CENTER | Age: 52
End: 2022-01-18
Attending: INTERNAL MEDICINE

## 2022-01-19 ENCOUNTER — TELEMEDICINE (OUTPATIENT)
Dept: ADMISSIONS | Facility: MEDICAL CENTER | Age: 52
End: 2022-01-19
Attending: RADIOLOGY
Payer: COMMERCIAL

## 2022-01-31 ENCOUNTER — APPOINTMENT (OUTPATIENT)
Dept: ADMISSIONS | Facility: MEDICAL CENTER | Age: 52
End: 2022-01-31
Attending: RADIOLOGY
Payer: COMMERCIAL

## 2022-02-04 ENCOUNTER — APPOINTMENT (OUTPATIENT)
Dept: RADIOLOGY | Facility: MEDICAL CENTER | Age: 52
End: 2022-02-04
Attending: PHYSICIAN ASSISTANT
Payer: COMMERCIAL

## 2022-02-04 ENCOUNTER — HOSPITAL ENCOUNTER (OUTPATIENT)
Facility: MEDICAL CENTER | Age: 52
End: 2022-02-04
Attending: PHYSICIAN ASSISTANT | Admitting: RADIOLOGY
Payer: COMMERCIAL

## 2022-02-04 VITALS
SYSTOLIC BLOOD PRESSURE: 149 MMHG | RESPIRATION RATE: 15 BRPM | DIASTOLIC BLOOD PRESSURE: 94 MMHG | OXYGEN SATURATION: 97 % | HEIGHT: 67 IN | BODY MASS INDEX: 28.25 KG/M2 | HEART RATE: 82 BPM | WEIGHT: 180 LBS

## 2022-02-04 DIAGNOSIS — D50.9 IRON DEFICIENCY ANEMIA, UNSPECIFIED IRON DEFICIENCY ANEMIA TYPE: ICD-10-CM

## 2022-02-04 DIAGNOSIS — R74.8 ELEVATED LIVER ENZYMES: ICD-10-CM

## 2022-02-04 LAB
ERYTHROCYTE [DISTWIDTH] IN BLOOD BY AUTOMATED COUNT: 42.4 FL (ref 35.9–50)
HCT VFR BLD AUTO: 44.1 % (ref 37–47)
HGB BLD-MCNC: 14.8 G/DL (ref 12–16)
INR PPP: 0.92 (ref 0.87–1.13)
MCH RBC QN AUTO: 27.8 PG (ref 27–33)
MCHC RBC AUTO-ENTMCNC: 33.6 G/DL (ref 33.6–35)
MCV RBC AUTO: 82.7 FL (ref 81.4–97.8)
PATHOLOGY CONSULT NOTE: NORMAL
PLATELET # BLD AUTO: 171 K/UL (ref 164–446)
PMV BLD AUTO: 9.7 FL (ref 9–12.9)
PROTHROMBIN TIME: 12.1 SEC (ref 12–14.6)
RBC # BLD AUTO: 5.33 M/UL (ref 4.2–5.4)
WBC # BLD AUTO: 5.4 K/UL (ref 4.8–10.8)

## 2022-02-04 PROCEDURE — 88313 SPECIAL STAINS GROUP 2: CPT | Mod: 91

## 2022-02-04 PROCEDURE — 47000 NEEDLE BIOPSY OF LIVER PERQ: CPT

## 2022-02-04 PROCEDURE — 700111 HCHG RX REV CODE 636 W/ 250 OVERRIDE (IP): Performed by: RADIOLOGY

## 2022-02-04 PROCEDURE — 700111 HCHG RX REV CODE 636 W/ 250 OVERRIDE (IP)

## 2022-02-04 PROCEDURE — 88307 TISSUE EXAM BY PATHOLOGIST: CPT

## 2022-02-04 PROCEDURE — 85027 COMPLETE CBC AUTOMATED: CPT

## 2022-02-04 PROCEDURE — 85610 PROTHROMBIN TIME: CPT

## 2022-02-04 RX ORDER — SODIUM CHLORIDE 9 MG/ML
500 INJECTION, SOLUTION INTRAVENOUS
Status: ACTIVE | OUTPATIENT
Start: 2022-02-04 | End: 2022-02-04

## 2022-02-04 RX ORDER — MIDAZOLAM HYDROCHLORIDE 1 MG/ML
.5-2 INJECTION INTRAMUSCULAR; INTRAVENOUS PRN
Status: ACTIVE | OUTPATIENT
Start: 2022-02-04 | End: 2022-02-04

## 2022-02-04 RX ORDER — HYDROMORPHONE HYDROCHLORIDE 1 MG/ML
0.5 INJECTION, SOLUTION INTRAMUSCULAR; INTRAVENOUS; SUBCUTANEOUS
Status: CANCELLED | OUTPATIENT
Start: 2022-02-04 | End: 2022-02-05

## 2022-02-04 RX ORDER — ONDANSETRON 2 MG/ML
4 INJECTION INTRAMUSCULAR; INTRAVENOUS PRN
Status: ACTIVE | OUTPATIENT
Start: 2022-02-04 | End: 2022-02-04

## 2022-02-04 RX ORDER — ONDANSETRON 2 MG/ML
4 INJECTION INTRAMUSCULAR; INTRAVENOUS EVERY 8 HOURS PRN
Status: CANCELLED | OUTPATIENT
Start: 2022-02-04 | End: 2022-02-05

## 2022-02-04 RX ORDER — OXYCODONE HYDROCHLORIDE 10 MG/1
10 TABLET ORAL
Status: CANCELLED | OUTPATIENT
Start: 2022-02-04 | End: 2022-02-05

## 2022-02-04 RX ORDER — OXYCODONE HYDROCHLORIDE 5 MG/1
5 TABLET ORAL
Status: CANCELLED | OUTPATIENT
Start: 2022-02-04 | End: 2022-02-05

## 2022-02-04 RX ORDER — MIDAZOLAM HYDROCHLORIDE 1 MG/ML
INJECTION INTRAMUSCULAR; INTRAVENOUS
Status: COMPLETED
Start: 2022-02-04 | End: 2022-02-04

## 2022-02-04 RX ADMIN — MIDAZOLAM 1 MG: 1 INJECTION INTRAMUSCULAR; INTRAVENOUS at 10:16

## 2022-02-04 RX ADMIN — FENTANYL CITRATE 50 MCG: 50 INJECTION INTRAMUSCULAR; INTRAVENOUS at 10:16

## 2022-02-04 RX ADMIN — MIDAZOLAM HYDROCHLORIDE 0.5 MG: 1 INJECTION, SOLUTION INTRAMUSCULAR; INTRAVENOUS at 10:20

## 2022-02-04 RX ADMIN — FENTANYL CITRATE 50 MCG: 50 INJECTION, SOLUTION INTRAMUSCULAR; INTRAVENOUS at 10:16

## 2022-02-04 RX ADMIN — MIDAZOLAM HYDROCHLORIDE 1 MG: 1 INJECTION, SOLUTION INTRAMUSCULAR; INTRAVENOUS at 10:16

## 2022-02-04 RX ADMIN — MIDAZOLAM HYDROCHLORIDE 0.5 MG: 1 INJECTION, SOLUTION INTRAMUSCULAR; INTRAVENOUS at 10:22

## 2022-02-04 RX ADMIN — FENTANYL CITRATE 25 MCG: 50 INJECTION, SOLUTION INTRAMUSCULAR; INTRAVENOUS at 10:20

## 2022-02-04 RX ADMIN — FENTANYL CITRATE 25 MCG: 50 INJECTION, SOLUTION INTRAMUSCULAR; INTRAVENOUS at 10:22

## 2022-02-04 ASSESSMENT — FIBROSIS 4 INDEX: FIB4 SCORE: 2.29

## 2022-02-04 NOTE — PROGRESS NOTES
Pt presents to [facility]. Pt was consented by MD at bedside, confirmed by this RN and consent at bedside. Pt remains on stretcher in supine position. Patient underwent a liver biposy by Dr. Benjamin. Procedure site was marked by MD and verified using imaging guidance. Pt placed on monitor, prepped and draped in a sterile fashion. Vitals were taken every 5 minutes and remained stable during procedure (see doc flow sheet for results). CO2 waveform capnography was monitored and remained WNL throughout procedure. Pt will remain under care of this RN for recovery in [facility].    Liver core biposy X 3 hand delivered to lab in formalin

## 2022-02-04 NOTE — PROGRESS NOTES
Pt has met discharge criteria at this time. Pt was provided with discharge instructions, pt verbalized understanding and had no questions at this time. Pt's vitals WNL, dressing WNL, IV removed. Pt 1250 out of department at this time home with .

## 2022-02-04 NOTE — H&P
History and Physical    Date: 2/4/2022    PCP: Eren Heath M.D.      CC: Elevated LFTs    HPI: This is a 52 y.o. female who is presenting for liver biopsy    Past Medical History:   Diagnosis Date   • Adverse effect of anesthesia     Laryngospasm in the past with extubation   • Anemia    • Anxiety    • Bronchitis 2009   • Diabetes (HCC)     type 2   • Hypertension    • Pneumonia 2009       Past Surgical History:   Procedure Laterality Date   • NH UPPER GI ENDOSCOPY,DIAGNOSIS N/A 11/1/2021    Procedure: GASTROSCOPY- WITH ENDOSCOPIC MUCOSAL RESECTION;  Surgeon: Edwin Aguillon M.D.;  Location: [facility];  Service: EUS   • EGD W/ENDOSCOPIC ULTRASOUND N/A 11/1/2021    Procedure: EGD, WITH ENDOSCOPIC US - UPPER RADIAL;  Surgeon: Edwin Aguillon M.D.;  Location: [facility];  Service: EUS   • PELVIC EXAM UNDER ANESTHESIA  6/17/2014    Performed by Enedina Grant M.D. at SURGERY [facility] ORS   • VAGINAL HYSTERECTOMY TOTAL  6/17/2014    Performed by Enedina Grant M.D. at SURGERY [facility] ORS   • HYSTERECTOMY, VAGINAL  06/04/2014    [facility]   • APPENDECTOMY  2014       No current facility-administered medications for this encounter.        Social History     Socioeconomic History   • Marital status:      Spouse name: Not on file   • Number of children: Not on file   • Years of education: Not on file   • Highest education level: Not on file   Occupational History   • Not on file   Tobacco Use   • Smoking status: Never Smoker   • Smokeless tobacco: Never Used   Vaping Use   • Vaping Use: Never used   Substance and Sexual Activity   • Alcohol use: No   • Drug use: No   • Sexual activity: Yes     Partners: Male     Birth control/protection: Surgical     Comment: .    Other Topics Concern   • Not on file   Social History Narrative   • Not on file     Social Determinants of Health     Financial Resource Strain:    • Difficulty of Paying Living  Expenses: Not on file   Food Insecurity:    • Worried About Running Out of Food in the Last Year: Not on file   • Ran Out of Food in the Last Year: Not on file   Transportation Needs:    • Lack of Transportation (Medical): Not on file   • Lack of Transportation (Non-Medical): Not on file   Physical Activity:    • Days of Exercise per Week: Not on file   • Minutes of Exercise per Session: Not on file   Stress:    • Feeling of Stress : Not on file   Social Connections:    • Frequency of Communication with Friends and Family: Not on file   • Frequency of Social Gatherings with Friends and Family: Not on file   • Attends Faith Services: Not on file   • Active Member of Clubs or Organizations: Not on file   • Attends Club or Organization Meetings: Not on file   • Marital Status: Not on file   Intimate Partner Violence:    • Fear of Current or Ex-Partner: Not on file   • Emotionally Abused: Not on file   • Physically Abused: Not on file   • Sexually Abused: Not on file   Housing Stability:    • Unable to Pay for Housing in the Last Year: Not on file   • Number of Places Lived in the Last Year: Not on file   • Unstable Housing in the Last Year: Not on file       Family History   Problem Relation Age of Onset   • Heart Disease Father    • No Known Problems Sister    • Heart Attack Brother         CAD. 3 MI's.    • No Known Problems Son    • No Known Problems Daughter        Allergies:  Patient has no known allergies.    Review of Systems:  Negative except for elevated LFTs    Physical Exam    Vital Signs                           Labs:                    Radiology:  IR-LIVER BX PROCEDURE    (Results Pending)             Assessment and Plan:This is a 52 y.o.PRESENTS FOR ULTRASOUND GUIDED LVER BIOPSY

## 2022-02-04 NOTE — DISCHARGE INSTRUCTIONS
Liver Biopsy, Care After  These instructions give you information on caring for yourself after your procedure. Your doctor may also give you more specific instructions. Call your doctor if you have any problems or questions after your procedure.  What can I expect after the procedure?  After the procedure, it is common to have:  · Pain and soreness where the biopsy was done.  · Bruising around the area where the biopsy was done.  · Sleepiness and be tired for a few days.  Follow these instructions at home:  Medicines  · Take over-the-counter and prescription medicines only as told by your doctor.  · If you were prescribed an antibiotic medicine, take it as told by your doctor. Do not stop taking the antibiotic even if you start to feel better.  · Do not take medicines such as aspirin and ibuprofen. These medicines can thin your blood. Do not take these medicines unless your doctor tells you to take them.  · If you are taking prescription pain medicine, take actions to prevent or treat constipation. Your doctor may recommend that you:  ? Drink enough fluid to keep your pee (urine) clear or pale yellow.  ? Take over-the-counter or prescription medicines.  ? Eat foods that are high in fiber, such as fresh fruits and vegetables, whole grains, and beans.  ? Limit foods that are high in fat and processed sugars, such as fried and sweet foods.  Caring for your cut  · Follow instructions from your doctor about how to take care of your cuts from surgery (incisions). Make sure you:  ? Wash your hands with soap and water before you change your bandage (dressing). If you cannot use soap and water, use hand .  ? Change your bandage as told by your doctor.  ? Leave stitches (sutures), skin glue, or skin tape (adhesive) strips in place. They may need to stay in place for 2 weeks or longer. If tape strips get loose and curl up, you may trim the loose edges. Do not remove tape strips completely unless your doctor says it is  okay.  · Check your cuts every day for signs of infection. Check for:  ? Redness, swelling, or more pain.  ? Fluid or blood.  ? Pus or a bad smell.  ? Warmth.  · Do not take baths, swim, or use a hot tub until your doctor says it is okay to do so.  Activity    · Rest at home for 1-2 days or as told by your doctor.  ? Avoid sitting for a long time without moving. Get up to take short walks every 1-2 hours.  · Return to your normal activities as told by your doctor. Ask what activities are safe for you.  · Do not do these things in the first 24 hours:  ? Drive.  ? Use machinery.  ? Take a bath or shower.  · Do not lift more than 10 pounds (4.5 kg) or play contact sports for the first 2 weeks.  General instructions    · Do not drink alcohol in the first week after the procedure.  · Have someone stay with you for at least 24 hours after the procedure.  · Get your test results. Ask your doctor or the department that is doing the test:  ? When will my results be ready?  ? How will I get my results?  ? What are my treatment options?  ? What other tests do I need?  ? What are my next steps?  · Keep all follow-up visits as told by your doctor. This is important.  Contact a doctor if:  · A cut bleeds and leaves more than just a small spot of blood.  · A cut is red, puffs up (swells), or hurts more than before.  · Fluid or something else comes from a cut.  · A cut smells bad.  · You have a fever or chills.  Get help right away if:  · You have swelling, bloating, or pain in your belly (abdomen).  · You get dizzy or faint.  · You have a rash.  · You feel sick to your stomach (nauseous) or throw up (vomit).  · You have trouble breathing, feel short of breath, or feel faint.  · Your chest hurts.  · You have problems talking or seeing.  · You have trouble with your balance or moving your arms or legs.  Summary  · After the procedure, it is common to have pain, soreness, bruising, and tiredness.  · Your doctor will tell you how to  take care of yourself at home. Change your bandage, take your medicines, and limit your activities as told by your doctor.  · Call your doctor if you have symptoms of infection. Get help right away if your belly swells, your cut bleeds a lot, or you have trouble talking or breathing.  This information is not intended to replace advice given to you by your health care provider. Make sure you discuss any questions you have with your health care provider.  Document Released: 09/26/2009 Document Revised: 12/28/2018 Document Reviewed: 12/28/2018  [employer] Patient Education © 2020 [employer]        Moderate Conscious Sedation, Adult, Care After  These instructions provide you with information about caring for yourself after your procedure. Your health care provider may also give you more specific instructions. Your treatment has been planned according to current medical practices, but problems sometimes occur. Call your health care provider if you have any problems or questions after your procedure.  What can I expect after the procedure?  After your procedure, it is common:  · To feel sleepy for several hours.  · To feel clumsy and have poor balance for several hours.  · To have poor judgment for several hours.  · To vomit if you eat too soon.  Follow these instructions at home:  For at least 24 hours after the procedure:    · Do not:  ? Participate in activities where you could fall or become injured.  ? Drive.  ? Use heavy machinery.  ? Drink alcohol.  ? Take sleeping pills or medicines that cause drowsiness.  ? Make important decisions or sign legal documents.  ? Take care of children on your own.  · Rest.  Eating and drinking  · Follow the diet recommended by your health care provider.  · If you vomit:  ? Drink water, juice, or soup when you can drink without vomiting.  ? Make sure you have little or no nausea before eating solid foods.  General instructions  · Have a responsible adult stay with you until you are  awake and alert.  · Take over-the-counter and prescription medicines only as told by your health care provider.  · If you smoke, do not smoke without supervision.  · Keep all follow-up visits as told by your health care provider. This is important.  Contact a health care provider if:  · You keep feeling nauseous or you keep vomiting.  · You feel light-headed.  · You develop a rash.  · You have a fever.  Get help right away if:  · You have trouble breathing.  This information is not intended to replace advice given to you by your health care provider. Make sure you discuss any questions you have with your health care provider.  Document Released: 10/08/2014 Document Revised: 11/30/2018 Document Reviewed: 04/08/2017  [employer] Patient Education © 2020 [employer]

## 2022-02-04 NOTE — OR SURGEON
Immediate Post- Operative Note        Findings: Elevated LFTs      Procedure(s): Ultrasound guided Liver Biopsy      Estimated Blood Loss: Less than 5 ml    Complications: None      2/4/2022     10:39 AM     Joon Benjamin M.D.

## 2022-02-06 ENCOUNTER — HOSPITAL ENCOUNTER (OUTPATIENT)
Facility: MEDICAL CENTER | Age: 52
End: 2022-02-07
Attending: EMERGENCY MEDICINE | Admitting: STUDENT IN AN ORGANIZED HEALTH CARE EDUCATION/TRAINING PROGRAM
Payer: COMMERCIAL

## 2022-02-06 ENCOUNTER — APPOINTMENT (OUTPATIENT)
Dept: RADIOLOGY | Facility: MEDICAL CENTER | Age: 52
End: 2022-02-06
Attending: EMERGENCY MEDICINE
Payer: COMMERCIAL

## 2022-02-06 DIAGNOSIS — K85.90 ACUTE PANCREATITIS WITHOUT INFECTION OR NECROSIS, UNSPECIFIED PANCREATITIS TYPE: ICD-10-CM

## 2022-02-06 PROBLEM — K80.50 CHOLEDOCHOLITHIASIS: Status: ACTIVE | Noted: 2022-02-06

## 2022-02-06 PROBLEM — F39 MOOD DISORDER (HCC): Status: ACTIVE | Noted: 2022-02-06

## 2022-02-06 PROBLEM — R79.89 ELEVATED LFTS: Status: ACTIVE | Noted: 2022-02-06

## 2022-02-06 PROBLEM — K76.0 NAFLD (NONALCOHOLIC FATTY LIVER DISEASE): Status: ACTIVE | Noted: 2022-02-06

## 2022-02-06 LAB
ALBUMIN SERPL BCP-MCNC: 4.1 G/DL (ref 3.2–4.9)
ALBUMIN/GLOB SERPL: 1.3 G/DL
ALP SERPL-CCNC: 137 U/L (ref 30–99)
ALT SERPL-CCNC: 159 U/L (ref 2–50)
ANION GAP SERPL CALC-SCNC: 15 MMOL/L (ref 7–16)
APPEARANCE UR: CLEAR
AST SERPL-CCNC: 98 U/L (ref 12–45)
BASOPHILS # BLD AUTO: 0.2 % (ref 0–1.8)
BASOPHILS # BLD: 0.02 K/UL (ref 0–0.12)
BILIRUB SERPL-MCNC: 3.7 MG/DL (ref 0.1–1.5)
BILIRUB UR QL STRIP.AUTO: ABNORMAL
BUN SERPL-MCNC: 5 MG/DL (ref 8–22)
CALCIUM SERPL-MCNC: 9.3 MG/DL (ref 8.5–10.5)
CHLORIDE SERPL-SCNC: 100 MMOL/L (ref 96–112)
CO2 SERPL-SCNC: 22 MMOL/L (ref 20–33)
COLOR UR: YELLOW
CREAT SERPL-MCNC: 0.54 MG/DL (ref 0.5–1.4)
EOSINOPHIL # BLD AUTO: 0.05 K/UL (ref 0–0.51)
EOSINOPHIL NFR BLD: 0.6 % (ref 0–6.9)
ERYTHROCYTE [DISTWIDTH] IN BLOOD BY AUTOMATED COUNT: 43.5 FL (ref 35.9–50)
GLOBULIN SER CALC-MCNC: 3.2 G/DL (ref 1.9–3.5)
GLUCOSE SERPL-MCNC: 109 MG/DL (ref 65–99)
GLUCOSE UR STRIP.AUTO-MCNC: NEGATIVE MG/DL
HCT VFR BLD AUTO: 42.2 % (ref 37–47)
HGB BLD-MCNC: 14.2 G/DL (ref 12–16)
IMM GRANULOCYTES # BLD AUTO: 0.04 K/UL (ref 0–0.11)
IMM GRANULOCYTES NFR BLD AUTO: 0.5 % (ref 0–0.9)
INR PPP: 0.99 (ref 0.87–1.13)
KETONES UR STRIP.AUTO-MCNC: 40 MG/DL
LEUKOCYTE ESTERASE UR QL STRIP.AUTO: NEGATIVE
LIPASE SERPL-CCNC: 195 U/L (ref 11–82)
LYMPHOCYTES # BLD AUTO: 1.39 K/UL (ref 1–4.8)
LYMPHOCYTES NFR BLD: 16.3 % (ref 22–41)
MCH RBC QN AUTO: 28 PG (ref 27–33)
MCHC RBC AUTO-ENTMCNC: 33.6 G/DL (ref 33.6–35)
MCV RBC AUTO: 83.2 FL (ref 81.4–97.8)
MICRO URNS: ABNORMAL
MONOCYTES # BLD AUTO: 0.41 K/UL (ref 0–0.85)
MONOCYTES NFR BLD AUTO: 4.8 % (ref 0–13.4)
NEUTROPHILS # BLD AUTO: 6.61 K/UL (ref 2–7.15)
NEUTROPHILS NFR BLD: 77.6 % (ref 44–72)
NITRITE UR QL STRIP.AUTO: NEGATIVE
NRBC # BLD AUTO: 0 K/UL
NRBC BLD-RTO: 0 /100 WBC
PH UR STRIP.AUTO: 6.5 [PH] (ref 5–8)
PLATELET # BLD AUTO: 167 K/UL (ref 164–446)
PMV BLD AUTO: 9.6 FL (ref 9–12.9)
POTASSIUM SERPL-SCNC: 3.6 MMOL/L (ref 3.6–5.5)
PROT SERPL-MCNC: 7.3 G/DL (ref 6–8.2)
PROT UR QL STRIP: NEGATIVE MG/DL
PROTHROMBIN TIME: 12.8 SEC (ref 12–14.6)
RBC # BLD AUTO: 5.07 M/UL (ref 4.2–5.4)
RBC UR QL AUTO: NEGATIVE
SODIUM SERPL-SCNC: 137 MMOL/L (ref 135–145)
SP GR UR STRIP.AUTO: 1.01
UROBILINOGEN UR STRIP.AUTO-MCNC: 1 MG/DL
WBC # BLD AUTO: 8.5 K/UL (ref 4.8–10.8)

## 2022-02-06 PROCEDURE — 700117 HCHG RX CONTRAST REV CODE 255: Performed by: EMERGENCY MEDICINE

## 2022-02-06 PROCEDURE — G0378 HOSPITAL OBSERVATION PER HR: HCPCS

## 2022-02-06 PROCEDURE — 36415 COLL VENOUS BLD VENIPUNCTURE: CPT

## 2022-02-06 PROCEDURE — 74177 CT ABD & PELVIS W/CONTRAST: CPT

## 2022-02-06 PROCEDURE — 80053 COMPREHEN METABOLIC PANEL: CPT

## 2022-02-06 PROCEDURE — 85025 COMPLETE CBC W/AUTO DIFF WBC: CPT

## 2022-02-06 PROCEDURE — 76705 ECHO EXAM OF ABDOMEN: CPT

## 2022-02-06 PROCEDURE — 99220 PR INITIAL OBSERVATION CARE,LEVL III: CPT | Performed by: STUDENT IN AN ORGANIZED HEALTH CARE EDUCATION/TRAINING PROGRAM

## 2022-02-06 PROCEDURE — 85610 PROTHROMBIN TIME: CPT

## 2022-02-06 PROCEDURE — 81003 URINALYSIS AUTO W/O SCOPE: CPT

## 2022-02-06 PROCEDURE — 99285 EMERGENCY DEPT VISIT HI MDM: CPT

## 2022-02-06 PROCEDURE — 83690 ASSAY OF LIPASE: CPT

## 2022-02-06 RX ORDER — OXYCODONE HYDROCHLORIDE 5 MG/1
5 TABLET ORAL
Status: DISCONTINUED | OUTPATIENT
Start: 2022-02-06 | End: 2022-02-07 | Stop reason: HOSPADM

## 2022-02-06 RX ORDER — MORPHINE SULFATE 4 MG/ML
4 INJECTION INTRAVENOUS
Status: DISCONTINUED | OUTPATIENT
Start: 2022-02-06 | End: 2022-02-07 | Stop reason: HOSPADM

## 2022-02-06 RX ORDER — BISACODYL 10 MG
10 SUPPOSITORY, RECTAL RECTAL
Status: DISCONTINUED | OUTPATIENT
Start: 2022-02-06 | End: 2022-02-07 | Stop reason: HOSPADM

## 2022-02-06 RX ORDER — ONDANSETRON 2 MG/ML
4 INJECTION INTRAMUSCULAR; INTRAVENOUS ONCE
Status: DISCONTINUED | OUTPATIENT
Start: 2022-02-06 | End: 2022-02-07 | Stop reason: HOSPADM

## 2022-02-06 RX ORDER — AMOXICILLIN 250 MG
2 CAPSULE ORAL 2 TIMES DAILY
Status: DISCONTINUED | OUTPATIENT
Start: 2022-02-06 | End: 2022-02-07 | Stop reason: HOSPADM

## 2022-02-06 RX ORDER — PROCHLORPERAZINE EDISYLATE 5 MG/ML
5-10 INJECTION INTRAMUSCULAR; INTRAVENOUS EVERY 4 HOURS PRN
Status: DISCONTINUED | OUTPATIENT
Start: 2022-02-06 | End: 2022-02-07 | Stop reason: HOSPADM

## 2022-02-06 RX ORDER — OXYCODONE HYDROCHLORIDE 5 MG/1
10 TABLET ORAL
Status: DISCONTINUED | OUTPATIENT
Start: 2022-02-06 | End: 2022-02-07 | Stop reason: HOSPADM

## 2022-02-06 RX ORDER — GUAIFENESIN/DEXTROMETHORPHAN 100-10MG/5
10 SYRUP ORAL EVERY 6 HOURS PRN
Status: DISCONTINUED | OUTPATIENT
Start: 2022-02-06 | End: 2022-02-07 | Stop reason: HOSPADM

## 2022-02-06 RX ORDER — LORAZEPAM 1 MG/1
0.5 TABLET ORAL EVERY 8 HOURS PRN
Status: DISCONTINUED | OUTPATIENT
Start: 2022-02-06 | End: 2022-02-07 | Stop reason: HOSPADM

## 2022-02-06 RX ORDER — VITAMIN B COMPLEX
1000 TABLET ORAL DAILY
Status: DISCONTINUED | OUTPATIENT
Start: 2022-02-07 | End: 2022-02-07 | Stop reason: HOSPADM

## 2022-02-06 RX ORDER — PROMETHAZINE HYDROCHLORIDE 25 MG/1
12.5-25 SUPPOSITORY RECTAL EVERY 4 HOURS PRN
Status: DISCONTINUED | OUTPATIENT
Start: 2022-02-06 | End: 2022-02-07 | Stop reason: HOSPADM

## 2022-02-06 RX ORDER — BUPROPION HYDROCHLORIDE 150 MG/1
150 TABLET, EXTENDED RELEASE ORAL 2 TIMES DAILY
Status: DISCONTINUED | OUTPATIENT
Start: 2022-02-07 | End: 2022-02-07 | Stop reason: HOSPADM

## 2022-02-06 RX ORDER — POLYETHYLENE GLYCOL 3350 17 G/17G
1 POWDER, FOR SOLUTION ORAL
Status: DISCONTINUED | OUTPATIENT
Start: 2022-02-06 | End: 2022-02-07 | Stop reason: HOSPADM

## 2022-02-06 RX ORDER — SODIUM CHLORIDE 9 MG/ML
INJECTION, SOLUTION INTRAVENOUS CONTINUOUS
Status: ACTIVE | OUTPATIENT
Start: 2022-02-06 | End: 2022-02-07

## 2022-02-06 RX ORDER — IBUPROFEN 200 MG
600 TABLET ORAL PRN
Status: SHIPPED | COMMUNITY
End: 2022-06-14

## 2022-02-06 RX ORDER — LISINOPRIL 20 MG/1
20 TABLET ORAL DAILY
Status: DISCONTINUED | OUTPATIENT
Start: 2022-02-07 | End: 2022-02-07 | Stop reason: HOSPADM

## 2022-02-06 RX ORDER — LABETALOL HYDROCHLORIDE 5 MG/ML
10 INJECTION, SOLUTION INTRAVENOUS EVERY 4 HOURS PRN
Status: DISCONTINUED | OUTPATIENT
Start: 2022-02-06 | End: 2022-02-07 | Stop reason: HOSPADM

## 2022-02-06 RX ORDER — SODIUM CHLORIDE, SODIUM LACTATE, POTASSIUM CHLORIDE, AND CALCIUM CHLORIDE .6; .31; .03; .02 G/100ML; G/100ML; G/100ML; G/100ML
500 INJECTION, SOLUTION INTRAVENOUS
Status: DISCONTINUED | OUTPATIENT
Start: 2022-02-06 | End: 2022-02-07 | Stop reason: HOSPADM

## 2022-02-06 RX ORDER — HEPARIN SODIUM 5000 [USP'U]/ML
5000 INJECTION, SOLUTION INTRAVENOUS; SUBCUTANEOUS EVERY 8 HOURS
Status: DISCONTINUED | OUTPATIENT
Start: 2022-02-06 | End: 2022-02-07 | Stop reason: HOSPADM

## 2022-02-06 RX ORDER — MORPHINE SULFATE 4 MG/ML
4 INJECTION INTRAVENOUS ONCE
Status: DISCONTINUED | OUTPATIENT
Start: 2022-02-06 | End: 2022-02-07 | Stop reason: HOSPADM

## 2022-02-06 RX ORDER — LORAZEPAM 2 MG/1
2 TABLET ORAL
Status: COMPLETED | OUTPATIENT
Start: 2022-02-06 | End: 2022-02-07

## 2022-02-06 RX ORDER — ONDANSETRON 2 MG/ML
4 INJECTION INTRAMUSCULAR; INTRAVENOUS EVERY 4 HOURS PRN
Status: DISCONTINUED | OUTPATIENT
Start: 2022-02-06 | End: 2022-02-07 | Stop reason: HOSPADM

## 2022-02-06 RX ORDER — ASPIRIN 81 MG/1
81 TABLET, CHEWABLE ORAL DAILY
Status: DISCONTINUED | OUTPATIENT
Start: 2022-02-07 | End: 2022-02-07 | Stop reason: HOSPADM

## 2022-02-06 RX ORDER — ACETAMINOPHEN 500 MG
1500 TABLET ORAL PRN
Status: ON HOLD | COMMUNITY
End: 2022-06-24

## 2022-02-06 RX ORDER — PROMETHAZINE HYDROCHLORIDE 25 MG/1
12.5-25 TABLET ORAL EVERY 4 HOURS PRN
Status: DISCONTINUED | OUTPATIENT
Start: 2022-02-06 | End: 2022-02-07 | Stop reason: HOSPADM

## 2022-02-06 RX ORDER — ESCITALOPRAM OXALATE 10 MG/1
40 TABLET ORAL DAILY
Status: DISCONTINUED | OUTPATIENT
Start: 2022-02-07 | End: 2022-02-07 | Stop reason: HOSPADM

## 2022-02-06 RX ORDER — OMEPRAZOLE 20 MG/1
20 CAPSULE, DELAYED RELEASE ORAL DAILY
Status: DISCONTINUED | OUTPATIENT
Start: 2022-02-07 | End: 2022-02-07 | Stop reason: HOSPADM

## 2022-02-06 RX ORDER — ONDANSETRON 4 MG/1
4 TABLET, ORALLY DISINTEGRATING ORAL EVERY 4 HOURS PRN
Status: DISCONTINUED | OUTPATIENT
Start: 2022-02-06 | End: 2022-02-07 | Stop reason: HOSPADM

## 2022-02-06 RX ADMIN — IOHEXOL 100 ML: 350 INJECTION, SOLUTION INTRAVENOUS at 20:58

## 2022-02-06 ASSESSMENT — ENCOUNTER SYMPTOMS
CONSTIPATION: 0
FEVER: 0
WHEEZING: 0
FALLS: 0
HEARTBURN: 1
FLANK PAIN: 0
BRUISES/BLEEDS EASILY: 0
NAUSEA: 1
COUGH: 0
VOMITING: 1
MYALGIAS: 0
PALPITATIONS: 0
DIZZINESS: 0
CHILLS: 1
FOCAL WEAKNESS: 0
DEPRESSION: 0
BLURRED VISION: 0
DOUBLE VISION: 0
HEADACHES: 0
ABDOMINAL PAIN: 1
SPEECH CHANGE: 0
BLOOD IN STOOL: 0

## 2022-02-06 ASSESSMENT — LIFESTYLE VARIABLES
SUBSTANCE_ABUSE: 0
DO YOU DRINK ALCOHOL: NO

## 2022-02-06 ASSESSMENT — FIBROSIS 4 INDEX: FIB4 SCORE: 3.14

## 2022-02-07 ENCOUNTER — APPOINTMENT (OUTPATIENT)
Dept: RADIOLOGY | Facility: MEDICAL CENTER | Age: 52
End: 2022-02-07
Attending: STUDENT IN AN ORGANIZED HEALTH CARE EDUCATION/TRAINING PROGRAM
Payer: COMMERCIAL

## 2022-02-07 VITALS
BODY MASS INDEX: 28.37 KG/M2 | SYSTOLIC BLOOD PRESSURE: 138 MMHG | HEIGHT: 67 IN | WEIGHT: 180.78 LBS | OXYGEN SATURATION: 97 % | HEART RATE: 100 BPM | RESPIRATION RATE: 14 BRPM | TEMPERATURE: 97.8 F | DIASTOLIC BLOOD PRESSURE: 84 MMHG

## 2022-02-07 LAB
ALBUMIN SERPL BCP-MCNC: 4.1 G/DL (ref 3.2–4.9)
ALBUMIN/GLOB SERPL: 1.3 G/DL
ALP SERPL-CCNC: 142 U/L (ref 30–99)
ALT SERPL-CCNC: 135 U/L (ref 2–50)
ANION GAP SERPL CALC-SCNC: 18 MMOL/L (ref 7–16)
AST SERPL-CCNC: 79 U/L (ref 12–45)
BASOPHILS # BLD AUTO: 0.3 % (ref 0–1.8)
BASOPHILS # BLD: 0.03 K/UL (ref 0–0.12)
BILIRUB CONJ SERPL-MCNC: 2.5 MG/DL (ref 0.1–0.5)
BILIRUB INDIRECT SERPL-MCNC: 0.9 MG/DL (ref 0–1)
BILIRUB SERPL-MCNC: 3.4 MG/DL (ref 0.1–1.5)
BUN SERPL-MCNC: 7 MG/DL (ref 8–22)
CALCIUM SERPL-MCNC: 8.9 MG/DL (ref 8.5–10.5)
CHLORIDE SERPL-SCNC: 97 MMOL/L (ref 96–112)
CO2 SERPL-SCNC: 19 MMOL/L (ref 20–33)
CREAT SERPL-MCNC: 0.58 MG/DL (ref 0.5–1.4)
EOSINOPHIL # BLD AUTO: 0.06 K/UL (ref 0–0.51)
EOSINOPHIL NFR BLD: 0.6 % (ref 0–6.9)
ERYTHROCYTE [DISTWIDTH] IN BLOOD BY AUTOMATED COUNT: 44 FL (ref 35.9–50)
GLOBULIN SER CALC-MCNC: 3.2 G/DL (ref 1.9–3.5)
GLUCOSE SERPL-MCNC: 117 MG/DL (ref 65–99)
HAV IGM SERPL QL IA: NORMAL
HBV CORE IGM SER QL: NORMAL
HBV SURFACE AG SER QL: NORMAL
HCT VFR BLD AUTO: 39.8 % (ref 37–47)
HCV AB SER QL: NORMAL
HGB BLD-MCNC: 13.2 G/DL (ref 12–16)
IMM GRANULOCYTES # BLD AUTO: 0.09 K/UL (ref 0–0.11)
IMM GRANULOCYTES NFR BLD AUTO: 0.9 % (ref 0–0.9)
LYMPHOCYTES # BLD AUTO: 1.69 K/UL (ref 1–4.8)
LYMPHOCYTES NFR BLD: 16.3 % (ref 22–41)
MAGNESIUM SERPL-MCNC: 1.8 MG/DL (ref 1.5–2.5)
MCH RBC QN AUTO: 27.4 PG (ref 27–33)
MCHC RBC AUTO-ENTMCNC: 33.2 G/DL (ref 33.6–35)
MCV RBC AUTO: 82.6 FL (ref 81.4–97.8)
MONOCYTES # BLD AUTO: 0.62 K/UL (ref 0–0.85)
MONOCYTES NFR BLD AUTO: 6 % (ref 0–13.4)
NEUTROPHILS # BLD AUTO: 7.89 K/UL (ref 2–7.15)
NEUTROPHILS NFR BLD: 75.9 % (ref 44–72)
NRBC # BLD AUTO: 0 K/UL
NRBC BLD-RTO: 0 /100 WBC
PHOSPHATE SERPL-MCNC: 3.3 MG/DL (ref 2.5–4.5)
PLATELET # BLD AUTO: 187 K/UL (ref 164–446)
PMV BLD AUTO: 10 FL (ref 9–12.9)
POTASSIUM SERPL-SCNC: 3.6 MMOL/L (ref 3.6–5.5)
PROT SERPL-MCNC: 7.3 G/DL (ref 6–8.2)
RBC # BLD AUTO: 4.82 M/UL (ref 4.2–5.4)
SODIUM SERPL-SCNC: 134 MMOL/L (ref 135–145)
TRIGL SERPL-MCNC: 72 MG/DL (ref 0–149)
WBC # BLD AUTO: 10.4 K/UL (ref 4.8–10.8)

## 2022-02-07 PROCEDURE — 700105 HCHG RX REV CODE 258: Performed by: STUDENT IN AN ORGANIZED HEALTH CARE EDUCATION/TRAINING PROGRAM

## 2022-02-07 PROCEDURE — 86381 MITOCHONDRIAL ANTIBODY EACH: CPT

## 2022-02-07 PROCEDURE — 80053 COMPREHEN METABOLIC PANEL: CPT

## 2022-02-07 PROCEDURE — 84100 ASSAY OF PHOSPHORUS: CPT

## 2022-02-07 PROCEDURE — 700102 HCHG RX REV CODE 250 W/ 637 OVERRIDE(OP): Performed by: HOSPITALIST

## 2022-02-07 PROCEDURE — 83735 ASSAY OF MAGNESIUM: CPT

## 2022-02-07 PROCEDURE — 96365 THER/PROPH/DIAG IV INF INIT: CPT

## 2022-02-07 PROCEDURE — 700111 HCHG RX REV CODE 636 W/ 250 OVERRIDE (IP): Performed by: HOSPITALIST

## 2022-02-07 PROCEDURE — 85025 COMPLETE CBC W/AUTO DIFF WBC: CPT

## 2022-02-07 PROCEDURE — 82787 IGG 1 2 3 OR 4 EACH: CPT | Mod: 91

## 2022-02-07 PROCEDURE — A9270 NON-COVERED ITEM OR SERVICE: HCPCS | Performed by: STUDENT IN AN ORGANIZED HEALTH CARE EDUCATION/TRAINING PROGRAM

## 2022-02-07 PROCEDURE — 84478 ASSAY OF TRIGLYCERIDES: CPT

## 2022-02-07 PROCEDURE — 80074 ACUTE HEPATITIS PANEL: CPT

## 2022-02-07 PROCEDURE — 74181 MRI ABDOMEN W/O CONTRAST: CPT

## 2022-02-07 PROCEDURE — 99217 PR OBSERVATION CARE DISCHARGE: CPT | Performed by: HOSPITALIST

## 2022-02-07 PROCEDURE — A9270 NON-COVERED ITEM OR SERVICE: HCPCS | Performed by: HOSPITALIST

## 2022-02-07 PROCEDURE — 86015 ACTIN ANTIBODY EACH: CPT

## 2022-02-07 PROCEDURE — 82248 BILIRUBIN DIRECT: CPT

## 2022-02-07 PROCEDURE — 700102 HCHG RX REV CODE 250 W/ 637 OVERRIDE(OP): Performed by: STUDENT IN AN ORGANIZED HEALTH CARE EDUCATION/TRAINING PROGRAM

## 2022-02-07 PROCEDURE — G0378 HOSPITAL OBSERVATION PER HR: HCPCS

## 2022-02-07 PROCEDURE — 86038 ANTINUCLEAR ANTIBODIES: CPT

## 2022-02-07 RX ORDER — ONDANSETRON 4 MG/1
4 TABLET, ORALLY DISINTEGRATING ORAL EVERY 6 HOURS PRN
Qty: 30 TABLET | Refills: 0 | Status: SHIPPED | OUTPATIENT
Start: 2022-02-07 | End: 2022-06-14

## 2022-02-07 RX ORDER — MAGNESIUM SULFATE 1 G/100ML
1 INJECTION INTRAVENOUS ONCE
Status: COMPLETED | OUTPATIENT
Start: 2022-02-07 | End: 2022-02-07

## 2022-02-07 RX ORDER — POTASSIUM CHLORIDE 20 MEQ/1
40 TABLET, EXTENDED RELEASE ORAL ONCE
Status: COMPLETED | OUTPATIENT
Start: 2022-02-07 | End: 2022-02-07

## 2022-02-07 RX ADMIN — ESCITALOPRAM OXALATE 40 MG: 10 TABLET ORAL at 05:18

## 2022-02-07 RX ADMIN — Medication 1000 UNITS: at 05:19

## 2022-02-07 RX ADMIN — ASPIRIN 81 MG: 81 TABLET, CHEWABLE ORAL at 05:18

## 2022-02-07 RX ADMIN — OMEPRAZOLE 20 MG: 20 CAPSULE, DELAYED RELEASE ORAL at 05:18

## 2022-02-07 RX ADMIN — BUPROPION HYDROCHLORIDE 150 MG: 150 TABLET, EXTENDED RELEASE ORAL at 05:19

## 2022-02-07 RX ADMIN — MAGNESIUM SULFATE HEPTAHYDRATE 1 G: 1 INJECTION, SOLUTION INTRAVENOUS at 09:21

## 2022-02-07 RX ADMIN — LISINOPRIL 20 MG: 20 TABLET ORAL at 05:19

## 2022-02-07 RX ADMIN — POTASSIUM CHLORIDE 40 MEQ: 1500 TABLET, EXTENDED RELEASE ORAL at 09:20

## 2022-02-07 RX ADMIN — SODIUM CHLORIDE: 9 INJECTION, SOLUTION INTRAVENOUS at 01:45

## 2022-02-07 RX ADMIN — LORAZEPAM 2 MG: 2 TABLET ORAL at 00:35

## 2022-02-07 ASSESSMENT — ENCOUNTER SYMPTOMS
CARDIOVASCULAR NEGATIVE: 1
NAUSEA: 1
RESPIRATORY NEGATIVE: 1
ABDOMINAL PAIN: 1
PSYCHIATRIC NEGATIVE: 1
CHILLS: 1
EYES NEGATIVE: 1
NEUROLOGICAL NEGATIVE: 1
VOMITING: 1
FEVER: 1
MUSCULOSKELETAL NEGATIVE: 1

## 2022-02-07 NOTE — H&P
Hospital Medicine History & Physical Note    Date of Service  2/6/2022    Primary Care Physician  Eren Heath M.D.    Consultants  GI (Dr. Swan)    Code Status  Full Code    Chief Complaint  Chief Complaint   Patient presents with   • Fever   • Chills   • Body Aches   • N/V   • Abdominal Pain   • Post-Op Complications     s/p liver biposy 2/4/2022       History of Presenting Illness  Mónica Keita is a 52 y.o. female with history of hypertension, diabetes who presented 2/6/2022 with complaints of abdominal discomfort, nausea.  Patient recent liver biopsy on 2/4/2022.  Denies prior IVDA, blood product transfusion, unsanitary tattoo, FH of autoimmune disorder.  Reported subjective fever and chills with nausea and vomiting.  In ER, she had CTAP noting gallbladder sludge, questionable pancreatitis and hepatic steatosis.  Abdominal ultrasound noted gallbladder sludge but no gallstones.  Elevated LFTs, lipase 195, total bilirubin 3.7.  Case was discussed with GI, recommended MRCP and formal evaluation to follow in a.m.  Admitted to medicine service.    I discussed the plan of care with patient and bedside RN.    Review of Systems  Review of Systems   Constitutional: Positive for chills and malaise/fatigue. Negative for fever.   HENT: Negative for hearing loss and tinnitus.    Eyes: Negative for blurred vision and double vision.   Respiratory: Negative for cough and wheezing.    Cardiovascular: Negative for chest pain, palpitations and leg swelling.   Gastrointestinal: Positive for abdominal pain, heartburn, nausea and vomiting. Negative for blood in stool and constipation.   Genitourinary: Negative for dysuria and flank pain.   Musculoskeletal: Negative for falls and myalgias.   Skin: Negative for itching and rash.   Neurological: Negative for dizziness, speech change, focal weakness and headaches.   Endo/Heme/Allergies: Negative for environmental allergies. Does not bruise/bleed easily.    Psychiatric/Behavioral: Negative for depression and substance abuse.   All other systems reviewed and are negative.      Past Medical History   has a past medical history of Adverse effect of anesthesia, Anemia, Anxiety, Bronchitis (), Diabetes (HCC), Elevated liver enzymes, Hypertension, Neuroendocrine tumor (2021), and Pneumonia ().    Surgical History   has a past surgical history that includes hysterectomy, vaginal (2014); pelvic exam under anesthesia (2014); vaginal hysterectomy total (2014); appendectomy (); pr upper gi endoscopy,diagnosis (N/A, 2021); egd w/endoscopic ultrasound (N/A, 2021); and craniotomy.     Family History  family history includes Heart Attack in her brother; Heart Disease in her father; No Known Problems in her daughter, sister, and son.   Grandmother  of unknown liver disease in her late 50s    Social History   reports that she has never smoked. She has never used smokeless tobacco. She reports that she does not drink alcohol and does not use drugs.    Allergies  No Known Allergies    Medications  Prior to Admission Medications   Prescriptions Last Dose Informant Patient Reported? Taking?   Biotin 5000 MCG Cap   Yes No   Sig: Take  by mouth.   Cholecalciferol (VITAMIN D3) 5000 UNIT/0.5ML Liquid   Yes No   Sig: Take 1 Tab by mouth every day.   Coenzyme Q10 (COQ10 PO)   Yes No   Sig: Take 1 Tab by mouth every day.   LORazepam (ATIVAN) 0.5 MG Tab   Yes No   Sig: every 8 hours as needed.   aspirin (ASA) 81 MG Chew Tab chewable tablet 2/3/2022  Yes No   Sig: Take 81 mg by mouth every day.   buPROPion (WELLBUTRIN XL) 300 MG XL tablet   Yes No   Sig: every morning.   escitalopram (LEXAPRO) 20 MG tablet   Yes No   Si mg.   lisinopril (PRINIVIL) 20 MG Tab   No No   Sig: Take 1 Tablet by mouth every day.   metFORMIN (GLUCOPHAGE) 500 MG Tab   Yes No   Sig: Take 500 mg by mouth 2 times a day with meals.   omeprazole (PRILOSEC) 20 MG delayed-release  capsule   No No   Sig: Take 1 capsule by mouth every day.      Facility-Administered Medications: None       Physical Exam  Temp:  [36.3 °C (97.4 °F)] 36.3 °C (97.4 °F)  Pulse:  [51-95] 95  Resp:  [16-18] 16  BP: (144-171)/() 154/91  SpO2:  [96 %-100 %] 99 %  Blood Pressure: 154/91   Temperature: 36.3 °C (97.4 °F)   Pulse: 95   Respiration: 16   Pulse Oximetry: 99 %       Physical Exam  Vitals and nursing note reviewed.   Constitutional:       Appearance: She is obese.   HENT:      Head: Normocephalic and atraumatic.      Nose: Nose normal.      Mouth/Throat:      Mouth: Mucous membranes are dry.      Pharynx: Oropharynx is clear.   Eyes:      General: No scleral icterus.     Extraocular Movements: Extraocular movements intact.   Cardiovascular:      Rate and Rhythm: Normal rate and regular rhythm.      Pulses: Normal pulses.   Pulmonary:      Effort: Pulmonary effort is normal. No respiratory distress.      Breath sounds: No stridor. No wheezing.   Abdominal:      Palpations: Abdomen is soft.      Comments: Generalized tenderness to deep palpation but otherwise nontender  Negative Murdock sign  Soft, nondistended, no guarding, no rebound, BS present   Musculoskeletal:         General: No swelling or tenderness. Normal range of motion.      Cervical back: Neck supple. No tenderness.   Skin:     General: Skin is warm and dry.      Capillary Refill: Capillary refill takes less than 2 seconds.   Neurological:      General: No focal deficit present.      Mental Status: She is alert and oriented to person, place, and time.      Motor: No weakness.   Psychiatric:         Mood and Affect: Mood normal.         Laboratory:  Recent Labs     02/04/22  0849 02/06/22  1631   WBC 5.4 8.5   RBC 5.33 5.07   HEMOGLOBIN 14.8 14.2   HEMATOCRIT 44.1 42.2   MCV 82.7 83.2   MCH 27.8 28.0   MCHC 33.6 33.6   RDW 42.4 43.5   PLATELETCT 171 167   MPV 9.7 9.6     Recent Labs     02/06/22  1631   SODIUM 137   POTASSIUM 3.6   CHLORIDE 100    CO2 22   GLUCOSE 109*   BUN 5*   CREATININE 0.54   CALCIUM 9.3     Recent Labs     02/06/22  1631   ALTSGPT 159*   ASTSGOT 98*   ALKPHOSPHAT 137*   TBILIRUBIN 3.7*   LIPASE 195*   GLUCOSE 109*     Recent Labs     02/04/22  0849 02/06/22  1631   INR 0.92 0.99     No results for input(s): NTPROBNP in the last 72 hours.      No results for input(s): TROPONINT in the last 72 hours.    Imaging:  CT-ABDOMEN-PELVIS WITH   Final Result         1.  Slight hazy fat and adjacent to the tail of pancreas, appearance suggests pancreatitis.   2.  Hepatomegaly and diffuse hepatic steatosis   3.  Dependent gallbladder sludge or poorly calcified stone      US-RUQ   Final Result      1.  Sludge is noted in the gallbladder. No gallstones.      2.  Increased echogenicity and enlargement of the liver could be due to hepatic steatosis.      [id number]-H/O    (Results Pending)       no X-Ray or EKG requiring interpretation    Assessment/Plan:  I anticipate this patient is appropriate for observation status at this time.    * Choledocholithiasis- (present on admission)  Assessment & Plan  Total bilirubin 3.7, elevated LFT, lipase 195  ?Gallstone pancreatitis, pancreatic divisum    IVF, anti-emetic  Advance diet as tolerated  No abx as low suspicion for cholecystitis/ascending cholangitis - afebrile, no leukocytosis    F/u MRCP  Formal GI evaluation to follow in AM    Pancreatitis  Assessment & Plan  Questionable pancreatitis noted on CTAP  Lipase 195  ?gallstone pancreatitis    IVF  Advance diet as tolerated    NAFLD (nonalcoholic fatty liver disease)  Assessment & Plan  Probable NAFLD  H/o DM, HTN  Recent liver biopsy 2/4/2022, path result pending    Doubt VALENTIN as no cirrhosis seen on CT AP or abd US    Elevated LFTs  Assessment & Plan  As above    Hypertension- (present on admission)  Assessment & Plan  ACEI    Mood disorder (HCC)  Assessment & Plan  SSRI    Gastroesophageal reflux disease- (present on admission)  Assessment &  Plan  PPI    DM (diabetes mellitus) (HCC)  Assessment & Plan  Continue home metformin    Hemochromatosis- (present on admission)  Assessment & Plan  Per history    Anxiety- (present on admission)  Assessment & Plan  Cont home PRN ativan      VTE prophylaxis: heparin ppx

## 2022-02-07 NOTE — ED TRIAGE NOTES
Patient to Ed with complaints of   Chief Complaint   Patient presents with   • Fever   • Chills   • Body Aches   • N/V   • Abdominal Pain   • Post-Op Complications     s/p liver biposy 2/4/2022     Symptoms started on Saturday she had a liver biopsy Friday. Biopsy site without redness of drainage.     Pt educated on ED process and asked to wait in lobby. Patient educated on importance of alerting staff to new or worsening symptoms or concerns.

## 2022-02-07 NOTE — ED NOTES
Pt aware of plan of care. Medicated per MAR. Awaiting breakfast.  If tolerates clear liquid diet can be d/c'd.     GI rounded on pt.

## 2022-02-07 NOTE — ASSESSMENT & PLAN NOTE
Questionable pancreatitis noted on CTAP  Lipase 195  ?gallstone pancreatitis    IVF  Advance diet as tolerated

## 2022-02-07 NOTE — ED NOTES
US completed at bedside.  Patient refused medication as ordered by ERP, see MAR.  Patient educated on need for urine collection, verbalized understanding.

## 2022-02-07 NOTE — ASSESSMENT & PLAN NOTE
Probable NAFLD  H/o DM, HTN  Recent liver biopsy 2/4/2022, path result pending    Doubt VALENTIN as no cirrhosis seen on CT AP or abd US

## 2022-02-07 NOTE — CONSULTS
"Gastroenterology Consult Note:    ANDI Ledesma  Date & Time note created:    2/7/2022   9:37 AM     Referring MD:  Dr. Robert Edwards    Patient ID:  Name:             Mónica Keita   YOB: 1970  Age:                 52 y.o.  female   MRN:               [id number]                                                             Reason for Consult:      Abdominal pain  N/V  Fever  Pancreatitis    History of Present Illness:    This is a 51 yo female PMH, elevated LFT's (s/p liver biopsy 2/4/22), HTN, [id number] who presented to the ER 2/6/22 with complaints of subjective fevers (T: 100 F), N/V, abdominal pain after having a liver biopsy 2/4/22. Labs: TB: 3.7. AST: 98. ALT: 159. ALP: 137. Lipase: 195. Hepatitis panel: non reactive. Denies starting any new medications, alcohol abuse.    Abdominal US 2/6/22: Sludge is noted in the gallbladder. No gallstones.       Increased echogenicity and enlargement of the liver could be due to hepatic steatosis.    CT abdomen/pelvis 2/6/22: Slight hazy fat and adjacent to the tail of pancreas, appearance suggests pancreatitis.  2.  Hepatomegaly and diffuse hepatic steatosis      3.  Dependent gallbladder sludge or poorly calcified stone    MRCP 2/7/22: Sludge is noted in the gallbladder.   2.  No biliary ductal dilatation. No filling defects or strictures identified.   3.  No pancreatic ductal dilatation is identified.       4.  Findings are consistent with pancreatitis involving the tail of the pancreas.    Currently, abdominal pain has improved. Describes the pain as \"sore.\" No N/V. LFTs slowly trending down.    Review of Systems:      Review of Systems   Constitutional: Positive for chills, fever and malaise/fatigue.   HENT: Negative.    Eyes: Negative.    Respiratory: Negative.    Cardiovascular: Negative.    Gastrointestinal: Positive for abdominal pain, nausea and vomiting.   Genitourinary: Negative.    Musculoskeletal: Negative.    Skin: Negative.  "   Neurological: Negative.    Psychiatric/Behavioral: Negative.              Physical Exam:  Vitals/ General Appearance:   Weight/BMI: Body mass index is 28.31 kg/m².    Vitals:    02/07/22 0600 02/07/22 0700 02/07/22 0900 02/07/22 0919   BP: (!) 166/88 118/71  136/87   Pulse: 96 (!) 105 98 100   Resp: 16      Temp:       TempSrc:       SpO2: 94% 93% 94% 94%   Weight:       Height:         Oxygen Therapy:  Pulse Oximetry: 94 %, O2 (LPM): 0    Physical Exam  Vitals and nursing note reviewed.   Constitutional:       Appearance: Normal appearance.   HENT:      Head: Normocephalic and atraumatic.      Right Ear: External ear normal.      Left Ear: External ear normal.      Nose: Nose normal.      Mouth/Throat:      Mouth: Mucous membranes are moist.   Eyes:      General: No scleral icterus.     Extraocular Movements: Extraocular movements intact.      Pupils: Pupils are equal, round, and reactive to light.   Cardiovascular:      Rate and Rhythm: Normal rate and regular rhythm.      Pulses: Normal pulses.      Heart sounds: Normal heart sounds.   Pulmonary:      Effort: Pulmonary effort is normal.      Breath sounds: Normal breath sounds.   Abdominal:      General: Bowel sounds are normal.      Palpations: Abdomen is soft.      Tenderness: There is abdominal tenderness (epigastric/RuQ ).   Musculoskeletal:         General: Normal range of motion.      Cervical back: Normal range of motion and neck supple.   Skin:     General: Skin is warm and dry.      Capillary Refill: Capillary refill takes less than 2 seconds.   Neurological:      General: No focal deficit present.      Mental Status: She is alert and oriented to person, place, and time.   Psychiatric:         Mood and Affect: Mood normal.         Behavior: Behavior normal.         Thought Content: Thought content normal.         Judgment: Judgment normal.         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 at SURGERY [facility]   • VAGINAL HYSTERECTOMY TOTAL  6/17/2014    Performed by Enedina Grant M.D. at [facility]   • HYSTERECTOMY, VAGINAL  06/04/2014    [facility]   • APPENDECTOMY  2014   • CRANIOTOMY      neuro-endocrine tumor resected 11/1/2021       Hospital Medications:    Current Facility-Administered Medications:   •  magnesium sulfate in D5W IVPB premix 1 g, 1 g, Intravenous, Once, Robert Edwards M.D., Last Rate: 100 mL/hr at 02/07/22 0921, 1 g at 02/07/22 0921  •  morphine 4 MG/ML injection 4 mg, 4 mg, Intravenous, Once, Lou Carreon M.D.  •  ondansetron (ZOFRAN) syringe/vial injection 4 mg, 4 mg, Intravenous, Once, Lou Carreon M.D.  •  senna-docusate (PERICOLACE or SENOKOT S) 8.6-50 MG per tablet 2 Tablet, 2 Tablet, Oral, BID **AND** polyethylene glycol/lytes (MIRALAX) PACKET 1 Packet, 1 Packet, Oral, QDAY PRN **AND** magnesium hydroxide (MILK OF MAGNESIA) suspension 30 mL, 30 mL, Oral, QDAY PRN **AND** bisacodyl (DULCOLAX) suppository 10 mg, 10 mg, Rectal, QDAY PRN, Jun Mendoza M.D.  •  Respiratory Therapy Consult, , Nebulization, Continuous RT, Jun Mendoza M.D.  •  lactated ringers  infusion (BOLUS), 500 mL, Intravenous, Once PRN, Jun Mendoza M.D.  •  heparin injection 5,000 Units, 5,000 Units, Subcutaneous, Q8HRS, Jun Mendoza M.D.  •  labetalol (NORMODYNE/TRANDATE) injection 10 mg, 10 mg, Intravenous, Q4HRS PRN, Jun Mendoza M.D.  •  ondansetron (ZOFRAN) syringe/vial injection 4 mg, 4 mg, Intravenous, Q4HRS PRN, Jun Mendoza M.D.  •  ondansetron (ZOFRAN ODT) dispertab 4 mg, 4 mg, Oral, Q4HRS PRN, Jun Mendoza M.D.  •  promethazine (PHENERGAN) tablet 12.5-25 mg, 12.5-25 mg, Oral, Q4HRS PRN, Jun Mendoza M.D.  •  promethazine (PHENERGAN) suppository 12.5-25 mg, 12.5-25 mg, Rectal, Q4HRS PRN, Jun Mendoza M.D.  •  prochlorperazine (COMPAZINE) injection 5-10 mg, 5-10 mg, Intravenous, Q4HRS PRN, Jun Mendoza M.D.  •  guaiFENesin dextromethorphan (ROBITUSSIN DM) 100-10 MG/5ML syrup 10 mL, 10 mL, Oral, Q6HRS PRN, Jun Mendoza M.D.  •  aspirin (ASA) chewable tab 81 mg, 81 mg, Oral, DAILY, Jun Mendoza M.D., 81 mg at 02/07/22 0518  •  buPROPion SR (WELLBUTRIN-SR) tablet 150 mg, 150 mg, Oral, BID, Jun Mendoza M.D., 150 mg at 02/07/22 0519  •  vitamin D3 (cholecalciferol) tablet 1,000 Units, 1,000 Units, Oral, DAILY, Jun Mendoza M.D., 1,000 Units at 02/07/22 0519  •  escitalopram (Lexapro) tablet 40 mg, 40 mg, Oral, DAILY, Jun Mendoza M.D., 40 mg at 02/07/22 0518  •  lisinopril (PRINIVIL) tablet 20 mg, 20 mg, Oral, DAILY, Jun Mendoza M.D., 20 mg at 02/07/22 0519  •  LORazepam (ATIVAN) tablet 0.5 mg, 0.5 mg, Oral, Q8HRS PRN, Jun Mendoza M.D.  •  omeprazole (PRILOSEC) capsule 20 mg, 20 mg, Oral, DAILY, Jun Mendoza M.D., 20 mg at 02/07/22 0518  •  metFORMIN (GLUCOPHAGE) tablet 500 mg, 500 mg, Oral, BID WITH MEALS, Jun Mendoza M.D.  •  Pharmacy Consult Request ...Pain Management Review 1 Each, 1 Each, Other, PHARMACY TO DOSE, Jun Mendoza M.D.  •  oxyCODONE immediate-release (ROXICODONE) tablet 5 mg, 5 mg, Oral, Q3HRS PRN **OR** oxyCODONE immediate-release (ROXICODONE) tablet 10  mg, 10 mg, Oral, Q3HRS PRN **OR** morphine 4 MG/ML injection 4 mg, 4 mg, Intravenous, Q3HRS PRN, Jun Mendoza M.D.    Current Outpatient Medications:   •  ondansetron (ZOFRAN ODT) 4 MG TABLET DISPERSIBLE, Take 1 Tablet by mouth every 6 hours as needed for Nausea., Disp: 30 Tablet, Rfl: 0  •  ibuprofen (MOTRIN) 200 MG Tab, Take 600 mg by mouth as needed for Mild Pain., Disp: , Rfl:   •  acetaminophen (TYLENOL) 500 MG Tab, Take 1,000 mg by mouth as needed for Mild Pain., Disp: , Rfl:   •  metFORMIN (GLUCOPHAGE) 500 MG Tab, Take 500 mg by mouth 2 times a day with meals., Disp: , Rfl:   •  buPROPion (WELLBUTRIN XL) 300 MG XL tablet, Take 300 mg by mouth every morning., Disp: , Rfl:   •  lisinopril (PRINIVIL) 20 MG Tab, Take 1 Tablet by mouth every day., Disp: 90 Tablet, Rfl: 3  •  omeprazole (PRILOSEC) 20 MG delayed-release capsule, Take 1 capsule by mouth every day., Disp: 90 capsule, Rfl: 2  •  escitalopram (LEXAPRO) 20 MG tablet, Take 40 mg by mouth every day., Disp: , Rfl:   •  Cholecalciferol (VITAMIN D3) 5000 UNIT/0.5ML Liquid, Take 1 Capsule by mouth every day. Liquid capsule, Disp: , Rfl:   •  Coenzyme Q10 (COQ10 PO), Take 1 Tab by mouth every day., Disp: , Rfl:   •  LORazepam (ATIVAN) 0.5 MG Tab, Take 0.5 mg by mouth every 8 hours as needed for Anxiety., Disp: , Rfl: 2  •  Biotin 5000 MCG Cap, Take 5,000 mcg by mouth every day., Disp: , Rfl:   •  aspirin (ASA) 81 MG Chew Tab chewable tablet, Take 81 mg by mouth every day., Disp: , Rfl:     Current Outpatient Medications:  Current Facility-Administered Medications   Medication Dose Route Frequency Provider Last Rate Last Admin   • magnesium sulfate in D5W IVPB premix 1 g  1 g Intravenous Once Robert Edwards M.D. 100 mL/hr at 02/07/22 0921 1 g at 02/07/22 0921   • morphine 4 MG/ML injection 4 mg  4 mg Intravenous Once Lou Carreon M.D.       • ondansetron (ZOFRAN) syringe/vial injection 4 mg  4 mg Intravenous Once Lou Carreon M.D.       • sencookie-angelitote  (PERICOLACE or SENOKOT S) 8.6-50 MG per tablet 2 Tablet  2 Tablet Oral BID Jun Mendoza M.D.        And   • polyethylene glycol/lytes (MIRALAX) PACKET 1 Packet  1 Packet Oral QDAY PRN Jun Mendoza M.D.        And   • magnesium hydroxide (MILK OF MAGNESIA) suspension 30 mL  30 mL Oral QDAY PRN Jun Mendoza M.D.        And   • bisacodyl (DULCOLAX) suppository 10 mg  10 mg Rectal QDAY PRN Jun Mendoza M.D.       • Respiratory Therapy Consult   Nebulization Continuous RT Jun Mendoza M.D.       • lactated ringers infusion (BOLUS)  500 mL Intravenous Once PRN Jun Mendoza M.D.       • heparin injection 5,000 Units  5,000 Units Subcutaneous Q8HRS Jun Mendoza M.D.       • labetalol (NORMODYNE/TRANDATE) injection 10 mg  10 mg Intravenous Q4HRS PRN Jun Mendoza M.D.       • ondansetron (ZOFRAN) syringe/vial injection 4 mg  4 mg Intravenous Q4HRS PRN Jun Mendoza M.D.       • ondansetron (ZOFRAN ODT) dispertab 4 mg  4 mg Oral Q4HRS PRN Jun Mendoza M.D.       • promethazine (PHENERGAN) tablet 12.5-25 mg  12.5-25 mg Oral Q4HRS PRN Jun Mendoza M.D.       • promethazine (PHENERGAN) suppository 12.5-25 mg  12.5-25 mg Rectal Q4HRS PRN Jun Mendoza M.D.       • prochlorperazine (COMPAZINE) injection 5-10 mg  5-10 mg Intravenous Q4HRS PRN Jun Mendoza M.D.       • guaiFENesin dextromethorphan (ROBITUSSIN DM) 100-10 MG/5ML syrup 10 mL  10 mL Oral Q6HRS PRN Jun Mendoza M.D.       • aspirin (ASA) chewable tab 81 mg  81 mg Oral DAILY Jun Mendoza M.D.   81 mg at 02/07/22 0518   • buPROPion SR (WELLBUTRIN-SR) tablet 150 mg  150 mg Oral BID Jun Mendoza M.D.   150 mg at 02/07/22 0519   • vitamin D3 (cholecalciferol) tablet 1,000 Units  1,000 Units Oral DAILY Jun Mendoza M.D.   1,000 Units at 02/07/22 0519   • escitalopram (Lexapro) tablet 40 mg  40 mg Oral DAILY Jun Mendoza M.D.   40 mg at 02/07/22 0518   • lisinopril (PRINIVIL) tablet 20 mg  20 mg Oral DAILY Jun Mendoza M.D.   20 mg at 02/07/22 0519   • LORazepam  (ATIVAN) tablet 0.5 mg  0.5 mg Oral Q8HRS PRN Jun Mendoza M.D.       • omeprazole (PRILOSEC) capsule 20 mg  20 mg Oral DAILY Jun Mendoza M.D.   20 mg at 02/07/22 0518   • metFORMIN (GLUCOPHAGE) tablet 500 mg  500 mg Oral BID WITH MEALS Jun Mendoza M.D.       • Pharmacy Consult Request ...Pain Management Review 1 Each  1 Each Other PHARMACY TO DOSE Jun Mendoza M.D.       • oxyCODONE immediate-release (ROXICODONE) tablet 5 mg  5 mg Oral Q3HRS PRN Jun Mendoza M.D.        Or   • oxyCODONE immediate-release (ROXICODONE) tablet 10 mg  10 mg Oral Q3HRS PRN Jun Mendoza M.D.        Or   • morphine 4 MG/ML injection 4 mg  4 mg Intravenous Q3HRS PRN Jun Mendoza M.D.         Current Outpatient Medications   Medication Sig Dispense Refill   • ondansetron (ZOFRAN ODT) 4 MG TABLET DISPERSIBLE Take 1 Tablet by mouth every 6 hours as needed for Nausea. 30 Tablet 0   • ibuprofen (MOTRIN) 200 MG Tab Take 600 mg by mouth as needed for Mild Pain.     • acetaminophen (TYLENOL) 500 MG Tab Take 1,000 mg by mouth as needed for Mild Pain.     • metFORMIN (GLUCOPHAGE) 500 MG Tab Take 500 mg by mouth 2 times a day with meals.     • buPROPion (WELLBUTRIN XL) 300 MG XL tablet Take 300 mg by mouth every morning.     • lisinopril (PRINIVIL) 20 MG Tab Take 1 Tablet by mouth every day. 90 Tablet 3   • omeprazole (PRILOSEC) 20 MG delayed-release capsule Take 1 capsule by mouth every day. 90 capsule 2   • escitalopram (LEXAPRO) 20 MG tablet Take 40 mg by mouth every day.     • Cholecalciferol (VITAMIN D3) 5000 UNIT/0.5ML Liquid Take 1 Capsule by mouth every day. Liquid capsule     • Coenzyme Q10 (COQ10 PO) Take 1 Tab by mouth every day.     • LORazepam (ATIVAN) 0.5 MG Tab Take 0.5 mg by mouth every 8 hours as needed for Anxiety.  2   • Biotin 5000 MCG Cap Take 5,000 mcg by mouth every day.     • aspirin (ASA) 81 MG Chew Tab chewable tablet Take 81 mg by mouth every day.         Medication Allergy:  No Known Allergies    Family  History:  Family History   Problem Relation Age of Onset   • Heart Disease Father    • No Known Problems Sister    • Heart Attack Brother         CAD. 3 MI's.    • No Known Problems Son    • No Known Problems Daughter        Social History:  Social History     Socioeconomic History   • Marital status:      Spouse name: Not on file   • Number of children: Not on file   • Years of education: Not on file   • Highest education level: Not on file   Occupational History   • Not on file   Tobacco Use   • Smoking status: Never Smoker   • Smokeless tobacco: Never Used   Vaping Use   • Vaping Use: Never used   Substance and Sexual Activity   • Alcohol use: No   • Drug use: No   • Sexual activity: Yes     Partners: Male     Birth control/protection: Surgical     Comment: .    Other Topics Concern   • Not on file   Social History Narrative   • Not on file     Social Determinants of Health     Financial Resource Strain:    • Difficulty of Paying Living Expenses: Not on file   Food Insecurity:    • Worried About Running Out of Food in the Last Year: Not on file   • Ran Out of Food in the Last Year: Not on file   Transportation Needs:    • Lack of Transportation (Medical): Not on file   • Lack of Transportation (Non-Medical): Not on file   Physical Activity:    • Days of Exercise per Week: Not on file   • Minutes of Exercise per Session: Not on file   Stress:    • Feeling of Stress : Not on file   Social Connections:    • Frequency of Communication with Friends and Family: Not on file   • Frequency of Social Gatherings with Friends and Family: Not on file   • Attends Orthodoxy Services: Not on file   • Active Member of Clubs or Organizations: Not on file   • Attends Club or Organization Meetings: Not on file   • Marital Status: Not on file   Intimate Partner Violence:    • Fear of Current or Ex-Partner: Not on file   • Emotionally Abused: Not on file   • Physically Abused: Not on file   • Sexually Abused: Not on file    Housing Stability:    • Unable to Pay for Housing in the Last Year: Not on file   • Number of Places Lived in the Last Year: Not on file   • Unstable Housing in the Last Year: Not on file         MDM (Data Review):     Records reviewed and summarized in current documentation    Lab Data Review:  Recent Results (from the past 24 hour(s))   CBC WITH DIFFERENTIAL    Collection Time: 02/06/22  4:31 PM   Result Value Ref Range    WBC 8.5 4.8 - 10.8 K/uL    RBC 5.07 4.20 - 5.40 M/uL    Hemoglobin 14.2 12.0 - 16.0 g/dL    Hematocrit 42.2 37.0 - 47.0 %    MCV 83.2 81.4 - 97.8 fL    MCH 28.0 27.0 - 33.0 pg    MCHC 33.6 33.6 - 35.0 g/dL    RDW 43.5 35.9 - 50.0 fL    Platelet Count 167 164 - 446 K/uL    MPV 9.6 9.0 - 12.9 fL    Neutrophils-Polys 77.60 (H) 44.00 - 72.00 %    Lymphocytes 16.30 (L) 22.00 - 41.00 %    Monocytes 4.80 0.00 - 13.40 %    Eosinophils 0.60 0.00 - 6.90 %    Basophils 0.20 0.00 - 1.80 %    Immature Granulocytes 0.50 0.00 - 0.90 %    Nucleated RBC 0.00 /100 WBC    Neutrophils (Absolute) 6.61 2.00 - 7.15 K/uL    Lymphs (Absolute) 1.39 1.00 - 4.80 K/uL    Monos (Absolute) 0.41 0.00 - 0.85 K/uL    Eos (Absolute) 0.05 0.00 - 0.51 K/uL    Baso (Absolute) 0.02 0.00 - 0.12 K/uL    Immature Granulocytes (abs) 0.04 0.00 - 0.11 K/uL    NRBC (Absolute) 0.00 K/uL   COMP METABOLIC PANEL    Collection Time: 02/06/22  4:31 PM   Result Value Ref Range    Sodium 137 135 - 145 mmol/L    Potassium 3.6 3.6 - 5.5 mmol/L    Chloride 100 96 - 112 mmol/L    Co2 22 20 - 33 mmol/L    Anion Gap 15.0 7.0 - 16.0    Glucose 109 (H) 65 - 99 mg/dL    Bun 5 (L) 8 - 22 mg/dL    Creatinine 0.54 0.50 - 1.40 mg/dL    Calcium 9.3 8.5 - 10.5 mg/dL    AST(SGOT) 98 (H) 12 - 45 U/L    ALT(SGPT) 159 (H) 2 - 50 U/L    Alkaline Phosphatase 137 (H) 30 - 99 U/L    Total Bilirubin 3.7 (H) 0.1 - 1.5 mg/dL    Albumin 4.1 3.2 - 4.9 g/dL    Total Protein 7.3 6.0 - 8.2 g/dL    Globulin 3.2 1.9 - 3.5 g/dL    A-G Ratio 1.3 g/dL   LIPASE    Collection Time:  02/06/22  4:31 PM   Result Value Ref Range    Lipase 195 (H) 11 - 82 U/L   ESTIMATED GFR    Collection Time: 02/06/22  4:31 PM   Result Value Ref Range    GFR If African American >60 >60 mL/min/1.73 m 2    GFR If Non African American >60 >60 mL/min/1.73 m 2   Prothrombin time (INR)    Collection Time: 02/06/22  4:31 PM   Result Value Ref Range    PT 12.8 12.0 - 14.6 sec    INR 0.99 0.87 - 1.13   URINALYSIS    Collection Time: 02/06/22  4:40 PM    Specimen: Urine, Clean Catch   Result Value Ref Range    Color Yellow     Character Clear     Specific Gravity 1.010 <1.035    Ph 6.5 5.0 - 8.0    Glucose Negative Negative mg/dL    Ketones 40 (A) Negative mg/dL    Protein Negative Negative mg/dL    Bilirubin Moderate (A) Negative    Urobilinogen, Urine 1.0 Negative    Nitrite Negative Negative    Leukocyte Esterase Negative Negative    Occult Blood Negative Negative    Micro Urine Req see below    CBC WITH DIFFERENTIAL    Collection Time: 02/07/22  1:45 AM   Result Value Ref Range    WBC 10.4 4.8 - 10.8 K/uL    RBC 4.82 4.20 - 5.40 M/uL    Hemoglobin 13.2 12.0 - 16.0 g/dL    Hematocrit 39.8 37.0 - 47.0 %    MCV 82.6 81.4 - 97.8 fL    MCH 27.4 27.0 - 33.0 pg    MCHC 33.2 (L) 33.6 - 35.0 g/dL    RDW 44.0 35.9 - 50.0 fL    Platelet Count 187 164 - 446 K/uL    MPV 10.0 9.0 - 12.9 fL    Neutrophils-Polys 75.90 (H) 44.00 - 72.00 %    Lymphocytes 16.30 (L) 22.00 - 41.00 %    Monocytes 6.00 0.00 - 13.40 %    Eosinophils 0.60 0.00 - 6.90 %    Basophils 0.30 0.00 - 1.80 %    Immature Granulocytes 0.90 0.00 - 0.90 %    Nucleated RBC 0.00 /100 WBC    Neutrophils (Absolute) 7.89 (H) 2.00 - 7.15 K/uL    Lymphs (Absolute) 1.69 1.00 - 4.80 K/uL    Monos (Absolute) 0.62 0.00 - 0.85 K/uL    Eos (Absolute) 0.06 0.00 - 0.51 K/uL    Baso (Absolute) 0.03 0.00 - 0.12 K/uL    Immature Granulocytes (abs) 0.09 0.00 - 0.11 K/uL    NRBC (Absolute) 0.00 K/uL   Comp Metabolic Panel    Collection Time: 02/07/22  1:45 AM   Result Value Ref Range     Sodium 134 (L) 135 - 145 mmol/L    Potassium 3.6 3.6 - 5.5 mmol/L    Chloride 97 96 - 112 mmol/L    Co2 19 (L) 20 - 33 mmol/L    Anion Gap 18.0 (H) 7.0 - 16.0    Glucose 117 (H) 65 - 99 mg/dL    Bun 7 (L) 8 - 22 mg/dL    Creatinine 0.58 0.50 - 1.40 mg/dL    Calcium 8.9 8.5 - 10.5 mg/dL    AST(SGOT) 79 (H) 12 - 45 U/L    ALT(SGPT) 135 (H) 2 - 50 U/L    Alkaline Phosphatase 142 (H) 30 - 99 U/L    Total Bilirubin 3.4 (H) 0.1 - 1.5 mg/dL    Albumin 4.1 3.2 - 4.9 g/dL    Total Protein 7.3 6.0 - 8.2 g/dL    Globulin 3.2 1.9 - 3.5 g/dL    A-G Ratio 1.3 g/dL   MAGNESIUM    Collection Time: 02/07/22  1:45 AM   Result Value Ref Range    Magnesium 1.8 1.5 - 2.5 mg/dL   PHOSPHORUS    Collection Time: 02/07/22  1:45 AM   Result Value Ref Range    Phosphorus 3.3 2.5 - 4.5 mg/dL   HEPATITIS PANEL ACUTE(4 COMPONENTS)    Collection Time: 02/07/22  1:45 AM   Result Value Ref Range    Hepatitis B Surface Antigen Non-Reactive Non-Reactive    Hepatitis B Cors Ab,IgM Non-Reactive Non-Reactive    Hepatitis A Virus Ab, IgM Non-Reactive Non-Reactive    Hepatitis C Antibody Non-Reactive Non-Reactive   BILIRUBIN DIRECT    Collection Time: 02/07/22  1:45 AM   Result Value Ref Range    Direct Bilirubin 2.5 (H) 0.1 - 0.5 mg/dL   BILIRUBIN INDIRECT    Collection Time: 02/07/22  1:45 AM   Result Value Ref Range    Indirect Bilirubin 0.9 0.0 - 1.0 mg/dL   ESTIMATED GFR    Collection Time: 02/07/22  1:45 AM   Result Value Ref Range    GFR If African American >60 >60 mL/min/1.73 m 2    GFR If Non African American >60 >60 mL/min/1.73 m 2       Imaging/Procedures Review:      [id number]   Final Result      1.  Sludge is noted in the gallbladder.      2.  No biliary ductal dilatation. No filling defects or strictures identified.      3.  No pancreatic ductal dilatation is identified.      4.  Findings are consistent with pancreatitis involving the tail of the pancreas.      CT-ABDOMEN-PELVIS WITH   Final Result         1.  Slight hazy fat and  adjacent to the tail of pancreas, appearance suggests pancreatitis.   2.  Hepatomegaly and diffuse hepatic steatosis   3.  Dependent gallbladder sludge or poorly calcified stone      US-RUQ   Final Result      1.  Sludge is noted in the gallbladder. No gallstones.      2.  Increased echogenicity and enlargement of the liver could be due to hepatic steatosis.           MDM (Assessment and Plan):     Patient Active Problem List    Diagnosis Date Noted   • Choledocholithiasis 02/06/2022   • NAFLD (nonalcoholic fatty liver disease) 02/06/2022   • Elevated LFTs 02/06/2022   • Mood disorder (HCC) 02/06/2022   • Pancreatitis 02/06/2022   • Neuroendocrine neoplasm of stomach 11/12/2021   • Gastroesophageal reflux disease 05/11/2021   • Chronic nonalcoholic liver disease 05/11/2021   • Allergic rhinitis 05/11/2021   • Transaminitis 05/07/2020   • DM (diabetes mellitus) (HCC) 07/17/2019   • Hypertension 06/23/2019   • Anxiety 06/12/2019   • Hemochromatosis 12/15/2017     This is a pleasant 53 yo female who was seen in the ER 2/6/22 with sudden onset of abdominal pain, N/V, fevers 100 F starting after she had a liver biopsy 2/4/22. Labs: TB: 3.7. AST: 98. ALT: 159. ALP: 137. Lipase: 195. Hepatitis panel: non reactive. Denies starting any new medications, alcohol abuse. This is her first episode of pancreatitis. Abdominal US revealed gallbladder sludge. CT abdomen/pelvis: pancreatitis and gallbladder sludge. MRCP: no biliary dilatation. Pancreatitis of tail. Gallbladder sludge. Currently, abdominal pain has improved. No N/V. LFT's slowly trending down.    ASSESSMENT:  1. Abdominal pain: likely due to pancreatitis  2. N/V: resolved  3. Elevated LFTs slowly trending down. Imaging studies consistent with gallbadder sludge.    PLAN:  1. Clear liquid diet slowly advance diet to low fat as tolerated. If patient can tolerate clear liquids, plan to discharge home  2.  Recommend triglycerides, IgG4  3. If she begins to have another  episode of abdominal pain, N/V may need to be evaluated by general surgery for cholecystectomy due to gallbladder sludge on imaging studies.  4. Recommend she follow up with Dawit Gibbons PA-C, [facility] for liver biopsy results and further evaluation of pancreatitis if indicated.    GI WILL SIGN OFF PLEASE CALL IF ANY QUESTIONS OR CONCERNS.     Thank your for the opportunity to assist in the care of your patient.  Please call for any questions or concerns.    VICKY Ledesma.

## 2022-02-07 NOTE — DISCHARGE SUMMARY
Hospital Medicine Discharge Note     Admit Date:  2/6/2022       Discharge Date:   2/7/2022    Attending Physician:  Robert Edwards M.D.     Diagnoses (includes active and resolved):   Principal Problem:    Choledocholithiasis POA: Yes  Active Problems:    Anxiety POA: Yes    Hemochromatosis POA: Yes    Hypertension POA: Yes    DM (diabetes mellitus) (HCC) POA: Unknown    Gastroesophageal reflux disease POA: Yes    NAFLD (nonalcoholic fatty liver disease) POA: Unknown    Elevated LFTs POA: Unknown    Mood disorder (HCC) POA: Unknown    Pancreatitis POA: Unknown  Resolved Problems:    * No resolved hospital problems. *      Hospital Summary (Brief Narrative):       Mónica Keita is a 52 y.o. female with history of hypertension, diabetes who presented 2/6/2022 with complaints of abdominal discomfort, nausea. Recent liver biopsy on 2/4/2022.  CT showed gallbladder sludge, questionable pancreatitis and hepatic steatosis.  Abdominal ultrasound noted gallbladder sludge but no gallstones.  Elevated LFTs, lipase 195, total bilirubin 3.7.  Case was discussed with GI, recommended MRCP.  MRCP only showed gallbladder sludge but no stones.  Patient's abdominal pain improved and possibly may have passed in obstruction.  Magnesium and potassium were repleted.  Patient was able to tolerate clear liquid diet.  Patient was thus able to be discharged home and should follow-up with GI outpatient for liver biopsy results.  She can consider elective cholecystectomy outpatient.  Labs here that were drawn and are still pending include ASMA, IgG4, ZOHAIB, AMA.         Consultants:      [facility] Dr. Dahl    Procedures:        None    Discharge Medications:           Medication List      START taking these medications      Instructions   ondansetron 4 MG Tbdp  Commonly known as: ZOFRAN ODT   Take 1 Tablet by mouth every 6 hours as needed for Nausea.  Dose: 4 mg        CONTINUE taking these medications      Instructions   acetaminophen 500 MG  Tabs  Commonly known as: TYLENOL   Take 1,000 mg by mouth as needed for Mild Pain.  Dose: 1,000 mg     aspirin 81 MG Chew chewable tablet  Commonly known as: ASA   Take 81 mg by mouth every day.  Dose: 81 mg     Biotin 5000 MCG Caps   Take 5,000 mcg by mouth every day.  Dose: 5,000 mcg     buPROPion 300 MG XL tablet  Commonly known as: WELLBUTRIN XL   Take 300 mg by mouth every morning.  Dose: 300 mg     COQ10 PO   Take 1 Tab by mouth every day.  Dose: 1 Tablet     escitalopram 20 MG tablet  Commonly known as: LEXAPRO   Take 40 mg by mouth every day.  Dose: 40 mg     ibuprofen 200 MG Tabs  Commonly known as: MOTRIN   Take 600 mg by mouth as needed for Mild Pain.  Dose: 600 mg     lisinopril 20 MG Tabs  Commonly known as: PRINIVIL   Take 1 Tablet by mouth every day.  Dose: 20 mg     LORazepam 0.5 MG Tabs  Commonly known as: ATIVAN   Take 0.5 mg by mouth every 8 hours as needed for Anxiety.  Dose: 0.5 mg     metFORMIN 500 MG Tabs  Commonly known as: GLUCOPHAGE   Take 500 mg by mouth 2 times a day with meals.  Dose: 500 mg     omeprazole 20 MG delayed-release capsule  Commonly known as: PRILOSEC   Take 1 capsule by mouth every day.  Dose: 20 mg     Vitamin D3 5000 UNIT/0.5ML Liqd   Take 1 Capsule by mouth every day. Liquid capsule  Dose: 1 Capsule          Disposition:   Discharge home    Activity:   As tolerated    Code status:   Full code    Primary Care Provider:    Eren Heath M.D.    Follow up appointment details :      Dawit Gibbons P.A.-C.  [address] [place] [id number] [phone]    Schedule an appointment as soon as possible for a visit  liver biopsy results and further evaluation of pancreatitis if indicated.    Future Appointments   Date Time Provider Department Center   2/16/2022 11:00 AM [facility] None   6/14/2022  1:30 PM [facility] [address]       Pending Studies:        ASMA, IgG4, ZOHAIB, AMA.     #################################################    Interval  history/exam for day of discharge:    Vitals:    02/07/22 0900 02/07/22 0919 02/07/22 1000 02/07/22 1047   BP:  136/87 142/94 138/84   Pulse: 98 100 100 100   Resp:   14 14   Temp:    36.6 °C (97.8 °F)   TempSrc:       SpO2: 94% 94% 96% 97%   Weight:       Height:         Weight/BMI: Body mass index is 28.31 kg/m².  Pulse Oximetry: 97 %, O2 (LPM): 0, O2 Delivery Device: None - Room Air    General:  NAD  CVS:  RRR  PULM:  CTAB, no respiratory distress  Abd: TTP improving    Most Recent Labs:    Lab Results   Component Value Date/Time    WBC 10.4 02/07/2022 01:45 AM    RBC 4.82 02/07/2022 01:45 AM    HEMOGLOBIN 13.2 02/07/2022 01:45 AM    HEMATOCRIT 39.8 02/07/2022 01:45 AM    MCV 82.6 02/07/2022 01:45 AM    MCH 27.4 02/07/2022 01:45 AM    MCHC 33.2 (L) 02/07/2022 01:45 AM    MPV 10.0 02/07/2022 01:45 AM    NEUTSPOLYS 75.90 (H) 02/07/2022 01:45 AM    LYMPHOCYTES 16.30 (L) 02/07/2022 01:45 AM    MONOCYTES 6.00 02/07/2022 01:45 AM    EOSINOPHILS 0.60 02/07/2022 01:45 AM    BASOPHILS 0.30 02/07/2022 01:45 AM    HYPOCHROMIA 1+ 07/26/2021 11:25 AM    ANISOCYTOSIS 1+ 09/16/2021 08:05 AM      Lab Results   Component Value Date/Time    SODIUM 134 (L) 02/07/2022 01:45 AM    POTASSIUM 3.6 02/07/2022 01:45 AM    CHLORIDE 97 02/07/2022 01:45 AM    CO2 19 (L) 02/07/2022 01:45 AM    GLUCOSE 117 (H) 02/07/2022 01:45 AM    BUN 7 (L) 02/07/2022 01:45 AM    CREATININE 0.58 02/07/2022 01:45 AM    BUNCREATRAT 13 06/18/2019 10:57 AM      Lab Results   Component Value Date/Time    ALTSGPT 135 (H) 02/07/2022 01:45 AM    ASTSGOT 79 (H) 02/07/2022 01:45 AM    ALKPHOSPHAT 142 (H) 02/07/2022 01:45 AM    TBILIRUBIN 3.4 (H) 02/07/2022 01:45 AM    DBILIRUBIN 2.5 (H) 02/07/2022 01:45 AM    LIPASE 195 (H) 02/06/2022 04:31 PM    ALBUMIN 4.1 02/07/2022 01:45 AM    GLOBULIN 3.2 02/07/2022 01:45 AM    INR 0.99 02/06/2022 04:31 PM    MACROCYTOSIS 1+ 09/16/2021 08:05 AM     Lab Results   Component Value Date/Time    PROTHROMBTM 12.8 02/06/2022 04:31 PM     INR 0.99 02/06/2022 04:31 PM        Imaging/ Testing:      [id number]   Final Result      1.  Sludge is noted in the gallbladder.      2.  No biliary ductal dilatation. No filling defects or strictures identified.      3.  No pancreatic ductal dilatation is identified.      4.  Findings are consistent with pancreatitis involving the tail of the pancreas.      CT-ABDOMEN-PELVIS WITH   Final Result         1.  Slight hazy fat and adjacent to the tail of pancreas, appearance suggests pancreatitis.   2.  Hepatomegaly and diffuse hepatic steatosis   3.  Dependent gallbladder sludge or poorly calcified stone      US-RUQ   Final Result      1.  Sludge is noted in the gallbladder. No gallstones.      2.  Increased echogenicity and enlargement of the liver could be due to hepatic steatosis.          Instructions:      The patient was instructed to return to the ER in the event of worsening symptoms. I have counseled the patient on the importance of compliance and the patient has agreed to proceed with all medical recommendations and follow up plan indicated above.   The patient understands that all medications come with benefits and risks. Risks may include permanent injury or death and these risks can be minimized with close reassessment and monitoring.

## 2022-02-07 NOTE — ED PROVIDER NOTES
ED Provider Note    CHIEF COMPLAINT  Chief Complaint   Patient presents with   • Fever   • Chills   • Body Aches   • N/V   • Abdominal Pain   • Post-Op Complications     s/p liver biposy 2/4/2022       HPI  Mónica Keita is a 52 y.o. female who presents emergency department chief complaint of epigastric pain nausea vomiting fevers chills body ache she states temperatures have been up to 100 at home.  She is had some elevated liver enzymes are she had an ultrasound liver biopsy 2 days ago and since then she has progressively felt worse she started vomiting today she has epigastric discomfort rating the right upper quadrant.  Does not radiate to the back she denies alcohol abuse she does states she had decreased appetite and feeling generally unwell.  She denies easy bleeding or bruising bloody stools bloody emesis she is been having normal bowel movements without difficulty appears with no shortness of breath    REVIEW OF SYSTEMS  Positives as above. Pertinent negatives include chest pain shortness of breath cough back pain chest pain jaw pain arm pain bloody stools bloody emesis dysuria hematuria flank pain is bleeding or bruising  All other review of systems are negative    PAST MEDICAL HISTORY   has a past medical history of Adverse effect of anesthesia, Anemia, Anxiety, Bronchitis (2009), Diabetes (HCC), Elevated liver enzymes, Hypertension, Neuroendocrine tumor (11/2021), and Pneumonia (2009).    SOCIAL HISTORY  Social History     Tobacco Use   • Smoking status: Never Smoker   • Smokeless tobacco: Never Used   Vaping Use   • Vaping Use: Never used   Substance and Sexual Activity   • Alcohol use: No   • Drug use: No   • Sexual activity: Yes     Partners: Male     Birth control/protection: Surgical     Comment: .        SURGICAL HISTORY   has a past surgical history that includes hysterectomy, vaginal (06/04/2014); pelvic exam under anesthesia (6/17/2014); vaginal hysterectomy total (6/17/2014);  "appendectomy (2014); upper gi endoscopy,diagnosis (N/A, 11/1/2021); egd w/endoscopic ultrasound (N/A, 11/1/2021); and craniotomy.    CURRENT MEDICATIONS  Home Medications     Reviewed by Lora Brantley R.N. (Registered Nurse) on 02/06/22 at 1621  Med List Status: Complete   Medication Last Dose Status   aspirin (ASA) 81 MG Chew Tab chewable tablet 2/3/2022 Active   Biotin 5000 MCG Cap  Active   buPROPion (WELLBUTRIN XL) 300 MG XL tablet  Active   Cholecalciferol (VITAMIN D3) 5000 UNIT/0.5ML Liquid  Active   Coenzyme Q10 (COQ10 PO)  Active   escitalopram (LEXAPRO) 20 MG tablet  Active   lisinopril (PRINIVIL) 20 MG Tab  Active   LORazepam (ATIVAN) 0.5 MG Tab  Active   metFORMIN (GLUCOPHAGE) 500 MG Tab  Active   omeprazole (PRILOSEC) 20 MG delayed-release capsule  Active                ALLERGIES  No Known Allergies    PHYSICAL EXAM  VITAL SIGNS: BP (!) 171/100   Pulse 95   Temp 36.3 °C (97.4 °F) (Temporal)   Resp 18   Ht 1.702 m (5' 7\")   Wt 82 kg (180 lb 12.4 oz)   LMP 06/14/2012   SpO2 97%   BMI 28.31 kg/m²    Pulse ox interpretation: I interpret this pulse ox as normal.  Constitutional: Alert in no apparent distress.  HENT: Normocephalic atraumatic, MMM  Eyes: PER, Conjunctiva normal, Non-icteric.   Neck: Normal range of motion, No tenderness, Supple, No stridor.   Cardiovascular: Regular rate and rhythm, no murmurs.   Thorax & Lungs: Normal breath sounds, No respiratory distress, No wheezing, No chest tenderness.   Abdomen: Bowel sounds normal, Soft, mild epigastric tenderness there is a small incision that is clean and dry without erythema or discharge in the mid abdomen mild right upper quadrant tenderness negative Murdock sign, No pulsatile masses. No peritoneal signs.  Skin: Warm, Dry, No erythema, No rash.   Back: No bony tenderness, No CVA tenderness.   Extremities/MSK: Intact equal distal pulses, No edema, No tenderness, No cyanosis, no major deformities noted  Neurologic: Alert and oriented x3, " No focal deficits noted.       DIFFERENTIAL DIAGNOSIS AND WORK UP PLAN    This is a 52 y.o. female who presents with some postbiopsy discomfort they could have had mild bowel they could of lead to fluid leak pancreatitis abscess cholecystitis choledocholithiasis patient is a little uncomfortable on my examination but not peritoneal she received pain management antiemetics laboratory analysis and ultrasound right upper quadrant to start and if that is nondiagnostic then may move on to CT scan.    DIAGNOSTIC STUDIES / PROCEDURES    EKG      LABS  Pertinent Lab Findings  CBC with mild left shift, CMP with elevated LFTs which she has had prior however this time for T bili is 3.7 which has never been elevated before, lipase is also mildly elevated at 195 she has moderate bilirubin in her urinalysis      RADIOLOGY  CT-ABDOMEN-PELVIS WITH   Final Result         1.  Slight hazy fat and adjacent to the tail of pancreas, appearance suggests pancreatitis.   2.  Hepatomegaly and diffuse hepatic steatosis   3.  Dependent gallbladder sludge or poorly calcified stone      US-RUQ   Final Result      1.  Sludge is noted in the gallbladder. No gallstones.      2.  Increased echogenicity and enlargement of the liver could be due to hepatic steatosis.        The radiologist's interpretation of all radiological studies have been reviewed by me.      COURSE & MEDICAL DECISION MAKING  Pertinent Labs & Imaging studies reviewed. (See chart for details)    7:36 PM  Reassessed patient at the bedside after ultrasound she does have sludge noted in the gallbladder but her CBD is within normal limits and otherwise she is little bit increased echogenicity which could be due to the recent procedure or some obstruction.  The plan will be to move forward the CT scan    9:19 PM  I reassessed patient bedside feeling better after pain management and antiemetics we discussed her CT scan findings and my concern with the pancreatitis and the hepatic  steatosis and gallbladder sludge that she may have some mild obstruction I will discussed the case with GI consultants who she follows up with    9:31 PM  Spoke nadine Royal with GI consultants he agrees with the MRCP observation and repeat laboratory analysis and they will consult in the morning    9:48 PM  Spoke nadine Mendoza with the hospitalist service and he has accepted the patient for hospitalization    I verified that the patient was wearing a mask and I was wearing appropriate PPE every time I entered the room. The patient's mask was on the patient at all times during my encounter except for a brief view of the oropharynx.          FINAL IMPRESSION  1.  Pancreatitis  2.  Hyperbilirubinemia  3.  Nausea vomiting non-intractable        Electronically signed by: Lou Carreon M.D., 2/6/2022 6:30 PM    This dictation has been created using voice recognition software and/or scribes. The accuracy of the dictation is limited by the abilities of the software and the expertise of the scribes. I expect there may be some errors of grammar and possibly content. I made every attempt to manually correct the errors within my dictation. However, errors related to voice recognition software and/or scribes may still exist and should be interpreted within the appropriate context.

## 2022-02-07 NOTE — ED NOTES
Medicated Pt according to MAR.    Rounded on Pt.    Updated Pt on plan of care. Pt verbalized understanding.  No acute distress at this time.  Will continue to monitor.

## 2022-02-07 NOTE — ED NOTES
Placed Pt on hospital bed.  Collected labs.  Rounded on Pt.    Updated Pt on plan of care. Pt verbalized understanding.  No acute distress at this time.  Will continue to monitor.

## 2022-02-07 NOTE — ED NOTES
Reviewed discharge instructions, pt verbalized understanding of instructions and medication. States she will  RX and schedule follow-up appointment. Denies further questions at this time. Pt ambulatory out of ER with steady gait.

## 2022-02-07 NOTE — ED NOTES
Introduced self to Pt; informed her that I am part of her care team.    Performed MRI screening.    Rounded on Pt. Updated Pt on plan of care. Pt verbalized understanding.  No acute distress at this time.  Will continue to monitor.

## 2022-02-07 NOTE — ASSESSMENT & PLAN NOTE
Total bilirubin 3.7, elevated LFT, lipase 195  ?Gallstone pancreatitis, pancreatic divisum    IVF, anti-emetic  Advance diet as tolerated  No abx as low suspicion for cholecystitis/ascending cholangitis - afebrile, no leukocytosis    F/u MRCP  Formal GI evaluation to follow in AM

## 2022-02-08 ENCOUNTER — TELEPHONE (OUTPATIENT)
Dept: MEDICAL GROUP | Facility: PHYSICIAN GROUP | Age: 52
End: 2022-02-08

## 2022-02-08 LAB
IGG1 SER-MCNC: 364 MG/DL (ref 240–1118)
IGG1 SER-MCNC: 380 MG/DL (ref 240–1118)
IGG2 SER-MCNC: 250 MG/DL (ref 124–549)
IGG2 SER-MCNC: 255 MG/DL (ref 124–549)
IGG3 SER-MCNC: 25 MG/DL (ref 21–134)
IGG3 SER-MCNC: 25 MG/DL (ref 21–134)
IGG4 SER-MCNC: 35 MG/DL (ref 1–123)
IGG4 SER-MCNC: 35 MG/DL (ref 1–123)
MITOCHONDRIA M2 IGG SER-ACNC: 2.5 UNITS (ref 0–24.9)
NUCLEAR IGG SER QL IA: NORMAL
SMA IGG SER-ACNC: 6 UNITS (ref 0–19)

## 2022-02-08 NOTE — TELEPHONE ENCOUNTER
Phone Number Called: [phone]    Call outcome: Left detailed message for patient. Informed to call back with any additional questions.    Message: Please call Nurse Cheatham (with [facility]) at [phone] to schedule a follow-up visit with your primary care provider following your emergency room visit on 2/6/2022.     Pt returned my call & we scheduled her to be seen in PCP office on 2/9/22.

## 2022-02-09 ENCOUNTER — OFFICE VISIT (OUTPATIENT)
Dept: MEDICAL GROUP | Facility: PHYSICIAN GROUP | Age: 52
End: 2022-02-09
Payer: COMMERCIAL

## 2022-02-09 VITALS
DIASTOLIC BLOOD PRESSURE: 82 MMHG | OXYGEN SATURATION: 99 % | TEMPERATURE: 98.7 F | HEIGHT: 67 IN | BODY MASS INDEX: 28.28 KG/M2 | RESPIRATION RATE: 18 BRPM | HEART RATE: 100 BPM | SYSTOLIC BLOOD PRESSURE: 122 MMHG | WEIGHT: 180.2 LBS

## 2022-02-09 DIAGNOSIS — R79.89 ELEVATED LFTS: ICD-10-CM

## 2022-02-09 DIAGNOSIS — K76.0 NAFLD (NONALCOHOLIC FATTY LIVER DISEASE): ICD-10-CM

## 2022-02-09 DIAGNOSIS — E80.6 HYPERBILIRUBINEMIA: ICD-10-CM

## 2022-02-09 DIAGNOSIS — K85.90 ACUTE PANCREATITIS, UNSPECIFIED COMPLICATION STATUS, UNSPECIFIED PANCREATITIS TYPE: ICD-10-CM

## 2022-02-09 PROCEDURE — 99213 OFFICE O/P EST LOW 20 MIN: CPT | Performed by: FAMILY MEDICINE

## 2022-02-09 ASSESSMENT — ENCOUNTER SYMPTOMS
BLURRED VISION: 0
HEARTBURN: 0
FEVER: 0
DIZZINESS: 0
COUGH: 0
SORE THROAT: 0
CHILLS: 0
VOMITING: 0
PALPITATIONS: 0
NAUSEA: 0
MYALGIAS: 0
SHORTNESS OF BREATH: 0
CONSTIPATION: 0
ABDOMINAL PAIN: 1
BLOOD IN STOOL: 0
DIARRHEA: 0
DOUBLE VISION: 0
HEADACHES: 0

## 2022-02-09 ASSESSMENT — FIBROSIS 4 INDEX: FIB4 SCORE: 1.82

## 2022-02-10 NOTE — PROGRESS NOTES
Subjective:     CC: ER f/u    HPI:   Mónica presents today with     1) ER f/u for abd pain/Pancreatitis  Need to monitor bili/LFTs  Only mild pain, when cough sneeze  Aware of icterus signs   (baby was premie under light for a week)        Problem   Hyperbilirubinemia   Nafld (Nonalcoholic Fatty Liver Disease)   Elevated Lfts   Pancreatitis       Current Outpatient Medications Ordered in Epic   Medication Sig Dispense Refill   • ondansetron (ZOFRAN ODT) 4 MG TABLET DISPERSIBLE Take 1 Tablet by mouth every 6 hours as needed for Nausea. 30 Tablet 0   • ibuprofen (MOTRIN) 200 MG Tab Take 600 mg by mouth as needed for Mild Pain.     • acetaminophen (TYLENOL) 500 MG Tab Take 1,000 mg by mouth as needed for Mild Pain.     • metFORMIN (GLUCOPHAGE) 500 MG Tab Take 500 mg by mouth 2 times a day with meals.     • buPROPion (WELLBUTRIN XL) 300 MG XL tablet Take 300 mg by mouth every morning.     • lisinopril (PRINIVIL) 20 MG Tab Take 1 Tablet by mouth every day. 90 Tablet 3   • omeprazole (PRILOSEC) 20 MG delayed-release capsule Take 1 capsule by mouth every day. 90 capsule 2   • escitalopram (LEXAPRO) 20 MG tablet Take 40 mg by mouth every day.     • Cholecalciferol (VITAMIN D3) 5000 UNIT/0.5ML Liquid Take 1 Capsule by mouth every day. Liquid capsule     • Coenzyme Q10 (COQ10 PO) Take 1 Tab by mouth every day.     • LORazepam (ATIVAN) 0.5 MG Tab Take 0.5 mg by mouth every 8 hours as needed for Anxiety.  2   • Biotin 5000 MCG Cap Take 5,000 mcg by mouth every day.     • aspirin (ASA) 81 MG Chew Tab chewable tablet Take 81 mg by mouth every day.       No current [facility]-ordered facility-administered medications on file.     ROS:  Review of Systems   Constitutional: Negative for chills and fever.   HENT: Negative for ear pain and sore throat.    Eyes: Negative for blurred vision and double vision.   Respiratory: Negative for cough and shortness of breath.    Cardiovascular: Negative for chest pain and palpitations.  "  Gastrointestinal: Positive for abdominal pain. Negative for blood in stool, constipation, diarrhea, heartburn, melena, nausea and vomiting.   Genitourinary: Negative for dysuria and hematuria.   Musculoskeletal: Negative for myalgias.   Neurological: Negative for dizziness and headaches.       Objective:     Exam:  /82 (BP Location: Left arm, Patient Position: Sitting, BP Cuff Size: Adult)   Pulse 100   Temp 37.1 °C (98.7 °F) (Temporal)   Resp 18   Ht 1.702 m (5' 7\")   Wt 81.7 kg (180 lb 3.2 oz)   LMP 06/14/2012   SpO2 99%   BMI 28.22 kg/m²  Body mass index is 28.22 kg/m².    Physical Exam  Constitutional:       General: She is not in acute distress.     Appearance: Normal appearance. She is normal weight. She is not ill-appearing, toxic-appearing or diaphoretic.   HENT:      Head: Normocephalic and atraumatic.   Abdominal:      General: Bowel sounds are normal. There is no distension.      Tenderness: There is no abdominal tenderness. There is no guarding.   Neurological:      Mental Status: She is alert.         Assessment & Plan:     52 y.o. female with the following -     Problem List Items Addressed This Visit     NAFLD (nonalcoholic fatty liver disease)     S/p bx  Path results   VALENTIN with eosinophilia (Nsaid use?)  No pain on exam  F/u with GI         Elevated LFTs     Will monitor         Pancreatitis     abd pains improving           Hyperbilirubinemia     F/u repeat          Relevant Orders    Comp Metabolic Panel        Return in about 2 weeks (around 2/23/2022) for f/u labs.    Please note that this dictation was created using voice recognition software. I have made every reasonable attempt to correct obvious errors, but I expect that there are errors of grammar and possibly content that I did not discover before finalizing the note.      "

## 2022-02-16 ENCOUNTER — HOSPITAL ENCOUNTER (OUTPATIENT)
Dept: LAB | Facility: MEDICAL CENTER | Age: 52
End: 2022-02-16
Attending: FAMILY MEDICINE
Payer: COMMERCIAL

## 2022-02-16 ENCOUNTER — HOSPITAL ENCOUNTER (OUTPATIENT)
Dept: LAB | Facility: MEDICAL CENTER | Age: 52
End: 2022-02-16
Attending: INTERNAL MEDICINE
Payer: COMMERCIAL

## 2022-02-16 DIAGNOSIS — E83.110 HEREDITARY HEMOCHROMATOSIS (HCC): ICD-10-CM

## 2022-02-16 DIAGNOSIS — E80.6 HYPERBILIRUBINEMIA: ICD-10-CM

## 2022-02-16 LAB
ALBUMIN SERPL BCP-MCNC: 4.6 G/DL (ref 3.2–4.9)
ALBUMIN/GLOB SERPL: 1.6 G/DL
ALP SERPL-CCNC: 99 U/L (ref 30–99)
ALT SERPL-CCNC: 140 U/L (ref 2–50)
ANION GAP SERPL CALC-SCNC: 10 MMOL/L (ref 7–16)
AST SERPL-CCNC: 141 U/L (ref 12–45)
BASOPHILS # BLD AUTO: 0.6 % (ref 0–1.8)
BASOPHILS # BLD: 0.04 K/UL (ref 0–0.12)
BILIRUB SERPL-MCNC: 0.6 MG/DL (ref 0.1–1.5)
BUN SERPL-MCNC: 10 MG/DL (ref 8–22)
CALCIUM SERPL-MCNC: 9.2 MG/DL (ref 8.5–10.5)
CHLORIDE SERPL-SCNC: 102 MMOL/L (ref 96–112)
CO2 SERPL-SCNC: 27 MMOL/L (ref 20–33)
CREAT SERPL-MCNC: 0.74 MG/DL (ref 0.5–1.4)
EOSINOPHIL # BLD AUTO: 0.1 K/UL (ref 0–0.51)
EOSINOPHIL NFR BLD: 1.5 % (ref 0–6.9)
ERYTHROCYTE [DISTWIDTH] IN BLOOD BY AUTOMATED COUNT: 45.8 FL (ref 35.9–50)
FASTING STATUS PATIENT QL REPORTED: NORMAL
FERRITIN SERPL-MCNC: 128 NG/ML (ref 10–291)
GLOBULIN SER CALC-MCNC: 2.8 G/DL (ref 1.9–3.5)
GLUCOSE SERPL-MCNC: 107 MG/DL (ref 65–99)
HCT VFR BLD AUTO: 42.5 % (ref 37–47)
HGB BLD-MCNC: 13.5 G/DL (ref 12–16)
IMM GRANULOCYTES # BLD AUTO: 0.03 K/UL (ref 0–0.11)
IMM GRANULOCYTES NFR BLD AUTO: 0.5 % (ref 0–0.9)
IRON SATN MFR SERPL: 20 % (ref 15–55)
IRON SERPL-MCNC: 88 UG/DL (ref 40–170)
LYMPHOCYTES # BLD AUTO: 1.86 K/UL (ref 1–4.8)
LYMPHOCYTES NFR BLD: 28.7 % (ref 22–41)
MCH RBC QN AUTO: 27.3 PG (ref 27–33)
MCHC RBC AUTO-ENTMCNC: 31.8 G/DL (ref 33.6–35)
MCV RBC AUTO: 86 FL (ref 81.4–97.8)
MONOCYTES # BLD AUTO: 0.43 K/UL (ref 0–0.85)
MONOCYTES NFR BLD AUTO: 6.6 % (ref 0–13.4)
NEUTROPHILS # BLD AUTO: 4.01 K/UL (ref 2–7.15)
NEUTROPHILS NFR BLD: 62.1 % (ref 44–72)
NRBC # BLD AUTO: 0 K/UL
NRBC BLD-RTO: 0 /100 WBC
PLATELET # BLD AUTO: 278 K/UL (ref 164–446)
PMV BLD AUTO: 9.7 FL (ref 9–12.9)
POTASSIUM SERPL-SCNC: 5 MMOL/L (ref 3.6–5.5)
PROT SERPL-MCNC: 7.4 G/DL (ref 6–8.2)
RBC # BLD AUTO: 4.94 M/UL (ref 4.2–5.4)
SODIUM SERPL-SCNC: 139 MMOL/L (ref 135–145)
TIBC SERPL-MCNC: 437 UG/DL (ref 250–450)
UIBC SERPL-MCNC: 349 UG/DL (ref 110–370)
WBC # BLD AUTO: 6.5 K/UL (ref 4.8–10.8)

## 2022-02-16 PROCEDURE — 82728 ASSAY OF FERRITIN: CPT

## 2022-02-16 PROCEDURE — 83540 ASSAY OF IRON: CPT

## 2022-02-16 PROCEDURE — 83550 IRON BINDING TEST: CPT

## 2022-02-16 PROCEDURE — 80053 COMPREHEN METABOLIC PANEL: CPT

## 2022-02-16 PROCEDURE — 36415 COLL VENOUS BLD VENIPUNCTURE: CPT

## 2022-02-16 PROCEDURE — 85025 COMPLETE CBC W/AUTO DIFF WBC: CPT

## 2022-04-07 DIAGNOSIS — K21.9 GASTROESOPHAGEAL REFLUX DISEASE WITHOUT ESOPHAGITIS: ICD-10-CM

## 2022-04-07 RX ORDER — OMEPRAZOLE 20 MG/1
20 CAPSULE, DELAYED RELEASE ORAL DAILY
Qty: 90 CAPSULE | Refills: 2 | Status: SHIPPED | OUTPATIENT
Start: 2022-04-07 | End: 2023-01-06

## 2022-05-17 ENCOUNTER — PATIENT MESSAGE (OUTPATIENT)
Dept: MEDICAL GROUP | Facility: PHYSICIAN GROUP | Age: 52
End: 2022-05-17

## 2022-05-17 DIAGNOSIS — E11.9 TYPE 2 DIABETES MELLITUS WITHOUT COMPLICATION, WITHOUT LONG-TERM CURRENT USE OF INSULIN (HCC): ICD-10-CM

## 2022-06-07 ENCOUNTER — HOSPITAL ENCOUNTER (OUTPATIENT)
Dept: LAB | Facility: MEDICAL CENTER | Age: 52
End: 2022-06-07
Attending: PHYSICIAN ASSISTANT
Payer: COMMERCIAL

## 2022-06-07 ENCOUNTER — HOSPITAL ENCOUNTER (OUTPATIENT)
Dept: LAB | Facility: MEDICAL CENTER | Age: 52
End: 2022-06-07
Attending: INTERNAL MEDICINE
Payer: COMMERCIAL

## 2022-06-07 ENCOUNTER — HOSPITAL ENCOUNTER (OUTPATIENT)
Dept: LAB | Facility: MEDICAL CENTER | Age: 52
End: 2022-06-07
Attending: FAMILY MEDICINE
Payer: COMMERCIAL

## 2022-06-07 DIAGNOSIS — E83.110 HEREDITARY HEMOCHROMATOSIS (HCC): ICD-10-CM

## 2022-06-07 DIAGNOSIS — E11.9 TYPE 2 DIABETES MELLITUS WITHOUT COMPLICATION, WITHOUT LONG-TERM CURRENT USE OF INSULIN (HCC): ICD-10-CM

## 2022-06-07 LAB
ALBUMIN SERPL BCP-MCNC: 4.4 G/DL (ref 3.2–4.9)
ALBUMIN SERPL BCP-MCNC: 4.5 G/DL (ref 3.2–4.9)
ALBUMIN/GLOB SERPL: 1.4 G/DL
ALBUMIN/GLOB SERPL: 1.6 G/DL
ALP SERPL-CCNC: 65 U/L (ref 30–99)
ALP SERPL-CCNC: 68 U/L (ref 30–99)
ALT SERPL-CCNC: 70 U/L (ref 2–50)
ALT SERPL-CCNC: 72 U/L (ref 2–50)
ANION GAP SERPL CALC-SCNC: 14 MMOL/L (ref 7–16)
ANION GAP SERPL CALC-SCNC: 16 MMOL/L (ref 7–16)
AST SERPL-CCNC: 81 U/L (ref 12–45)
AST SERPL-CCNC: 82 U/L (ref 12–45)
BASOPHILS # BLD AUTO: 0.9 % (ref 0–1.8)
BASOPHILS # BLD: 0.04 K/UL (ref 0–0.12)
BILIRUB SERPL-MCNC: 0.5 MG/DL (ref 0.1–1.5)
BILIRUB SERPL-MCNC: 0.5 MG/DL (ref 0.1–1.5)
BUN SERPL-MCNC: 11 MG/DL (ref 8–22)
BUN SERPL-MCNC: 11 MG/DL (ref 8–22)
CALCIUM SERPL-MCNC: 8.9 MG/DL (ref 8.5–10.5)
CALCIUM SERPL-MCNC: 9.1 MG/DL (ref 8.5–10.5)
CHLORIDE SERPL-SCNC: 100 MMOL/L (ref 96–112)
CHLORIDE SERPL-SCNC: 101 MMOL/L (ref 96–112)
CHOLEST SERPL-MCNC: 115 MG/DL (ref 100–199)
CO2 SERPL-SCNC: 22 MMOL/L (ref 20–33)
CO2 SERPL-SCNC: 23 MMOL/L (ref 20–33)
CREAT SERPL-MCNC: 0.63 MG/DL (ref 0.5–1.4)
CREAT SERPL-MCNC: 0.69 MG/DL (ref 0.5–1.4)
CREAT UR-MCNC: 163.98 MG/DL
EOSINOPHIL # BLD AUTO: 0.12 K/UL (ref 0–0.51)
EOSINOPHIL NFR BLD: 2.6 % (ref 0–6.9)
ERYTHROCYTE [DISTWIDTH] IN BLOOD BY AUTOMATED COUNT: 42.5 FL (ref 35.9–50)
EST. AVERAGE GLUCOSE BLD GHB EST-MCNC: 146 MG/DL
FASTING STATUS PATIENT QL REPORTED: NORMAL
FERRITIN SERPL-MCNC: 120 NG/ML (ref 10–291)
GFR SERPLBLD CREATININE-BSD FMLA CKD-EPI: 105 ML/MIN/1.73 M 2
GFR SERPLBLD CREATININE-BSD FMLA CKD-EPI: 108 ML/MIN/1.73 M 2
GLOBULIN SER CALC-MCNC: 2.9 G/DL (ref 1.9–3.5)
GLOBULIN SER CALC-MCNC: 3.1 G/DL (ref 1.9–3.5)
GLUCOSE SERPL-MCNC: 134 MG/DL (ref 65–99)
GLUCOSE SERPL-MCNC: 139 MG/DL (ref 65–99)
HBA1C MFR BLD: 6.7 % (ref 4–5.6)
HCT VFR BLD AUTO: 44.7 % (ref 37–47)
HDLC SERPL-MCNC: 50 MG/DL
HGB BLD-MCNC: 15 G/DL (ref 12–16)
IMM GRANULOCYTES # BLD AUTO: 0.02 K/UL (ref 0–0.11)
IMM GRANULOCYTES NFR BLD AUTO: 0.4 % (ref 0–0.9)
IRON SATN MFR SERPL: 28 % (ref 15–55)
IRON SERPL-MCNC: 113 UG/DL (ref 40–170)
LDLC SERPL CALC-MCNC: 55 MG/DL
LYMPHOCYTES # BLD AUTO: 1.63 K/UL (ref 1–4.8)
LYMPHOCYTES NFR BLD: 34.8 % (ref 22–41)
MCH RBC QN AUTO: 28.5 PG (ref 27–33)
MCHC RBC AUTO-ENTMCNC: 33.6 G/DL (ref 33.6–35)
MCV RBC AUTO: 84.8 FL (ref 81.4–97.8)
MICROALBUMIN UR-MCNC: 7.3 MG/DL
MICROALBUMIN/CREAT UR: 45 MG/G (ref 0–30)
MONOCYTES # BLD AUTO: 0.28 K/UL (ref 0–0.85)
MONOCYTES NFR BLD AUTO: 6 % (ref 0–13.4)
NEUTROPHILS # BLD AUTO: 2.59 K/UL (ref 2–7.15)
NEUTROPHILS NFR BLD: 55.3 % (ref 44–72)
NRBC # BLD AUTO: 0 K/UL
NRBC BLD-RTO: 0 /100 WBC
PLATELET # BLD AUTO: 245 K/UL (ref 164–446)
PMV BLD AUTO: 10.5 FL (ref 9–12.9)
POTASSIUM SERPL-SCNC: 3.8 MMOL/L (ref 3.6–5.5)
POTASSIUM SERPL-SCNC: 3.8 MMOL/L (ref 3.6–5.5)
PROT SERPL-MCNC: 7.4 G/DL (ref 6–8.2)
PROT SERPL-MCNC: 7.5 G/DL (ref 6–8.2)
RBC # BLD AUTO: 5.27 M/UL (ref 4.2–5.4)
SODIUM SERPL-SCNC: 138 MMOL/L (ref 135–145)
SODIUM SERPL-SCNC: 138 MMOL/L (ref 135–145)
TIBC SERPL-MCNC: 401 UG/DL (ref 250–450)
TRIGL SERPL-MCNC: 50 MG/DL (ref 0–149)
UIBC SERPL-MCNC: 288 UG/DL (ref 110–370)
WBC # BLD AUTO: 4.7 K/UL (ref 4.8–10.8)

## 2022-06-07 PROCEDURE — 85025 COMPLETE CBC W/AUTO DIFF WBC: CPT

## 2022-06-07 PROCEDURE — 82787 IGG 1 2 3 OR 4 EACH: CPT

## 2022-06-07 PROCEDURE — 80053 COMPREHEN METABOLIC PANEL: CPT | Mod: 91

## 2022-06-07 PROCEDURE — 82570 ASSAY OF URINE CREATININE: CPT

## 2022-06-07 PROCEDURE — 83036 HEMOGLOBIN GLYCOSYLATED A1C: CPT

## 2022-06-07 PROCEDURE — 80061 LIPID PANEL: CPT

## 2022-06-07 PROCEDURE — 36415 COLL VENOUS BLD VENIPUNCTURE: CPT

## 2022-06-07 PROCEDURE — 82728 ASSAY OF FERRITIN: CPT

## 2022-06-07 PROCEDURE — 80053 COMPREHEN METABOLIC PANEL: CPT

## 2022-06-07 PROCEDURE — 82043 UR ALBUMIN QUANTITATIVE: CPT

## 2022-06-07 PROCEDURE — 83540 ASSAY OF IRON: CPT

## 2022-06-07 PROCEDURE — 83550 IRON BINDING TEST: CPT

## 2022-06-09 LAB — IGG4 SER-MCNC: 26 MG/DL (ref 1–123)

## 2022-06-14 ENCOUNTER — OFFICE VISIT (OUTPATIENT)
Dept: MEDICAL GROUP | Facility: PHYSICIAN GROUP | Age: 52
End: 2022-06-14
Payer: COMMERCIAL

## 2022-06-14 VITALS
HEIGHT: 67 IN | TEMPERATURE: 98 F | DIASTOLIC BLOOD PRESSURE: 82 MMHG | SYSTOLIC BLOOD PRESSURE: 134 MMHG | HEART RATE: 98 BPM | BODY MASS INDEX: 28.72 KG/M2 | OXYGEN SATURATION: 95 % | WEIGHT: 183 LBS

## 2022-06-14 DIAGNOSIS — G44.89 OTHER HEADACHE SYNDROME: ICD-10-CM

## 2022-06-14 DIAGNOSIS — F41.9 ANXIETY: ICD-10-CM

## 2022-06-14 PROCEDURE — 99214 OFFICE O/P EST MOD 30 MIN: CPT | Performed by: FAMILY MEDICINE

## 2022-06-14 RX ORDER — HYDROXYZINE HYDROCHLORIDE 25 MG/1
25 TABLET, FILM COATED ORAL 3 TIMES DAILY PRN
Qty: 30 TABLET | Refills: 3 | Status: SHIPPED | OUTPATIENT
Start: 2022-06-14 | End: 2023-04-25

## 2022-06-14 RX ORDER — SUMATRIPTAN 100 MG/1
100 TABLET, FILM COATED ORAL
Qty: 10 TABLET | Refills: 3 | Status: SHIPPED | OUTPATIENT
Start: 2022-06-14 | End: 2022-07-25

## 2022-06-14 RX ORDER — ESCITALOPRAM OXALATE 20 MG/1
40 TABLET ORAL DAILY
Qty: 160 TABLET | Refills: 3 | Status: SHIPPED | OUTPATIENT
Start: 2022-06-14 | End: 2023-05-05

## 2022-06-14 RX ORDER — BUPROPION HYDROCHLORIDE 300 MG/1
300 TABLET ORAL EVERY MORNING
Qty: 90 TABLET | Refills: 1 | Status: SHIPPED | OUTPATIENT
Start: 2022-06-14 | End: 2022-12-28 | Stop reason: SDUPTHER

## 2022-06-14 ASSESSMENT — FIBROSIS 4 INDEX: FIB4 SCORE: 1.95

## 2022-06-14 NOTE — ASSESSMENT & PLAN NOTE
Chronic issue  Has followed up with psychiatry.   On ativan PRN. Also takes wellbutrin and lexapro.

## 2022-06-14 NOTE — ASSESSMENT & PLAN NOTE
New problem  Started about 17 days ago   Localized to frontal area and moves to the left upper eyebrow and eye area. No eye tearing.   No prior hx of this.   Does not wake up 2/2 headache  Lasts for many hours  Has had some allergies but not much sinus pressure off/on  Has tried APAP and excedrin with no help     Not the worse headache of her life

## 2022-06-14 NOTE — PROGRESS NOTES
Subjective:     Chief Complaint   Patient presents with   • Headache     x17 days, frontal lobe, worsens in the mornings and throbs through out the day   • Hot Flashes     X 2months, come and go, several times a day, had Hysterectomy 6/2024   • Sleep Problem     Sleep apnea , updating PCP    • Medication Management     Take over medication for anxiety, or possible referral       HPI:   Mónica presents today to discuss the following.    Other headache syndrome  New problem  Started about 17 days ago   Localized to frontal area and moves to the left upper eyebrow and eye area. No eye tearing.   No prior hx of this.   Does not wake up 2/2 headache  Lasts for many hours  Has had some allergies but not much sinus pressure off/on  Has tried APAP and excedrin with no help     Not the worse headache of her life     Anxiety  Chronic issue  Has followed up with psychiatry.   On ativan PRN. Also takes wellbutrin and lexapro.         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 10 Tablet 3   • hydrOXYzine HCl (ATARAX) 25 MG Tab Take 1 Tablet by mouth 3 times a day as needed for Anxiety. 30 Tablet 3   • buPROPion (WELLBUTRIN XL) 300 MG XL tablet Take 1 Tablet by mouth every morning. 90 Tablet 1   • escitalopram (LEXAPRO) 20 MG tablet Take 2 Tablets by mouth every day. 160 Tablet 3   • omeprazole (PRILOSEC) 20 MG delayed-release capsule Take 1 Capsule by mouth every day. 90 Capsule 2   • acetaminophen (TYLENOL) 500 MG Tab Take 1,000  "mg by mouth as needed for Mild Pain.     • metFORMIN (GLUCOPHAGE) 500 MG Tab Take 500 mg by mouth 2 times a day with meals.     • lisinopril (PRINIVIL) 20 MG Tab Take 1 Tablet by mouth every day. 90 Tablet 3   • Cholecalciferol (VITAMIN D3) 5000 UNIT/0.5ML Liquid Take 1 Capsule by mouth every day. Liquid capsule     • Coenzyme Q10 (COQ10 PO) Take 1 Tab by mouth every day.     • LORazepam (ATIVAN) 0.5 MG Tab Take 0.5 mg by mouth every 8 hours as needed for Anxiety.  2   • Biotin 5000 MCG Cap Take 5,000 mcg by mouth every day.       No current [facility]-ordered facility-administered medications on file.       Allergies:  Patient has no known allergies.    Health Maintenance: Completed    ROS:  Gen: no fevers/chills, no changes in weight  Eyes: no changes in vision  Pulm: no sob, no cough  CV: no chest pain, no palpitations  GI: no nausea/vomiting, no diarrhea      Objective:     Exam:  /82 (BP Location: Right arm, Patient Position: Sitting, BP Cuff Size: Adult)   Pulse 98   Temp 36.7 °C (98 °F) (Temporal)   Ht 1.702 m (5' 7\")   Wt 83 kg (183 lb)   LMP 06/14/2012   SpO2 95%   BMI 28.66 kg/m²  Body mass index is 28.66 kg/m².      Constitutional: Alert, no distress, well-groomed.  Skin: Warm, dry, good turgor, no rashes in visible areas.  Eye: Equal, round and reactive, conjunctiva clear, lids normal.  ENMT: Lips without lesions, good dentition, moist mucous membranes.  Neck: Trachea midline, no masses, no thyromegaly.  Respiratory: Unlabored respiratory effort, no cough.  MSK: Normal gait, moves all extremities.  Neuro: Grossly non-focal.   Psych: Alert and oriented x3, normal affect and mood.        Assessment & Plan:     52 y.o. female with the following -     1. Other headache syndrome  New problem.  Unknown etiology and prognosis.  New onset headache with new features and frequency.  We will proceed with imaging and a trial of Imitrex.  - MR-MRA HEAD-W/O; Future  - sumatriptan (IMITREX) 100 MG tablet; Take 1 " Tablet by mouth one time as needed for Migraine for up to 1 dose.  Dispense: 10 Tablet; Refill: 3    2. Anxiety  Chronic, stable condition.  We will try to replace Ativan for hydroxyzine.  Continue with Wellbutrin and Lexapro.      Return in about 6 weeks (around 7/26/2022).    Please note that this dictation was created using voice recognition software. I have made every reasonable attempt to correct obvious errors, but I expect that there are errors of grammar and possibly content that I did not discover before finalizing the note.

## 2022-06-23 ENCOUNTER — APPOINTMENT (OUTPATIENT)
Dept: RADIOLOGY | Facility: MEDICAL CENTER | Age: 52
DRG: 301 | End: 2022-06-23
Attending: PSYCHIATRY & NEUROLOGY
Payer: COMMERCIAL

## 2022-06-23 ENCOUNTER — HOSPITAL ENCOUNTER (INPATIENT)
Facility: MEDICAL CENTER | Age: 52
LOS: 1 days | DRG: 301 | End: 2022-06-24
Attending: EMERGENCY MEDICINE | Admitting: INTERNAL MEDICINE
Payer: COMMERCIAL

## 2022-06-23 ENCOUNTER — HOSPITAL ENCOUNTER (OUTPATIENT)
Dept: RADIOLOGY | Facility: MEDICAL CENTER | Age: 52
End: 2022-06-23
Attending: FAMILY MEDICINE
Payer: COMMERCIAL

## 2022-06-23 DIAGNOSIS — I10 PRIMARY HYPERTENSION: ICD-10-CM

## 2022-06-23 DIAGNOSIS — G44.89 OTHER HEADACHE SYNDROME: ICD-10-CM

## 2022-06-23 DIAGNOSIS — I77.71 CAROTID ARTERY DISSECTION (HCC): ICD-10-CM

## 2022-06-23 LAB
ALBUMIN SERPL BCP-MCNC: 4.4 G/DL (ref 3.2–4.9)
ALBUMIN/GLOB SERPL: 1.5 G/DL
ALP SERPL-CCNC: 63 U/L (ref 30–99)
ALT SERPL-CCNC: 61 U/L (ref 2–50)
ANION GAP SERPL CALC-SCNC: 14 MMOL/L (ref 7–16)
AST SERPL-CCNC: 54 U/L (ref 12–45)
BASOPHILS # BLD AUTO: 0.6 % (ref 0–1.8)
BASOPHILS # BLD: 0.04 K/UL (ref 0–0.12)
BILIRUB SERPL-MCNC: 0.3 MG/DL (ref 0.1–1.5)
BUN SERPL-MCNC: 12 MG/DL (ref 8–22)
CALCIUM SERPL-MCNC: 9.3 MG/DL (ref 8.5–10.5)
CHLORIDE SERPL-SCNC: 101 MMOL/L (ref 96–112)
CO2 SERPL-SCNC: 24 MMOL/L (ref 20–33)
CREAT SERPL-MCNC: 0.6 MG/DL (ref 0.5–1.4)
EOSINOPHIL # BLD AUTO: 0.15 K/UL (ref 0–0.51)
EOSINOPHIL NFR BLD: 2.4 % (ref 0–6.9)
ERYTHROCYTE [DISTWIDTH] IN BLOOD BY AUTOMATED COUNT: 39.6 FL (ref 35.9–50)
GFR SERPLBLD CREATININE-BSD FMLA CKD-EPI: 109 ML/MIN/1.73 M 2
GLOBULIN SER CALC-MCNC: 2.9 G/DL (ref 1.9–3.5)
GLUCOSE SERPL-MCNC: 116 MG/DL (ref 65–99)
HCT VFR BLD AUTO: 41.3 % (ref 37–47)
HGB BLD-MCNC: 14.1 G/DL (ref 12–16)
IMM GRANULOCYTES # BLD AUTO: 0.02 K/UL (ref 0–0.11)
IMM GRANULOCYTES NFR BLD AUTO: 0.3 % (ref 0–0.9)
INR PPP: 1 (ref 0.87–1.13)
LYMPHOCYTES # BLD AUTO: 2.13 K/UL (ref 1–4.8)
LYMPHOCYTES NFR BLD: 34 % (ref 22–41)
MCH RBC QN AUTO: 28.3 PG (ref 27–33)
MCHC RBC AUTO-ENTMCNC: 34.1 G/DL (ref 33.6–35)
MCV RBC AUTO: 82.8 FL (ref 81.4–97.8)
MONOCYTES # BLD AUTO: 0.44 K/UL (ref 0–0.85)
MONOCYTES NFR BLD AUTO: 7 % (ref 0–13.4)
NEUTROPHILS # BLD AUTO: 3.49 K/UL (ref 2–7.15)
NEUTROPHILS NFR BLD: 55.7 % (ref 44–72)
NRBC # BLD AUTO: 0 K/UL
NRBC BLD-RTO: 0 /100 WBC
PLATELET # BLD AUTO: 237 K/UL (ref 164–446)
PMV BLD AUTO: 9.2 FL (ref 9–12.9)
POTASSIUM SERPL-SCNC: 3.9 MMOL/L (ref 3.6–5.5)
PROT SERPL-MCNC: 7.3 G/DL (ref 6–8.2)
PROTHROMBIN TIME: 12.9 SEC (ref 12–14.6)
RBC # BLD AUTO: 4.99 M/UL (ref 4.2–5.4)
SODIUM SERPL-SCNC: 139 MMOL/L (ref 135–145)
TROPONIN T SERPL-MCNC: <6 NG/L (ref 6–19)
WBC # BLD AUTO: 6.3 K/UL (ref 4.8–10.8)

## 2022-06-23 PROCEDURE — 99223 1ST HOSP IP/OBS HIGH 75: CPT | Performed by: INTERNAL MEDICINE

## 2022-06-23 PROCEDURE — A9270 NON-COVERED ITEM OR SERVICE: HCPCS | Performed by: EMERGENCY MEDICINE

## 2022-06-23 PROCEDURE — 80053 COMPREHEN METABOLIC PANEL: CPT

## 2022-06-23 PROCEDURE — 70544 MR ANGIOGRAPHY HEAD W/O DYE: CPT

## 2022-06-23 PROCEDURE — 700111 HCHG RX REV CODE 636 W/ 250 OVERRIDE (IP): Performed by: EMERGENCY MEDICINE

## 2022-06-23 PROCEDURE — 96374 THER/PROPH/DIAG INJ IV PUSH: CPT

## 2022-06-23 PROCEDURE — 96375 TX/PRO/DX INJ NEW DRUG ADDON: CPT

## 2022-06-23 PROCEDURE — 85025 COMPLETE CBC W/AUTO DIFF WBC: CPT

## 2022-06-23 PROCEDURE — 700111 HCHG RX REV CODE 636 W/ 250 OVERRIDE (IP): Performed by: INTERNAL MEDICINE

## 2022-06-23 PROCEDURE — 99222 1ST HOSP IP/OBS MODERATE 55: CPT | Performed by: PSYCHIATRY & NEUROLOGY

## 2022-06-23 PROCEDURE — 99285 EMERGENCY DEPT VISIT HI MDM: CPT

## 2022-06-23 PROCEDURE — 96372 THER/PROPH/DIAG INJ SC/IM: CPT

## 2022-06-23 PROCEDURE — 85610 PROTHROMBIN TIME: CPT

## 2022-06-23 PROCEDURE — 36415 COLL VENOUS BLD VENIPUNCTURE: CPT

## 2022-06-23 PROCEDURE — 770020 HCHG ROOM/CARE - TELE (206)

## 2022-06-23 PROCEDURE — 84484 ASSAY OF TROPONIN QUANT: CPT

## 2022-06-23 PROCEDURE — 700102 HCHG RX REV CODE 250 W/ 637 OVERRIDE(OP): Performed by: EMERGENCY MEDICINE

## 2022-06-23 RX ORDER — ONDANSETRON 4 MG/1
4 TABLET, ORALLY DISINTEGRATING ORAL EVERY 4 HOURS PRN
Status: DISCONTINUED | OUTPATIENT
Start: 2022-06-23 | End: 2022-06-24 | Stop reason: HOSPADM

## 2022-06-23 RX ORDER — PROMETHAZINE HYDROCHLORIDE 25 MG/1
12.5-25 TABLET ORAL EVERY 4 HOURS PRN
Status: DISCONTINUED | OUTPATIENT
Start: 2022-06-23 | End: 2022-06-24 | Stop reason: HOSPADM

## 2022-06-23 RX ORDER — HYDROXYZINE HYDROCHLORIDE 25 MG/1
25 TABLET, FILM COATED ORAL 3 TIMES DAILY PRN
Status: DISCONTINUED | OUTPATIENT
Start: 2022-06-23 | End: 2022-06-24 | Stop reason: HOSPADM

## 2022-06-23 RX ORDER — OXYCODONE HYDROCHLORIDE 5 MG/1
5 TABLET ORAL
Status: DISCONTINUED | OUTPATIENT
Start: 2022-06-23 | End: 2022-06-24 | Stop reason: HOSPADM

## 2022-06-23 RX ORDER — BUPROPION HYDROCHLORIDE 150 MG/1
150 TABLET, EXTENDED RELEASE ORAL 2 TIMES DAILY
Status: DISCONTINUED | OUTPATIENT
Start: 2022-06-24 | End: 2022-06-24 | Stop reason: HOSPADM

## 2022-06-23 RX ORDER — BUPROPION HYDROCHLORIDE 150 MG/1
300 TABLET, EXTENDED RELEASE ORAL 2 TIMES DAILY
Status: DISCONTINUED | OUTPATIENT
Start: 2022-06-24 | End: 2022-06-23

## 2022-06-23 RX ORDER — AMOXICILLIN 250 MG
2 CAPSULE ORAL 2 TIMES DAILY
Status: DISCONTINUED | OUTPATIENT
Start: 2022-06-23 | End: 2022-06-24 | Stop reason: HOSPADM

## 2022-06-23 RX ORDER — ACETAMINOPHEN 325 MG/1
650 TABLET ORAL EVERY 6 HOURS PRN
Status: DISCONTINUED | OUTPATIENT
Start: 2022-06-23 | End: 2022-06-24 | Stop reason: HOSPADM

## 2022-06-23 RX ORDER — MORPHINE SULFATE 4 MG/ML
2 INJECTION INTRAVENOUS ONCE
Status: COMPLETED | OUTPATIENT
Start: 2022-06-23 | End: 2022-06-23

## 2022-06-23 RX ORDER — HYDRALAZINE HYDROCHLORIDE 20 MG/ML
10 INJECTION INTRAMUSCULAR; INTRAVENOUS EVERY 4 HOURS PRN
Status: DISCONTINUED | OUTPATIENT
Start: 2022-06-23 | End: 2022-06-24 | Stop reason: HOSPADM

## 2022-06-23 RX ORDER — ASPIRIN 325 MG
325 TABLET ORAL DAILY
Status: DISCONTINUED | OUTPATIENT
Start: 2022-06-24 | End: 2022-06-24 | Stop reason: HOSPADM

## 2022-06-23 RX ORDER — BISACODYL 10 MG
10 SUPPOSITORY, RECTAL RECTAL
Status: DISCONTINUED | OUTPATIENT
Start: 2022-06-23 | End: 2022-06-24 | Stop reason: HOSPADM

## 2022-06-23 RX ORDER — OXYCODONE HYDROCHLORIDE 10 MG/1
10 TABLET ORAL
Status: DISCONTINUED | OUTPATIENT
Start: 2022-06-23 | End: 2022-06-24 | Stop reason: HOSPADM

## 2022-06-23 RX ORDER — LORAZEPAM 1 MG/1
0.5 TABLET ORAL ONCE
Status: COMPLETED | OUTPATIENT
Start: 2022-06-23 | End: 2022-06-23

## 2022-06-23 RX ORDER — HYDROMORPHONE HYDROCHLORIDE 1 MG/ML
0.5 INJECTION, SOLUTION INTRAMUSCULAR; INTRAVENOUS; SUBCUTANEOUS
Status: DISCONTINUED | OUTPATIENT
Start: 2022-06-23 | End: 2022-06-24 | Stop reason: HOSPADM

## 2022-06-23 RX ORDER — ESCITALOPRAM OXALATE 10 MG/1
40 TABLET ORAL DAILY
Status: DISCONTINUED | OUTPATIENT
Start: 2022-06-24 | End: 2022-06-24 | Stop reason: HOSPADM

## 2022-06-23 RX ORDER — ENOXAPARIN SODIUM 100 MG/ML
40 INJECTION SUBCUTANEOUS DAILY
Status: DISCONTINUED | OUTPATIENT
Start: 2022-06-23 | End: 2022-06-24 | Stop reason: HOSPADM

## 2022-06-23 RX ORDER — POLYETHYLENE GLYCOL 3350 17 G/17G
1 POWDER, FOR SOLUTION ORAL
Status: DISCONTINUED | OUTPATIENT
Start: 2022-06-23 | End: 2022-06-24 | Stop reason: HOSPADM

## 2022-06-23 RX ORDER — LORAZEPAM 2 MG/ML
1 INJECTION INTRAMUSCULAR
Status: COMPLETED | OUTPATIENT
Start: 2022-06-23 | End: 2022-06-23

## 2022-06-23 RX ORDER — OMEPRAZOLE 20 MG/1
20 CAPSULE, DELAYED RELEASE ORAL DAILY
Status: DISCONTINUED | OUTPATIENT
Start: 2022-06-24 | End: 2022-06-24 | Stop reason: HOSPADM

## 2022-06-23 RX ORDER — CLOPIDOGREL 300 MG/1
300 TABLET, FILM COATED ORAL ONCE
Status: COMPLETED | OUTPATIENT
Start: 2022-06-23 | End: 2022-06-23

## 2022-06-23 RX ORDER — PROCHLORPERAZINE EDISYLATE 5 MG/ML
5-10 INJECTION INTRAMUSCULAR; INTRAVENOUS EVERY 4 HOURS PRN
Status: DISCONTINUED | OUTPATIENT
Start: 2022-06-23 | End: 2022-06-24 | Stop reason: HOSPADM

## 2022-06-23 RX ORDER — ONDANSETRON 2 MG/ML
4 INJECTION INTRAMUSCULAR; INTRAVENOUS ONCE
Status: COMPLETED | OUTPATIENT
Start: 2022-06-23 | End: 2022-06-23

## 2022-06-23 RX ORDER — LISINOPRIL 20 MG/1
20 TABLET ORAL DAILY
Status: DISCONTINUED | OUTPATIENT
Start: 2022-06-24 | End: 2022-06-24 | Stop reason: HOSPADM

## 2022-06-23 RX ORDER — ONDANSETRON 2 MG/ML
4 INJECTION INTRAMUSCULAR; INTRAVENOUS EVERY 4 HOURS PRN
Status: DISCONTINUED | OUTPATIENT
Start: 2022-06-23 | End: 2022-06-24 | Stop reason: HOSPADM

## 2022-06-23 RX ORDER — LORAZEPAM 1 MG/1
0.5 TABLET ORAL EVERY 8 HOURS PRN
Status: DISCONTINUED | OUTPATIENT
Start: 2022-06-23 | End: 2022-06-24 | Stop reason: HOSPADM

## 2022-06-23 RX ORDER — VITAMIN B COMPLEX
1000 TABLET ORAL DAILY
Status: DISCONTINUED | OUTPATIENT
Start: 2022-06-24 | End: 2022-06-24 | Stop reason: HOSPADM

## 2022-06-23 RX ORDER — PROMETHAZINE HYDROCHLORIDE 25 MG/1
12.5-25 SUPPOSITORY RECTAL EVERY 4 HOURS PRN
Status: DISCONTINUED | OUTPATIENT
Start: 2022-06-23 | End: 2022-06-24 | Stop reason: HOSPADM

## 2022-06-23 RX ADMIN — ONDANSETRON HYDROCHLORIDE 4 MG: 2 SOLUTION INTRAMUSCULAR; INTRAVENOUS at 18:31

## 2022-06-23 RX ADMIN — LORAZEPAM 0.5 MG: 1 TABLET ORAL at 18:31

## 2022-06-23 RX ADMIN — LORAZEPAM 1 MG: 2 INJECTION INTRAMUSCULAR; INTRAVENOUS at 22:43

## 2022-06-23 RX ADMIN — ENOXAPARIN SODIUM 40 MG: 40 INJECTION SUBCUTANEOUS at 19:53

## 2022-06-23 RX ADMIN — MORPHINE SULFATE 2 MG: 4 INJECTION INTRAVENOUS at 18:30

## 2022-06-23 RX ADMIN — CLOPIDOGREL BISULFATE 300 MG: 300 TABLET, FILM COATED ORAL at 18:31

## 2022-06-23 ASSESSMENT — ENCOUNTER SYMPTOMS
FLANK PAIN: 0
HEMOPTYSIS: 0
NECK PAIN: 0
COUGH: 0
SPUTUM PRODUCTION: 0
PALPITATIONS: 0
HEARTBURN: 0
POLYDIPSIA: 0
PHOTOPHOBIA: 0
ORTHOPNEA: 0
BLURRED VISION: 0
WEIGHT LOSS: 0
FOCAL WEAKNESS: 0
VOMITING: 0
NERVOUS/ANXIOUS: 0
BRUISES/BLEEDS EASILY: 0
HEADACHES: 1
HALLUCINATIONS: 0
CHILLS: 0
DOUBLE VISION: 0
NAUSEA: 0
SPEECH CHANGE: 0
TREMORS: 0
FEVER: 0
BACK PAIN: 0

## 2022-06-23 ASSESSMENT — PAIN DESCRIPTION - PAIN TYPE: TYPE: ACUTE PAIN

## 2022-06-23 ASSESSMENT — FIBROSIS 4 INDEX: FIB4 SCORE: 1.95

## 2022-06-23 ASSESSMENT — LIFESTYLE VARIABLES
SUBSTANCE_ABUSE: 0
DO YOU DRINK ALCOHOL: NO

## 2022-06-23 NOTE — PROGRESS NOTES
Discussed MRI results with radiologist.  I have called the patient and personally discussed the results with her and I have directed the patient to the emergency room for more prompt, safest evaluation.

## 2022-06-24 VITALS
RESPIRATION RATE: 15 BRPM | WEIGHT: 183.86 LBS | HEIGHT: 70 IN | OXYGEN SATURATION: 99 % | TEMPERATURE: 96.8 F | SYSTOLIC BLOOD PRESSURE: 149 MMHG | HEART RATE: 87 BPM | DIASTOLIC BLOOD PRESSURE: 94 MMHG | BODY MASS INDEX: 26.32 KG/M2

## 2022-06-24 DIAGNOSIS — I77.71 DISSECTION OF CAROTID ARTERY (HCC): ICD-10-CM

## 2022-06-24 LAB
ALBUMIN SERPL BCP-MCNC: 3.9 G/DL (ref 3.2–4.9)
ALBUMIN/GLOB SERPL: 1.5 G/DL
ALP SERPL-CCNC: 58 U/L (ref 30–99)
ALT SERPL-CCNC: 51 U/L (ref 2–50)
ANION GAP SERPL CALC-SCNC: 12 MMOL/L (ref 7–16)
AST SERPL-CCNC: 45 U/L (ref 12–45)
BASOPHILS # BLD AUTO: 0.5 % (ref 0–1.8)
BASOPHILS # BLD: 0.03 K/UL (ref 0–0.12)
BILIRUB SERPL-MCNC: 0.5 MG/DL (ref 0.1–1.5)
BUN SERPL-MCNC: 11 MG/DL (ref 8–22)
CALCIUM SERPL-MCNC: 8.4 MG/DL (ref 8.5–10.5)
CHLORIDE SERPL-SCNC: 100 MMOL/L (ref 96–112)
CO2 SERPL-SCNC: 26 MMOL/L (ref 20–33)
CREAT SERPL-MCNC: 0.62 MG/DL (ref 0.5–1.4)
EOSINOPHIL # BLD AUTO: 0.16 K/UL (ref 0–0.51)
EOSINOPHIL NFR BLD: 2.5 % (ref 0–6.9)
ERYTHROCYTE [DISTWIDTH] IN BLOOD BY AUTOMATED COUNT: 39.4 FL (ref 35.9–50)
GFR SERPLBLD CREATININE-BSD FMLA CKD-EPI: 108 ML/MIN/1.73 M 2
GLOBULIN SER CALC-MCNC: 2.6 G/DL (ref 1.9–3.5)
GLUCOSE BLD STRIP.AUTO-MCNC: 155 MG/DL (ref 65–99)
GLUCOSE SERPL-MCNC: 105 MG/DL (ref 65–99)
HCT VFR BLD AUTO: 39.4 % (ref 37–47)
HGB BLD-MCNC: 13.3 G/DL (ref 12–16)
IMM GRANULOCYTES # BLD AUTO: 0.02 K/UL (ref 0–0.11)
IMM GRANULOCYTES NFR BLD AUTO: 0.3 % (ref 0–0.9)
LYMPHOCYTES # BLD AUTO: 2.55 K/UL (ref 1–4.8)
LYMPHOCYTES NFR BLD: 40.6 % (ref 22–41)
MCH RBC QN AUTO: 28.1 PG (ref 27–33)
MCHC RBC AUTO-ENTMCNC: 33.8 G/DL (ref 33.6–35)
MCV RBC AUTO: 83.1 FL (ref 81.4–97.8)
MONOCYTES # BLD AUTO: 0.44 K/UL (ref 0–0.85)
MONOCYTES NFR BLD AUTO: 7 % (ref 0–13.4)
NEUTROPHILS # BLD AUTO: 3.08 K/UL (ref 2–7.15)
NEUTROPHILS NFR BLD: 49.1 % (ref 44–72)
NRBC # BLD AUTO: 0 K/UL
NRBC BLD-RTO: 0 /100 WBC
PLATELET # BLD AUTO: 249 K/UL (ref 164–446)
PMV BLD AUTO: 10.2 FL (ref 9–12.9)
POTASSIUM SERPL-SCNC: 3.5 MMOL/L (ref 3.6–5.5)
PROT SERPL-MCNC: 6.5 G/DL (ref 6–8.2)
RBC # BLD AUTO: 4.74 M/UL (ref 4.2–5.4)
SODIUM SERPL-SCNC: 138 MMOL/L (ref 135–145)
WBC # BLD AUTO: 6.3 K/UL (ref 4.8–10.8)

## 2022-06-24 PROCEDURE — 700102 HCHG RX REV CODE 250 W/ 637 OVERRIDE(OP): Performed by: EMERGENCY MEDICINE

## 2022-06-24 PROCEDURE — A9270 NON-COVERED ITEM OR SERVICE: HCPCS | Performed by: STUDENT IN AN ORGANIZED HEALTH CARE EDUCATION/TRAINING PROGRAM

## 2022-06-24 PROCEDURE — A9270 NON-COVERED ITEM OR SERVICE: HCPCS | Performed by: EMERGENCY MEDICINE

## 2022-06-24 PROCEDURE — A9576 INJ PROHANCE MULTIPACK: HCPCS | Performed by: PSYCHIATRY & NEUROLOGY

## 2022-06-24 PROCEDURE — 99239 HOSP IP/OBS DSCHRG MGMT >30: CPT | Performed by: STUDENT IN AN ORGANIZED HEALTH CARE EDUCATION/TRAINING PROGRAM

## 2022-06-24 PROCEDURE — 85025 COMPLETE CBC W/AUTO DIFF WBC: CPT

## 2022-06-24 PROCEDURE — 700102 HCHG RX REV CODE 250 W/ 637 OVERRIDE(OP): Performed by: STUDENT IN AN ORGANIZED HEALTH CARE EDUCATION/TRAINING PROGRAM

## 2022-06-24 PROCEDURE — 36415 COLL VENOUS BLD VENIPUNCTURE: CPT

## 2022-06-24 PROCEDURE — 70551 MRI BRAIN STEM W/O DYE: CPT

## 2022-06-24 PROCEDURE — 82962 GLUCOSE BLOOD TEST: CPT

## 2022-06-24 PROCEDURE — 80053 COMPREHEN METABOLIC PANEL: CPT

## 2022-06-24 PROCEDURE — 70549 MR ANGIOGRAPH NECK W/O&W/DYE: CPT

## 2022-06-24 PROCEDURE — 700117 HCHG RX CONTRAST REV CODE 255: Performed by: PSYCHIATRY & NEUROLOGY

## 2022-06-24 PROCEDURE — A9270 NON-COVERED ITEM OR SERVICE: HCPCS | Performed by: INTERNAL MEDICINE

## 2022-06-24 PROCEDURE — 700102 HCHG RX REV CODE 250 W/ 637 OVERRIDE(OP): Performed by: INTERNAL MEDICINE

## 2022-06-24 RX ORDER — ACETAMINOPHEN 325 MG/1
650 TABLET ORAL EVERY 6 HOURS PRN
Qty: 30 TABLET | Refills: 0 | Status: SHIPPED | OUTPATIENT
Start: 2022-06-24 | End: 2022-12-05

## 2022-06-24 RX ORDER — AMLODIPINE BESYLATE 5 MG/1
5 TABLET ORAL DAILY
Qty: 30 TABLET | Refills: 0 | Status: SHIPPED | OUTPATIENT
Start: 2022-06-24 | End: 2022-07-24

## 2022-06-24 RX ORDER — ASPIRIN 81 MG/1
81 TABLET ORAL DAILY
Qty: 100 TABLET | Refills: 0 | Status: SHIPPED | OUTPATIENT
Start: 2022-06-24 | End: 2024-01-09

## 2022-06-24 RX ORDER — POTASSIUM CHLORIDE 20 MEQ/1
40 TABLET, EXTENDED RELEASE ORAL ONCE
Status: COMPLETED | OUTPATIENT
Start: 2022-06-24 | End: 2022-06-24

## 2022-06-24 RX ORDER — OXYCODONE HYDROCHLORIDE 10 MG/1
10 TABLET ORAL EVERY 6 HOURS PRN
Qty: 12 TABLET | Refills: 0 | Status: SHIPPED | OUTPATIENT
Start: 2022-06-24 | End: 2022-06-27

## 2022-06-24 RX ORDER — CLOPIDOGREL BISULFATE 75 MG/1
75 TABLET ORAL DAILY
Status: DISCONTINUED | OUTPATIENT
Start: 2022-06-24 | End: 2022-06-24 | Stop reason: HOSPADM

## 2022-06-24 RX ADMIN — OXYCODONE HYDROCHLORIDE 10 MG: 10 TABLET ORAL at 13:32

## 2022-06-24 RX ADMIN — BUPROPION HYDROCHLORIDE 150 MG: 150 TABLET, EXTENDED RELEASE ORAL at 06:12

## 2022-06-24 RX ADMIN — CLOPIDOGREL BISULFATE 75 MG: 75 TABLET ORAL at 06:12

## 2022-06-24 RX ADMIN — ASPIRIN 325 MG: 325 TABLET ORAL at 06:13

## 2022-06-24 RX ADMIN — OMEPRAZOLE 20 MG: 20 CAPSULE, DELAYED RELEASE ORAL at 06:11

## 2022-06-24 RX ADMIN — LISINOPRIL 20 MG: 20 TABLET ORAL at 06:00

## 2022-06-24 RX ADMIN — POTASSIUM CHLORIDE 40 MEQ: 1500 TABLET, EXTENDED RELEASE ORAL at 11:24

## 2022-06-24 RX ADMIN — ACETAMINOPHEN 650 MG: 325 TABLET ORAL at 13:32

## 2022-06-24 RX ADMIN — Medication 1000 UNITS: at 09:04

## 2022-06-24 RX ADMIN — GADOTERIDOL 18 ML: 279.3 INJECTION, SOLUTION INTRAVENOUS at 00:15

## 2022-06-24 RX ADMIN — ESCITALOPRAM OXALATE 40 MG: 10 TABLET ORAL at 06:11

## 2022-06-24 ASSESSMENT — LIFESTYLE VARIABLES
TOTAL SCORE: 0
TOTAL SCORE: 0
CONSUMPTION TOTAL: NEGATIVE
TOTAL SCORE: 0
ON A TYPICAL DAY WHEN YOU DRINK ALCOHOL HOW MANY DRINKS DO YOU HAVE: 0
EVER FELT BAD OR GUILTY ABOUT YOUR DRINKING: NO
HAVE PEOPLE ANNOYED YOU BY CRITICIZING YOUR DRINKING: NO
AVERAGE NUMBER OF DAYS PER WEEK YOU HAVE A DRINK CONTAINING ALCOHOL: 0
HAVE YOU EVER FELT YOU SHOULD CUT DOWN ON YOUR DRINKING: NO
ALCOHOL_USE: NO
HOW MANY TIMES IN THE PAST YEAR HAVE YOU HAD 5 OR MORE DRINKS IN A DAY: 0
EVER HAD A DRINK FIRST THING IN THE MORNING TO STEADY YOUR NERVES TO GET RID OF A HANGOVER: NO
DOES PATIENT WANT TO STOP DRINKING: NO

## 2022-06-24 ASSESSMENT — COGNITIVE AND FUNCTIONAL STATUS - GENERAL
DAILY ACTIVITIY SCORE: 24
MOBILITY SCORE: 24
SUGGESTED CMS G CODE MODIFIER MOBILITY: CH
SUGGESTED CMS G CODE MODIFIER DAILY ACTIVITY: CH

## 2022-06-24 ASSESSMENT — PATIENT HEALTH QUESTIONNAIRE - PHQ9
SUM OF ALL RESPONSES TO PHQ9 QUESTIONS 1 AND 2: 0
1. LITTLE INTEREST OR PLEASURE IN DOING THINGS: NOT AT ALL
2. FEELING DOWN, DEPRESSED, IRRITABLE, OR HOPELESS: NOT AT ALL

## 2022-06-24 NOTE — ED NOTES
Floor RN @ bedside to transport pt. All belongings with pt. NAD. AO4. VSS. Respirations equal and unlabored.

## 2022-06-24 NOTE — H&P
Hospital Medicine History & Physical Note    Date of Service  6/23/2022    Primary Care Physician  Eren Heath M.D.    Consultants  Vascular surgery Dr. Alonso  Discussed with Dr. Ivy/neurology    Code Status  Full Code    Chief Complaint  Chief Complaint   Patient presents with   • Sent by MD     Had MRI today.     Left distal cervical internal carotid artery dissection with luminal compromise without complete occlusion.    • Head Ache     X4 weeks        History of Presenting Illness  Mónica Keita is a 52 y.o. female with history of hypertension who presented 6/23/2022 with abnormal MRA of the head showing left internal carotid artery dissection.  Patient has been having left-sided abdominal headache 6 out of 10 in the last 4 weeks without alleviating aggravating factors, resistant to Tylenol and Advil.  Patient denies any focal symptoms, vision changes, dizziness, ataxia.  Denies any preceding trauma or visit to chiropractor.  MRI was done as outpatient per PCP order.  Sequently she was referred to ER with ICA dissection.  Dr. Wilhelm recommends loading with aspirin and Plavix which was done.  I spoke to Dr. Ivy, who recommended MRI of the brain without contrast, MRA of the neck with and without contrast ordered additionally.    I discussed the plan of care with patient.    Review of Systems  Review of Systems   Constitutional: Negative for chills, fever and weight loss.   HENT: Negative for ear pain, hearing loss and tinnitus.    Eyes: Negative for blurred vision, double vision and photophobia.   Respiratory: Negative for cough, hemoptysis and sputum production.    Cardiovascular: Negative for chest pain, palpitations and orthopnea.   Gastrointestinal: Negative for heartburn, nausea and vomiting.   Genitourinary: Negative for dysuria, flank pain, frequency and hematuria.   Musculoskeletal: Negative for back pain, joint pain and neck pain.   Skin: Negative for itching and rash.    Neurological: Positive for headaches. Negative for tremors, speech change and focal weakness.   Endo/Heme/Allergies: Negative for environmental allergies and polydipsia. Does not bruise/bleed easily.   Psychiatric/Behavioral: Negative for hallucinations and substance abuse. The patient is not nervous/anxious.        Past Medical History   has a past medical history of Adverse effect of anesthesia, Anemia, Anxiety, Bronchitis (2009), Diabetes (HCC), Elevated liver enzymes, Hypertension, Neuroendocrine tumor (11/2021), and Pneumonia (2009).    Surgical History   has a past surgical history that includes hysterectomy, vaginal (06/04/2014); pelvic exam under anesthesia (6/17/2014); vaginal hysterectomy total (6/17/2014); appendectomy (2014); pr upper gi endoscopy,diagnosis (N/A, 11/1/2021); egd w/endoscopic ultrasound (N/A, 11/1/2021); and craniotomy.     Family History  family history includes Heart Attack in her brother; Heart Disease in her father; No Known Problems in her daughter, sister, and son.   Family history reviewed with patient. There is no family history that is pertinent to the chief complaint.     Social History   reports that she has never smoked. She has never used smokeless tobacco. She reports that she does not drink alcohol and does not use drugs.    Allergies  No Known Allergies    Medications  Prior to Admission Medications   Prescriptions Last Dose Informant Patient Reported? Taking?   Biotin 5000 MCG Cap  Patient Yes No   Sig: Take 5,000 mcg by mouth every day.   Cholecalciferol (VITAMIN D3) 5000 UNIT/0.5ML Liquid  Patient Yes No   Sig: Take 1 Capsule by mouth every day. Liquid capsule   Coenzyme Q10 (COQ10 PO)  Patient Yes No   Sig: Take 1 Tab by mouth every day.   Ferrous Sulfate (IRON PO)   Yes No   LORazepam (ATIVAN) 0.5 MG Tab  Patient Yes No   Sig: Take 0.5 mg by mouth every 8 hours as needed for Anxiety.   acetaminophen (TYLENOL) 500 MG Tab  Patient Yes No   Sig: Take 1,000 mg by mouth  as needed for Mild Pain.   buPROPion (WELLBUTRIN XL) 300 MG XL tablet   No No   Sig: Take 1 Tablet by mouth every morning.   escitalopram (LEXAPRO) 20 MG tablet   No No   Sig: Take 2 Tablets by mouth every day.   hydrOXYzine HCl (ATARAX) 25 MG Tab   No No   Sig: Take 1 Tablet by mouth 3 times a day as needed for Anxiety.   lisinopril (PRINIVIL) 20 MG Tab  Patient No No   Sig: Take 1 Tablet by mouth every day.   metFORMIN (GLUCOPHAGE) 500 MG Tab   No No   Sig: Take 1 Tablet by mouth 2 times a day with meals.   omeprazole (PRILOSEC) 20 MG delayed-release capsule   No No   Sig: Take 1 Capsule by mouth every day.   sumatriptan (IMITREX) 100 MG tablet   No No   Sig: Take 1 Tablet by mouth one time as needed for Migraine for up to 1 dose.      Facility-Administered Medications: None       Physical Exam  Temp:  [35.7 °C (96.2 °F)-36 °C (96.8 °F)] 36 °C (96.8 °F)  Pulse:  [] 96  Resp:  [14-20] 20  BP: (155-158)/(104-114) 158/104  SpO2:  [96 %-98 %] 96 %  Blood Pressure: (!) 158/104   Temperature: 36 °C (96.8 °F)   Pulse: 96   Respiration: 20   Pulse Oximetry: 96 %       Physical Exam  Vitals and nursing note reviewed.   Constitutional:       General: She is not in acute distress.     Appearance: Normal appearance.   HENT:      Head: Normocephalic and atraumatic.      Nose: Nose normal.      Mouth/Throat:      Mouth: Mucous membranes are moist.   Eyes:      Extraocular Movements: Extraocular movements intact.      Pupils: Pupils are equal, round, and reactive to light.   Cardiovascular:      Rate and Rhythm: Normal rate and regular rhythm.   Pulmonary:      Effort: Pulmonary effort is normal.      Breath sounds: Normal breath sounds.   Abdominal:      General: Abdomen is flat. There is no distension.      Tenderness: There is no abdominal tenderness.   Musculoskeletal:         General: No swelling or deformity. Normal range of motion.      Cervical back: Normal range of motion and neck supple.   Skin:     General: Skin  is warm and dry.   Neurological:      General: No focal deficit present.      Mental Status: She is alert and oriented to person, place, and time.   Psychiatric:         Mood and Affect: Mood normal.         Behavior: Behavior normal.         Laboratory:  Recent Labs     06/23/22  1729   WBC 6.3   RBC 4.99   HEMOGLOBIN 14.1   HEMATOCRIT 41.3   MCV 82.8   MCH 28.3   MCHC 34.1   RDW 39.6   PLATELETCT 237   MPV 9.2     Recent Labs     06/23/22  1729   SODIUM 139   POTASSIUM 3.9   CHLORIDE 101   CO2 24   GLUCOSE 116*   BUN 12   CREATININE 0.60   CALCIUM 9.3     Recent Labs     06/23/22  1729   ALTSGPT 61*   ASTSGOT 54*   ALKPHOSPHAT 63   TBILIRUBIN 0.3   GLUCOSE 116*     Recent Labs     06/23/22  1729   INR 1.00     No results for input(s): NTPROBNP in the last 72 hours.      Recent Labs     06/23/22  1729   TROPONINT <6       Imaging:  MR-BRAIN-W/O    (Results Pending)   MR-MRA NECK-W/O    (Results Pending)           Assessment/Plan:  Justification for Admission Status  I anticipate this patient will require at least two midnights for appropriate medical management, necessitating inpatient admission because Additional work-up with MRI, consultation, close observation with neurochecks    * Carotid artery dissection (HCC)- (present on admission)  Assessment & Plan  Spontaneous without preceding trauma  No associated neurological deficit  Pending MRI of the head without contrast, MRA of the neck with and without contrast   patient was given loading dose of Plavix, aspirin  Admit to neurology floor with neurochecks every 4 hours    Elevated LFTs- (present on admission)  Assessment & Plan  Chronic  Follow-up with PCP    DM (diabetes mellitus) (HCC)- (present on admission)  Assessment & Plan  We will hold metformin  Monitor    Anxiety- (present on admission)  Assessment & Plan  Ativan as needed      VTE prophylaxis: enoxaparin ppx

## 2022-06-24 NOTE — DISCHARGE SUMMARY
Discharge Summary    CHIEF COMPLAINT ON ADMISSION  Chief Complaint   Patient presents with   • Sent by MD     Had MRI today.     Left distal cervical internal carotid artery dissection with luminal compromise without complete occlusion.    • Head Ache     X4 weeks        Reason for Admission  Sent by MD     Admission Date  6/23/2022    CODE STATUS  Full Code    HPI & HOSPITAL COURSE  Mónica Keita is a 52 y.o. female with history of hypertension who presented 6/23/2022 with severe headache and outpatient MRA of the head showing left internal carotid artery dissection.   Denies any other neurological changes. Dr. Wilhelm recommends loading with aspirin and Plavix.    MRI brain without contrast showed Intramural hematoma involving the distal cervical and proximal petrous left internal carotid artery compatible with a dissection.  MRA neck showed Acute-subacute dissection with intramural hematoma involving the distal left cervical ICA just below the skull base. There is at least 50% luminal narrowing involving this segment.     Neurology consulted, recommended to continue aspirin 81 mg daily, repeat MRA 3 to 6 months to see whether the dissection has resolved.  No need for vascular surgeon evaluated per neurology.  I did check with vascular surgeon Dr. Ag since he was consulted per ERP.  No vascular intervention given it kristi up into the skull.  patient is cleared for discharge per neurology and vascular surgery.     BP stays high, added amlodipine, continue lisinopril 20 mg daily.  Patient was advised to keep blood pressure log at home and discuss with her PCP for further management.  BP goal is to keep in the normal range per neurology.       Therefore, she is discharged in good and stable condition to home with close outpatient follow-up.    The patient recovered much more quickly than anticipated on admission.    Discharge Date  6/24/2022    FOLLOW UP ITEMS POST DISCHARGE  - Follow up with primary  care physician in 1 week.   -Follow-up with neurology, repeat MRA 3 to 6 months to see whether the dissection has resolved.  - keep blood pressure log at home and discuss with her PCP for further management.  Please take the medications as instructed    Go to the local Emergency Department if you have any worsening condition.       DISCHARGE DIAGNOSES  Principal Problem:    Carotid artery dissection (HCC) POA: Yes  Active Problems:    Anxiety POA: Yes    DM (diabetes mellitus) (HCC) POA: Yes    Elevated LFTs POA: Yes  Resolved Problems:    * No resolved hospital problems. *      FOLLOW UP  Future Appointments   Date Time Provider Department Center   7/25/2022  2:00 PM Eren Heath M.D. [facility] M[facility]  [address]  [facility] [id number] [phone]    Follow up in 1 week(s)  Please call your primary care provider to schedule, recent hospitalization follow up      MEDICATIONS ON DISCHARGE     Medication List      START taking these medications      Instructions   amLODIPine 5 MG Tabs  Commonly known as: NORVASC   Take 1 Tablet by mouth every day for 30 days.  Dose: 5 mg     aspirin 81 MG EC tablet   Take 1 Tablet by mouth every day.  Dose: 81 mg     oxyCODONE immediate release 10 MG immediate release tablet  Commonly known as: ROXICODONE   Take 1 Tablet by mouth every 6 hours as needed for Severe Pain for up to 3 days.  Dose: 10 mg        CHANGE how you take these medications      Instructions   acetaminophen 325 MG Tabs  What changed:   · medication strength  · how much to take  · when to take this  · reasons to take this  Commonly known as: Tylenol   Take 2 Tablets by mouth every 6 hours as needed for Moderate Pain or Mild Pain.  Dose: 650 mg        CONTINUE taking these medications      Instructions   Biotin 5000 MCG Caps   Take 5,000 mcg by mouth every day.  Dose: 5,000 mcg     buPROPion 300 MG XL tablet  Commonly known as: WELLBUTRIN XL   Take 1 Tablet by mouth every  morning.  Dose: 300 mg     COQ10 PO   Take 1 Tab by mouth every day.  Dose: 1 Tablet     escitalopram 20 MG tablet  Commonly known as: LEXAPRO   Take 2 Tablets by mouth every day.  Dose: 40 mg     hydrOXYzine HCl 25 MG Tabs  Commonly known as: ATARAX   Take 1 Tablet by mouth 3 times a day as needed for Anxiety.  Dose: 25 mg     IRON PO   Take 1 Tablet by mouth. 3 TIMES WEEKLY  Dose: 1 Tablet     lisinopril 20 MG Tabs  Commonly known as: PRINIVIL   Take 1 Tablet by mouth every day.  Dose: 20 mg     LORazepam 0.5 MG Tabs  Commonly known as: ATIVAN   Take 0.5 mg by mouth every 8 hours as needed for Anxiety.  Dose: 0.5 mg     metFORMIN 500 MG Tabs  Commonly known as: GLUCOPHAGE   Take 1 Tablet by mouth 2 times a day with meals.  Dose: 500 mg     omeprazole 20 MG delayed-release capsule  Commonly known as: PRILOSEC   Take 1 Capsule by mouth every day.  Dose: 20 mg     sumatriptan 100 MG tablet  Commonly known as: IMITREX   Take 1 Tablet by mouth one time as needed for Migraine for up to 1 dose.  Dose: 100 mg     Vitamin D3 1.25 MG (05556 UT) Caps   Take 1 Capsule by mouth every day.  Dose: 1 Capsule            Allergies  No Known Allergies    DIET  Orders Placed This Encounter   Procedures   • Diet Order Diet: Regular     Standing Status:   Standing     Number of Occurrences:   1     Order Specific Question:   Diet:     Answer:   Regular [1]       ACTIVITY  As tolerated.  Weight bearing as tolerated    CONSULTATIONS  Neurology    PROCEDURES      LABORATORY  Lab Results   Component Value Date    SODIUM 138 06/24/2022    POTASSIUM 3.5 (L) 06/24/2022    CHLORIDE 100 06/24/2022    CO2 26 06/24/2022    GLUCOSE 105 (H) 06/24/2022    BUN 11 06/24/2022    CREATININE 0.62 06/24/2022        Lab Results   Component Value Date    WBC 6.3 06/24/2022    HEMOGLOBIN 13.3 06/24/2022    HEMATOCRIT 39.4 06/24/2022    PLATELETCT 249 06/24/2022        Total time of the discharge process exceeds 35 minutes.

## 2022-06-24 NOTE — ED NOTES
Report received from Arie SAUER.  Pt is Ox3, able to ambulate with steady gait to BR.  Provided non-slip socks and re-enforced falls precautions.

## 2022-06-24 NOTE — ED PROVIDER NOTES
ED Provider Note    CHIEF COMPLAINT  Chief Complaint   Patient presents with   • Sent by MD     Had MRI today.     Left distal cervical internal carotid artery dissection with luminal compromise without complete occlusion.    • Head Ache     X4 weeks        HPI  Mónica Keita is a 52 y.o. female who presents with a chief complaint of left internal carotid artery dissection. Duration has been since MRI was discovered this afternoon. There is no alleviating or exacerbating factors. It is no associated weakness or numbness in the arms or legs. Associ with a headache.    She is a headache for four weeks. She is our regular Dr. Ann order MRI results were today. She does have a history of high blood pressure. Patient also has a history of diabetes no history of family sudden death no history of family aneurysm.    REVIEW OF SYSTEMS  General: No fever or chills.  Eyes: No eye discharge. No eye pain.  Ear nose throat: No sore throat or  trouble swallowing.  Pulmonary: No shortness of breath or cough.  Cardiovascular: No chest pain or chest pressure.  GI: No abdominal pain nausea or vomiting.  : No dysuria or hematuria  Dermatologic: No rashes. No abrasions.  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 3 MI's.    • No Known Problems Son    • No Known Problems Daughter        SOCIAL HISTORY  Social History     Socioeconomic History   • Marital status:    Tobacco Use   • Smoking status: Never Smoker    • Smokeless tobacco: Never Used   Vaping Use   • Vaping Use: Never used   Substance and Sexual Activity   • Alcohol use: No   • Drug use: No   • Sexual activity: Yes     Partners: Male     Birth control/protection: Surgical     Comment: .        SURGICAL HISTORY  Past Surgical History:   Procedure Laterality Date   • NJ UPPER GI ENDOSCOPY,DIAGNOSIS N/A 11/1/2021    Procedure: GASTROSCOPY- WITH ENDOSCOPIC MUCOSAL RESECTION;  Surgeon: Edwin Aguillon M.D.;  Location: SURGERY [facility];  Service: EUS   • EGD W/ENDOSCOPIC ULTRASOUND N/A 11/1/2021    Procedure: EGD, WITH ENDOSCOPIC US - UPPER RADIAL;  Surgeon: Edwin Aguillon M.D.;  Location: [facility];  Service: EUS   • PELVIC EXAM UNDER ANESTHESIA  6/17/2014    Performed by Enedina Grant M.D. at SURGERY [facility]   • VAGINAL HYSTERECTOMY TOTAL  6/17/2014    Performed by Enedina Grant M.D. at SURGERY [facility]   • HYSTERECTOMY, VAGINAL  06/04/2014    [facility]   • APPENDECTOMY  2014   • CRANIOTOMY      neuro-endocrine tumor resected 11/1/2021       CURRENT MEDICATIONS  No current facility-administered medications on file prior to encounter.     Current Outpatient Medications on File Prior to Encounter   Medication Sig Dispense Refill   • metFORMIN (GLUCOPHAGE) 500 MG Tab Take 1 Tablet by mouth 2 times a day with meals. 180 Tablet 3   • Ferrous Sulfate (IRON PO)      • sumatriptan (IMITREX) 100 MG tablet Take 1 Tablet by mouth one time as needed for Migraine for up to 1 dose. 10 Tablet 3   • hydrOXYzine HCl (ATARAX) 25 MG Tab Take 1 Tablet by mouth 3 times a day as needed for Anxiety. 30 Tablet 3   • buPROPion (WELLBUTRIN XL) 300 MG XL tablet Take 1 Tablet by mouth every morning. 90 Tablet 1   • escitalopram (LEXAPRO) 20 MG tablet Take 2 Tablets by mouth every day. 160 Tablet 3   • omeprazole (PRILOSEC) 20 MG delayed-release capsule Take 1 Capsule by mouth every day. 90 Capsule 2   • acetaminophen  "(TYLENOL) 500 MG Tab Take 1,000 mg by mouth as needed for Mild Pain.     • lisinopril (PRINIVIL) 20 MG Tab Take 1 Tablet by mouth every day. 90 Tablet 3   • Cholecalciferol (VITAMIN D3) 5000 UNIT/0.5ML Liquid Take 1 Capsule by mouth every day. Liquid capsule     • Coenzyme Q10 (COQ10 PO) Take 1 Tab by mouth every day.     • LORazepam (ATIVAN) 0.5 MG Tab Take 0.5 mg by mouth every 8 hours as needed for Anxiety.  2   • Biotin 5000 MCG Cap Take 5,000 mcg by mouth every day.         ALLERGIES  No Known Allergies    PHYSICAL EXAM  VITAL SIGNS: BP (!) 155/114   Pulse (!) 114   Temp (!) 35.7 °C (96.2 °F) (Temporal)   Resp 14   Ht 1.778 m (5' 10\")   Wt 83.4 kg (183 lb 13.8 oz)   LMP 06/14/2012   SpO2 98%   BMI 26.38 kg/m²       Constitutional: Well developed, Well nourished, No acute distress, Non-toxic appearance.   HENT: normocephalic atraumatic.  Eyes: pupils equal round reactivate antitax Maame  Neck: Normal range of motion, No tenderness, Supple, No stridor.   Cardiovascular: Normal heart rate, Normal rhythm, No murmurs, No rubs, No gallops.   Thorax & Lungs: Normal breath sounds, No respiratory distress, No wheezing, No chest tenderness.   Abdomen: Bowel sounds normal, Soft, No tenderness, No masses, No pulsatile masses.   Skin: Warm, Dry, No erythema, No rash.   Back: No tenderness, No CVA tenderness.   Extremities: Intact distal pulses, No edema, No tenderness, No cyanosis, No clubbing.   Neurologic: patient has no cranial nerve deficit. Good strength both arms. She moved both legs above the ground. She has good  strength bilaterally no facial droop.  Psychiatric: Affect normal, Judgment normal, Mood normal.         RADIOLOGY/PROCEDURES  No orders to display         COURSE & MEDICAL DECISION MAKING  Pertinent Labs & Imaging studies reviewed. (See chart for details)  is a very pleasant 52-year-old female reviewed the MRI with her. Showing carotid artery dissection. At this point the patient will be " admitted to the hospital contact the vascular surgeon and hospitalist for treatment    At this point, it's really undetermined at the age. Therefore, like their expert decision especially the vascular surgeon. Many of these required simply anticoagulation. I'll not start anticoagulation until I get confirmation from the surgeon if the patient needs treatment. Marycruz spoke to the hospital said 5:54 PM. Waiting vascular surgery.    6:13 PM  Spoke to Dr. Alonso vascular surgeon who recommended loading dose of Plavix and aspirin. He said that he can be consulted later if needed nonsurgical. I read the results of the MRI to him. He was able to get understanding of the dissection and how far it was going. He stated that neurology can be consulted tomorrow. No need for neurosurgery at this time.    I this point will go ahead and control the patient's blood pressure and heart rate aspect secondary from anxiety. I have also spoken to hospice regarding admitting.    This is a 52-year-old female with a life-threatening condition of carotid artery dissection who is remaining asymptomatic. Working to manage her blood pressure and heart rate. Discussion with a hospitalist, vascular surgeon bedside management dictation was approximately 35 minutes.    FINAL IMPRESSION  1. Carotid artery dissection  2. Critical care time 35 minutes  3.      Electronically signed by: Amol Monsivais M.D., 6/23/2022 5:47 PM

## 2022-06-24 NOTE — ED NOTES
Patient to blue17 from triage. Report from CRISTIANE Mensah. Patient changed into gown and placed on monitor. Chart up for ERP

## 2022-06-24 NOTE — PROGRESS NOTES
Brief neurology follow-up note    MRI brain did not reveal any acute findings.  MRA neck confirmed the left small ICA dissection.  At this time typical treatment for an incidental dissection is with antiplatelet therapy with aspirin 81mg daily.  Can repeat imaging and 3 to 6 months time to see if the dissection has resolved.  Pain management per the primary team.  No further recommendations this time patient can go home and follow-up in the neurology clinic.

## 2022-06-24 NOTE — ASSESSMENT & PLAN NOTE
Spontaneous without preceding trauma  No associated neurological deficit  Pending MRI of the head without contrast, MRA of the neck with and without contrast   patient was given loading dose of Plavix, aspirin  Admit to neurology floor with neurochecks every 4 hours

## 2022-06-24 NOTE — CONSULTS
Neurology Initial Consult H&P  Neurohospitalist Service, [facility]    Referring Physician: David Lopez M.D.    Chief Complaint   Patient presents with   • Sent by MD     Had MRI today.     Left distal cervical internal carotid artery dissection with luminal compromise without complete occlusion.    • Head Ache     X4 weeks        HPI: 56-year-old female's been having left-sided superior neck pain for the past 4 weeks.  She denies any numbness or weakness in the extremities that is new.  She does have chronic bilateral carpal tunnel.  Denies any speech issues in the past 4 weeks.  No specifically right-sided symptoms.  A MRA head today revealed a distal ICA dissection with near occlusion.  She denies any recent trauma.  No whiplash events.  No chiropractor visits.  No neck manipulation of any type recently.  Denies any family history of artery issues such as this.    Review of systems: In addition to what is detailed in the HPI above, (and scanned into the chart if and when applicable), all other systems reviewed and are negative.    Past Medical History:    has a past medical history of Adverse effect of anesthesia, Anemia, Anxiety, Bronchitis (2009), Diabetes (HCC), Elevated liver enzymes, Hypertension, Neuroendocrine tumor (11/2021), and Pneumonia (2009).    FHx:  family history includes Heart Attack in her brother; Heart Disease in her father; No Known Problems in her daughter, sister, and son.    SHx:   reports that she has never smoked. She has never used smokeless tobacco. She reports that she does not drink alcohol and does not use drugs.    Allergies:  No Known Allergies    Medications:    Current Facility-Administered Medications:   •  [START ON 6/24/2022] aspirin (ASA) tablet 325 mg, 325 mg, Oral, DAILY, Amol Monsivais M.D.  •  senna-docusate (PERICOLACE or SENOKOT S) 8.6-50 MG per tablet 2 Tablet, 2 Tablet, Oral, BID **AND** polyethylene glycol/lytes (MIRALAX)  PACKET 1 Packet, 1 Packet, Oral, QDAY PRN **AND** magnesium hydroxide (MILK OF MAGNESIA) suspension 30 mL, 30 mL, Oral, QDAY PRN **AND** bisacodyl (DULCOLAX) suppository 10 mg, 10 mg, Rectal, QDAY PRN, David Lopez M.D.  •  enoxaparin (Lovenox) inj 40 mg, 40 mg, Subcutaneous, DAILY AT 1800, David Lopez M.D.  •  acetaminophen (Tylenol) tablet 650 mg, 650 mg, Oral, Q6HRS PRN, David Lopez M.D.  •  Notify provider if pain remains uncontrolled, , , CONTINUOUS **AND** Use the Numeric Rating Scale (NRS), Paniagua-Baker Faces (WBF), or FLACC on regular floors and Critical-Care Pain Observation Tool (CPOT) on ICUs/Trauma to assess pain, , , CONTINUOUS **AND** Pulse Ox, , , CONTINUOUS **AND** Pharmacy Consult Request ...Pain Management Review 1 Each, 1 Each, Other, PHARMACY TO DOSE **AND** If patient difficult to arouse and/or has respiratory depression (respiratory rate of 10 or less), stop any opiates that are currently infusing and call a Rapid Response., , , CONTINUOUS, David Lopez M.D.  •  oxyCODONE immediate-release (ROXICODONE) tablet 5 mg, 5 mg, Oral, Q3HRS PRN **OR** oxyCODONE immediate release (ROXICODONE) tablet 10 mg, 10 mg, Oral, Q3HRS PRN **OR** HYDROmorphone (Dilaudid) injection 0.5 mg, 0.5 mg, Intravenous, Q3HRS PRN, David Lopez M.D.  •  ondansetron (ZOFRAN) syringe/vial injection 4 mg, 4 mg, Intravenous, Q4HRS PRN, David Lopez M.D.  •  ondansetron (ZOFRAN ODT) dispertab 4 mg, 4 mg, Oral, Q4HRS PRN, David Lopez M.D.  •  promethazine (PHENERGAN) tablet 12.5-25 mg, 12.5-25 mg, Oral, Q4HRS PRN, David Lopez M.D.  •  promethazine (PHENERGAN) suppository 12.5-25 mg, 12.5-25 mg, Rectal, Q4HRS PRN, David Lopez M.D.  •  prochlorperazine (COMPAZINE) injection 5-10 mg, 5-10 mg, Intravenous, Q4HRS PRN, David Lopez M.D.  •  hydrALAZINE (APRESOLINE) injection 10 mg, 10 mg, Intravenous, Q4HRS PRN, David Lopez M.D.  •  [START ON 6/24/2022] buPROPion (WELLBUTRIN XL)  24 hr tablet 300 mg, 300 mg, Oral, QAM, David Lopez M.D.  •  [START ON 6/24/2022] vitamin D3 (cholecalciferol) tablet 1,000 Units, 1,000 Units, Oral, DAILY, David Lopez M.D.  •  [START ON 6/24/2022] escitalopram (LEXAPRO) TABS 40 mg, 40 mg, Oral, DAILY, David Lopez M.D.  •  hydrOXYzine HCl (ATARAX) tablet 25 mg, 25 mg, Oral, TID PRN, David Lopez M.D.  •  [START ON 6/24/2022] lisinopril (PRINIVIL) tablet 20 mg, 20 mg, Oral, DAILY, David Lopez M.D.  •  LORazepam (ATIVAN) tablet 0.5 mg, 0.5 mg, Oral, Q8HRS PRN, David Lopez M.D.  •  [START ON 6/24/2022] omeprazole (PRILOSEC) capsule 20 mg, 20 mg, Oral, DAILY, David Lopez M.D.    Current Outpatient Medications:   •  metFORMIN (GLUCOPHAGE) 500 MG Tab, Take 1 Tablet by mouth 2 times a day with meals., Disp: 180 Tablet, Rfl: 3  •  Ferrous Sulfate (IRON PO), , Disp: , Rfl:   •  sumatriptan (IMITREX) 100 MG tablet, Take 1 Tablet by mouth one time as needed for Migraine for up to 1 dose., Disp: 10 Tablet, Rfl: 3  •  hydrOXYzine HCl (ATARAX) 25 MG Tab, Take 1 Tablet by mouth 3 times a day as needed for Anxiety., Disp: 30 Tablet, Rfl: 3  •  buPROPion (WELLBUTRIN XL) 300 MG XL tablet, Take 1 Tablet by mouth every morning., Disp: 90 Tablet, Rfl: 1  •  escitalopram (LEXAPRO) 20 MG tablet, Take 2 Tablets by mouth every day., Disp: 160 Tablet, Rfl: 3  •  omeprazole (PRILOSEC) 20 MG delayed-release capsule, Take 1 Capsule by mouth every day., Disp: 90 Capsule, Rfl: 2  •  acetaminophen (TYLENOL) 500 MG Tab, Take 1,000 mg by mouth as needed for Mild Pain., Disp: , Rfl:   •  lisinopril (PRINIVIL) 20 MG Tab, Take 1 Tablet by mouth every day., Disp: 90 Tablet, Rfl: 3  •  Cholecalciferol (VITAMIN D3) 5000 UNIT/0.5ML Liquid, Take 1 Capsule by mouth every day. Liquid capsule, Disp: , Rfl:   •  Coenzyme Q10 (COQ10 PO), Take 1 Tab by mouth every day., Disp: , Rfl:   •  LORazepam (ATIVAN) 0.5 MG Tab, Take 0.5 mg by mouth every 8 hours as needed for  "Anxiety., Disp: , Rfl: 2  •  Biotin 5000 MCG Cap, Take 5,000 mcg by mouth every day., Disp: , Rfl:     Physical Examination:     Vitals:    06/23/22 1650 06/23/22 1701 06/23/22 1820   BP: (!) 155/114  (!) 158/104   Pulse: (!) 114  96   Resp: 14  20   Temp: (!) 35.7 °C (96.2 °F)  36 °C (96.8 °F)   TempSrc: Temporal  Temporal   SpO2: 98%  96%   Weight:  83.4 kg (183 lb 13.8 oz)    Height: 1.778 m (5' 10\")         General: Patient is awake and in no acute distress  Eyes: Unremarkable  CV: RRR    NEUROLOGICAL EXAM:     Mental status: Awake, alert and fully oriented, follows commands  Speech and language: speech is clear and fluent. The patient is able to name and repeat.  Cranial nerve exam: Pupils are equal, round and reactive to light bilaterally. Visual fields are full. Extraocular muscles are intact. Sensation in the face is intact to light touch. Face is symmetric. Hearing to finger rub equal. Palate elevates symmetrically. Shoulder shrug is full. Tongue is midline.  Motor exam: Strength is 5/5 in all extremities both distally and proximally. Tone is normal. No abnormal movements were seen on exam.  Sensory exam: No sensory deficits identified   Deep tendon reflexes:  2+ and symmetric. Toes down-going bilaterally.  Coordination: no ataxia   Gait: Normal    Objective Data:    Labs:  Lab Results   Component Value Date/Time    PROTHROMBTM 12.9 06/23/2022 05:29 PM    INR 1.00 06/23/2022 05:29 PM      Lab Results   Component Value Date/Time    WBC 6.3 06/23/2022 05:29 PM    RBC 4.99 06/23/2022 05:29 PM    HEMOGLOBIN 14.1 06/23/2022 05:29 PM    HEMATOCRIT 41.3 06/23/2022 05:29 PM    MCV 82.8 06/23/2022 05:29 PM    MCH 28.3 06/23/2022 05:29 PM    MCHC 34.1 06/23/2022 05:29 PM    MPV 9.2 06/23/2022 05:29 PM    NEUTSPOLYS 55.70 06/23/2022 05:29 PM    LYMPHOCYTES 34.00 06/23/2022 05:29 PM    MONOCYTES 7.00 06/23/2022 05:29 PM    EOSINOPHILS 2.40 06/23/2022 05:29 PM    BASOPHILS 0.60 06/23/2022 05:29 PM    HYPOCHROMIA 1+ " 07/26/2021 11:25 AM    ANISOCYTOSIS 1+ 09/16/2021 08:05 AM      Lab Results   Component Value Date/Time    SODIUM 139 06/23/2022 05:29 PM    POTASSIUM 3.9 06/23/2022 05:29 PM    CHLORIDE 101 06/23/2022 05:29 PM    CO2 24 06/23/2022 05:29 PM    GLUCOSE 116 (H) 06/23/2022 05:29 PM    BUN 12 06/23/2022 05:29 PM    CREATININE 0.60 06/23/2022 05:29 PM    BUNCREATRAT 13 06/18/2019 10:57 AM      Lab Results   Component Value Date/Time    CHOLSTRLTOT 115 06/07/2022 11:16 AM    LDL 55 06/07/2022 11:16 AM    HDL 50 06/07/2022 11:16 AM    TRIGLYCERIDE 50 06/07/2022 11:16 AM       Lab Results   Component Value Date/Time    ALKPHOSPHAT 63 06/23/2022 05:29 PM    ASTSGOT 54 (H) 06/23/2022 05:29 PM    ALTSGPT 61 (H) 06/23/2022 05:29 PM    TBILIRUBIN 0.3 06/23/2022 05:29 PM        Imaging/Testing:  MR-BRAIN-W/O    (Results Pending)   MR-MRA NECK-W/O    (Results Pending)         Assessment and Plan:    52-year-old female has been having left-sided neck pain for the past 4 weeks.  Found to have a spontaneous left distal ICA dissection.  On my review appears it may be occluded however the axial sequences end and cannot get a full picture..  An MRA neck was not obtained.  Incidental dissection treatment was with antiplatelet.  Patient already received aspirin and Plavix in ER.  We will get an MRI brain and see there is any incidental infarcts and also MRA neck with contrast to see if this is already occluded.        The evaluation of the patient, and recommended management, was discussed with the resident staff.       This chart was partially generated using voice recognition technology and sound alike word replacement may be present, best efforts were made to make the chart accurate.    Haroon Ivy MD  Board Certified Neurology, ABPN  (t) [phone]

## 2022-06-24 NOTE — PROGRESS NOTES
Pt arrived to floor with RN on Zoll monitor; yhe bed system utilized. Pt placed on tele. Monitor room notified. Pt AAOx4, very pleasant. Pt is not a fall risk as Alvarez Gonzalez score is a 2. Pt allowed to be up-self in room. Admission profile updated and completed. Pt to be moved to room when cleaned.    1115: Pt moved into S181.

## 2022-06-24 NOTE — DISCHARGE INSTRUCTIONS
- Follow up with primary care physician in 1 week.   -Follow-up with neurology, repeat MRA 3 to 6 months to see whether the dissection has resolved.  - keep blood pressure log at home and discuss with her PCP for further management.  Please take the medications as instructed  Go to the local Emergency Department if you have any worsening condition.       Discharge Instructions    Discharged to home by car with relative. Discharged via wheelchair, hospital escort: Yes.  Special equipment needed: Not Applicable    Be sure to schedule a follow-up appointment with your primary care doctor or any specialists as instructed.     Discharge Plan:   Diet Plan: Discussed  Activity Level: Discussed  Confirmed Follow up Appointment: Appointment Scheduled  Confirmed Symptoms Management: Discussed  Medication Reconciliation Updated: Yes    I understand that a diet low in cholesterol, fat, and sodium is recommended for good health. Unless I have been given specific instructions below for another diet, I accept this instruction as my diet prescription.   Other diet: Normal    Special Instructions: None    Is patient discharged on Warfarin / Coumadin?   No     Depression / Suicide Risk    As you are discharged from this [facility] facility, it is important to learn how to keep safe from harming yourself.    Recognize the warning signs:  Abrupt changes in personality, positive or negative- including increase in energy   Giving away possessions  Change in eating patterns- significant weight changes-  positive or negative  Change in sleeping patterns- unable to sleep or sleeping all the time   Unwillingness or inability to communicate  Depression  Unusual sadness, discouragement and loneliness  Talk of wanting to die  Neglect of personal appearance   Rebelliousness- reckless behavior  Withdrawal from people/activities they love  Confusion- inability to concentrate     If you or a loved one observes any of these behaviors or has  concerns about self-harm, here's what you can do:  Talk about it- your feelings and reasons for harming yourself  Remove any means that you might use to hurt yourself (examples: pills, rope, extension cords, firearm)  Get professional help from the community (Mental Health, Substance Abuse, psychological counseling)  Do not be alone:Call your Safe Contact- someone whom you trust who will be there for you.  Call your local CRISIS HOTLINE 737-0447 or 729-600-4014  Call your local Children's Mobile Crisis Response Team [place] (426) 159-9325 or www.[employer]  Call the toll free National Suicide Prevention Hotlines   National Suicide Prevention Lifeline 723-345-HKDU (3075)  National Hope Line Network 800-SUICIDE (912-6436)

## 2022-06-24 NOTE — ED TRIAGE NOTES
"Chief Complaint   Patient presents with   • Sent by MD     Had MRI today.     Left distal cervical internal carotid artery dissection with luminal compromise without complete occlusion.    • Head Ache     X4 weeks      Ambulatory to triage for above. VS noted. Charge RN notified.     BP (!) 155/114   Pulse (!) 114   Temp (!) 35.7 °C (96.2 °F) (Temporal)   Resp 14   Ht 1.778 m (5' 10\")   Wt 83.4 kg (183 lb 13.8 oz)   LMP 06/14/2012   SpO2 98%   BMI 26.38 kg/m²     "

## 2022-06-24 NOTE — PROGRESS NOTES
4 Eyes Skin Assessment Completed by Kasie, CRISTIANE and CRISTIANE Avitia.    Head Blanching and Redness  Ears WDL  Nose WDL  Mouth WDL  Neck WDL  Breast/Chest WDL  Shoulder Blades WDL  Spine WDL  (R) Arm/Elbow/Hand WDL  (L) Arm/Elbow/Hand WDL  Abdomen WDL  Groin WDL  Scrotum/Coccyx/Buttocks WDL  (R) Leg WDL  (L) Leg WDL  (R) Heel/Foot/Toe WDL  (L) Heel/Foot/Toe WDL          Devices In Places Tele Box, Blood Pressure Cuff and Pulse Ox      Interventions In Place Pillows and Pressure Redistribution Mattress    Possible Skin Injury No    Pictures Uploaded Into Epic N/A  Wound Consult Placed N/A  RN Wound Prevention Protocol Ordered No

## 2022-07-07 NOTE — TELEPHONE ENCOUNTER
10/15/21 requested home blood pressure reading from patient via "[employer]hart, patient responded with same, reading slightly above goal of less than 140/90, see flowsheets.    
independent

## 2022-07-25 ENCOUNTER — OFFICE VISIT (OUTPATIENT)
Dept: MEDICAL GROUP | Facility: PHYSICIAN GROUP | Age: 52
End: 2022-07-25
Payer: COMMERCIAL

## 2022-07-25 VITALS
HEIGHT: 70 IN | OXYGEN SATURATION: 97 % | TEMPERATURE: 98.7 F | DIASTOLIC BLOOD PRESSURE: 77 MMHG | WEIGHT: 183 LBS | BODY MASS INDEX: 26.2 KG/M2 | RESPIRATION RATE: 16 BRPM | HEART RATE: 100 BPM | SYSTOLIC BLOOD PRESSURE: 112 MMHG

## 2022-07-25 DIAGNOSIS — I10 PRIMARY HYPERTENSION: ICD-10-CM

## 2022-07-25 DIAGNOSIS — I77.71 CAROTID ARTERY DISSECTION (HCC): ICD-10-CM

## 2022-07-25 PROCEDURE — 99214 OFFICE O/P EST MOD 30 MIN: CPT | Performed by: FAMILY MEDICINE

## 2022-07-25 RX ORDER — AMLODIPINE BESYLATE 5 MG/1
5 TABLET ORAL DAILY
Qty: 90 TABLET | Refills: 1 | Status: ON HOLD | OUTPATIENT
Start: 2022-07-25 | End: 2022-12-14

## 2022-07-25 ASSESSMENT — FIBROSIS 4 INDEX: FIB4 SCORE: 1.27

## 2022-07-25 NOTE — ASSESSMENT & PLAN NOTE
Incidentally found on imaging   Headaches are improving  Hospitalized ad neuro recommended repeat in 3 mo  Added ASA per neuro  Vascular said no surgical intervention   Establishing appt with neuro 8/19

## 2022-07-25 NOTE — PROGRESS NOTES
Subjective:     Chief Complaint   Patient presents with   • Follow-Up     6 weeks        HPI:   Mónica presents today to discuss the following.      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 90 Tablet 1   • acetaminophen (TYLENOL) 325 MG Tab Take 2 Tablets by mouth every 6 hours as needed for Moderate Pain or Mild Pain. 30 Tablet 0   • aspirin 81 MG EC tablet Take 1 Tablet by mouth every day. 100 Tablet 0   • metFORMIN (GLUCOPHAGE) 500 MG Tab Take 1 Tablet by mouth 2 times a day with meals. 180 Tablet 3   • Ferrous Sulfate (IRON PO) Take 1 Tablet by mouth. 3 TIMES WEEKLY     • hydrOXYzine HCl (ATARAX) 25 MG Tab Take 1 Tablet by mouth 3 times a day as needed for Anxiety. 30 Tablet 3   • buPROPion (WELLBUTRIN XL) 300 MG XL tablet Take 1 Tablet by mouth every morning. 90 Tablet 1   • escitalopram (LEXAPRO) 20 MG tablet Take 2 Tablets by mouth every day. 160 Tablet 3   • omeprazole (PRILOSEC) 20 MG delayed-release capsule Take 1 Capsule by mouth every day. 90 Capsule 2   • lisinopril (PRINIVIL) 20 MG Tab Take 1 Tablet by mouth every day. 90 Tablet 3   • Cholecalciferol (VITAMIN D3) 1.25 MG (86599 UT) Cap Take 1 Capsule by mouth every day.     • Coenzyme Q10 (COQ10 PO) Take 1 Tab by mouth every day.     •  "Biotin 5000 MCG Cap Take 5,000 mcg by mouth every day.       No current Epic-ordered facility-administered medications on file.       Allergies:  Patient has no known allergies.    Health Maintenance: Completed    ROS:  Gen: no fevers/chills, no changes in weight  Eyes: no changes in vision  Pulm: no sob, no cough  CV: no chest pain, no palpitations  GI: no nausea/vomiting, no diarrhea      Objective:     Exam:  /77 (BP Location: Left arm, Patient Position: Sitting, BP Cuff Size: Adult)   Pulse 100   Temp 37.1 °C (98.7 °F) (Temporal)   Resp 16   Ht 1.778 m (5' 10\")   Wt 83 kg (183 lb)   LMP 06/14/2012   SpO2 97%   BMI 26.26 kg/m²  Body mass index is 26.26 kg/m².        Constitutional: Alert, no distress, well-groomed.  Skin: Warm, dry, good turgor, no rashes in visible areas.  Eye: Equal, round and reactive, conjunctiva clear, lids normal.  ENMT: Lips without lesions, good dentition, moist mucous membranes.  Neck: Trachea midline, no masses, no thyromegaly.  Respiratory: Unlabored respiratory effort, no cough.  MSK: Normal gait, moves all extremities.  Neuro: Grossly non-focal.   Psych: Alert and oriented x3, normal affect and mood.        Assessment & Plan:     52 y.o. female with the following -     1. Primary hypertension  Chronic condition.  Hospital team added amlodipine onto lisinopril for better blood pressure control.  Continue with regimen.    2. Carotid artery dissection (HCC)  New problem.  Unknown etiology and prognosis.  Incidentally found on imaging.  Patient was hospitalized for this.  Headaches are improving.  Now taking baby aspirin and achieving better blood pressure control.  Strongly recommend following up with neurology outpatient.  Strict emergency room precautions recommended.    Return in about 3 months (around 10/25/2022).    Please note that this dictation was created using voice recognition software. I have made every reasonable attempt to correct obvious errors, but I expect " that there are errors of grammar and possibly content that I did not discover before finalizing the note.

## 2022-08-19 ENCOUNTER — OFFICE VISIT (OUTPATIENT)
Dept: NEUROLOGY | Facility: MEDICAL CENTER | Age: 52
End: 2022-08-19
Attending: PSYCHIATRY & NEUROLOGY
Payer: COMMERCIAL

## 2022-08-19 VITALS
RESPIRATION RATE: 14 BRPM | DIASTOLIC BLOOD PRESSURE: 70 MMHG | BODY MASS INDEX: 28.72 KG/M2 | OXYGEN SATURATION: 99 % | SYSTOLIC BLOOD PRESSURE: 110 MMHG | HEART RATE: 97 BPM | HEIGHT: 67 IN | WEIGHT: 182.98 LBS

## 2022-08-19 DIAGNOSIS — I77.71 DISSECTION OF CAROTID ARTERY (HCC): Primary | ICD-10-CM

## 2022-08-19 PROCEDURE — 99214 OFFICE O/P EST MOD 30 MIN: CPT | Performed by: PSYCHIATRY & NEUROLOGY

## 2022-08-19 PROCEDURE — 99211 OFF/OP EST MAY X REQ PHY/QHP: CPT | Performed by: PSYCHIATRY & NEUROLOGY

## 2022-08-19 RX ORDER — DIAZEPAM 5 MG/1
TABLET ORAL
Qty: 3 TABLET | Refills: 0 | Status: SHIPPED | OUTPATIENT
Start: 2022-08-19 | End: 2022-10-19

## 2022-08-19 ASSESSMENT — FIBROSIS 4 INDEX: FIB4 SCORE: 1.27

## 2022-08-19 NOTE — PROGRESS NOTES
"[facility] NEUROLOGY  GENERAL NEUROLOGY  NEW PATIENT VISIT    Referral source: Haroon Ivy MD    CC: \"dissection of carotid artery\"    HISTORY OF ILLNESS:  Mónica Keita is a 52 y.o. woman with a history most notable for elevated liver enzymes, HTN, DM, carotid artery dissection, and anxiety.  Today, she was unaccompanied, and she provided the following history:    6/2022:  Mónica developed a headache.  Workup included vessel imaging which revealed a dissection involving the distal left ICA.  There were no preceding injuries or identifiable provoking maneuvers.    She was started on ASA.    She continues on ASA.  Her headaches are still present, but they are becoming less frequent.    Otherwise, she has not developed any new or worsened neurologic symptoms.    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 at SURGERY [facility]    VAGINAL HYSTERECTOMY TOTAL  06/17/2014    Performed by Enedina Grant M.D. at SURGERY [facility]    HYSTERECTOMY, VAGINAL  06/04/2014    [facility]    APPENDECTOMY  2014     MEDICATIONS:  Current Outpatient " Medications   Medication Sig    diazePAM (VALIUM) 5 MG Tab Take 1 tablet ~45 minutes before scan; take a second tablet ~5 minutes before scan; may take a third dose as needed.    amLODIPine (NORVASC) 5 MG Tab Take 1 Tablet by mouth every day.    acetaminophen (TYLENOL) 325 MG Tab Take 2 Tablets by mouth every 6 hours as needed for Moderate Pain or Mild Pain.    aspirin 81 MG EC tablet Take 1 Tablet by mouth every day.    metFORMIN (GLUCOPHAGE) 500 MG Tab Take 1 Tablet by mouth 2 times a day with meals.    Ferrous Sulfate (IRON PO) Take 1 Tablet by mouth. 3 TIMES WEEKLY    hydrOXYzine HCl (ATARAX) 25 MG Tab Take 1 Tablet by mouth 3 times a day as needed for Anxiety.    buPROPion (WELLBUTRIN XL) 300 MG XL tablet Take 1 Tablet by mouth every morning.    escitalopram (LEXAPRO) 20 MG tablet Take 2 Tablets by mouth every day.    omeprazole (PRILOSEC) 20 MG delayed-release capsule Take 1 Capsule by mouth every day.    lisinopril (PRINIVIL) 20 MG Tab Take 1 Tablet by mouth every day.    Cholecalciferol (VITAMIN D3) 1.25 MG (76855 UT) Cap Take 1 Capsule by mouth every day.    Coenzyme Q10 (COQ10 PO) Take 1 Tab by mouth every day.    Biotin 5000 MCG Cap Take 5,000 mcg by mouth every day.     SOCIAL HISTORY:  Social History     Tobacco Use    Smoking status: Never    Smokeless tobacco: Never   Substance Use Topics    Alcohol use: No     Social History     Social History Narrative    Not on file     FAMILY HISTORY:  Family History   Problem Relation Age of Onset    Heart Disease Father     No Known Problems Sister     Heart Attack Brother         CAD. 3 MI's.     No Known Problems Son     No Known Problems Daughter      REVIEW OF SYSTEMS:  A ROS was completed.  Pertinent positives and negatives were included in the HPI, above.  All other systems were reviewed and are negative.    PHYSICAL EXAM:  General/Medical:  - NAD  - hair, skin, nails, and joints were normal    Neuro:  MENTAL STATUS: awake and alert; no deficits of speech  "or language; oriented to conversation; affect was appropriate to situation; pleasant, cooperative    CRANIAL NERVES:    II: acuity: J1+/J3-1, fields: intact to confrontation, pupils: 3/3 to 2/2 without a relative afferent pupillary defect, discs: sharp    III/IV/VI: versions: intact without nystagmus    V: facial sensation: symmetric to light touch    VII: facial expression: symmetric    VIII: hearing: intact to finger rub    IX/X: palate: elevates symmetrically    XI: shoulder shrug: symmetric    XII: tongue: midline    MOTOR:  - bulk: normal throughout  - tone: NT  - functional strength: full throughout  Upper Extremity Strength  (R/L)    NT   Elbow flexion NT   Elbow extension NT   Shoulder abduction NT     Lower Extremity Strength  (R/L)   Hip flexion NT   Knee extension NT   Knee flexion NT   Ankle plantarflexion NT   Ankle dorsiflexion NT     - can walk on toes and heels  - pronator drift: absent  - abnormal movements: none    SENSATION:  - light touch: grossly intact over the upper and lower extremities  - vibration (R/L, seconds): NT/NT at the great toes  - pinprick: NT  - proprioception: NT  - Romberg: absent    COORDINATION:  - finger to nose: normal, no ataxia on exam  - finger tapping: rapid and accurate, bilaterally    REFLEXES:  Reflex Right Left   BR 2+ 2+   Biceps 2+ 2+   Triceps 2+ 2+   Patellae 3+ 3+   Achilles NT NT   Toes NT NT     GAIT:  - narrow base and normal  - heel-raised/toe-raised gait: intact/intact  - tandem gait: intact    REVIEW OF IMAGING STUDIES: I reviewed the following studies:  MRI Brain:  Date: 6/23/2022  W/o and w/ contrast?: without  Indication: \"headache...\"  Comparison: none  Impression:  \"No acute intracranial process. No acute infarct.  Intramural hematoma involving the distal cervical and proximal petrous left internal carotid artery compatible with a dissection.\"    MRA Head/Neck:  Date: 6/23/2022  W/o and w/ contrast?: without  Indication: \"headache, " "chronic...\"  Comparison: none  Impression:  \"Head:  1) Left distal cervical internal carotid artery dissection with luminal compromise without complete occlusion. Restoration of normal appearing flow void in the left carotid siphon. No evidence of embolic occlusion of large or medium caliber anterior cerebral or middle cerebral arteries.  2) Unremarkable posterior circulation. No aneurysm or occlusive lesion.  3) A Voalte message was sent to [facility]BER [facility]ANTU at 3:35 PM 6/23/2022. Efforts are ongoing to make direct telephone contact with a managing clinician.  Neck:  Acute-subacute dissection with intramural hematoma involving the distal left cervical ICA just below the skull base. There is at least 50% luminal narrowing involving this segment.  Fusiform dilatation/pseudoaneurysm of the right distal cervical ICA just below the skull base likely related to remote dissection. No significant luminal compromise.  Small pseudoaneurysm involving the distal V2 segment of the right vertebral artery also likely related to remote dissection. No significant luminal compromise.\"    REVIEW OF LABORATORY STUDIES:  6/24/2022:  - CBC w/ diff: WNL  - CMP: WNL    ASSESSMENT:  Mónica Keita is a 52 y.o. woman with a history of carotid artery dissection as well as elevated liver enzymes, HTN, DM, and anxiety.  Plans/recommendations as follows:    PLAN:  Carotid Artery Dissection:  - repeat MRA neck in ~1 month  - continue ASA    Follow-Up:  - Return if symptoms worsen or fail to improve.    Signed: Jerry Pacheco M.D.  "

## 2022-08-24 ENCOUNTER — HOSPITAL ENCOUNTER (OUTPATIENT)
Dept: LAB | Facility: MEDICAL CENTER | Age: 52
End: 2022-08-24
Attending: PHYSICIAN ASSISTANT
Payer: COMMERCIAL

## 2022-08-24 ENCOUNTER — HOSPITAL ENCOUNTER (OUTPATIENT)
Dept: LAB | Facility: MEDICAL CENTER | Age: 52
End: 2022-08-24
Attending: INTERNAL MEDICINE
Payer: COMMERCIAL

## 2022-08-24 DIAGNOSIS — E83.110 HEREDITARY HEMOCHROMATOSIS (HCC): ICD-10-CM

## 2022-08-24 LAB
ALBUMIN SERPL BCP-MCNC: 4.9 G/DL (ref 3.2–4.9)
ALBUMIN/GLOB SERPL: 1.8 G/DL
ALP SERPL-CCNC: 61 U/L (ref 30–99)
ALT SERPL-CCNC: 86 U/L (ref 2–50)
ANION GAP SERPL CALC-SCNC: 12 MMOL/L (ref 7–16)
AST SERPL-CCNC: 57 U/L (ref 12–45)
BASOPHILS # BLD AUTO: 0.6 % (ref 0–1.8)
BASOPHILS # BLD: 0.04 K/UL (ref 0–0.12)
BILIRUB SERPL-MCNC: 0.4 MG/DL (ref 0.1–1.5)
BUN SERPL-MCNC: 11 MG/DL (ref 8–22)
CALCIUM SERPL-MCNC: 9.7 MG/DL (ref 8.5–10.5)
CHLORIDE SERPL-SCNC: 101 MMOL/L (ref 96–112)
CO2 SERPL-SCNC: 26 MMOL/L (ref 20–33)
CREAT SERPL-MCNC: 0.68 MG/DL (ref 0.5–1.4)
EOSINOPHIL # BLD AUTO: 0.16 K/UL (ref 0–0.51)
EOSINOPHIL NFR BLD: 2.4 % (ref 0–6.9)
ERYTHROCYTE [DISTWIDTH] IN BLOOD BY AUTOMATED COUNT: 40.2 FL (ref 35.9–50)
FERRITIN SERPL-MCNC: 62.2 NG/ML (ref 10–291)
GFR SERPLBLD CREATININE-BSD FMLA CKD-EPI: 106 ML/MIN/1.73 M 2
GLOBULIN SER CALC-MCNC: 2.7 G/DL (ref 1.9–3.5)
GLUCOSE SERPL-MCNC: 76 MG/DL (ref 65–99)
HCT VFR BLD AUTO: 43.1 % (ref 37–47)
HGB BLD-MCNC: 14.5 G/DL (ref 12–16)
IMM GRANULOCYTES # BLD AUTO: 0.01 K/UL (ref 0–0.11)
IMM GRANULOCYTES NFR BLD AUTO: 0.2 % (ref 0–0.9)
IRON SATN MFR SERPL: 26 % (ref 15–55)
IRON SERPL-MCNC: 110 UG/DL (ref 40–170)
LYMPHOCYTES # BLD AUTO: 2.26 K/UL (ref 1–4.8)
LYMPHOCYTES NFR BLD: 34.1 % (ref 22–41)
MCH RBC QN AUTO: 28.2 PG (ref 27–33)
MCHC RBC AUTO-ENTMCNC: 33.6 G/DL (ref 33.6–35)
MCV RBC AUTO: 83.7 FL (ref 81.4–97.8)
MONOCYTES # BLD AUTO: 0.53 K/UL (ref 0–0.85)
MONOCYTES NFR BLD AUTO: 8 % (ref 0–13.4)
NEUTROPHILS # BLD AUTO: 3.62 K/UL (ref 2–7.15)
NEUTROPHILS NFR BLD: 54.7 % (ref 44–72)
NRBC # BLD AUTO: 0 K/UL
NRBC BLD-RTO: 0 /100 WBC
PLATELET # BLD AUTO: 238 K/UL (ref 164–446)
PMV BLD AUTO: 9.8 FL (ref 9–12.9)
POTASSIUM SERPL-SCNC: 4.4 MMOL/L (ref 3.6–5.5)
PROT SERPL-MCNC: 7.6 G/DL (ref 6–8.2)
RBC # BLD AUTO: 5.15 M/UL (ref 4.2–5.4)
SODIUM SERPL-SCNC: 139 MMOL/L (ref 135–145)
TIBC SERPL-MCNC: 431 UG/DL (ref 250–450)
UIBC SERPL-MCNC: 321 UG/DL (ref 110–370)
WBC # BLD AUTO: 6.6 K/UL (ref 4.8–10.8)

## 2022-08-24 PROCEDURE — 83540 ASSAY OF IRON: CPT

## 2022-08-24 PROCEDURE — 36415 COLL VENOUS BLD VENIPUNCTURE: CPT

## 2022-08-24 PROCEDURE — 85025 COMPLETE CBC W/AUTO DIFF WBC: CPT

## 2022-08-24 PROCEDURE — 80053 COMPREHEN METABOLIC PANEL: CPT

## 2022-08-24 PROCEDURE — 82728 ASSAY OF FERRITIN: CPT

## 2022-08-24 PROCEDURE — 83550 IRON BINDING TEST: CPT

## 2022-09-23 ENCOUNTER — HOSPITAL ENCOUNTER (OUTPATIENT)
Dept: RADIOLOGY | Facility: MEDICAL CENTER | Age: 52
End: 2022-09-23
Attending: PSYCHIATRY & NEUROLOGY
Payer: COMMERCIAL

## 2022-09-23 DIAGNOSIS — I77.71 DISSECTION OF CAROTID ARTERY (HCC): ICD-10-CM

## 2022-09-23 PROCEDURE — 70547 MR ANGIOGRAPHY NECK W/O DYE: CPT

## 2022-10-25 ENCOUNTER — OFFICE VISIT (OUTPATIENT)
Dept: MEDICAL GROUP | Facility: PHYSICIAN GROUP | Age: 52
End: 2022-10-25
Payer: COMMERCIAL

## 2022-10-25 VITALS
WEIGHT: 175.6 LBS | HEART RATE: 93 BPM | HEIGHT: 67 IN | BODY MASS INDEX: 27.56 KG/M2 | SYSTOLIC BLOOD PRESSURE: 112 MMHG | TEMPERATURE: 98.6 F | OXYGEN SATURATION: 98 % | DIASTOLIC BLOOD PRESSURE: 82 MMHG

## 2022-10-25 DIAGNOSIS — F43.0 ACUTE STRESS REACTION: ICD-10-CM

## 2022-10-25 DIAGNOSIS — M77.11 LATERAL EPICONDYLITIS OF RIGHT ELBOW: ICD-10-CM

## 2022-10-25 PROCEDURE — 99214 OFFICE O/P EST MOD 30 MIN: CPT | Performed by: FAMILY MEDICINE

## 2022-10-25 ASSESSMENT — FIBROSIS 4 INDEX: FIB4 SCORE: 1.29

## 2022-10-25 NOTE — ASSESSMENT & PLAN NOTE
New problem  Had the recent loss of a loved one a few days ago and has struggled emotionally.  She is already on wellbutrin and lexapro    Denies any Si/Hi  Is well supported    She is connected with friends in the psychiatry department.

## 2022-10-25 NOTE — PROGRESS NOTES
Subjective:     Chief Complaint   Patient presents with    Follow-Up     Surgery        HPI:   Mónica presents today to discuss the following.    Acute stress reaction  New problem  Had the recent loss of a loved one a few days ago and has struggled emotionally.  She is already on wellbutrin and lexapro    Denies any Si/Hi  Is well supported    She is connected with friends in the psychiatry department.     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 90 Tablet 1    acetaminophen (TYLENOL) 325 MG Tab Take 2 Tablets by mouth every 6 hours as needed for Moderate Pain or Mild Pain. 30 Tablet 0    aspirin 81 MG EC tablet Take 1 Tablet by mouth every day. 100 Tablet 0    metFORMIN (GLUCOPHAGE) 500 MG Tab Take 1 Tablet by mouth 2 times a day with meals. 180 Tablet 3    Ferrous Sulfate (IRON PO) Take 1 Tablet by mouth. 3 TIMES WEEKLY      hydrOXYzine HCl (ATARAX) 25 MG Tab Take 1 Tablet by mouth 3 times a day as needed for Anxiety. 30 Tablet 3    buPROPion (WELLBUTRIN XL) 300 MG XL tablet Take 1 Tablet by mouth every morning. 90 Tablet 1    escitalopram (LEXAPRO) 20 MG tablet Take 2 Tablets by mouth every day. 160 Tablet 3    omeprazole (PRILOSEC) 20 MG delayed-release capsule Take 1 Capsule by mouth every day. 90 Capsule 2    lisinopril (PRINIVIL) 20 MG Tab Take 1 Tablet by mouth every day. 90 Tablet 3    Cholecalciferol (VITAMIN D3) 1.25 MG (66299 UT) Cap Take 1 Capsule by mouth  "every day.      Coenzyme Q10 (COQ10 PO) Take 1 Tab by mouth every day.      Biotin 5000 MCG Cap Take 5,000 mcg by mouth every day.       No current [facility]-ordered facility-administered medications on file.       Allergies:  Patient has no known allergies.    Health Maintenance: Completed    ROS:  Gen: no fevers/chills, no changes in weight  Eyes: no changes in vision  Pulm: no sob, no cough  CV: no chest pain, no palpitations  GI: no nausea/vomiting, no diarrhea      Objective:     Exam:  /82 (BP Location: Left arm, Patient Position: Sitting, BP Cuff Size: Small adult)   Pulse 93   Temp 37 °C (98.6 °F) (Temporal)   Ht 1.702 m (5' 7\")   Wt 79.7 kg (175 lb 9.6 oz)   LMP 06/14/2012   SpO2 98%   BMI 27.50 kg/m²  Body mass index is 27.5 kg/m².      Constitutional: Alert, no distress, well-groomed.  Skin: Warm, dry, good turgor, no rashes in visible areas.  Eye: Equal, round and reactive, conjunctiva clear, lids normal.  ENMT: Lips without lesions, good dentition, moist mucous membranes.  Neck: Trachea midline, no masses, no thyromegaly.  Respiratory: Unlabored respiratory effort, no cough.  MSK: Normal gait, moves all extremities.  Neuro: Grossly non-focal.   Psych: Alert and oriented x3, normal affect and mood.        Assessment & Plan:     52 y.o. female with the following -     1. Acute stress reaction  New problem.  The patient's has had to deal with a lot of life disrupting events over the past couple of days.  She has lost her loved one.  At this time she is coping well and finds good support in friends and family.  I offered her referrals today but she would like to hold off.  She is already on Lexapro and Wellbutrin.  I will closely follow-up in 6 weeks.  Continue with friends and family support.    2. Lateral epicondylitis of right elbow  New problem.  Symptoms consistent with tennis elbow.  Recommend the use of a brace and reassess in 6 weeks.  May also take NSAIDs.      Return in about 6 weeks " (around 12/6/2022).          Please note that this dictation was created using voice recognition software. I have made every reasonable attempt to correct obvious errors, but I expect that there are errors of grammar and possibly content that I did not discover before finalizing the note.

## 2022-11-21 ENCOUNTER — HOSPITAL ENCOUNTER (OUTPATIENT)
Dept: RADIOLOGY | Facility: MEDICAL CENTER | Age: 52
End: 2022-11-21
Attending: FAMILY MEDICINE
Payer: COMMERCIAL

## 2022-11-21 DIAGNOSIS — Z12.31 VISIT FOR SCREENING MAMMOGRAM: ICD-10-CM

## 2022-11-21 DIAGNOSIS — E11.9 TYPE 2 DIABETES MELLITUS WITHOUT COMPLICATION, WITHOUT LONG-TERM CURRENT USE OF INSULIN (HCC): ICD-10-CM

## 2022-11-21 PROCEDURE — 77063 BREAST TOMOSYNTHESIS BI: CPT

## 2022-11-23 ENCOUNTER — HOSPITAL ENCOUNTER (OUTPATIENT)
Dept: LAB | Facility: MEDICAL CENTER | Age: 52
End: 2022-11-23
Attending: INTERNAL MEDICINE
Payer: COMMERCIAL

## 2022-11-23 ENCOUNTER — HOSPITAL ENCOUNTER (OUTPATIENT)
Dept: LAB | Facility: MEDICAL CENTER | Age: 52
End: 2022-11-23
Attending: FAMILY MEDICINE
Payer: COMMERCIAL

## 2022-11-23 DIAGNOSIS — E83.110 HEREDITARY HEMOCHROMATOSIS (HCC): ICD-10-CM

## 2022-11-23 DIAGNOSIS — E11.9 TYPE 2 DIABETES MELLITUS WITHOUT COMPLICATION, WITHOUT LONG-TERM CURRENT USE OF INSULIN (HCC): ICD-10-CM

## 2022-11-23 LAB
ALBUMIN SERPL BCP-MCNC: 4.4 G/DL (ref 3.2–4.9)
ALBUMIN/GLOB SERPL: 1.5 G/DL
ALP SERPL-CCNC: 62 U/L (ref 30–99)
ALT SERPL-CCNC: 29 U/L (ref 2–50)
ANION GAP SERPL CALC-SCNC: 13 MMOL/L (ref 7–16)
AST SERPL-CCNC: 25 U/L (ref 12–45)
BASOPHILS # BLD AUTO: 0.8 % (ref 0–1.8)
BASOPHILS # BLD: 0.06 K/UL (ref 0–0.12)
BILIRUB SERPL-MCNC: 0.3 MG/DL (ref 0.1–1.5)
BUN SERPL-MCNC: 13 MG/DL (ref 8–22)
CALCIUM SERPL-MCNC: 9.5 MG/DL (ref 8.5–10.5)
CHLORIDE SERPL-SCNC: 100 MMOL/L (ref 96–112)
CO2 SERPL-SCNC: 26 MMOL/L (ref 20–33)
CREAT SERPL-MCNC: 0.66 MG/DL (ref 0.5–1.4)
EOSINOPHIL # BLD AUTO: 0.12 K/UL (ref 0–0.51)
EOSINOPHIL NFR BLD: 1.6 % (ref 0–6.9)
ERYTHROCYTE [DISTWIDTH] IN BLOOD BY AUTOMATED COUNT: 39.7 FL (ref 35.9–50)
EST. AVERAGE GLUCOSE BLD GHB EST-MCNC: 131 MG/DL
FERRITIN SERPL-MCNC: 30.5 NG/ML (ref 10–291)
GFR SERPLBLD CREATININE-BSD FMLA CKD-EPI: 106 ML/MIN/1.73 M 2
GLOBULIN SER CALC-MCNC: 3 G/DL (ref 1.9–3.5)
GLUCOSE SERPL-MCNC: 81 MG/DL (ref 65–99)
HBA1C MFR BLD: 6.2 % (ref 4–5.6)
HCT VFR BLD AUTO: 44.4 % (ref 37–47)
HGB BLD-MCNC: 14.8 G/DL (ref 12–16)
IMM GRANULOCYTES # BLD AUTO: 0.03 K/UL (ref 0–0.11)
IMM GRANULOCYTES NFR BLD AUTO: 0.4 % (ref 0–0.9)
IRON SATN MFR SERPL: 20 % (ref 15–55)
IRON SERPL-MCNC: 82 UG/DL (ref 40–170)
LYMPHOCYTES # BLD AUTO: 1.67 K/UL (ref 1–4.8)
LYMPHOCYTES NFR BLD: 22.4 % (ref 22–41)
MCH RBC QN AUTO: 27.9 PG (ref 27–33)
MCHC RBC AUTO-ENTMCNC: 33.3 G/DL (ref 33.6–35)
MCV RBC AUTO: 83.6 FL (ref 81.4–97.8)
MONOCYTES # BLD AUTO: 0.59 K/UL (ref 0–0.85)
MONOCYTES NFR BLD AUTO: 7.9 % (ref 0–13.4)
NEUTROPHILS # BLD AUTO: 5 K/UL (ref 2–7.15)
NEUTROPHILS NFR BLD: 66.9 % (ref 44–72)
NRBC # BLD AUTO: 0 K/UL
NRBC BLD-RTO: 0 /100 WBC
PLATELET # BLD AUTO: 237 K/UL (ref 164–446)
PMV BLD AUTO: 9.8 FL (ref 9–12.9)
POTASSIUM SERPL-SCNC: 4.4 MMOL/L (ref 3.6–5.5)
PROT SERPL-MCNC: 7.4 G/DL (ref 6–8.2)
RBC # BLD AUTO: 5.31 M/UL (ref 4.2–5.4)
SODIUM SERPL-SCNC: 139 MMOL/L (ref 135–145)
TIBC SERPL-MCNC: 417 UG/DL (ref 250–450)
UIBC SERPL-MCNC: 335 UG/DL (ref 110–370)
WBC # BLD AUTO: 7.5 K/UL (ref 4.8–10.8)

## 2022-11-23 PROCEDURE — 83540 ASSAY OF IRON: CPT

## 2022-11-23 PROCEDURE — 83550 IRON BINDING TEST: CPT

## 2022-11-23 PROCEDURE — 83036 HEMOGLOBIN GLYCOSYLATED A1C: CPT

## 2022-11-23 PROCEDURE — 80053 COMPREHEN METABOLIC PANEL: CPT

## 2022-11-23 PROCEDURE — 85025 COMPLETE CBC W/AUTO DIFF WBC: CPT

## 2022-11-23 PROCEDURE — 36415 COLL VENOUS BLD VENIPUNCTURE: CPT

## 2022-11-23 PROCEDURE — 82728 ASSAY OF FERRITIN: CPT

## 2022-12-05 ENCOUNTER — HOSPITAL ENCOUNTER (INPATIENT)
Facility: MEDICAL CENTER | Age: 52
LOS: 9 days | DRG: 871 | End: 2022-12-14
Attending: EMERGENCY MEDICINE | Admitting: HOSPITALIST
Payer: COMMERCIAL

## 2022-12-05 ENCOUNTER — APPOINTMENT (OUTPATIENT)
Dept: RADIOLOGY | Facility: MEDICAL CENTER | Age: 52
DRG: 871 | End: 2022-12-05
Payer: COMMERCIAL

## 2022-12-05 DIAGNOSIS — J96.01 ACUTE RESPIRATORY FAILURE WITH HYPOXIA (HCC): ICD-10-CM

## 2022-12-05 DIAGNOSIS — R09.02 HYPOXIA: ICD-10-CM

## 2022-12-05 DIAGNOSIS — J18.9 MULTIFOCAL PNEUMONIA: ICD-10-CM

## 2022-12-05 DIAGNOSIS — J18.9 PNEUMONIA OF BOTH LUNGS DUE TO INFECTIOUS ORGANISM, UNSPECIFIED PART OF LUNG: ICD-10-CM

## 2022-12-05 DIAGNOSIS — J96.01 ACUTE HYPOXEMIC RESPIRATORY FAILURE (HCC): ICD-10-CM

## 2022-12-05 DIAGNOSIS — I10 PRIMARY HYPERTENSION: ICD-10-CM

## 2022-12-05 DIAGNOSIS — J44.9 CHRONIC OBSTRUCTIVE PULMONARY DISEASE, UNSPECIFIED COPD TYPE (HCC): ICD-10-CM

## 2022-12-05 LAB
ALBUMIN SERPL BCP-MCNC: 2.8 G/DL (ref 3.2–4.9)
ALBUMIN/GLOB SERPL: 0.7 G/DL
ALP SERPL-CCNC: 73 U/L (ref 30–99)
ALT SERPL-CCNC: 31 U/L (ref 2–50)
ANION GAP SERPL CALC-SCNC: 15 MMOL/L (ref 7–16)
ANISOCYTOSIS BLD QL SMEAR: ABNORMAL
APPEARANCE UR: CLEAR
AST SERPL-CCNC: 46 U/L (ref 12–45)
BACTERIA #/AREA URNS HPF: ABNORMAL /HPF
BASOPHILS # BLD AUTO: 0 % (ref 0–1.8)
BASOPHILS # BLD: 0 K/UL (ref 0–0.12)
BILIRUB SERPL-MCNC: 1.4 MG/DL (ref 0.1–1.5)
BILIRUB UR QL STRIP.AUTO: ABNORMAL
BUN SERPL-MCNC: 13 MG/DL (ref 8–22)
CALCIUM SERPL-MCNC: 8.7 MG/DL (ref 8.5–10.5)
CHLORIDE SERPL-SCNC: 94 MMOL/L (ref 96–112)
CO2 SERPL-SCNC: 21 MMOL/L (ref 20–33)
COLOR UR: YELLOW
CREAT SERPL-MCNC: 0.58 MG/DL (ref 0.5–1.4)
EKG IMPRESSION: NORMAL
EOSINOPHIL # BLD AUTO: 0 K/UL (ref 0–0.51)
EOSINOPHIL NFR BLD: 0 % (ref 0–6.9)
EPI CELLS #/AREA URNS HPF: ABNORMAL /HPF
ERYTHROCYTE [DISTWIDTH] IN BLOOD BY AUTOMATED COUNT: 39.6 FL (ref 35.9–50)
FLUAV RNA SPEC QL NAA+PROBE: NEGATIVE
FLUBV RNA SPEC QL NAA+PROBE: NEGATIVE
GFR SERPLBLD CREATININE-BSD FMLA CKD-EPI: 110 ML/MIN/1.73 M 2
GLOBULIN SER CALC-MCNC: 3.8 G/DL (ref 1.9–3.5)
GLUCOSE BLD STRIP.AUTO-MCNC: 60 MG/DL (ref 65–99)
GLUCOSE BLD STRIP.AUTO-MCNC: 96 MG/DL (ref 65–99)
GLUCOSE SERPL-MCNC: 82 MG/DL (ref 65–99)
GLUCOSE UR STRIP.AUTO-MCNC: NEGATIVE MG/DL
HCT VFR BLD AUTO: 36.4 % (ref 37–47)
HGB BLD-MCNC: 12.4 G/DL (ref 12–16)
HYALINE CASTS #/AREA URNS LPF: ABNORMAL /LPF
KETONES UR STRIP.AUTO-MCNC: 15 MG/DL
LACTATE SERPL-SCNC: 2.7 MMOL/L (ref 0.5–2)
LEUKOCYTE ESTERASE UR QL STRIP.AUTO: NEGATIVE
LYMPHOCYTES # BLD AUTO: 1.21 K/UL (ref 1–4.8)
LYMPHOCYTES NFR BLD: 32.8 % (ref 22–41)
MACROCYTES BLD QL SMEAR: ABNORMAL
MANUAL DIFF BLD: NORMAL
MCH RBC QN AUTO: 27.9 PG (ref 27–33)
MCHC RBC AUTO-ENTMCNC: 34.1 G/DL (ref 33.6–35)
MCV RBC AUTO: 81.8 FL (ref 81.4–97.8)
METAMYELOCYTES NFR BLD MANUAL: 2.4 %
MICRO URNS: ABNORMAL
MONOCYTES # BLD AUTO: 0.18 K/UL (ref 0–0.85)
MONOCYTES NFR BLD AUTO: 4.8 % (ref 0–13.4)
MORPHOLOGY BLD-IMP: NORMAL
NEUTROPHILS # BLD AUTO: 2.22 K/UL (ref 2–7.15)
NEUTROPHILS NFR BLD: 60 % (ref 44–72)
NITRITE UR QL STRIP.AUTO: NEGATIVE
NRBC # BLD AUTO: 0.02 K/UL
NRBC BLD-RTO: 0.5 /100 WBC
PH UR STRIP.AUTO: 5.5 [PH] (ref 5–8)
PLATELET # BLD AUTO: 256 K/UL (ref 164–446)
PLATELET BLD QL SMEAR: NORMAL
PMV BLD AUTO: 9.3 FL (ref 9–12.9)
POLYCHROMASIA BLD QL SMEAR: NORMAL
POTASSIUM SERPL-SCNC: 3.3 MMOL/L (ref 3.6–5.5)
PROCALCITONIN SERPL-MCNC: 35.7 NG/ML
PROT SERPL-MCNC: 6.6 G/DL (ref 6–8.2)
PROT UR QL STRIP: 100 MG/DL
RBC # BLD AUTO: 4.45 M/UL (ref 4.2–5.4)
RBC # URNS HPF: ABNORMAL /HPF
RBC BLD AUTO: PRESENT
RBC UR QL AUTO: NEGATIVE
RSV RNA SPEC QL NAA+PROBE: NEGATIVE
SARS-COV-2 RNA RESP QL NAA+PROBE: NOTDETECTED
SODIUM SERPL-SCNC: 130 MMOL/L (ref 135–145)
SP GR UR STRIP.AUTO: 1.02
SPECIMEN SOURCE: NORMAL
TOXIC GRANULES BLD QL SMEAR: NORMAL
UROBILINOGEN UR STRIP.AUTO-MCNC: 1 MG/DL
WBC # BLD AUTO: 3.7 K/UL (ref 4.8–10.8)
WBC #/AREA URNS HPF: ABNORMAL /HPF

## 2022-12-05 PROCEDURE — 700105 HCHG RX REV CODE 258: Performed by: HOSPITALIST

## 2022-12-05 PROCEDURE — 93005 ELECTROCARDIOGRAM TRACING: CPT

## 2022-12-05 PROCEDURE — 700102 HCHG RX REV CODE 250 W/ 637 OVERRIDE(OP): Performed by: HOSPITALIST

## 2022-12-05 PROCEDURE — 99223 1ST HOSP IP/OBS HIGH 75: CPT | Performed by: HOSPITALIST

## 2022-12-05 PROCEDURE — 94669 MECHANICAL CHEST WALL OSCILL: CPT

## 2022-12-05 PROCEDURE — 94640 AIRWAY INHALATION TREATMENT: CPT

## 2022-12-05 PROCEDURE — A9270 NON-COVERED ITEM OR SERVICE: HCPCS | Performed by: HOSPITALIST

## 2022-12-05 PROCEDURE — 36415 COLL VENOUS BLD VENIPUNCTURE: CPT

## 2022-12-05 PROCEDURE — 700111 HCHG RX REV CODE 636 W/ 250 OVERRIDE (IP): Performed by: HOSPITALIST

## 2022-12-05 PROCEDURE — 96367 TX/PROPH/DG ADDL SEQ IV INF: CPT

## 2022-12-05 PROCEDURE — 700105 HCHG RX REV CODE 258: Performed by: EMERGENCY MEDICINE

## 2022-12-05 PROCEDURE — 770020 HCHG ROOM/CARE - TELE (206)

## 2022-12-05 PROCEDURE — C9803 HOPD COVID-19 SPEC COLLECT: HCPCS | Performed by: EMERGENCY MEDICINE

## 2022-12-05 PROCEDURE — 87181 SC STD AGAR DILUTION PER AGT: CPT

## 2022-12-05 PROCEDURE — 85007 BL SMEAR W/DIFF WBC COUNT: CPT

## 2022-12-05 PROCEDURE — 83605 ASSAY OF LACTIC ACID: CPT | Mod: 91

## 2022-12-05 PROCEDURE — 0241U HCHG SARS-COV-2 COVID-19 NFCT DS RESP RNA 4 TRGT MIC: CPT

## 2022-12-05 PROCEDURE — 96365 THER/PROPH/DIAG IV INF INIT: CPT

## 2022-12-05 PROCEDURE — 82962 GLUCOSE BLOOD TEST: CPT | Mod: 91

## 2022-12-05 PROCEDURE — 93005 ELECTROCARDIOGRAM TRACING: CPT | Performed by: EMERGENCY MEDICINE

## 2022-12-05 PROCEDURE — 87086 URINE CULTURE/COLONY COUNT: CPT

## 2022-12-05 PROCEDURE — 85025 COMPLETE CBC W/AUTO DIFF WBC: CPT

## 2022-12-05 PROCEDURE — 99285 EMERGENCY DEPT VISIT HI MDM: CPT

## 2022-12-05 PROCEDURE — 81001 URINALYSIS AUTO W/SCOPE: CPT

## 2022-12-05 PROCEDURE — 71045 X-RAY EXAM CHEST 1 VIEW: CPT

## 2022-12-05 PROCEDURE — 80053 COMPREHEN METABOLIC PANEL: CPT

## 2022-12-05 PROCEDURE — 87040 BLOOD CULTURE FOR BACTERIA: CPT | Mod: 91

## 2022-12-05 PROCEDURE — 700111 HCHG RX REV CODE 636 W/ 250 OVERRIDE (IP): Performed by: EMERGENCY MEDICINE

## 2022-12-05 PROCEDURE — 96372 THER/PROPH/DIAG INJ SC/IM: CPT

## 2022-12-05 PROCEDURE — 96375 TX/PRO/DX INJ NEW DRUG ADDON: CPT

## 2022-12-05 PROCEDURE — 84145 PROCALCITONIN (PCT): CPT

## 2022-12-05 PROCEDURE — 87077 CULTURE AEROBIC IDENTIFY: CPT

## 2022-12-05 RX ORDER — OXYCODONE HYDROCHLORIDE 5 MG/1
5 TABLET ORAL
Status: DISCONTINUED | OUTPATIENT
Start: 2022-12-05 | End: 2022-12-14 | Stop reason: HOSPADM

## 2022-12-05 RX ORDER — PROMETHAZINE HYDROCHLORIDE 25 MG/1
12.5-25 SUPPOSITORY RECTAL EVERY 4 HOURS PRN
Status: DISCONTINUED | OUTPATIENT
Start: 2022-12-05 | End: 2022-12-14 | Stop reason: HOSPADM

## 2022-12-05 RX ORDER — ONDANSETRON 4 MG/1
4 TABLET, ORALLY DISINTEGRATING ORAL EVERY 4 HOURS PRN
Status: DISCONTINUED | OUTPATIENT
Start: 2022-12-05 | End: 2022-12-14 | Stop reason: HOSPADM

## 2022-12-05 RX ORDER — PROCHLORPERAZINE EDISYLATE 5 MG/ML
5-10 INJECTION INTRAMUSCULAR; INTRAVENOUS EVERY 4 HOURS PRN
Status: DISCONTINUED | OUTPATIENT
Start: 2022-12-05 | End: 2022-12-14 | Stop reason: HOSPADM

## 2022-12-05 RX ORDER — BENZONATATE 100 MG/1
100 CAPSULE ORAL 3 TIMES DAILY PRN
Status: DISCONTINUED | OUTPATIENT
Start: 2022-12-05 | End: 2022-12-14 | Stop reason: HOSPADM

## 2022-12-05 RX ORDER — BISACODYL 10 MG
10 SUPPOSITORY, RECTAL RECTAL
Status: DISCONTINUED | OUTPATIENT
Start: 2022-12-05 | End: 2022-12-08

## 2022-12-05 RX ORDER — AMOXICILLIN 250 MG
2 CAPSULE ORAL 2 TIMES DAILY
Status: DISCONTINUED | OUTPATIENT
Start: 2022-12-05 | End: 2022-12-08

## 2022-12-05 RX ORDER — BUPROPION HYDROCHLORIDE 150 MG/1
150 TABLET, EXTENDED RELEASE ORAL 2 TIMES DAILY
Status: DISCONTINUED | OUTPATIENT
Start: 2022-12-05 | End: 2022-12-14 | Stop reason: HOSPADM

## 2022-12-05 RX ORDER — MORPHINE SULFATE 4 MG/ML
2 INJECTION INTRAVENOUS ONCE
Status: COMPLETED | OUTPATIENT
Start: 2022-12-05 | End: 2022-12-05

## 2022-12-05 RX ORDER — AZITHROMYCIN 500 MG/1
500 INJECTION, POWDER, LYOPHILIZED, FOR SOLUTION INTRAVENOUS ONCE
Status: COMPLETED | OUTPATIENT
Start: 2022-12-05 | End: 2022-12-05

## 2022-12-05 RX ORDER — OMEPRAZOLE 20 MG/1
20 CAPSULE, DELAYED RELEASE ORAL DAILY
Status: DISCONTINUED | OUTPATIENT
Start: 2022-12-06 | End: 2022-12-14 | Stop reason: HOSPADM

## 2022-12-05 RX ORDER — ONDANSETRON 2 MG/ML
4 INJECTION INTRAMUSCULAR; INTRAVENOUS EVERY 4 HOURS PRN
Status: DISCONTINUED | OUTPATIENT
Start: 2022-12-05 | End: 2022-12-14 | Stop reason: HOSPADM

## 2022-12-05 RX ORDER — MORPHINE SULFATE 4 MG/ML
4 INJECTION INTRAVENOUS
Status: DISCONTINUED | OUTPATIENT
Start: 2022-12-05 | End: 2022-12-14 | Stop reason: HOSPADM

## 2022-12-05 RX ORDER — BUPROPION HYDROCHLORIDE 300 MG/1
300 TABLET ORAL EVERY MORNING
Status: DISCONTINUED | OUTPATIENT
Start: 2022-12-05 | End: 2022-12-05

## 2022-12-05 RX ORDER — SODIUM CHLORIDE, SODIUM LACTATE, POTASSIUM CHLORIDE, CALCIUM CHLORIDE 600; 310; 30; 20 MG/100ML; MG/100ML; MG/100ML; MG/100ML
INJECTION, SOLUTION INTRAVENOUS CONTINUOUS
Status: DISCONTINUED | OUTPATIENT
Start: 2022-12-05 | End: 2022-12-06

## 2022-12-05 RX ORDER — PROMETHAZINE HYDROCHLORIDE 25 MG/1
12.5-25 TABLET ORAL EVERY 4 HOURS PRN
Status: DISCONTINUED | OUTPATIENT
Start: 2022-12-05 | End: 2022-12-14 | Stop reason: HOSPADM

## 2022-12-05 RX ORDER — AZITHROMYCIN 500 MG/5ML
500 INJECTION, POWDER, LYOPHILIZED, FOR SOLUTION INTRAVENOUS EVERY 24 HOURS
Status: COMPLETED | OUTPATIENT
Start: 2022-12-06 | End: 2022-12-07

## 2022-12-05 RX ORDER — LISINOPRIL 20 MG/1
20 TABLET ORAL DAILY
Status: DISCONTINUED | OUTPATIENT
Start: 2022-12-05 | End: 2022-12-14 | Stop reason: HOSPADM

## 2022-12-05 RX ORDER — SODIUM CHLORIDE 9 MG/ML
30 INJECTION, SOLUTION INTRAVENOUS ONCE
Status: COMPLETED | OUTPATIENT
Start: 2022-12-05 | End: 2022-12-05

## 2022-12-05 RX ORDER — AMLODIPINE BESYLATE 5 MG/1
5 TABLET ORAL DAILY
Status: DISCONTINUED | OUTPATIENT
Start: 2022-12-05 | End: 2022-12-10

## 2022-12-05 RX ORDER — ESCITALOPRAM OXALATE 10 MG/1
40 TABLET ORAL DAILY
Status: DISCONTINUED | OUTPATIENT
Start: 2022-12-05 | End: 2022-12-14 | Stop reason: HOSPADM

## 2022-12-05 RX ORDER — HYDROXYZINE HYDROCHLORIDE 25 MG/1
25 TABLET, FILM COATED ORAL 3 TIMES DAILY PRN
Status: DISCONTINUED | OUTPATIENT
Start: 2022-12-05 | End: 2022-12-14 | Stop reason: HOSPADM

## 2022-12-05 RX ORDER — POLYETHYLENE GLYCOL 3350 17 G/17G
1 POWDER, FOR SOLUTION ORAL
Status: DISCONTINUED | OUTPATIENT
Start: 2022-12-05 | End: 2022-12-08

## 2022-12-05 RX ORDER — OXYCODONE HYDROCHLORIDE 10 MG/1
10 TABLET ORAL
Status: DISCONTINUED | OUTPATIENT
Start: 2022-12-05 | End: 2022-12-14 | Stop reason: HOSPADM

## 2022-12-05 RX ORDER — ENOXAPARIN SODIUM 100 MG/ML
40 INJECTION SUBCUTANEOUS DAILY
Status: DISCONTINUED | OUTPATIENT
Start: 2022-12-05 | End: 2022-12-14 | Stop reason: HOSPADM

## 2022-12-05 RX ORDER — KETOROLAC TROMETHAMINE 30 MG/ML
30 INJECTION, SOLUTION INTRAMUSCULAR; INTRAVENOUS ONCE
Status: COMPLETED | OUTPATIENT
Start: 2022-12-05 | End: 2022-12-05

## 2022-12-05 RX ORDER — ACETAMINOPHEN 325 MG/1
650 TABLET ORAL EVERY 6 HOURS PRN
Status: DISCONTINUED | OUTPATIENT
Start: 2022-12-05 | End: 2022-12-14 | Stop reason: HOSPADM

## 2022-12-05 RX ADMIN — LISINOPRIL 20 MG: 20 TABLET ORAL at 17:33

## 2022-12-05 RX ADMIN — KETOROLAC TROMETHAMINE 30 MG: 30 INJECTION, SOLUTION INTRAMUSCULAR; INTRAVENOUS at 16:45

## 2022-12-05 RX ADMIN — ESCITALOPRAM OXALATE 40 MG: 10 TABLET ORAL at 17:33

## 2022-12-05 RX ADMIN — ENOXAPARIN SODIUM 40 MG: 40 INJECTION SUBCUTANEOUS at 17:33

## 2022-12-05 RX ADMIN — BUPROPION HYDROCHLORIDE 150 MG: 150 TABLET, EXTENDED RELEASE ORAL at 17:34

## 2022-12-05 RX ADMIN — AMPICILLIN AND SULBACTAM 3 G: 1; 2 INJECTION, POWDER, FOR SOLUTION INTRAMUSCULAR; INTRAVENOUS at 14:53

## 2022-12-05 RX ADMIN — MORPHINE SULFATE 2 MG: 4 INJECTION INTRAVENOUS at 16:10

## 2022-12-05 RX ADMIN — AZITHROMYCIN MONOHYDRATE 500 MG: 500 INJECTION, POWDER, LYOPHILIZED, FOR SOLUTION INTRAVENOUS at 15:53

## 2022-12-05 RX ADMIN — AMLODIPINE BESYLATE 5 MG: 5 TABLET ORAL at 17:33

## 2022-12-05 RX ADMIN — BENZONATATE 100 MG: 100 CAPSULE ORAL at 17:05

## 2022-12-05 RX ADMIN — OXYCODONE HYDROCHLORIDE 10 MG: 10 TABLET ORAL at 17:05

## 2022-12-05 RX ADMIN — METFORMIN HYDROCHLORIDE 500 MG: 500 TABLET ORAL at 17:33

## 2022-12-05 RX ADMIN — CEFTRIAXONE SODIUM 2000 MG: 10 INJECTION, POWDER, FOR SOLUTION INTRAVENOUS at 17:33

## 2022-12-05 RX ADMIN — SODIUM CHLORIDE 2358 ML: 9 INJECTION, SOLUTION INTRAVENOUS at 14:53

## 2022-12-05 RX ADMIN — SODIUM CHLORIDE, POTASSIUM CHLORIDE, SODIUM LACTATE AND CALCIUM CHLORIDE: 600; 310; 30; 20 INJECTION, SOLUTION INTRAVENOUS at 17:37

## 2022-12-05 RX ADMIN — ASPIRIN 81 MG: 81 TABLET, COATED ORAL at 17:33

## 2022-12-05 ASSESSMENT — ENCOUNTER SYMPTOMS
FEVER: 0
SHORTNESS OF BREATH: 1
BACK PAIN: 1
NAUSEA: 1
HEADACHES: 0
MYALGIAS: 1
DIZZINESS: 0
DIARRHEA: 0
SORE THROAT: 0
ABDOMINAL PAIN: 0
COUGH: 1
NAUSEA: 0
CHILLS: 1
BLURRED VISION: 0
LOSS OF CONSCIOUSNESS: 0
SPUTUM PRODUCTION: 1
PALPITATIONS: 0
DOUBLE VISION: 0
FEVER: 1
VOMITING: 0

## 2022-12-05 ASSESSMENT — LIFESTYLE VARIABLES: EVER_SMOKED: NEVER

## 2022-12-05 ASSESSMENT — FIBROSIS 4 INDEX: FIB4 SCORE: 0.98

## 2022-12-05 ASSESSMENT — COPD QUESTIONNAIRES
HAVE YOU SMOKED AT LEAST 100 CIGARETTES IN YOUR ENTIRE LIFE: NO/DON'T KNOW
DO YOU EVER COUGH UP ANY MUCUS OR PHLEGM?: NO/ONLY WITH OCCASIONAL COLDS OR INFECTIONS
DURING THE PAST 4 WEEKS HOW MUCH DID YOU FEEL SHORT OF BREATH: NONE/LITTLE OF THE TIME
COPD SCREENING SCORE: 1

## 2022-12-05 ASSESSMENT — PAIN DESCRIPTION - PAIN TYPE: TYPE: ACUTE PAIN

## 2022-12-05 NOTE — ED TRIAGE NOTES
Pt  to triage with   Chief Complaint   Patient presents with    Shortness of Breath     For the last day, report pain with coughing.  Pt on 4 liters NC normally on RA.  Tested for covid 11/25 and was negative.  Reports temps since before thanksgiving.       EKG completed.   Protocol ordered.  Pt Informed regarding triage process and verbalized understanding to inform triage tech or RN for any changes in condition. Placed in lobby.

## 2022-12-06 PROBLEM — B95.5 STREPTOCOCCAL BACTEREMIA: Status: ACTIVE | Noted: 2022-12-06

## 2022-12-06 PROBLEM — A41.9 SEPSIS (HCC): Status: ACTIVE | Noted: 2022-12-06

## 2022-12-06 PROBLEM — R78.81 STREPTOCOCCAL BACTEREMIA: Status: ACTIVE | Noted: 2022-12-06

## 2022-12-06 PROBLEM — J96.01 ACUTE HYPOXEMIC RESPIRATORY FAILURE (HCC): Status: ACTIVE | Noted: 2022-12-06

## 2022-12-06 LAB
ANION GAP SERPL CALC-SCNC: 11 MMOL/L (ref 7–16)
BUN SERPL-MCNC: 14 MG/DL (ref 8–22)
CALCIUM SERPL-MCNC: 7.9 MG/DL (ref 8.5–10.5)
CHLORIDE SERPL-SCNC: 99 MMOL/L (ref 96–112)
CO2 SERPL-SCNC: 24 MMOL/L (ref 20–33)
CREAT SERPL-MCNC: 0.63 MG/DL (ref 0.5–1.4)
ERYTHROCYTE [DISTWIDTH] IN BLOOD BY AUTOMATED COUNT: 42 FL (ref 35.9–50)
GFR SERPLBLD CREATININE-BSD FMLA CKD-EPI: 107 ML/MIN/1.73 M 2
GLUCOSE BLD STRIP.AUTO-MCNC: 101 MG/DL (ref 65–99)
GLUCOSE BLD STRIP.AUTO-MCNC: 118 MG/DL (ref 65–99)
GLUCOSE BLD STRIP.AUTO-MCNC: 56 MG/DL (ref 65–99)
GLUCOSE BLD STRIP.AUTO-MCNC: 90 MG/DL (ref 65–99)
GLUCOSE SERPL-MCNC: 71 MG/DL (ref 65–99)
HCT VFR BLD AUTO: 32.4 % (ref 37–47)
HGB BLD-MCNC: 10.7 G/DL (ref 12–16)
INR PPP: 1.1 (ref 0.87–1.13)
LACTATE SERPL-SCNC: 1.5 MMOL/L (ref 0.5–2)
MAGNESIUM SERPL-MCNC: 1.6 MG/DL (ref 1.5–2.5)
MCH RBC QN AUTO: 27.6 PG (ref 27–33)
MCHC RBC AUTO-ENTMCNC: 33 G/DL (ref 33.6–35)
MCV RBC AUTO: 83.5 FL (ref 81.4–97.8)
PLATELET # BLD AUTO: 204 K/UL (ref 164–446)
PMV BLD AUTO: 9 FL (ref 9–12.9)
POTASSIUM SERPL-SCNC: 3.5 MMOL/L (ref 3.6–5.5)
PROTHROMBIN TIME: 14.1 SEC (ref 12–14.6)
RBC # BLD AUTO: 3.88 M/UL (ref 4.2–5.4)
SCCMEC + MECA PNL NOSE NAA+PROBE: NEGATIVE
SODIUM SERPL-SCNC: 134 MMOL/L (ref 135–145)
WBC # BLD AUTO: 5.9 K/UL (ref 4.8–10.8)

## 2022-12-06 PROCEDURE — 700102 HCHG RX REV CODE 250 W/ 637 OVERRIDE(OP): Performed by: STUDENT IN AN ORGANIZED HEALTH CARE EDUCATION/TRAINING PROGRAM

## 2022-12-06 PROCEDURE — 36415 COLL VENOUS BLD VENIPUNCTURE: CPT

## 2022-12-06 PROCEDURE — 83735 ASSAY OF MAGNESIUM: CPT

## 2022-12-06 PROCEDURE — 700101 HCHG RX REV CODE 250: Performed by: HOSPITALIST

## 2022-12-06 PROCEDURE — 94640 AIRWAY INHALATION TREATMENT: CPT

## 2022-12-06 PROCEDURE — 94669 MECHANICAL CHEST WALL OSCILL: CPT

## 2022-12-06 PROCEDURE — 85610 PROTHROMBIN TIME: CPT

## 2022-12-06 PROCEDURE — 99291 CRITICAL CARE FIRST HOUR: CPT | Performed by: STUDENT IN AN ORGANIZED HEALTH CARE EDUCATION/TRAINING PROGRAM

## 2022-12-06 PROCEDURE — 83605 ASSAY OF LACTIC ACID: CPT

## 2022-12-06 PROCEDURE — 700111 HCHG RX REV CODE 636 W/ 250 OVERRIDE (IP): Performed by: HOSPITALIST

## 2022-12-06 PROCEDURE — 96366 THER/PROPH/DIAG IV INF ADDON: CPT

## 2022-12-06 PROCEDURE — 82962 GLUCOSE BLOOD TEST: CPT

## 2022-12-06 PROCEDURE — A9270 NON-COVERED ITEM OR SERVICE: HCPCS | Performed by: HOSPITALIST

## 2022-12-06 PROCEDURE — 96375 TX/PRO/DX INJ NEW DRUG ADDON: CPT

## 2022-12-06 PROCEDURE — 700105 HCHG RX REV CODE 258: Performed by: STUDENT IN AN ORGANIZED HEALTH CARE EDUCATION/TRAINING PROGRAM

## 2022-12-06 PROCEDURE — 85027 COMPLETE CBC AUTOMATED: CPT

## 2022-12-06 PROCEDURE — 700101 HCHG RX REV CODE 250: Performed by: STUDENT IN AN ORGANIZED HEALTH CARE EDUCATION/TRAINING PROGRAM

## 2022-12-06 PROCEDURE — 770020 HCHG ROOM/CARE - TELE (206)

## 2022-12-06 PROCEDURE — 80048 BASIC METABOLIC PNL TOTAL CA: CPT

## 2022-12-06 PROCEDURE — 700102 HCHG RX REV CODE 250 W/ 637 OVERRIDE(OP): Performed by: HOSPITALIST

## 2022-12-06 PROCEDURE — 700111 HCHG RX REV CODE 636 W/ 250 OVERRIDE (IP): Performed by: STUDENT IN AN ORGANIZED HEALTH CARE EDUCATION/TRAINING PROGRAM

## 2022-12-06 PROCEDURE — 700105 HCHG RX REV CODE 258: Performed by: HOSPITALIST

## 2022-12-06 PROCEDURE — A9270 NON-COVERED ITEM OR SERVICE: HCPCS | Performed by: STUDENT IN AN ORGANIZED HEALTH CARE EDUCATION/TRAINING PROGRAM

## 2022-12-06 PROCEDURE — 87641 MR-STAPH DNA AMP PROBE: CPT

## 2022-12-06 RX ORDER — SODIUM CHLORIDE, SODIUM LACTATE, POTASSIUM CHLORIDE, AND CALCIUM CHLORIDE .6; .31; .03; .02 G/100ML; G/100ML; G/100ML; G/100ML
500 INJECTION, SOLUTION INTRAVENOUS
Status: DISCONTINUED | OUTPATIENT
Start: 2022-12-06 | End: 2022-12-14 | Stop reason: HOSPADM

## 2022-12-06 RX ORDER — IPRATROPIUM BROMIDE AND ALBUTEROL SULFATE 2.5; .5 MG/3ML; MG/3ML
3 SOLUTION RESPIRATORY (INHALATION)
Status: DISPENSED | OUTPATIENT
Start: 2022-12-06 | End: 2022-12-07

## 2022-12-06 RX ORDER — SODIUM CHLORIDE, SODIUM LACTATE, POTASSIUM CHLORIDE, CALCIUM CHLORIDE 600; 310; 30; 20 MG/100ML; MG/100ML; MG/100ML; MG/100ML
INJECTION, SOLUTION INTRAVENOUS CONTINUOUS
Status: DISCONTINUED | OUTPATIENT
Start: 2022-12-06 | End: 2022-12-07

## 2022-12-06 RX ORDER — LINEZOLID 2 MG/ML
600 INJECTION, SOLUTION INTRAVENOUS EVERY 12 HOURS
Status: DISCONTINUED | OUTPATIENT
Start: 2022-12-06 | End: 2022-12-06

## 2022-12-06 RX ORDER — POTASSIUM CHLORIDE 20 MEQ/1
40 TABLET, EXTENDED RELEASE ORAL EVERY 6 HOURS
Status: COMPLETED | OUTPATIENT
Start: 2022-12-06 | End: 2022-12-06

## 2022-12-06 RX ORDER — SODIUM CHLORIDE, SODIUM LACTATE, POTASSIUM CHLORIDE, AND CALCIUM CHLORIDE .6; .31; .03; .02 G/100ML; G/100ML; G/100ML; G/100ML
30 INJECTION, SOLUTION INTRAVENOUS ONCE
Status: COMPLETED | OUTPATIENT
Start: 2022-12-06 | End: 2022-12-07

## 2022-12-06 RX ORDER — IPRATROPIUM BROMIDE AND ALBUTEROL SULFATE 2.5; .5 MG/3ML; MG/3ML
3 SOLUTION RESPIRATORY (INHALATION)
Status: DISCONTINUED | OUTPATIENT
Start: 2022-12-06 | End: 2022-12-14 | Stop reason: HOSPADM

## 2022-12-06 RX ADMIN — BENZONATATE 100 MG: 100 CAPSULE ORAL at 00:46

## 2022-12-06 RX ADMIN — CEFTRIAXONE SODIUM 2000 MG: 10 INJECTION, POWDER, FOR SOLUTION INTRAVENOUS at 05:26

## 2022-12-06 RX ADMIN — ASPIRIN 81 MG: 81 TABLET, COATED ORAL at 09:18

## 2022-12-06 RX ADMIN — BUPROPION HYDROCHLORIDE 150 MG: 150 TABLET, EXTENDED RELEASE ORAL at 16:52

## 2022-12-06 RX ADMIN — IPRATROPIUM BROMIDE AND ALBUTEROL SULFATE 3 ML: .5; 2.5 SOLUTION RESPIRATORY (INHALATION) at 23:51

## 2022-12-06 RX ADMIN — SODIUM CHLORIDE, POTASSIUM CHLORIDE, SODIUM LACTATE AND CALCIUM CHLORIDE 2358 ML: 600; 310; 30; 20 INJECTION, SOLUTION INTRAVENOUS at 09:03

## 2022-12-06 RX ADMIN — OXYCODONE 5 MG: 5 TABLET ORAL at 11:18

## 2022-12-06 RX ADMIN — AMPICILLIN SODIUM 2000 MG: 2 INJECTION, POWDER, FOR SOLUTION INTRAMUSCULAR; INTRAVENOUS at 14:19

## 2022-12-06 RX ADMIN — AMPICILLIN SODIUM 2000 MG: 2 INJECTION, POWDER, FOR SOLUTION INTRAMUSCULAR; INTRAVENOUS at 16:56

## 2022-12-06 RX ADMIN — OMEPRAZOLE 20 MG: 20 CAPSULE, DELAYED RELEASE ORAL at 05:23

## 2022-12-06 RX ADMIN — ENOXAPARIN SODIUM 40 MG: 40 INJECTION SUBCUTANEOUS at 16:52

## 2022-12-06 RX ADMIN — ACETAMINOPHEN 650 MG: 325 TABLET, FILM COATED ORAL at 18:02

## 2022-12-06 RX ADMIN — POTASSIUM CHLORIDE 40 MEQ: 20 TABLET, EXTENDED RELEASE ORAL at 09:18

## 2022-12-06 RX ADMIN — BENZONATATE 100 MG: 100 CAPSULE ORAL at 05:22

## 2022-12-06 RX ADMIN — AMLODIPINE BESYLATE 5 MG: 5 TABLET ORAL at 05:24

## 2022-12-06 RX ADMIN — METFORMIN HYDROCHLORIDE 500 MG: 500 TABLET ORAL at 16:52

## 2022-12-06 RX ADMIN — LISINOPRIL 20 MG: 20 TABLET ORAL at 05:25

## 2022-12-06 RX ADMIN — ESCITALOPRAM OXALATE 40 MG: 10 TABLET ORAL at 05:24

## 2022-12-06 RX ADMIN — BUPROPION HYDROCHLORIDE 150 MG: 150 TABLET, EXTENDED RELEASE ORAL at 05:23

## 2022-12-06 RX ADMIN — SODIUM CHLORIDE, POTASSIUM CHLORIDE, SODIUM LACTATE AND CALCIUM CHLORIDE: 600; 310; 30; 20 INJECTION, SOLUTION INTRAVENOUS at 15:00

## 2022-12-06 RX ADMIN — ACETAMINOPHEN 650 MG: 325 TABLET, FILM COATED ORAL at 05:32

## 2022-12-06 RX ADMIN — POTASSIUM CHLORIDE 40 MEQ: 20 TABLET, EXTENDED RELEASE ORAL at 14:14

## 2022-12-06 RX ADMIN — AZITHROMYCIN MONOHYDRATE 500 MG: 500 INJECTION, POWDER, LYOPHILIZED, FOR SOLUTION INTRAVENOUS at 05:37

## 2022-12-06 RX ADMIN — AMPICILLIN SODIUM 2000 MG: 2 INJECTION, POWDER, FOR SOLUTION INTRAMUSCULAR; INTRAVENOUS at 21:53

## 2022-12-06 ASSESSMENT — ENCOUNTER SYMPTOMS
HEADACHES: 0
PALPITATIONS: 0
VOMITING: 0
ABDOMINAL PAIN: 0
SPUTUM PRODUCTION: 1
DIZZINESS: 0
COUGH: 1
MYALGIAS: 0
NAUSEA: 0
FEVER: 1
SHORTNESS OF BREATH: 0
CHILLS: 1

## 2022-12-06 ASSESSMENT — COGNITIVE AND FUNCTIONAL STATUS - GENERAL
MOBILITY SCORE: 24
DAILY ACTIVITIY SCORE: 24
SUGGESTED CMS G CODE MODIFIER MOBILITY: CH
SUGGESTED CMS G CODE MODIFIER DAILY ACTIVITY: CH

## 2022-12-06 ASSESSMENT — LIFESTYLE VARIABLES
AVERAGE NUMBER OF DAYS PER WEEK YOU HAVE A DRINK CONTAINING ALCOHOL: 0
HAVE YOU EVER FELT YOU SHOULD CUT DOWN ON YOUR DRINKING: NO
CONSUMPTION TOTAL: NEGATIVE
HAVE PEOPLE ANNOYED YOU BY CRITICIZING YOUR DRINKING: NO
HOW MANY TIMES IN THE PAST YEAR HAVE YOU HAD 5 OR MORE DRINKS IN A DAY: 0
TOTAL SCORE: 0
ON A TYPICAL DAY WHEN YOU DRINK ALCOHOL HOW MANY DRINKS DO YOU HAVE: 0
TOTAL SCORE: 0
ALCOHOL_USE: NO
TOTAL SCORE: 0
EVER FELT BAD OR GUILTY ABOUT YOUR DRINKING: NO
EVER HAD A DRINK FIRST THING IN THE MORNING TO STEADY YOUR NERVES TO GET RID OF A HANGOVER: NO
DOES PATIENT WANT TO STOP DRINKING: NO

## 2022-12-06 ASSESSMENT — PAIN DESCRIPTION - PAIN TYPE: TYPE: ACUTE PAIN

## 2022-12-06 ASSESSMENT — FIBROSIS 4 INDEX: FIB4 SCORE: 2.02

## 2022-12-06 NOTE — RESPIRATORY CARE
Pt assessed by Respiratory Therapy.    Transitioned from 15L NRB to 15L Oxymask.    Worked with patient to perform PEP therapy and Incentive Spirometry.     High Flow Nasal Cannula Available if oxygen requirements continue to increase.

## 2022-12-06 NOTE — PROGRESS NOTES
[facility] Medicine Daily Progress Note    Date of Service  12/6/2022    Chief Complaint  Mónica Keita is a 52 y.o. female admitted 12/5/2022 with shortness of breath.  Shortness of breath.  There is a pleasant woman with a history of nonalcoholic fatty liver disease, hypertension, neuroendocrine tumor.  Patient recently    Hospital Course  Mónica Keita is a 52 y.o. female who presented 12/5/2022 with shortness of breath.  This is a pleasant woman with a history of nonalcoholic fatty liver disease, hypertension, and neuroendocrine tumor.  Patient presented with several day history of shortness of breath as well as fevers and chills.  Her symptoms began approximately 10 days ago.  She recently tested for influenza but was negative for COVID-19.  She had not been vaccinated for influenza.  In the emergency room, patient had SPO2 of 75% on room air requiring supplemental oxygen.  She was tachycardic with heart rate in the 100s with white count of 3.7 meeting criteria for sepsis.  Patient was tachypneic with respiratory rate up to 29 showing signs of respiratory distress.  Lactic acid was 2.7.  Sodium of 130.  Bicarb of 21.  Chest x-ray showed right lower lobe pneumonia.    Interval Problem Update  12/6: Patient feeling less short of breath.  Sepsis bolus given.  Patient requiring high flow oxygen 30 LPM this morning increasing to 40 LPM by the afternoon.  Continue to have fevers.  Blood cultures growing 2 out of 2 bottles gram-positive cocci possible Streptococcus species.  Initially due to concern for possible staph pneumonia, she was started on linezolid.  However, agreed with pharmacy's recommendations to transition to ampicillin.  Echocardiogram has been ordered.  Patient will need infectious diseases consultation in the morning tomorrow.    I have discussed this patient's plan of care and discharge plan at IDT rounds today with Case Management, Nursing, Nursing leadership, and other members of the IDT  team.    Consultants/Specialty  none    Code Status  Full Code    Disposition  Patient is not medically cleared for discharge.   Anticipate discharge to to home with close outpatient follow-up.  I have placed the appropriate orders for post-discharge needs.    Review of Systems  Review of Systems   Constitutional:  Positive for chills, fever and malaise/fatigue.   Respiratory:  Positive for cough and sputum production. Negative for shortness of breath.    Cardiovascular:  Negative for chest pain and palpitations.   Gastrointestinal:  Negative for abdominal pain, nausea and vomiting.   Genitourinary:  Negative for dysuria and hematuria.   Musculoskeletal:  Negative for joint pain and myalgias.   Neurological:  Negative for dizziness and headaches.      Physical Exam  Temp:  [36 °C (96.8 °F)-38.6 °C (101.5 °F)] 36 °C (96.8 °F)  Pulse:  [] 118  Resp:  [16-32] 20  BP: ()/(56-85) 133/85  SpO2:  [83 %-98 %] 92 %    Physical Exam  Vitals and nursing note reviewed.   Constitutional:       Appearance: She is normal weight. She is ill-appearing. She is not diaphoretic.   HENT:      Head: Normocephalic and atraumatic.      Nose:      Comments: High flow oxygen     Mouth/Throat:      Mouth: Mucous membranes are moist.      Pharynx: Oropharynx is clear. No oropharyngeal exudate.   Eyes:      General:         Right eye: No discharge.         Left eye: No discharge.      Conjunctiva/sclera: Conjunctivae normal.      Pupils: Pupils are equal, round, and reactive to light.   Cardiovascular:      Rate and Rhythm: Normal rate and regular rhythm.      Pulses: Normal pulses.      Heart sounds: Normal heart sounds. No murmur heard.  Pulmonary:      Effort: Pulmonary effort is normal. No respiratory distress.      Breath sounds: Rales present.   Abdominal:      General: Abdomen is flat. Bowel sounds are normal. There is no distension.      Palpations: Abdomen is soft.      Tenderness: There is no abdominal tenderness.    Musculoskeletal:      Cervical back: Neck supple. No tenderness.      Right lower leg: No edema.      Left lower leg: No edema.   Skin:     Capillary Refill: Capillary refill takes more than 3 seconds.   Neurological:      Mental Status: She is alert and oriented to person, place, and time.      Motor: No weakness.       Fluids    Intake/Output Summary (Last 24 hours) at 12/6/2022 1948  Last data filed at 12/6/2022 1708  Gross per 24 hour   Intake 180 ml   Output 900 ml   Net -720 ml       Laboratory  Recent Labs     12/05/22  1350 12/06/22  0241   WBC 3.7* 5.9   RBC 4.45 3.88*   HEMOGLOBIN 12.4 10.7*   HEMATOCRIT 36.4* 32.4*   MCV 81.8 83.5   MCH 27.9 27.6   MCHC 34.1 33.0*   RDW 39.6 42.0   PLATELETCT 256 204   MPV 9.3 9.0     Recent Labs     12/05/22  1350 12/06/22  0241   SODIUM 130* 134*   POTASSIUM 3.3* 3.5*   CHLORIDE 94* 99   CO2 21 24   GLUCOSE 82 71   BUN 13 14   CREATININE 0.58 0.63   CALCIUM 8.7 7.9*     Recent Labs     12/06/22  0917   INR 1.10               Imaging  DX-CHEST-PORTABLE (1 VIEW)   Final Result      Multifocal bilateral pneumonia.         EC-ECHOCARDIOGRAM COMPLETE W/O CONT    (Results Pending)        Assessment/Plan  * Acute respiratory failure with hypoxia (HCC)- (present on admission)  Assessment & Plan  Likely due to secondary pneumonia from influenza infection.  Now with streptococcal bacteremia  Patient is a lifetime non-smoker.    Admit to telemetry inpatient status.  Continue oxygen as per respiratory protocol.  Continuous pulse oximetry.  Respiratory therapy consult.  Duo nebs every 4 hours and as needed.  Incentive spirometry  PEP therapy    Streptococcal bacteremia- (present on admission)  Assessment & Plan  Blood cultures from 12/5/2022 showing 2 out of 2 bottles gram-positive cocci likely streptococcal species.    Repeat blood cultures tomorrow  Echocardiogram has been ordered  Consult infectious diseases in the morning    Sepsis (HCC)- (present on admission)  Assessment &  Plan  This is Sepsis Not present on admission  SIRS criteria identified on my evaluation include: Fever, with temperature greater than 101 deg F, Hypothermia, with temperature less than 96.8 deg F and Tachypnea, with respirations greater than 20 per minute  Source is multifocal pneumonia bacteremia  Sepsis protocol initiated  Fluid resuscitation ordered per protocol  Crystalloid Fluid Administration: Fluid resuscitation ordered per standard protocol - 30 mL/kg per current or ideal body weight  IV antibiotics as appropriate for source of sepsis: Ceftriaxone changed to ampicillin.  Continues on azithromycin  Reassessment: I have reassessed the patient's hemodynamic status    Multifocal pneumonia due to Streptococcus secondary to influenza infection  Assessment & Plan  She is recovering from influenza A which was diagnosed 10 days ago  Blood cultures growing 2 out of 2 bottles likely streptococcal species    Change ceftriaxone to ampicillin  Continue azithromycin  Respiratory therapy and breathing treatments.    NAFLD (nonalcoholic fatty liver disease)- (present on admission)  Assessment & Plan  LFTs are hovering around her baseline    Type 2 diabetes mellitus, without long-term current use of insulin (HCC)- (present on admission)  Assessment & Plan  Takes metformin 500 twice daily at home.  Last A1c was 6.2, this was 2 weeks ago which demonstrates good control    Hold metformin to avoid metabolic acidosis  Continue regular insulin  Hypoglycemia protocol  Carbohydrate consistent diet    Hypertension- (present on admission)  Assessment & Plan  Patient is maintained on lisinopril 20 mg, and amlodipine 5 at home which we will continue with parameters       VTE prophylaxis: SCDs/TEDs and enoxaparin ppx    I have performed a physical exam and reviewed and updated ROS and Plan today (12/6/2022). In review of yesterday's note (12/5/2022), there are no changes except as documented above.      Patient is critically ill.   The  patient continues to have: Acute hypoxemic respiratory failure in setting of bacteremia and sepsis requiring high flow oxygen.  The vital organ system that is affected is the: Respiratory  If untreated there is a high chance of deterioration into: Fulminant respiratory failure, cardiovascular collapse, cardiac arrest, and eventually death.   The critical care that I am providing today is: Extensive data review and frequent monitoring of patient's vital signs and examination at bedside.  I have escalated patient's respiratory treatments and continued her on high flow oxygen.  I have modified her antibiotics according to her culture results.  Case was discussed with the pharmacist.  The critical that has been undertaken is medically complex.   There has been no overlap in critical care time.   Critical Care Time not including procedures: 32 minutes

## 2022-12-06 NOTE — ASSESSMENT & PLAN NOTE
Likely due to secondary pneumonia from influenza infection.  Now with streptococcal bacteremia  Patient is a lifetime non-smoker.  Respiratory therapy consult.  Duo nebs every 4 hours and as needed.  Incentive spirometry  PEP therapy    Improving, on 5L oxygen, continue to wean as able  See bacteremia/pneumonia problem

## 2022-12-06 NOTE — ASSESSMENT & PLAN NOTE
Patient is maintained on lisinopril 20 mg, and amlodipine 5 at home which we will continue with parameters

## 2022-12-06 NOTE — PROGRESS NOTES
4 Eyes Skin Assessment Completed by CRISTIANE Cook and CRISTIANE Elam.    Head WDL  Ears WDL  Nose WDL  Mouth WDL  Neck WDL  Breast/Chest WDL  Shoulder Blades WDL  Spine WDL  (R) Arm/Elbow/Hand WDL  (L) Arm/Elbow/Hand WDL  Abdomen WDL  Groin WDL  Scrotum/Coccyx/Buttocks WDL  (R) Leg WDL  (L) Leg WDL  (R) Heel/Foot/Toe WDL  (L) Heel/Foot/Toe WDL      -Face is flushed, skin is intact, no areas of concern.    Devices In Places Tele Box, Blood Pressure Cuff, Pulse Ox, and HFNC      Interventions In Place Pressure Redistribution Mattress    Possible Skin Injury No    Pictures Uploaded Into Epic N/A  Wound Consult Placed N/A  RN Wound Prevention Protocol Ordered No

## 2022-12-06 NOTE — ED NOTES
"Pt requesting to walk to the restroom. This RN attempted to educate pt on her increased oxygen demand. Pt states, \"I've been off of it before. I really need to go to the bathroom. I have to poop.\"  Pt amb to restroom with steady gait.  "

## 2022-12-06 NOTE — H&P
"Hospital Medicine History & Physical Note    Date of Service  12/5/2022    Primary Care Physician  Eren Heath M.D.    Consultants      Specialist Names:     Code Status  Full Code    Chief Complaint  Chief Complaint   Patient presents with    Shortness of Breath     For the last day, report pain with coughing.  Pt on 4 liters NC normally on RA.  Tested for covid 11/25 and was negative.  Reports temps since before thanksgiving.         History of Presenting Illness  Mónica Keita is a 52 y.o. female who presented 12/5/2022 with a previous medical history that includes NAFLD, hypertension, neuroendocrine tumor.    Patient presents for evaluation of cough shortness of breath fever and chills.  She reports symptoms began about 10 days ago.  She had systemic complaints along with a fever and chills.  She was tested for COVID and influenza, and came back negative for COVID, positive for influenza.  After few days she started to feel better and felt \"okay I guess\" for couple days before about 3 days ago started to get much worse.  She had a recurrence of the same symptoms but this time the cough was much more intense, the shortness of breath was also much more intense.    Here in the ED the patient was found to be satting 75% on room air.  She is tachycardic around 100 with good pressures between 110 and 130 systolic.  Chest x-ray shows a right lower lobe pneumonia.  She is leukopenic with a white count of 3.7.  CMP shows a sodium of 130, potassium 3.3, CO2 21, normal renal function, mild elevation of AST with normal ALT and alk phos and total bili of 2.5.  Lactic acid is 2.7    Patient is being admitted for treatment of right lower lobe pneumonia, and acute hypoxic respiratory failure.    I discussed the plan of care with patient.    Review of Systems  Review of Systems   Constitutional:  Positive for chills and fever.   HENT:  Negative for nosebleeds and sore throat.    Eyes:  Negative for blurred vision " and double vision.   Respiratory:  Positive for cough, sputum production and shortness of breath.    Cardiovascular:  Positive for chest pain. Negative for palpitations and leg swelling.   Gastrointestinal:  Positive for nausea. Negative for abdominal pain, diarrhea and vomiting.   Genitourinary:  Negative for dysuria and urgency.   Musculoskeletal:  Positive for back pain and myalgias.   Skin:  Negative for rash.   Neurological:  Negative for dizziness, loss of consciousness and headaches.     Past Medical History   has a past medical history of Adverse effect of anesthesia, Anemia, Anxiety, Bronchitis (2009), Diabetes (HCC), Dissection of cervical artery (HCC), Elevated liver enzymes, Hypertension, Neuroendocrine tumor (11/2021), and Pneumonia (2009).    Surgical History   has a past surgical history that includes hysterectomy, vaginal (06/04/2014); pelvic exam under anesthesia (06/17/2014); vaginal hysterectomy total (06/17/2014); appendectomy (2014); pr upper gi endoscopy,diagnosis (N/A, 11/01/2021); and egd w/endoscopic ultrasound (N/A, 11/01/2021).     Family History  family history includes Heart Attack in her brother; Heart Disease in her father; No Known Problems in her daughter, sister, and son.   Family history reviewed with patient. There is no family history that is pertinent to the chief complaint.     Social History   reports that she has never smoked. She has never used smokeless tobacco. She reports that she does not drink alcohol and does not use drugs.    Allergies  No Known Allergies    Medications  Prior to Admission Medications   Prescriptions Last Dose Informant Patient Reported? Taking?   Biotin 5000 MCG Cap 12/2/2022 at am Patient Yes No   Sig: Take 5,000 mcg by mouth every day.   Cholecalciferol (VITAMIN D3) 125 MCG (5000 UT) Cap 12/2/2022 at am Patient Yes No   Sig: Take 1 Capsule by mouth every day.   Coenzyme Q10 (COQ10 PO) 12/2/2022 at am Patient Yes No   Sig: Take 1 Tab by mouth every  day.   Ferrous Sulfate (IRON PO) 12/2/2022 at unknown Patient Yes No   Sig: Take 1 Tablet by mouth every Monday, Wednesday, and Friday.   amLODIPine (NORVASC) 5 MG Tab 12/2/2022 at hs  No No   Sig: Take 1 Tablet by mouth every day.   aspirin 81 MG EC tablet 12/2/2022 at hs  No No   Sig: Take 1 Tablet by mouth every day.   buPROPion (WELLBUTRIN XL) 300 MG XL tablet 12/2/2022 at am Patient No No   Sig: Take 1 Tablet by mouth every morning.   escitalopram (LEXAPRO) 20 MG tablet 12/2/2022 at hs Patient No No   Sig: Take 2 Tablets by mouth every day.   hydrOXYzine HCl (ATARAX) 25 MG Tab unknown at unknown Patient No No   Sig: Take 1 Tablet by mouth 3 times a day as needed for Anxiety.   lisinopril (PRINIVIL) 20 MG Tab 12/2/2022 at am Patient No No   Sig: Take 1 Tablet by mouth every day.   metFORMIN (GLUCOPHAGE) 500 MG Tab 12/2/2022 at hs Patient No No   Sig: Take 1 Tablet by mouth 2 times a day with meals.   omeprazole (PRILOSEC) 20 MG delayed-release capsule 12/5/2022 at am Patient No No   Sig: Take 1 Capsule by mouth every day.      Facility-Administered Medications: None       Physical Exam  Temp:  [37.4 °C (99.3 °F)-38.4 °C (101.1 °F)] 37.4 °C (99.3 °F)  Pulse:  [110-122] 119  Resp:  [15-29] 28  BP: (113-130)/(64-81) 130/77  SpO2:  [75 %-92 %] 92 %  Blood Pressure: 130/77   Temperature: 37.4 °C (99.3 °F)   Pulse: (!) 119   Respiration: (!) 28   Pulse Oximetry: 92 %       Physical Exam  Constitutional:       General: She is not in acute distress.     Appearance: She is well-developed. She is not diaphoretic.   HENT:      Head: Normocephalic and atraumatic.   Neck:      Vascular: No JVD.   Cardiovascular:      Rate and Rhythm: Regular rhythm. Tachycardia present.      Heart sounds: No murmur heard.  Pulmonary:      Effort: Pulmonary effort is normal. No respiratory distress.      Breath sounds: No stridor. No wheezing or rales.      Comments: Diminished on the right  Some rhonchi in the bases particular on the  left  Abdominal:      Palpations: Abdomen is soft.      Tenderness: There is no abdominal tenderness. There is no guarding or rebound.   Musculoskeletal:         General: No tenderness.      Right lower leg: No edema.      Left lower leg: No edema.   Skin:     General: Skin is warm and dry.      Capillary Refill: Capillary refill takes less than 2 seconds.      Findings: No rash.   Neurological:      General: No focal deficit present.      Mental Status: She is alert and oriented to person, place, and time.   Psychiatric:         Mood and Affect: Mood normal.         Behavior: Behavior normal.         Thought Content: Thought content normal.       Laboratory:  Recent Labs     12/05/22  1350   WBC 3.7*   RBC 4.45   HEMOGLOBIN 12.4   HEMATOCRIT 36.4*   MCV 81.8   MCH 27.9   MCHC 34.1   RDW 39.6   PLATELETCT 256   MPV 9.3     Recent Labs     12/05/22  1350   SODIUM 130*   POTASSIUM 3.3*   CHLORIDE 94*   CO2 21   GLUCOSE 82   BUN 13   CREATININE 0.58   CALCIUM 8.7     Recent Labs     12/05/22  1350   ALTSGPT 31   ASTSGOT 46*   ALKPHOSPHAT 73   TBILIRUBIN 1.4   GLUCOSE 82         No results for input(s): NTPROBNP in the last 72 hours.      No results for input(s): TROPONINT in the last 72 hours.    Imaging:  DX-CHEST-PORTABLE (1 VIEW)   Final Result      Multifocal bilateral pneumonia.             X-Ray:  I have personally reviewed the images and compared with prior images.    Assessment/Plan:  Justification for Admission Status  I anticipate this patient will require at least two midnights for appropriate medical management, necessitating inpatient admission because patient has significant pneumonia with room air saturations of 75% in a patient who does not have chronic respiratory failure.  She will need minimum 48 hours of IV antibiotics follow-up on cultures and aggressive treatment.    Patient will need a Med/Surg bed on MEDICAL service .  The need is secondary to right lower lobe pneumonia.    * Acute respiratory  failure with hypoxia (HCC)  Assessment & Plan  Acute hypoxic respiratory failure.  Patient does not carry previous respiratory diagnoses.  Lifelong non-smoker  Secondary to significant pneumonia which we are treating as noted  O2 and RT protocols  Close monitoring    Pneumonia due to infectious organism  Assessment & Plan  Patient presents with significant ammonia both on imaging and clinically.  She is recovering from influenza A which was diagnosed 10 days ago  Placed on azithromycin and ceftriaxone  Follow results of cultures  Supportive measures with antitussives, O2 and RT protocols, IV fluids    NAFLD (nonalcoholic fatty liver disease)- (present on admission)  Assessment & Plan  LFTs are hovering around her baseline    DM (diabetes mellitus) (HCC)- (present on admission)  Assessment & Plan  Takes metformin 500 twice daily at home.  Last A1c was 6.2, this was 2 weeks ago which demonstrates good control  Continue metformin  Placed on sliding scale    Hypertension- (present on admission)  Assessment & Plan  Patient is maintained on lisinopril 20 mg, and amlodipine 5 at home which we will continue with parameters      VTE prophylaxis: enoxaparin ppx

## 2022-12-06 NOTE — ED NOTES
RT placed pt on oxymask at 15L after tx and assess for progress  Will continue to monitor pt progress

## 2022-12-06 NOTE — ASSESSMENT & PLAN NOTE
Takes metformin 500 twice daily at home.  Last A1c was 6.2, this was 2 weeks ago which demonstrates good control    Hold metformin to avoid metabolic acidosis  Continue regular insulin  Hypoglycemia protocol  Carbohydrate consistent diet

## 2022-12-06 NOTE — ED NOTES
Pt given AM meds  Medicated per MAR PRN for cough and PRN for fever  Pt now resting, NAD  Call light in reach  Provided more water

## 2022-12-06 NOTE — ED NOTES
POC BS 90  Insulin held    Pt transferred over to hospital bed  Call light in reach  Denies further needs at this time

## 2022-12-06 NOTE — ASSESSMENT & PLAN NOTE
This is Sepsis Not present on admission  SIRS criteria identified on my evaluation include: Fever, with temperature greater than 101 deg F, Hypothermia, with temperature less than 96.8 deg F and Tachypnea, with respirations greater than 20 per minute  Source is multifocal pneumonia bacteremia  Sepsis protocol initiated  Fluid resuscitation ordered per protocol  Crystalloid Fluid Administration: Fluid resuscitation ordered per standard protocol - 30 mL/kg per current or ideal body weight  IV antibiotics as appropriate for source of sepsis: Ceftriaxone changed to ampicillin.  Continues on azithromycin  Reassessment: I have reassessed the patient's hemodynamic status    12/8: Currently on ceftriaxone, ID following patient.

## 2022-12-06 NOTE — ASSESSMENT & PLAN NOTE
She is recovering from influenza A which was diagnosed 10 days ago  Blood cultures growing 2 out of 2 bottles likely streptococcal species    Change ceftriaxone to ampicillin  Continue azithromycin  Respiratory therapy and breathing treatments.    12/7: ID has been consulted, we appreciate further recommendations.  On penicillin G, continue  Thoracentesis ordered given bilateral pleural effusions    12/13: ID switching to Levaquin today. As there is concern for pulmonary edema I have started on lasix   Wean off O2 as tolerated

## 2022-12-06 NOTE — ED NOTES
FSBS 118  Bolus #2, type: LR.  Started at:  1320, per Sepsis Protocol and/or MD order.  Pt's linens changed at pt's request

## 2022-12-07 ENCOUNTER — APPOINTMENT (OUTPATIENT)
Dept: CARDIOLOGY | Facility: MEDICAL CENTER | Age: 52
DRG: 871 | End: 2022-12-07
Attending: INTERNAL MEDICINE
Payer: COMMERCIAL

## 2022-12-07 PROBLEM — E87.1 HYPONATREMIA: Status: ACTIVE | Noted: 2022-12-07

## 2022-12-07 PROBLEM — D72.829 LEUKOCYTOSIS: Status: ACTIVE | Noted: 2022-12-07

## 2022-12-07 LAB
ALBUMIN SERPL BCP-MCNC: 2.1 G/DL (ref 3.2–4.9)
ALBUMIN/GLOB SERPL: 0.6 G/DL
ALP SERPL-CCNC: 117 U/L (ref 30–99)
ALT SERPL-CCNC: 15 U/L (ref 2–50)
ANION GAP SERPL CALC-SCNC: 11 MMOL/L (ref 7–16)
AST SERPL-CCNC: 28 U/L (ref 12–45)
BACTERIA UR CULT: NORMAL
BASOPHILS # BLD AUTO: 0.9 % (ref 0–1.8)
BASOPHILS # BLD: 0.11 K/UL (ref 0–0.12)
BILIRUB SERPL-MCNC: 1.1 MG/DL (ref 0.1–1.5)
BUN SERPL-MCNC: 8 MG/DL (ref 8–22)
CALCIUM SERPL-MCNC: 8.4 MG/DL (ref 8.5–10.5)
CHLORIDE SERPL-SCNC: 95 MMOL/L (ref 96–112)
CO2 SERPL-SCNC: 24 MMOL/L (ref 20–33)
CREAT SERPL-MCNC: 0.38 MG/DL (ref 0.5–1.4)
EOSINOPHIL # BLD AUTO: 0 K/UL (ref 0–0.51)
EOSINOPHIL NFR BLD: 0 % (ref 0–6.9)
ERYTHROCYTE [DISTWIDTH] IN BLOOD BY AUTOMATED COUNT: 39.9 FL (ref 35.9–50)
GFR SERPLBLD CREATININE-BSD FMLA CKD-EPI: 121 ML/MIN/1.73 M 2
GLOBULIN SER CALC-MCNC: 3.3 G/DL (ref 1.9–3.5)
GLUCOSE BLD STRIP.AUTO-MCNC: 81 MG/DL (ref 65–99)
GLUCOSE BLD STRIP.AUTO-MCNC: 83 MG/DL (ref 65–99)
GLUCOSE BLD STRIP.AUTO-MCNC: 93 MG/DL (ref 65–99)
GLUCOSE BLD STRIP.AUTO-MCNC: 93 MG/DL (ref 65–99)
GLUCOSE SERPL-MCNC: 102 MG/DL (ref 65–99)
HCT VFR BLD AUTO: 30.8 % (ref 37–47)
HGB BLD-MCNC: 10.4 G/DL (ref 12–16)
LV EJECT FRACT  99904: 70
LV EJECT FRACT MOD 4C 99902: 70.46
LYMPHOCYTES # BLD AUTO: 0.51 K/UL (ref 1–4.8)
LYMPHOCYTES NFR BLD: 4.2 % (ref 22–41)
MAGNESIUM SERPL-MCNC: 1.6 MG/DL (ref 1.5–2.5)
MANUAL DIFF BLD: NORMAL
MCH RBC QN AUTO: 27.7 PG (ref 27–33)
MCHC RBC AUTO-ENTMCNC: 33.8 G/DL (ref 33.6–35)
MCV RBC AUTO: 81.9 FL (ref 81.4–97.8)
METAMYELOCYTES NFR BLD MANUAL: 0.8 %
MONOCYTES # BLD AUTO: 0 K/UL (ref 0–0.85)
MONOCYTES NFR BLD AUTO: 0 % (ref 0–13.4)
MORPHOLOGY BLD-IMP: NORMAL
NEUTROPHILS # BLD AUTO: 11.48 K/UL (ref 2–7.15)
NEUTROPHILS NFR BLD: 93.3 % (ref 44–72)
NEUTS BAND NFR BLD MANUAL: 0.8 % (ref 0–10)
NRBC # BLD AUTO: 0 K/UL
NRBC BLD-RTO: 0 /100 WBC
PLATELET # BLD AUTO: 187 K/UL (ref 164–446)
PLATELET BLD QL SMEAR: NORMAL
PMV BLD AUTO: 9 FL (ref 9–12.9)
POTASSIUM SERPL-SCNC: 3.1 MMOL/L (ref 3.6–5.5)
PROCALCITONIN SERPL-MCNC: 12 NG/ML
PROT SERPL-MCNC: 5.4 G/DL (ref 6–8.2)
RBC # BLD AUTO: 3.76 M/UL (ref 4.2–5.4)
RBC BLD AUTO: NORMAL
SIGNIFICANT IND 70042: NORMAL
SITE SITE: NORMAL
SODIUM SERPL-SCNC: 130 MMOL/L (ref 135–145)
SOURCE SOURCE: NORMAL
WBC # BLD AUTO: 12.2 K/UL (ref 4.8–10.8)

## 2022-12-07 PROCEDURE — 84145 PROCALCITONIN (PCT): CPT

## 2022-12-07 PROCEDURE — 93306 TTE W/DOPPLER COMPLETE: CPT | Mod: 26 | Performed by: INTERNAL MEDICINE

## 2022-12-07 PROCEDURE — 700111 HCHG RX REV CODE 636 W/ 250 OVERRIDE (IP): Performed by: INTERNAL MEDICINE

## 2022-12-07 PROCEDURE — 700105 HCHG RX REV CODE 258: Performed by: STUDENT IN AN ORGANIZED HEALTH CARE EDUCATION/TRAINING PROGRAM

## 2022-12-07 PROCEDURE — 700102 HCHG RX REV CODE 250 W/ 637 OVERRIDE(OP): Performed by: HOSPITALIST

## 2022-12-07 PROCEDURE — 83735 ASSAY OF MAGNESIUM: CPT

## 2022-12-07 PROCEDURE — 87040 BLOOD CULTURE FOR BACTERIA: CPT

## 2022-12-07 PROCEDURE — 85025 COMPLETE CBC W/AUTO DIFF WBC: CPT

## 2022-12-07 PROCEDURE — 82962 GLUCOSE BLOOD TEST: CPT | Mod: 91

## 2022-12-07 PROCEDURE — 700111 HCHG RX REV CODE 636 W/ 250 OVERRIDE (IP): Performed by: STUDENT IN AN ORGANIZED HEALTH CARE EDUCATION/TRAINING PROGRAM

## 2022-12-07 PROCEDURE — 700102 HCHG RX REV CODE 250 W/ 637 OVERRIDE(OP)

## 2022-12-07 PROCEDURE — 94669 MECHANICAL CHEST WALL OSCILL: CPT

## 2022-12-07 PROCEDURE — 94640 AIRWAY INHALATION TREATMENT: CPT

## 2022-12-07 PROCEDURE — 99223 1ST HOSP IP/OBS HIGH 75: CPT | Performed by: INTERNAL MEDICINE

## 2022-12-07 PROCEDURE — 770020 HCHG ROOM/CARE - TELE (206)

## 2022-12-07 PROCEDURE — 700111 HCHG RX REV CODE 636 W/ 250 OVERRIDE (IP): Performed by: HOSPITALIST

## 2022-12-07 PROCEDURE — A9270 NON-COVERED ITEM OR SERVICE: HCPCS

## 2022-12-07 PROCEDURE — A9270 NON-COVERED ITEM OR SERVICE: HCPCS | Performed by: INTERNAL MEDICINE

## 2022-12-07 PROCEDURE — 99233 SBSQ HOSP IP/OBS HIGH 50: CPT | Performed by: INTERNAL MEDICINE

## 2022-12-07 PROCEDURE — 85007 BL SMEAR W/DIFF WBC COUNT: CPT

## 2022-12-07 PROCEDURE — 36415 COLL VENOUS BLD VENIPUNCTURE: CPT

## 2022-12-07 PROCEDURE — 700102 HCHG RX REV CODE 250 W/ 637 OVERRIDE(OP): Performed by: INTERNAL MEDICINE

## 2022-12-07 PROCEDURE — A9270 NON-COVERED ITEM OR SERVICE: HCPCS | Performed by: HOSPITALIST

## 2022-12-07 PROCEDURE — 80053 COMPREHEN METABOLIC PANEL: CPT

## 2022-12-07 PROCEDURE — 700101 HCHG RX REV CODE 250: Performed by: STUDENT IN AN ORGANIZED HEALTH CARE EDUCATION/TRAINING PROGRAM

## 2022-12-07 PROCEDURE — 93306 TTE W/DOPPLER COMPLETE: CPT

## 2022-12-07 RX ORDER — POTASSIUM CHLORIDE 20 MEQ/1
40 TABLET, EXTENDED RELEASE ORAL ONCE
Status: COMPLETED | OUTPATIENT
Start: 2022-12-07 | End: 2022-12-07

## 2022-12-07 RX ORDER — MAGNESIUM SULFATE 1 G/100ML
1 INJECTION INTRAVENOUS ONCE
Status: COMPLETED | OUTPATIENT
Start: 2022-12-07 | End: 2022-12-07

## 2022-12-07 RX ORDER — CHOLECALCIFEROL (VITAMIN D3) 125 MCG
5 CAPSULE ORAL ONCE
Status: COMPLETED | OUTPATIENT
Start: 2022-12-07 | End: 2022-12-07

## 2022-12-07 RX ADMIN — ESCITALOPRAM OXALATE 40 MG: 10 TABLET ORAL at 05:18

## 2022-12-07 RX ADMIN — AMLODIPINE BESYLATE 5 MG: 5 TABLET ORAL at 05:18

## 2022-12-07 RX ADMIN — ENOXAPARIN SODIUM 40 MG: 40 INJECTION SUBCUTANEOUS at 17:33

## 2022-12-07 RX ADMIN — ASPIRIN 81 MG: 81 TABLET, COATED ORAL at 05:18

## 2022-12-07 RX ADMIN — AMPICILLIN SODIUM 2000 MG: 2 INJECTION, POWDER, FOR SOLUTION INTRAMUSCULAR; INTRAVENOUS at 13:56

## 2022-12-07 RX ADMIN — SODIUM CHLORIDE, POTASSIUM CHLORIDE, SODIUM LACTATE AND CALCIUM CHLORIDE: 600; 310; 30; 20 INJECTION, SOLUTION INTRAVENOUS at 03:39

## 2022-12-07 RX ADMIN — BUPROPION HYDROCHLORIDE 150 MG: 150 TABLET, EXTENDED RELEASE ORAL at 17:33

## 2022-12-07 RX ADMIN — IPRATROPIUM BROMIDE AND ALBUTEROL SULFATE 3 ML: .5; 2.5 SOLUTION RESPIRATORY (INHALATION) at 10:22

## 2022-12-07 RX ADMIN — CEFTRIAXONE SODIUM 2000 MG: 10 INJECTION, POWDER, FOR SOLUTION INTRAVENOUS at 17:33

## 2022-12-07 RX ADMIN — IPRATROPIUM BROMIDE AND ALBUTEROL SULFATE 3 ML: .5; 2.5 SOLUTION RESPIRATORY (INHALATION) at 07:36

## 2022-12-07 RX ADMIN — ACETAMINOPHEN 650 MG: 325 TABLET, FILM COATED ORAL at 02:42

## 2022-12-07 RX ADMIN — BUPROPION HYDROCHLORIDE 150 MG: 150 TABLET, EXTENDED RELEASE ORAL at 05:18

## 2022-12-07 RX ADMIN — OMEPRAZOLE 20 MG: 20 CAPSULE, DELAYED RELEASE ORAL at 05:18

## 2022-12-07 RX ADMIN — Medication 5 MG: at 21:11

## 2022-12-07 RX ADMIN — AMPICILLIN SODIUM 2000 MG: 2 INJECTION, POWDER, FOR SOLUTION INTRAMUSCULAR; INTRAVENOUS at 11:14

## 2022-12-07 RX ADMIN — LISINOPRIL 20 MG: 20 TABLET ORAL at 05:18

## 2022-12-07 RX ADMIN — AMPICILLIN SODIUM 2000 MG: 2 INJECTION, POWDER, FOR SOLUTION INTRAMUSCULAR; INTRAVENOUS at 05:22

## 2022-12-07 RX ADMIN — POTASSIUM CHLORIDE 40 MEQ: 1500 TABLET, EXTENDED RELEASE ORAL at 14:35

## 2022-12-07 RX ADMIN — AZITHROMYCIN MONOHYDRATE 500 MG: 500 INJECTION, POWDER, LYOPHILIZED, FOR SOLUTION INTRAVENOUS at 06:41

## 2022-12-07 RX ADMIN — AMPICILLIN SODIUM 2000 MG: 2 INJECTION, POWDER, FOR SOLUTION INTRAMUSCULAR; INTRAVENOUS at 02:47

## 2022-12-07 RX ADMIN — IPRATROPIUM BROMIDE AND ALBUTEROL SULFATE 3 ML: .5; 2.5 SOLUTION RESPIRATORY (INHALATION) at 02:10

## 2022-12-07 RX ADMIN — MAGNESIUM SULFATE HEPTAHYDRATE 1 G: 1 INJECTION, SOLUTION INTRAVENOUS at 14:35

## 2022-12-07 RX ADMIN — IPRATROPIUM BROMIDE AND ALBUTEROL SULFATE 3 ML: .5; 2.5 SOLUTION RESPIRATORY (INHALATION) at 14:24

## 2022-12-07 ASSESSMENT — ENCOUNTER SYMPTOMS
ABDOMINAL PAIN: 0
MYALGIAS: 0
SHORTNESS OF BREATH: 1
DIZZINESS: 0
COUGH: 1
SPUTUM PRODUCTION: 1
CHILLS: 1
VOMITING: 0
HEADACHES: 0
NAUSEA: 0
PALPITATIONS: 0

## 2022-12-07 ASSESSMENT — PAIN DESCRIPTION - PAIN TYPE
TYPE: ACUTE PAIN
TYPE: ACUTE PAIN

## 2022-12-07 ASSESSMENT — FIBROSIS 4 INDEX: FIB4 SCORE: 1.93

## 2022-12-07 NOTE — CARE PLAN
The patient is Watcher - Medium risk of patient condition declining or worsening    Shift Goals  Clinical Goals: respiratory stability  Patient Goals: comfort, rest  Family Goals: ANISA    Progress made toward(s) clinical / shift goals:      Problem: Knowledge Deficit - Standard  Goal: Patient and family/care givers will demonstrate understanding of plan of care, disease process/condition, diagnostic tests and medications  Outcome: Progressing     Problem: Hemodynamics  Goal: Patient's hemodynamics, fluid balance and neurologic status will be stable or improve  Outcome: Progressing     Problem: Respiratory  Goal: Patient will achieve/maintain optimum respiratory ventilation and gas exchange  Outcome: Progressing       Patient is not progressing towards the following goals:

## 2022-12-07 NOTE — ASSESSMENT & PLAN NOTE
Blood cultures from 12/5/2022 showing 2 out of 2 bottles gram-positive cocci likely streptococcal species.  Repeat blood culture 12/7: NGTD  Continue penicillin G per ID recommendations  Appreciate ID input

## 2022-12-07 NOTE — HOSPITAL COURSE
Mónica Keita is a 52 y.o. female who presented 12/5/2022 with shortness of breath.  This is a pleasant woman with a history of nonalcoholic fatty liver disease, hypertension, and neuroendocrine tumor.  Patient presented with several day history of shortness of breath as well as fevers and chills.  Her symptoms began approximately 10 days ago.  She recently tested for influenza but was negative for COVID-19.  She had not been vaccinated for influenza.  In the emergency room, patient had SPO2 of 75% on room air requiring supplemental oxygen.  She was tachycardic with heart rate in the 100s with white count of 3.7 meeting criteria for sepsis.  Patient was tachypneic with respiratory rate up to 29 showing signs of respiratory distress.  Lactic acid was 2.7.  Sodium of 130.  Bicarb of 21.  Chest x-ray showed right lower lobe pneumonia.

## 2022-12-07 NOTE — CARE PLAN
The patient is Watcher - Medium risk of patient condition declining or worsening    Shift Goals  Clinical Goals: wean oxygen, encourage IS and mobility, ABX  Patient Goals: comfort, rest  Family Goals: ANISA    Progress made toward(s) clinical / shift goals:    Problem: Knowledge Deficit - Standard  Goal: Patient and family/care givers will demonstrate understanding of plan of care, disease process/condition, diagnostic tests and medications  Outcome: Progressing     Problem: Mechanical Ventilation  Goal: Safe management of artificial airway and ventilation  Outcome: Progressing  Goal: Patient will be able to express needs and understand communication  Outcome: Progressing       Patient is not progressing towards the following goals:

## 2022-12-07 NOTE — PROGRESS NOTES
Monitor Summary:    -119  No ectopy             Complex Repair And Graft Additional Text (Will Appearing After The Standard Complex Repair Text): The complex repair was not sufficient to completely close the primary defect. The remaining additional defect was repaired with the graft mentioned below.

## 2022-12-07 NOTE — PROGRESS NOTES
Report received and assumed patient care. Pt A&O x 4 and on 30L and 80% FiO2 via heated high flow nasal cannula, saturating 92 %. No signs of distress noted at this time. Pt has tele box in place. Pt updated on plan of care for the day and has call light within reach. Fall precautions are in place.

## 2022-12-07 NOTE — PROGRESS NOTES
Lab called with critical result of positive blood cultures showing strep pneumo at 1416. Critical lab result read back.   Dr. Michael Rodriguez notified of critical lab result at 1420.  Critical lab result read back by MD.

## 2022-12-07 NOTE — DISCHARGE PLANNING
Care Transition Team Assessment    RN ELVIA spoke with patient at bedside. Role of CM explained. Demographic info verified. Patient lives alone and is IADL's. Still works and drives. No DME or outpatient services. Verbalizes son or dtr-in-law will  upon discharge.     Information Source  Orientation Level: Oriented X4  Information Given By: Patient  Who is responsible for making decisions for patient? : Patient    Readmission Evaluation  Is this a readmission?: No    Elopement Risk  Legal Hold: No  Ambulatory or Self Mobile in Wheelchair: Yes  Disoriented: No  Psychiatric Symptoms: None  History of Wandering: No  Elopement this Admit: No  Vocalizing Wanting to Leave: No  Displays Behaviors, Body Language Wanting to Leave: No-Not at Risk for Elopement  Elopement Risk: Not at Risk for Elopement    Interdisciplinary Discharge Planning  Primary Care Physician: Dr. Eren Heath  Lives with - Patient's Self Care Capacity: Alone and Able to Care For Self  Patient or legal guardian wants to designate a caregiver: No  Support Systems: Children  Housing / Facility: 2 Story Apartment / Condo  Able to Return to Previous ADL's: Yes  Prior Services: None  Patient Prefers to be Discharged to:: Home  Assistance Needed: No    Discharge Preparedness  What is your plan after discharge?: Home with help  What are your discharge supports?: Child  Prior Functional Level: Independent with Activities of Daily Living  Difficulity with ADLs: None  Difficulity with IADLs: None    Functional Assesment  Prior Functional Level: Independent with Activities of Daily Living    Vision / Hearing Impairment  Vision Impairment : No  Hearing Impairment : No    Domestic Abuse  Have you ever been the victim of abuse or violence?: No  Physical Abuse or Sexual Abuse: No  Verbal Abuse or Emotional Abuse: No  Possible Abuse/Neglect Reported to:: Not Applicable     Anticipated Discharge Information  Discharge Disposition: Discharged to home/self care  (01)

## 2022-12-07 NOTE — PROGRESS NOTES
"Hospital Medicine Daily Progress Note    Date of Service  12/7/2022    Chief Complaint  Mónica Keita is a 52 y.o. female admitted 12/5/2022 with shortness of breath.  Shortness of breath.  There is a pleasant woman with a history of nonalcoholic fatty liver disease, hypertension, neuroendocrine tumor.  Patient recently    Hospital Course  As per chart review:  \"  Mónica Keita is a 52 y.o. female who presented 12/5/2022 with shortness of breath.  This is a pleasant woman with a history of nonalcoholic fatty liver disease, hypertension, and neuroendocrine tumor.  Patient presented with several day history of shortness of breath as well as fevers and chills.  Her symptoms began approximately 10 days ago.  She recently tested for influenza but was negative for COVID-19.  She had not been vaccinated for influenza.  In the emergency room, patient had SPO2 of 75% on room air requiring supplemental oxygen.  She was tachycardic with heart rate in the 100s with white count of 3.7 meeting criteria for sepsis.  Patient was tachypneic with respiratory rate up to 29 showing signs of respiratory distress.  Lactic acid was 2.7.  Sodium of 130.  Bicarb of 21.  Chest x-ray showed right lower lobe pneumonia.\"    Interval Problem Update  12/6: Patient feeling less short of breath.  Sepsis bolus given.  Patient requiring high flow oxygen 30 LPM this morning increasing to 40 LPM by the afternoon.  Continue to have fevers.  Blood cultures growing 2 out of 2 bottles gram-positive cocci possible Streptococcus species.  Initially due to concern for possible staph pneumonia, she was started on linezolid.  However, agreed with pharmacy's recommendations to transition to ampicillin.  Echocardiogram has been ordered.  Patient will need infectious diseases consultation in the morning tomorrow.    PATIENT SEEN BY PREVIOUS HOSPITALIST UNTIL 12/6 12/7: Patient seen at bedside this morning.  Patient still complaining of generalized " weakness, fatigue.  Still requiring high flow nasal cannula oxygen supplementation.  We have consulted ID, we appreciate further recommendations.  Pending echocardiogram.  We will continue to monitor closely.    I have discussed this patient's plan of care and discharge plan at IDT rounds today with Case Management, Nursing, Nursing leadership, and other members of the IDT team.    Consultants/Specialty  none    Code Status  Full Code    Disposition  Patient is not medically cleared for discharge.   Anticipate discharge to to home with close outpatient follow-up.  I have placed the appropriate orders for post-discharge needs.    Review of Systems  Review of Systems   Constitutional:  Positive for chills and malaise/fatigue.   Respiratory:  Positive for cough, sputum production and shortness of breath.    Cardiovascular:  Negative for chest pain and palpitations.   Gastrointestinal:  Negative for abdominal pain, nausea and vomiting.   Genitourinary:  Negative for dysuria and hematuria.   Musculoskeletal:  Negative for joint pain and myalgias.   Neurological:  Negative for dizziness and headaches.      Physical Exam  Temp:  [36 °C (96.8 °F)-38.1 °C (100.6 °F)] 36.8 °C (98.2 °F)  Pulse:  [] 104  Resp:  [16-20] 18  BP: (129-137)/(81-87) 137/85  SpO2:  [83 %-97 %] 91 %    Physical Exam  Vitals and nursing note reviewed.   Constitutional:       Appearance: She is normal weight. She is ill-appearing. She is not diaphoretic.   HENT:      Head: Normocephalic and atraumatic.      Nose:      Comments: High flow oxygen     Mouth/Throat:      Mouth: Mucous membranes are moist.      Pharynx: Oropharynx is clear. No oropharyngeal exudate.   Eyes:      General:         Right eye: No discharge.         Left eye: No discharge.   Cardiovascular:      Rate and Rhythm: Normal rate and regular rhythm.      Pulses: Normal pulses.      Heart sounds: Normal heart sounds. No murmur heard.  Pulmonary:      Effort: Respiratory distress  present.      Breath sounds: Rhonchi present.   Abdominal:      General: Abdomen is flat. Bowel sounds are normal. There is no distension.      Palpations: Abdomen is soft.      Tenderness: There is no abdominal tenderness.   Musculoskeletal:      Cervical back: Normal range of motion and neck supple. No tenderness.      Right lower leg: No edema.      Left lower leg: No edema.   Neurological:      Mental Status: She is alert and oriented to person, place, and time.      Motor: No weakness.   Psychiatric:         Mood and Affect: Mood normal.         Behavior: Behavior normal.       Fluids    Intake/Output Summary (Last 24 hours) at 12/7/2022 1418  Last data filed at 12/7/2022 1358  Gross per 24 hour   Intake 4420.11 ml   Output 1050 ml   Net 3370.11 ml         Laboratory  Recent Labs     12/05/22  1350 12/06/22  0241 12/07/22  0210   WBC 3.7* 5.9 12.2*   RBC 4.45 3.88* 3.76*   HEMOGLOBIN 12.4 10.7* 10.4*   HEMATOCRIT 36.4* 32.4* 30.8*   MCV 81.8 83.5 81.9   MCH 27.9 27.6 27.7   MCHC 34.1 33.0* 33.8   RDW 39.6 42.0 39.9   PLATELETCT 256 204 187   MPV 9.3 9.0 9.0       Recent Labs     12/05/22  1350 12/06/22  0241 12/07/22  0210   SODIUM 130* 134* 130*   POTASSIUM 3.3* 3.5* 3.1*   CHLORIDE 94* 99 95*   CO2 21 24 24   GLUCOSE 82 71 102*   BUN 13 14 8   CREATININE 0.58 0.63 0.38*   CALCIUM 8.7 7.9* 8.4*       Recent Labs     12/06/22  0917   INR 1.10                 Imaging  DX-CHEST-PORTABLE (1 VIEW)   Final Result      Multifocal bilateral pneumonia.         EC-ECHOCARDIOGRAM COMPLETE W/O CONT    (Results Pending)          Assessment/Plan  * Acute respiratory failure with hypoxia (HCC)- (present on admission)  Assessment & Plan  Likely due to secondary pneumonia from influenza infection.  Now with streptococcal bacteremia  Patient is a lifetime non-smoker.    Admit to telemetry inpatient status.  Continue oxygen as per respiratory protocol.  Continuous pulse oximetry.  Respiratory therapy consult.  Duo nebs every 4  hours and as needed.  Incentive spirometry  PEP therapy    12/7: We have consulted ID, we appreciate further recommendations    Hyponatremia  Assessment & Plan  We will stop IV fluids.  Will encourage p.o. intake.    Leukocytosis  Assessment & Plan  In the setting of bacteremia.  Continue antibiotics.  Monitor, repeat CBC.    Streptococcal bacteremia- (present on admission)  Assessment & Plan  Blood cultures from 12/5/2022 showing 2 out of 2 bottles gram-positive cocci likely streptococcal species.    12/7: ID has been consulted, we appreciate further recommendations.    Sepsis (HCC)- (present on admission)  Assessment & Plan  This is Sepsis Not present on admission  SIRS criteria identified on my evaluation include: Fever, with temperature greater than 101 deg F, Hypothermia, with temperature less than 96.8 deg F and Tachypnea, with respirations greater than 20 per minute  Source is multifocal pneumonia bacteremia  Sepsis protocol initiated  Fluid resuscitation ordered per protocol  Crystalloid Fluid Administration: Fluid resuscitation ordered per standard protocol - 30 mL/kg per current or ideal body weight  IV antibiotics as appropriate for source of sepsis: Ceftriaxone changed to ampicillin.  Continues on azithromycin  Reassessment: I have reassessed the patient's hemodynamic status    Multifocal pneumonia due to Streptococcus secondary to influenza infection  Assessment & Plan  She is recovering from influenza A which was diagnosed 10 days ago  Blood cultures growing 2 out of 2 bottles likely streptococcal species    Change ceftriaxone to ampicillin  Continue azithromycin  Respiratory therapy and breathing treatments.    12/7: ID has been consulted, we appreciate further recommendations.    NAFLD (nonalcoholic fatty liver disease)- (present on admission)  Assessment & Plan  LFTs are hovering around her baseline    Type 2 diabetes mellitus, without long-term current use of insulin (HCC)- (present on  admission)  Assessment & Plan  Takes metformin 500 twice daily at home.  Last A1c was 6.2, this was 2 weeks ago which demonstrates good control    Hold metformin to avoid metabolic acidosis  Continue regular insulin  Hypoglycemia protocol  Carbohydrate consistent diet    Hypertension- (present on admission)  Assessment & Plan  Patient is maintained on lisinopril 20 mg, and amlodipine 5 at home which we will continue with parameters         VTE prophylaxis: SCDs/TEDs and enoxaparin ppx    I have performed a physical exam and reviewed and updated ROS and Plan today (12/7/2022). In review of yesterday's note (12/6/2022), there are no changes except as documented above.

## 2022-12-07 NOTE — PROGRESS NOTES
Bedside report received and patient care assumed. Pt is resting in bed, A&O4, with no complaints of pain, and is on HFNC. Tele box on. All fall precautions are in place, belongings at bedside table.  Pt was updated on POC, no questions or concerns. Pt educated on use of call light for assistance.

## 2022-12-08 PROBLEM — E87.6 HYPOKALEMIA: Status: ACTIVE | Noted: 2022-12-08

## 2022-12-08 PROBLEM — E83.42 HYPOMAGNESEMIA: Status: ACTIVE | Noted: 2022-12-08

## 2022-12-08 LAB
ALBUMIN SERPL BCP-MCNC: 2.4 G/DL (ref 3.2–4.9)
ALBUMIN/GLOB SERPL: 0.8 G/DL
ALP SERPL-CCNC: 111 U/L (ref 30–99)
ALT SERPL-CCNC: 13 U/L (ref 2–50)
ANION GAP SERPL CALC-SCNC: 12 MMOL/L (ref 7–16)
AST SERPL-CCNC: 26 U/L (ref 12–45)
BACTERIA BLD CULT: ABNORMAL
BILIRUB SERPL-MCNC: 0.7 MG/DL (ref 0.1–1.5)
BUN SERPL-MCNC: 7 MG/DL (ref 8–22)
C DIFF DNA SPEC QL NAA+PROBE: NEGATIVE
C DIFF TOX GENS STL QL NAA+PROBE: NEGATIVE
CALCIUM SERPL-MCNC: 8.2 MG/DL (ref 8.5–10.5)
CHLORIDE SERPL-SCNC: 92 MMOL/L (ref 96–112)
CO2 SERPL-SCNC: 27 MMOL/L (ref 20–33)
CREAT SERPL-MCNC: 0.42 MG/DL (ref 0.5–1.4)
ERYTHROCYTE [DISTWIDTH] IN BLOOD BY AUTOMATED COUNT: 39 FL (ref 35.9–50)
ETEST SENSITIVITY ETEST: NORMAL
GFR SERPLBLD CREATININE-BSD FMLA CKD-EPI: 118 ML/MIN/1.73 M 2
GLOBULIN SER CALC-MCNC: 3.2 G/DL (ref 1.9–3.5)
GLUCOSE BLD STRIP.AUTO-MCNC: 114 MG/DL (ref 65–99)
GLUCOSE BLD STRIP.AUTO-MCNC: 117 MG/DL (ref 65–99)
GLUCOSE BLD STRIP.AUTO-MCNC: 83 MG/DL (ref 65–99)
GLUCOSE BLD STRIP.AUTO-MCNC: 84 MG/DL (ref 65–99)
GLUCOSE SERPL-MCNC: 99 MG/DL (ref 65–99)
HCT VFR BLD AUTO: 30.6 % (ref 37–47)
HGB BLD-MCNC: 10.3 G/DL (ref 12–16)
MAGNESIUM SERPL-MCNC: 1.7 MG/DL (ref 1.5–2.5)
MCH RBC QN AUTO: 27.3 PG (ref 27–33)
MCHC RBC AUTO-ENTMCNC: 33.7 G/DL (ref 33.6–35)
MCV RBC AUTO: 81.2 FL (ref 81.4–97.8)
PLATELET # BLD AUTO: 188 K/UL (ref 164–446)
PMV BLD AUTO: 8.6 FL (ref 9–12.9)
POTASSIUM SERPL-SCNC: 2.9 MMOL/L (ref 3.6–5.5)
PROT SERPL-MCNC: 5.6 G/DL (ref 6–8.2)
RBC # BLD AUTO: 3.77 M/UL (ref 4.2–5.4)
SIGNIFICANT IND 70042: ABNORMAL
SIGNIFICANT IND 70042: ABNORMAL
SITE SITE: ABNORMAL
SITE SITE: ABNORMAL
SODIUM SERPL-SCNC: 131 MMOL/L (ref 135–145)
SOURCE SOURCE: ABNORMAL
SOURCE SOURCE: ABNORMAL
WBC # BLD AUTO: 17.1 K/UL (ref 4.8–10.8)

## 2022-12-08 PROCEDURE — 87493 C DIFF AMPLIFIED PROBE: CPT

## 2022-12-08 PROCEDURE — A9270 NON-COVERED ITEM OR SERVICE: HCPCS | Performed by: INTERNAL MEDICINE

## 2022-12-08 PROCEDURE — 700101 HCHG RX REV CODE 250: Performed by: STUDENT IN AN ORGANIZED HEALTH CARE EDUCATION/TRAINING PROGRAM

## 2022-12-08 PROCEDURE — 700111 HCHG RX REV CODE 636 W/ 250 OVERRIDE (IP): Performed by: INTERNAL MEDICINE

## 2022-12-08 PROCEDURE — 700111 HCHG RX REV CODE 636 W/ 250 OVERRIDE (IP): Performed by: HOSPITALIST

## 2022-12-08 PROCEDURE — A9270 NON-COVERED ITEM OR SERVICE: HCPCS

## 2022-12-08 PROCEDURE — A9270 NON-COVERED ITEM OR SERVICE: HCPCS | Performed by: HOSPITALIST

## 2022-12-08 PROCEDURE — 36415 COLL VENOUS BLD VENIPUNCTURE: CPT

## 2022-12-08 PROCEDURE — 94640 AIRWAY INHALATION TREATMENT: CPT

## 2022-12-08 PROCEDURE — 85027 COMPLETE CBC AUTOMATED: CPT

## 2022-12-08 PROCEDURE — 700102 HCHG RX REV CODE 250 W/ 637 OVERRIDE(OP): Performed by: INTERNAL MEDICINE

## 2022-12-08 PROCEDURE — 770020 HCHG ROOM/CARE - TELE (206)

## 2022-12-08 PROCEDURE — 700102 HCHG RX REV CODE 250 W/ 637 OVERRIDE(OP)

## 2022-12-08 PROCEDURE — 700102 HCHG RX REV CODE 250 W/ 637 OVERRIDE(OP): Performed by: HOSPITALIST

## 2022-12-08 PROCEDURE — 80053 COMPREHEN METABOLIC PANEL: CPT

## 2022-12-08 PROCEDURE — 99233 SBSQ HOSP IP/OBS HIGH 50: CPT | Performed by: INTERNAL MEDICINE

## 2022-12-08 PROCEDURE — 82962 GLUCOSE BLOOD TEST: CPT

## 2022-12-08 PROCEDURE — 83735 ASSAY OF MAGNESIUM: CPT

## 2022-12-08 RX ORDER — ECHINACEA PURPUREA EXTRACT 125 MG
2 TABLET ORAL
Status: DISCONTINUED | OUTPATIENT
Start: 2022-12-08 | End: 2022-12-14 | Stop reason: HOSPADM

## 2022-12-08 RX ORDER — LOPERAMIDE HYDROCHLORIDE 2 MG/1
2 CAPSULE ORAL ONCE
Status: COMPLETED | OUTPATIENT
Start: 2022-12-08 | End: 2022-12-08

## 2022-12-08 RX ORDER — MAGNESIUM SULFATE 1 G/100ML
1 INJECTION INTRAVENOUS ONCE
Status: COMPLETED | OUTPATIENT
Start: 2022-12-08 | End: 2022-12-08

## 2022-12-08 RX ORDER — POTASSIUM CHLORIDE 20 MEQ/1
40 TABLET, EXTENDED RELEASE ORAL 2 TIMES DAILY
Status: COMPLETED | OUTPATIENT
Start: 2022-12-08 | End: 2022-12-08

## 2022-12-08 RX ADMIN — Medication 2 SPRAY: at 02:29

## 2022-12-08 RX ADMIN — POTASSIUM CHLORIDE 40 MEQ: 1500 TABLET, EXTENDED RELEASE ORAL at 08:55

## 2022-12-08 RX ADMIN — HYDROXYZINE HYDROCHLORIDE 25 MG: 25 TABLET, FILM COATED ORAL at 23:29

## 2022-12-08 RX ADMIN — AMLODIPINE BESYLATE 5 MG: 5 TABLET ORAL at 05:04

## 2022-12-08 RX ADMIN — CEFTRIAXONE SODIUM 2000 MG: 10 INJECTION, POWDER, FOR SOLUTION INTRAVENOUS at 16:57

## 2022-12-08 RX ADMIN — ESCITALOPRAM OXALATE 40 MG: 10 TABLET ORAL at 05:04

## 2022-12-08 RX ADMIN — POTASSIUM CHLORIDE 40 MEQ: 1500 TABLET, EXTENDED RELEASE ORAL at 16:57

## 2022-12-08 RX ADMIN — LISINOPRIL 20 MG: 20 TABLET ORAL at 05:04

## 2022-12-08 RX ADMIN — BUPROPION HYDROCHLORIDE 150 MG: 150 TABLET, EXTENDED RELEASE ORAL at 16:57

## 2022-12-08 RX ADMIN — MAGNESIUM SULFATE HEPTAHYDRATE 1 G: 1 INJECTION, SOLUTION INTRAVENOUS at 08:50

## 2022-12-08 RX ADMIN — IPRATROPIUM BROMIDE AND ALBUTEROL SULFATE 3 ML: .5; 2.5 SOLUTION RESPIRATORY (INHALATION) at 01:18

## 2022-12-08 RX ADMIN — IPRATROPIUM BROMIDE AND ALBUTEROL SULFATE 3 ML: .5; 2.5 SOLUTION RESPIRATORY (INHALATION) at 19:56

## 2022-12-08 RX ADMIN — OMEPRAZOLE 20 MG: 20 CAPSULE, DELAYED RELEASE ORAL at 05:04

## 2022-12-08 RX ADMIN — Medication 2 SPRAY: at 13:20

## 2022-12-08 RX ADMIN — LOPERAMIDE HYDROCHLORIDE 2 MG: 2 CAPSULE ORAL at 21:16

## 2022-12-08 RX ADMIN — ENOXAPARIN SODIUM 40 MG: 40 INJECTION SUBCUTANEOUS at 16:57

## 2022-12-08 RX ADMIN — BUPROPION HYDROCHLORIDE 150 MG: 150 TABLET, EXTENDED RELEASE ORAL at 05:04

## 2022-12-08 RX ADMIN — ASPIRIN 81 MG: 81 TABLET, COATED ORAL at 05:04

## 2022-12-08 ASSESSMENT — ENCOUNTER SYMPTOMS
CHILLS: 1
PALPITATIONS: 0
DIARRHEA: 0
MYALGIAS: 0
DIZZINESS: 0
BACK PAIN: 0
VOMITING: 0
HEADACHES: 0
ABDOMINAL PAIN: 0
COUGH: 1
CHILLS: 0
NAUSEA: 0
NECK PAIN: 0
PHOTOPHOBIA: 0
SPUTUM PRODUCTION: 1
SHORTNESS OF BREATH: 1
FEVER: 0

## 2022-12-08 ASSESSMENT — FIBROSIS 4 INDEX: FIB4 SCORE: 1.92

## 2022-12-08 NOTE — PROGRESS NOTES
"Hospital Medicine Daily Progress Note    Date of Service  12/8/2022    Chief Complaint  Mónica Keita is a 52 y.o. female admitted 12/5/2022 with shortness of breath.  Shortness of breath.  There is a pleasant woman with a history of nonalcoholic fatty liver disease, hypertension, neuroendocrine tumor.  Patient recently    Hospital Course  As per chart review:  \"  Mónica Keita is a 52 y.o. female who presented 12/5/2022 with shortness of breath.  This is a pleasant woman with a history of nonalcoholic fatty liver disease, hypertension, and neuroendocrine tumor.  Patient presented with several day history of shortness of breath as well as fevers and chills.  Her symptoms began approximately 10 days ago.  She recently tested for influenza but was negative for COVID-19.  She had not been vaccinated for influenza.  In the emergency room, patient had SPO2 of 75% on room air requiring supplemental oxygen.  She was tachycardic with heart rate in the 100s with white count of 3.7 meeting criteria for sepsis.  Patient was tachypneic with respiratory rate up to 29 showing signs of respiratory distress.  Lactic acid was 2.7.  Sodium of 130.  Bicarb of 21.  Chest x-ray showed right lower lobe pneumonia.  \"    Interval Problem Update  12/6: Patient feeling less short of breath.  Sepsis bolus given.  Patient requiring high flow oxygen 30 LPM this morning increasing to 40 LPM by the afternoon.  Continue to have fevers.  Blood cultures growing 2 out of 2 bottles gram-positive cocci possible Streptococcus species.  Initially due to concern for possible staph pneumonia, she was started on linezolid.  However, agreed with pharmacy's recommendations to transition to ampicillin.  Echocardiogram has been ordered.  Patient will need infectious diseases consultation in the morning tomorrow.    PATIENT SEEN BY PREVIOUS HOSPITALIST UNTIL 12/6 12/7: Patient seen at bedside this morning.  Patient still complaining of generalized " weakness, fatigue.  Still requiring high flow nasal cannula oxygen supplementation.  We have consulted ID, we appreciate further recommendations.  Pending echocardiogram.  We will continue to monitor closely.    12/8: Patient seen at bedside this morning.  The patient mentions that she feels better.  The patient is now on nasal cannula oxygen supplementation instead of high flow.  Patient currently on ceftriaxone, we appreciate further recommendations by ID.  Echocardiogram was reported as normal.  We will continue to monitor closely.    I have discussed this patient's plan of care and discharge plan at IDT rounds today with Case Management, Nursing, Nursing leadership, and other members of the IDT team.    Consultants/Specialty  ID    Code Status  Full Code    Disposition  Patient is not medically cleared for discharge.   Anticipate discharge to to home with close outpatient follow-up.  I have placed the appropriate orders for post-discharge needs.    Review of Systems  Review of Systems   Constitutional:  Positive for chills and malaise/fatigue.   Respiratory:  Positive for cough, sputum production and shortness of breath.    Cardiovascular:  Negative for chest pain and palpitations.   Gastrointestinal:  Negative for abdominal pain, nausea and vomiting.   Genitourinary:  Negative for dysuria and hematuria.   Musculoskeletal:  Negative for joint pain and myalgias.   Neurological:  Negative for dizziness and headaches.      Physical Exam  Temp:  [36.2 °C (97.2 °F)-36.9 °C (98.4 °F)] 36.9 °C (98.4 °F)  Pulse:  [] 79  Resp:  [16-20] 17  BP: (110-137)/(75-85) 134/80  SpO2:  [90 %-97 %] 92 %    Physical Exam  Vitals and nursing note reviewed.   Constitutional:       Appearance: She is normal weight. She is ill-appearing. She is not diaphoretic.   HENT:      Head: Normocephalic and atraumatic.      Nose:      Comments: On NC     Mouth/Throat:      Mouth: Mucous membranes are moist.      Pharynx: Oropharynx is  clear. No oropharyngeal exudate.   Eyes:      General:         Right eye: No discharge.         Left eye: No discharge.   Cardiovascular:      Rate and Rhythm: Normal rate and regular rhythm.      Pulses: Normal pulses.      Heart sounds: Normal heart sounds. No murmur heard.  Pulmonary:      Effort: Respiratory distress present.      Breath sounds: Rhonchi present.   Abdominal:      General: Abdomen is flat. Bowel sounds are normal. There is no distension.      Palpations: Abdomen is soft.      Tenderness: There is no abdominal tenderness.   Musculoskeletal:      Cervical back: Normal range of motion and neck supple. No tenderness.      Right lower leg: No edema.      Left lower leg: No edema.   Neurological:      Mental Status: She is alert and oriented to person, place, and time.      Motor: No weakness.   Psychiatric:         Mood and Affect: Mood normal.         Behavior: Behavior normal.       Fluids    Intake/Output Summary (Last 24 hours) at 12/8/2022 0850  Last data filed at 12/8/2022 0800  Gross per 24 hour   Intake 4014.16 ml   Output 750 ml   Net 3264.16 ml         Laboratory  Recent Labs     12/06/22  0241 12/07/22  0210 12/08/22  0502   WBC 5.9 12.2* 17.1*   RBC 3.88* 3.76* 3.77*   HEMOGLOBIN 10.7* 10.4* 10.3*   HEMATOCRIT 32.4* 30.8* 30.6*   MCV 83.5 81.9 81.2*   MCH 27.6 27.7 27.3   MCHC 33.0* 33.8 33.7   RDW 42.0 39.9 39.0   PLATELETCT 204 187 188   MPV 9.0 9.0 8.6*       Recent Labs     12/06/22  0241 12/07/22  0210 12/08/22  0502   SODIUM 134* 130* 131*   POTASSIUM 3.5* 3.1* 2.9*   CHLORIDE 99 95* 92*   CO2 24 24 27   GLUCOSE 71 102* 99   BUN 14 8 7*   CREATININE 0.63 0.38* 0.42*   CALCIUM 7.9* 8.4* 8.2*       Recent Labs     12/06/22  0917   INR 1.10                 Imaging  EC-ECHOCARDIOGRAM COMPLETE W/O CONT   Final Result      DX-CHEST-PORTABLE (1 VIEW)   Final Result      Multifocal bilateral pneumonia.                Assessment/Plan  * Acute respiratory failure with hypoxia (HCC)- (present  on admission)  Assessment & Plan  Likely due to secondary pneumonia from influenza infection.  Now with streptococcal bacteremia  Patient is a lifetime non-smoker.    Admit to telemetry inpatient status.  Continue oxygen as per respiratory protocol.  Continuous pulse oximetry.  Respiratory therapy consult.  Duo nebs every 4 hours and as needed.  Incentive spirometry  PEP therapy    12/7: We have consulted ID, we appreciate further recommendations    12/8: Improving. Patient now on nasal cannula.    Hypomagnesemia  Assessment & Plan  Replace as needed  monitor    Hypokalemia  Assessment & Plan  Replace as needed  monitor    Hyponatremia  Assessment & Plan  We will stop IV fluids.  Will encourage p.o. intake.    Leukocytosis  Assessment & Plan  In the setting of bacteremia.  Continue antibiotics.  Monitor, repeat CBC.    Streptococcal bacteremia- (present on admission)  Assessment & Plan  Blood cultures from 12/5/2022 showing 2 out of 2 bottles gram-positive cocci likely streptococcal species.    12/7: ID has been consulted, we appreciate further recommendations.    12/8: Currently on ceftriaxone. Echocardiogram was reported as normal.    Sepsis (HCC)- (present on admission)  Assessment & Plan  This is Sepsis Not present on admission  SIRS criteria identified on my evaluation include: Fever, with temperature greater than 101 deg F, Hypothermia, with temperature less than 96.8 deg F and Tachypnea, with respirations greater than 20 per minute  Source is multifocal pneumonia bacteremia  Sepsis protocol initiated  Fluid resuscitation ordered per protocol  Crystalloid Fluid Administration: Fluid resuscitation ordered per standard protocol - 30 mL/kg per current or ideal body weight  IV antibiotics as appropriate for source of sepsis: Ceftriaxone changed to ampicillin.  Continues on azithromycin  Reassessment: I have reassessed the patient's hemodynamic status    12/8: Currently on ceftriaxone, ID following  patient.    Multifocal pneumonia due to Streptococcus secondary to influenza infection  Assessment & Plan  She is recovering from influenza A which was diagnosed 10 days ago  Blood cultures growing 2 out of 2 bottles likely streptococcal species    Change ceftriaxone to ampicillin  Continue azithromycin  Respiratory therapy and breathing treatments.    12/7: ID has been consulted, we appreciate further recommendations.    12/8: Currently on ceftriaxone    NAFLD (nonalcoholic fatty liver disease)- (present on admission)  Assessment & Plan  LFTs are hovering around her baseline    Type 2 diabetes mellitus, without long-term current use of insulin (HCC)- (present on admission)  Assessment & Plan  Takes metformin 500 twice daily at home.  Last A1c was 6.2, this was 2 weeks ago which demonstrates good control    Hold metformin to avoid metabolic acidosis  Continue regular insulin  Hypoglycemia protocol  Carbohydrate consistent diet    Hypertension- (present on admission)  Assessment & Plan  Patient is maintained on lisinopril 20 mg, and amlodipine 5 at home which we will continue with parameters         VTE prophylaxis: SCDs/TEDs and enoxaparin ppx    I have performed a physical exam and reviewed and updated ROS and Plan today (12/8/2022). In review of yesterday's note (12/7/2022), there are no changes except as documented above.

## 2022-12-08 NOTE — PROGRESS NOTES
Stool sample sent down to lab again. Previous phone call this shift relayed that lab did not locate the sample sent down from yesterday. Confirmed existing order and that lab received the sample from today. Will be tested shortly per microbiology.

## 2022-12-08 NOTE — CARE PLAN
The patient is Stable - Low risk of patient condition declining or worsening    Shift Goals  Clinical Goals: Monitor and wean o2, encourage IS use  Patient Goals: Comfort, rest  Family Goals: ANISA    Progress made toward(s) clinical / shift goals:    Problem: Knowledge Deficit - Standard  Goal: Patient and family/care givers will demonstrate understanding of plan of care, disease process/condition, diagnostic tests and medications  Outcome: Progressing     Problem: Hemodynamics  Goal: Patient's hemodynamics, fluid balance and neurologic status will be stable or improve  Outcome: Progressing     Problem: Fluid Volume  Goal: Fluid volume balance will be maintained  Outcome: Progressing       Patient is not progressing towards the following goals:

## 2022-12-08 NOTE — PROGRESS NOTES
Report received from Wendi Diaz. Assumed pt care. Pt working with RT. Pt A&O x 4. Fall precautions in place, call light and belongings within reach, bed in lowest position. No signs of distress.

## 2022-12-08 NOTE — PROGRESS NOTES
Bedside report received and patient care assumed. Pt is resting in bed, A&O4, with no complaints of pain, and is on 5L. Tele box on. All fall precautions are in place, belongings at bedside table.  Pt was updated on POC, no questions or concerns. Pt educated on use of call light for assistance.

## 2022-12-08 NOTE — DOCUMENTATION QUERY
"                                                                         [facility]                                                                       Query Response Note      PATIENT:               ELVIS CARRASCO  ACCT #:                  [id number]  MRN:                     [id number]  :                      1970  ADMIT DATE:       2022 1:20 PM  DISCH DATE:          RESPONDING  PROVIDER #:        [id number]           QUERY TEXT:      Documentation in the medical record indicates patient has been diagnosed with sepsis after being admitted for Pneumonia the day before .     Can you please further specify the Present on Admission (POA) status of the  sepsis based on the above clinical indicators and documentation?    Note: If you agree with a diagnosis listed above, please remember to include it in your concurrent daily documentation and onto the Discharge Summary.              The patient's Clinical Indicators include:  52 year old female admitted for pneumonia on  and documented sepsis on .     Park \"Sepsis (HCC)- (present on admission)\"  \"This is Sepsis Not present on admission\"     Mahad \"Sepsis (HCC)- (present on admission)\"  \"This is Sepsis Not present on admission\"    Blood cultures from 2022 showing 2 out of 2 bottles gram-positive cocci likely streptococcal species.     WBC 3.7, Lac 2.7, procal 35.70    Risk factors: recent influenza, pneumonia, PNA, bacteremia  Treatment: labs, CXR, ABX, IS, O2, nebs, tele, IVF    If you have any questions please contact:  Meghan Corona RN CDI [facility]  [email or web address]  Meghan Corona [address] [employer]  Options provided:   -- Sepsis present on admission   -- Sepsis  not present on admission]  [[Sepsis was evolving on admission   -- Other explanation, please specify   -- Unable to determine      Query created by: Meghan Corona on 2022 8:12 AM    RESPONSE TEXT:    Sepsis not present on admission] [[Sepsis " was evolving on admission          Electronically signed by:  SAMANTHA CHAUDHRY MD 12/8/2022 8:14 AM

## 2022-12-08 NOTE — DOCUMENTATION QUERY
"                                                                         [facility]                                                                       Query Response Note      PATIENT:               ELVIS CARRASCO  ACCT #:                  [id number]  MRN:                     [id number]  :                      1970  ADMIT DATE:       2022 1:20 PM  DISCH DATE:          RESPONDING  PROVIDER #:        [id number]           QUERY TEXT:    Patient admitted with Pneumonia. Documentation in the medical record includes: Pneumonia due to infectious organism treated with azithromycin and ceftriaxone.  The patient also has positive blood cultures for gram positive cocci: possible streptococcus.     Can the diagnosis of pneumonia be further specified based on the above clinical indicators and required treatments?    Note: If you agree with a diagnosis listed above, please remember to include it in your concurrent daily documentation and onto the Discharge Summary.        The patient's Clinical Indicators include:  52 year old female admitted with pneumonia after testing positive for Influenza A 10 days ago.     H&P Patty \"Pneumonia due to infectious organism\"  \"She is recovering from influenza A which was diagnosed 10 days ago  Placed on azithromycin and ceftriaxone\"    + BC Gram Stain: Gram positive cocci: Possible Streptococcus  - Influenza A by PCR  Procalcitonin 35.70    Risk factors history of Influenza A  10 days ago  Treatment: labs, CXR, azithromycin, ceftriaxone IVF,  O2, RT protocols, antitussives      If you have any questions please contact:  Meghan Corona RN CDI [facility]  [email or web address]  Meghan Corona [address] [employer]  Options provided:   -- Viral Pneumonia due to Influenza A   -- Influenza A resolved, treating for Gram Positive/strep Pneumonia   -- Treating for both Influenza A and Gram Positive/strep Pneumonia   -- Other explanation, please specify   -- Unable to " determine      Query created by: Meghan Corona on 12/8/2022 7:58 AM    RESPONSE TEXT:    Influenza A resolved, treating for Gram Positive/strep Pneumonia          Electronically signed by:  SAMANTHA CHAUDHRY MD 12/8/2022 8:11 AM

## 2022-12-08 NOTE — PROGRESS NOTES
Infectious Disease Progress Note    Author: Jun Yousif M.D. Date & Time of service: 2022  9:54 AM    Chief Complaint:  Follow-up for bacteremia    Interval History:   fevers improving white count continues to go up, 17.1 today.  Patient feels better, oxygen requirements improved, down to nasal cannula.  Neck is no longer sore    Labs Reviewed and Medications Reviewed.    Review of Systems:  Review of Systems   Constitutional:  Negative for chills and fever.   Eyes:  Negative for photophobia.   Gastrointestinal:  Negative for abdominal pain, diarrhea, nausea and vomiting.   Musculoskeletal:  Negative for back pain and neck pain.   Skin:  Negative for itching and rash.   Neurological:  Negative for headaches.   All other systems reviewed and are negative.    Hemodynamics:  Temp (24hrs), Av.7 °C (98 °F), Min:36.2 °C (97.2 °F), Max:36.9 °C (98.4 °F)  Temperature: 36.9 °C (98.4 °F)  Pulse  Av.6  Min: 79  Max: 126   Blood Pressure: 134/80       Physical Exam:  Physical Exam  Vitals and nursing note reviewed.   Constitutional:       General: She is not in acute distress.     Appearance: She is not ill-appearing.   HENT:      Mouth/Throat:      Pharynx: No oropharyngeal exudate.   Eyes:      General: No scleral icterus.        Right eye: No discharge.         Left eye: No discharge.      Conjunctiva/sclera: Conjunctivae normal.   Cardiovascular:      Rate and Rhythm: Normal rate.      Heart sounds: No murmur heard.  Pulmonary:      Effort: No respiratory distress.      Comments: Tachypnea, using accessory muscles but able to speak in complete sentences - much better compared to yesterday.  Abdominal:      General: Abdomen is flat. There is no distension.      Palpations: There is no mass.      Tenderness: There is no abdominal tenderness.   Musculoskeletal:         General: No swelling or tenderness.   Skin:     Findings: No erythema or rash.   Neurological:      General: No focal deficit present.       Mental Status: She is alert and oriented to person, place, and time.   Psychiatric:         Mood and Affect: Mood normal.         Behavior: Behavior normal.       Meds:    Current Facility-Administered Medications:     sodium chloride    potassium chloride SA    cefTRIAXone (ROCEPHIN) IV    LR    ipratropium-albuterol    omeprazole    lisinopril    hydrOXYzine HCl    escitalopram    aspirin    amLODIPine    senna-docusate **AND** polyethylene glycol/lytes **AND** magnesium hydroxide **AND** bisacodyl    Respiratory Therapy Consult    enoxaparin (LOVENOX) injection    acetaminophen    Notify provider if pain remains uncontrolled **AND** Use the Numeric Rating Scale (NRS), [place]-Baker Faces (WBF), or FLACC on regular floors and Critical-Care Pain Observation Tool (CPOT) on ICUs/Trauma to assess pain **AND** [COMPLETED] Pulse Ox **AND** Pharmacy Consult Request **AND** If patient difficult to arouse and/or has respiratory depression (respiratory rate of 10 or less), stop any opiates that are currently infusing and call a Rapid Response.    oxyCODONE immediate-release **OR** oxyCODONE immediate-release **OR** morphine injection    ondansetron    ondansetron    promethazine    promethazine    prochlorperazine    benzonatate    insulin regular    buPROPion SR    Labs:  Recent Labs     12/05/22  1350 12/06/22  0241 12/07/22  0210 12/08/22  0502   WBC 3.7* 5.9 12.2* 17.1*   RBC 4.45 3.88* 3.76* 3.77*   HEMOGLOBIN 12.4 10.7* 10.4* 10.3*   HEMATOCRIT 36.4* 32.4* 30.8* 30.6*   MCV 81.8 83.5 81.9 81.2*   MCH 27.9 27.6 27.7 27.3   RDW 39.6 42.0 39.9 39.0   PLATELETCT 256 204 187 188   MPV 9.3 9.0 9.0 8.6*   NEUTSPOLYS 60.00  --  93.30*  --    LYMPHOCYTES 32.80  --  4.20*  --    MONOCYTES 4.80  --  0.00  --    EOSINOPHILS 0.00  --  0.00  --    BASOPHILS 0.00  --  0.90  --    RBCMORPHOLO Present  --  Normal  --      Recent Labs     12/06/22  0241 12/07/22  0210 12/08/22  0502   SODIUM 134* 130* 131*   POTASSIUM 3.5* 3.1* 2.9*    CHLORIDE 99 95* 92*   CO2 24 24 27   GLUCOSE 71 102* 99   BUN 14 8 7*     Recent Labs     22  1350 22  0241 22  0210 22  0502   ALBUMIN 2.8*  --  2.1* 2.4*   TBILIRUBIN 1.4  --  1.1 0.7   ALKPHOSPHAT 73  --  117* 111*   TOTPROTEIN 6.6  --  5.4* 5.6*   ALTSGPT 31  --  15 13   ASTSGOT 46*  --  28 26   CREATININE 0.58 0.63 0.38* 0.42*       Imaging:  DX-CHEST-PORTABLE (1 VIEW)    Result Date: 2022 1:36 PM HISTORY/REASON FOR EXAM:  possible sepsis. TECHNIQUE/EXAM DESCRIPTION AND NUMBER OF VIEWS: Single portable view of the chest. COMPARISON:  2012. FINDINGS: LUNGS: Bilateral consolidations, right worse than left. PNEUMOTHORAX: None. LINES AND TUBES: None. MEDIASTINUM: No cardiomegaly. MUSCULOSKELETAL STRUCTURES: No acute displaced fracture.     Multifocal bilateral pneumonia.     EC-ECHOCARDIOGRAM COMPLETE W/O CONT    Result Date: 2022  Transthoracic Echo Report Echocardiography Laboratory CONCLUSIONS Normal echocardiogram. ELVIS CARRASCO Exam Date:         2022                    15:57 Exam Location:     Inpatient Priority:          Routine Ordering Physician:        SAMANTHA VIGIL Referring Physician:       [id number] Mahmood Sonographer:               Yara HALL Age:    52     Gender:    F MRN:    [id number] :    1970 BSA:    1.94   Ht (in):    66     Wt (lb):    187 Exam Type:     Complete Indications:     bacteremia ICD Codes:       r78.81 CPT Codes:       03584 BP:   110    /   75     HR:   94 Technical Quality:       Fair MEASUREMENTS  (Male / Female) Normal Values 2D ECHO LV Diastolic Diameter PLAX        4 cm                  4.2 - 5.9 / 3.9 - 5.3 cm LV Systolic Diameter PLAX         2.5 cm                2.1 - 4.0 cm IVS Diastolic Thickness           0.9 cm                LVPW Diastolic Thickness          0.98 cm               LVOT Diameter                     1.9 cm                 Estimated LV Ejection Fraction    70 %                  LV Ejection Fraction MOD 4C       70.5 %                IVC Diameter                      2.1 cm                DOPPLER AV Peak Velocity                  1.7 m/s               AV Peak Gradient                  11.1 mmHg             AV Mean Gradient                  6.6 mmHg              LVOT Peak Velocity                1.3 m/s               AV Area Cont Eq vti               2.4 cm2               MV Velocity Time Integral         25.9 cm               Mitral E Point Velocity           1.1 m/s               Mitral E to A Ratio               1.3                   MV Pressure Half Time             46 ms                 MV Area PHT                       4.8 cm2               MV Deceleration Time              159 ms                TR Peak Velocity                  255 cm/s              PV Peak Velocity                  0.94 m/s              PV Peak Gradient                  3.5 mmHg              * Indicates values subject to auto-interpretation LV EF:  70    % FINDINGS Left Ventricle Normal left ventricular chamber size. Normal left ventricular wall thickness. Normal left ventricular systolic function. The left ventricular ejection fraction is visually estimated to be 70%. Normal regional wall motion. Normal diastolic function. Right Ventricle The right ventricle is normal in size and systolic function. Right Atrium The right atrium is normal in size. Normal inferior vena cava size and inspiratory collapse. Left Atrium The left atrium is normal in size. Left atrial volume index is 32 mL/sq m. Mitral Valve Structurally normal mitral valve without significant stenosis. Mild mitral regurgitation. Aortic Valve Structurally normal aortic valve without significant stenosis or regurgitation. Tricuspid Valve Structurally normal tricuspid valve without significant stenosis. Estimated right ventricular systolic pressure is 35 mmHg. Pulmonic Valve Structurally  normal pulmonic valve without significant stenosis. Trace pulmonic insufficiency. Pericardium No pericardial effusion. Aorta Normal aortic root for body surface area. The ascending aorta diameter is 2.9 cm. Alistair Abraham MD (Electronically Signed) Final Date:     07 December 2022                 19:03    MA-SCREENING MAMMO BILAT W/TOMOSYNTHESIS W/CAD    Result Date: 11/21/2022 11/21/2022 2:09 PM HISTORY/REASON FOR EXAM:  Routine Mammographic Screening. TECHNIQUE/EXAM DESCRIPTION: Bilateral tomosynthesis screening mammography was performed with standard mammographic images generated from the data set. Images were reviewed and interpreted with CAD. COMPARISON:   November 19, 2021 and November 10, 2020 FINDINGS: There are scattered areas of fibroglandular density. There is no dominant mass, suspicious calcification, or any secondary malignant sign. Scattered benign-appearing calcifications are once again noted bilaterally.     1.  Breasts have scattered areas of fibroglandular density, with no radiographic evidence of malignancy. 2.  Screening mammogram in one year is recommended. R2 - CATEGORY 2: BENIGN FINDING(S) Ten to twenty percent of all cancers can be categorized as hereditary and the clinical and financial value of identifying patients and families at risk is well documented. If you have a personal or family history of breast, ovarian, fallopian tube, peritoneal or other cancer, please consult your physician regarding genetic counseling and testing.       Micro:  Results       Procedure Component Value Units Date/Time    BLOOD CULTURE [[id number]] Collected: 12/07/22 1104    Order Status: Completed Specimen: Blood from Peripheral Updated: 12/08/22 0724     Significant Indicator NEG     Source BLD     Site PERIPHERAL     Culture Result No Growth  Note: Blood cultures are incubated for 5 days and  are monitored continuously.Positive blood cultures  are called to the RN and reported as soon as  they are  "identified.      Narrative:      Per Hospital Policy: Only change Specimen Src: to \"Line\" if  specified by physician order.  Right AC    BLOOD CULTURE [[id number]] Collected: 12/07/22 1104    Order Status: Completed Specimen: Blood from Peripheral Updated: 12/08/22 0724     Significant Indicator NEG     Source BLD     Site PERIPHERAL     Culture Result No Growth  Note: Blood cultures are incubated for 5 days and  are monitored continuously.Positive blood cultures  are called to the RN and reported as soon as  they are identified.      Narrative:      Per Hospital Policy: Only change Specimen Src: to \"Line\" if  specified by physician order.  Right Forearm/Arm    C Diff by PCR rflx Toxin [[id number]] Collected: 12/07/22 1751    Order Status: Sent Specimen: Stool     Blood Culture [[id number]]  (Abnormal) Collected: 12/05/22 1428    Order Status: Completed Specimen: Blood from Peripheral Updated: 12/07/22 1415     Significant Indicator POS     Source BLD     Site PERIPHERAL     Culture Result Growth detected by Bactec instrument. 12/06/2022  02:05      Streptococcus pneumoniae    Narrative:      CALL  Ann  ER tel. ,  CALLED  ER tel.  12/06/2022, 02:06, RB PERF. RESULTS CALLED TO: RN [id number]  Per Hospital Policy: Only change Specimen Src: to \"Line\" if  specified by physician order.  Right AC    Blood Culture [[id number]]  (Abnormal) Collected: 12/05/22 1428    Order Status: Completed Specimen: Blood from Peripheral Updated: 12/07/22 1415     Significant Indicator POS     Source BLD     Site PERIPHERAL     Culture Result Growth detected by Bactec instrument. 12/06/2022  02:45      Streptococcus pneumoniae    Narrative:      CALL  Ann  ER tel. ,  CALLED  ER tel.  12/06/2022, 02:45, RB PERF. RESULTS CALLED TO: RN [id number]  Per Hospital Policy: Only change Specimen Src: to \"Line\" if  specified by physician order.  Right Hand    Urine Culture (New) [[id number]] Collected: 12/05/22 1457    Order Status: Completed Specimen: Urine " Updated: 12/07/22 0832     Significant Indicator NEG     Source UR     Site -     Culture Result Usual urogenital yoan ,000 cfu/mL    Narrative:      Indication for culture:->Evaluation for sepsis without a  clear source of infection  Indication for culture:->Evaluation for sepsis without a    MRSA By PCR (Amp) [[id number]] Collected: 12/06/22 1427    Order Status: Completed Specimen: Respirate from Nares Updated: 12/06/22 1717     MRSA by PCR Negative    CULTURE RESPIRATORY W/ GRM STN [[id number]]     Order Status: Completed Specimen: Respirate from Sputum     CoV-2, FLU A/B, and RSV by PCR (2-4 Hours CEPHEID) : Collect NP swab in VTM [[id number]] Collected: 12/05/22 1554    Order Status: Completed Specimen: Respirate Updated: 12/05/22 1658     Influenza virus A RNA Negative     Influenza virus B, PCR Negative     RSV, PCR Negative     SARS-CoV-2 by PCR NotDetected     Comment: [facility] providers: PLEASE REFER TO DE-ESCALATION AND RETESTING PROTOCOL  on [email or web address]    **The [employer] GeneXpert Xpress SARS-CoV-2 RT-PCR Test has been made  available for use under the Emergency Use Authorization (EUA) only.          SARS-CoV-2 Source NP Swab    Urinalysis [[id number]]  (Abnormal) Collected: 12/05/22 1457    Order Status: Completed Specimen: Urine Updated: 12/05/22 1532     Color Yellow     Character Clear     Specific Gravity 1.020     Ph 5.5     Glucose Negative mg/dL      Ketones 15 mg/dL      Protein 100 mg/dL      Bilirubin Moderate     Urobilinogen, Urine 1.0     Nitrite Negative     Leukocyte Esterase Negative     Occult Blood Negative     Micro Urine Req Microscopic    Narrative:      Indication for culture:->Evaluation for sepsis without a  clear source of infection            Assessment:  Mónica Keita is a 52 y.o. female patient with known NAFLD, hypertension, neuroendocrine tumor, not on chemotherapy, admitted with post influenza multifocal community-acquired pneumonia and GPC bacteremia.  Blood  culture organism identified as strep pneumo     Pertinent Diagnoses:  Sepsis  Strep pneumo bacteremia  Multifocal community-acquired pneumonia  Acute hypoxemic respiratory failure  Recent influenza A  NAFLD    Plan:  -Continue IV ceftriaxone 2 g in 24 hours.  Await sensitivities.  Follow repeat blood cultures x2 from 12/7, pending  -While white count has gone up today, every other parameter has improved including resolution of fevers, improvement of symptoms and exam findings. No signs of seeding of infection at this time.  She does have new onset diarrhea, C. difficile pending  -TTE with no obvious valvular vegetations    Discussed with Pharm

## 2022-12-09 LAB
ANION GAP SERPL CALC-SCNC: 12 MMOL/L (ref 7–16)
BUN SERPL-MCNC: 7 MG/DL (ref 8–22)
CALCIUM SERPL-MCNC: 7.9 MG/DL (ref 8.5–10.5)
CHLORIDE SERPL-SCNC: 92 MMOL/L (ref 96–112)
CO2 SERPL-SCNC: 26 MMOL/L (ref 20–33)
CREAT SERPL-MCNC: 0.32 MG/DL (ref 0.5–1.4)
ERYTHROCYTE [DISTWIDTH] IN BLOOD BY AUTOMATED COUNT: 37.9 FL (ref 35.9–50)
GFR SERPLBLD CREATININE-BSD FMLA CKD-EPI: 126 ML/MIN/1.73 M 2
GLUCOSE BLD STRIP.AUTO-MCNC: 73 MG/DL (ref 65–99)
GLUCOSE BLD STRIP.AUTO-MCNC: 88 MG/DL (ref 65–99)
GLUCOSE BLD STRIP.AUTO-MCNC: 90 MG/DL (ref 65–99)
GLUCOSE BLD STRIP.AUTO-MCNC: 91 MG/DL (ref 65–99)
GLUCOSE SERPL-MCNC: 101 MG/DL (ref 65–99)
HCT VFR BLD AUTO: 31 % (ref 37–47)
HGB BLD-MCNC: 10.6 G/DL (ref 12–16)
MAGNESIUM SERPL-MCNC: 1.7 MG/DL (ref 1.5–2.5)
MCH RBC QN AUTO: 27.3 PG (ref 27–33)
MCHC RBC AUTO-ENTMCNC: 34.2 G/DL (ref 33.6–35)
MCV RBC AUTO: 79.9 FL (ref 81.4–97.8)
PLATELET # BLD AUTO: 202 K/UL (ref 164–446)
PMV BLD AUTO: 9.3 FL (ref 9–12.9)
POTASSIUM SERPL-SCNC: 2.9 MMOL/L (ref 3.6–5.5)
RBC # BLD AUTO: 3.88 M/UL (ref 4.2–5.4)
SODIUM SERPL-SCNC: 130 MMOL/L (ref 135–145)
WBC # BLD AUTO: 12.2 K/UL (ref 4.8–10.8)

## 2022-12-09 PROCEDURE — 99232 SBSQ HOSP IP/OBS MODERATE 35: CPT | Performed by: INTERNAL MEDICINE

## 2022-12-09 PROCEDURE — 97166 OT EVAL MOD COMPLEX 45 MIN: CPT

## 2022-12-09 PROCEDURE — 85027 COMPLETE CBC AUTOMATED: CPT

## 2022-12-09 PROCEDURE — 700102 HCHG RX REV CODE 250 W/ 637 OVERRIDE(OP): Performed by: INTERNAL MEDICINE

## 2022-12-09 PROCEDURE — A9270 NON-COVERED ITEM OR SERVICE: HCPCS | Performed by: HOSPITALIST

## 2022-12-09 PROCEDURE — 700111 HCHG RX REV CODE 636 W/ 250 OVERRIDE (IP): Performed by: INTERNAL MEDICINE

## 2022-12-09 PROCEDURE — 82962 GLUCOSE BLOOD TEST: CPT

## 2022-12-09 PROCEDURE — 700102 HCHG RX REV CODE 250 W/ 637 OVERRIDE(OP): Performed by: HOSPITALIST

## 2022-12-09 PROCEDURE — 770020 HCHG ROOM/CARE - TELE (206)

## 2022-12-09 PROCEDURE — A9270 NON-COVERED ITEM OR SERVICE: HCPCS | Performed by: INTERNAL MEDICINE

## 2022-12-09 PROCEDURE — 700111 HCHG RX REV CODE 636 W/ 250 OVERRIDE (IP): Performed by: HOSPITALIST

## 2022-12-09 PROCEDURE — 36415 COLL VENOUS BLD VENIPUNCTURE: CPT

## 2022-12-09 PROCEDURE — 80048 BASIC METABOLIC PNL TOTAL CA: CPT

## 2022-12-09 PROCEDURE — 83735 ASSAY OF MAGNESIUM: CPT

## 2022-12-09 PROCEDURE — 97162 PT EVAL MOD COMPLEX 30 MIN: CPT

## 2022-12-09 PROCEDURE — 99233 SBSQ HOSP IP/OBS HIGH 50: CPT | Performed by: INTERNAL MEDICINE

## 2022-12-09 RX ORDER — POTASSIUM CHLORIDE 20 MEQ/1
40 TABLET, EXTENDED RELEASE ORAL 2 TIMES DAILY
Status: COMPLETED | OUTPATIENT
Start: 2022-12-09 | End: 2022-12-10

## 2022-12-09 RX ORDER — LOPERAMIDE HYDROCHLORIDE 2 MG/1
2 CAPSULE ORAL 2 TIMES DAILY PRN
Status: DISCONTINUED | OUTPATIENT
Start: 2022-12-09 | End: 2022-12-14 | Stop reason: HOSPADM

## 2022-12-09 RX ORDER — MAGNESIUM SULFATE HEPTAHYDRATE 40 MG/ML
2 INJECTION, SOLUTION INTRAVENOUS ONCE
Status: COMPLETED | OUTPATIENT
Start: 2022-12-09 | End: 2022-12-09

## 2022-12-09 RX ADMIN — POTASSIUM CHLORIDE 40 MEQ: 1500 TABLET, EXTENDED RELEASE ORAL at 17:14

## 2022-12-09 RX ADMIN — MAGNESIUM SULFATE HEPTAHYDRATE 2 G: 40 INJECTION, SOLUTION INTRAVENOUS at 08:46

## 2022-12-09 RX ADMIN — LISINOPRIL 20 MG: 20 TABLET ORAL at 05:45

## 2022-12-09 RX ADMIN — Medication 2 SPRAY: at 17:11

## 2022-12-09 RX ADMIN — AMLODIPINE BESYLATE 5 MG: 5 TABLET ORAL at 05:46

## 2022-12-09 RX ADMIN — LOPERAMIDE HYDROCHLORIDE 2 MG: 2 CAPSULE ORAL at 13:17

## 2022-12-09 RX ADMIN — BUPROPION HYDROCHLORIDE 150 MG: 150 TABLET, EXTENDED RELEASE ORAL at 17:11

## 2022-12-09 RX ADMIN — ACETAMINOPHEN 650 MG: 325 TABLET, FILM COATED ORAL at 19:31

## 2022-12-09 RX ADMIN — ENOXAPARIN SODIUM 40 MG: 40 INJECTION SUBCUTANEOUS at 17:11

## 2022-12-09 RX ADMIN — POTASSIUM CHLORIDE 40 MEQ: 1500 TABLET, EXTENDED RELEASE ORAL at 08:44

## 2022-12-09 RX ADMIN — ASPIRIN 81 MG: 81 TABLET, COATED ORAL at 05:45

## 2022-12-09 RX ADMIN — ESCITALOPRAM OXALATE 40 MG: 10 TABLET ORAL at 05:45

## 2022-12-09 RX ADMIN — LOPERAMIDE HYDROCHLORIDE 2 MG: 2 CAPSULE ORAL at 21:22

## 2022-12-09 RX ADMIN — BUPROPION HYDROCHLORIDE 150 MG: 150 TABLET, EXTENDED RELEASE ORAL at 05:45

## 2022-12-09 RX ADMIN — CEFTRIAXONE SODIUM 2000 MG: 10 INJECTION, POWDER, FOR SOLUTION INTRAVENOUS at 17:11

## 2022-12-09 RX ADMIN — OMEPRAZOLE 20 MG: 20 CAPSULE, DELAYED RELEASE ORAL at 05:45

## 2022-12-09 ASSESSMENT — COGNITIVE AND FUNCTIONAL STATUS - GENERAL
MOVING FROM LYING ON BACK TO SITTING ON SIDE OF FLAT BED: A LITTLE
TURNING FROM BACK TO SIDE WHILE IN FLAT BAD: A LITTLE
MOBILITY SCORE: 18
CLIMB 3 TO 5 STEPS WITH RAILING: A LITTLE
DAILY ACTIVITIY SCORE: 24
SUGGESTED CMS G CODE MODIFIER MOBILITY: CK
WALKING IN HOSPITAL ROOM: A LITTLE
SUGGESTED CMS G CODE MODIFIER DAILY ACTIVITY: CH
MOVING TO AND FROM BED TO CHAIR: A LITTLE
STANDING UP FROM CHAIR USING ARMS: A LITTLE

## 2022-12-09 ASSESSMENT — ENCOUNTER SYMPTOMS
NECK PAIN: 0
BACK PAIN: 0
PALPITATIONS: 0
CHILLS: 0
NAUSEA: 0
DIARRHEA: 0
COUGH: 1
MYALGIAS: 0
DIZZINESS: 0
SPUTUM PRODUCTION: 1
HEADACHES: 0
ABDOMINAL PAIN: 0
FEVER: 0
SHORTNESS OF BREATH: 1
VOMITING: 0
PHOTOPHOBIA: 0

## 2022-12-09 ASSESSMENT — FIBROSIS 4 INDEX: FIB4 SCORE: 1.78

## 2022-12-09 ASSESSMENT — ACTIVITIES OF DAILY LIVING (ADL): TOILETING: INDEPENDENT

## 2022-12-09 ASSESSMENT — GAIT ASSESSMENTS
DISTANCE (FEET): 15
GAIT LEVEL OF ASSIST: SUPERVISED

## 2022-12-09 NOTE — PROGRESS NOTES
Infectious Disease Progress Note    Author: Jun Yousif M.D. Date & Time of service: 2022  1:23 PM    Chief Complaint:  Follow-up for bacteremia    Interval History:   fevers improving white count continues to go up, 17.1 today.  Patient feels better, oxygen requirements improved, down to nasal cannula.  Neck is no longer sore   patient remains afebrile, white count down to 12.2 today, tolerating ceftriaxone.  Still feels quite run down, still hypoxemic    Labs Reviewed and Medications Reviewed.    Review of Systems:  Review of Systems   Constitutional:  Negative for chills and fever.   Eyes:  Negative for photophobia.   Gastrointestinal:  Negative for abdominal pain, diarrhea, nausea and vomiting.   Musculoskeletal:  Negative for back pain and neck pain.   Skin:  Negative for itching and rash.   Neurological:  Negative for headaches.   All other systems reviewed and are negative.    Hemodynamics:  Temp (24hrs), Av.9 °C (98.5 °F), Min:36.6 °C (97.9 °F), Max:37.2 °C (99 °F)  Temperature: 37.2 °C (98.9 °F)  Pulse  Av.4  Min: 79  Max: 126   Blood Pressure: (!) 145/89       Physical Exam:  Physical Exam  Vitals and nursing note reviewed.   Constitutional:       General: She is not in acute distress.     Appearance: She is not ill-appearing.   HENT:      Mouth/Throat:      Pharynx: No oropharyngeal exudate.   Eyes:      General: No scleral icterus.        Right eye: No discharge.         Left eye: No discharge.      Conjunctiva/sclera: Conjunctivae normal.   Cardiovascular:      Rate and Rhythm: Normal rate.      Heart sounds: No murmur heard.  Pulmonary:      Effort: No respiratory distress.      Comments: Tachypnea, using accessory muscles but able to speak in complete sentences - much better compared to 2 days ago, about the same compared to yesterday.  Abdominal:      General: Abdomen is flat. There is no distension.      Palpations: There is no mass.      Tenderness: There is no abdominal  tenderness.   Musculoskeletal:         General: No swelling or tenderness.   Skin:     Findings: No erythema or rash.   Neurological:      General: No focal deficit present.      Mental Status: She is alert and oriented to person, place, and time.   Psychiatric:         Mood and Affect: Mood normal.         Behavior: Behavior normal.       Meds:    Current Facility-Administered Medications:     potassium chloride SA    loperamide    sodium chloride    cefTRIAXone (ROCEPHIN) IV    LR    ipratropium-albuterol    omeprazole    lisinopril    hydrOXYzine HCl    escitalopram    aspirin    amLODIPine    Respiratory Therapy Consult    enoxaparin (LOVENOX) injection    acetaminophen    Notify provider if pain remains uncontrolled **AND** Use the Numeric Rating Scale (NRS), [place]-Baker Faces (WBF), or FLACC on regular floors and Critical-Care Pain Observation Tool (CPOT) on ICUs/Trauma to assess pain **AND** [COMPLETED] Pulse Ox **AND** Pharmacy Consult Request **AND** If patient difficult to arouse and/or has respiratory depression (respiratory rate of 10 or less), stop any opiates that are currently infusing and call a Rapid Response.    oxyCODONE immediate-release **OR** oxyCODONE immediate-release **OR** morphine injection    ondansetron    ondansetron    promethazine    promethazine    prochlorperazine    benzonatate    insulin regular    buPROPion SR    Labs:  Recent Labs     12/07/22 0210 12/08/22 0502 12/09/22 0135   WBC 12.2* 17.1* 12.2*   RBC 3.76* 3.77* 3.88*   HEMOGLOBIN 10.4* 10.3* 10.6*   HEMATOCRIT 30.8* 30.6* 31.0*   MCV 81.9 81.2* 79.9*   MCH 27.7 27.3 27.3   RDW 39.9 39.0 37.9   PLATELETCT 187 188 202   MPV 9.0 8.6* 9.3   NEUTSPOLYS 93.30*  --   --    LYMPHOCYTES 4.20*  --   --    MONOCYTES 0.00  --   --    EOSINOPHILS 0.00  --   --    BASOPHILS 0.90  --   --    RBCMORPHOLO Normal  --   --        Recent Labs     12/07/22 0210 12/08/22 0502 12/09/22 0135   SODIUM 130* 131* 130*   POTASSIUM 3.1* 2.9* 2.9*    CHLORIDE 95* 92* 92*   CO2 24 27 26   GLUCOSE 102* 99 101*   BUN 8 7* 7*       Recent Labs     22  0210 22  0502 22  0135   ALBUMIN 2.1* 2.4*  --    TBILIRUBIN 1.1 0.7  --    ALKPHOSPHAT 117* 111*  --    TOTPROTEIN 5.4* 5.6*  --    ALTSGPT 15 13  --    ASTSGOT 28 26  --    CREATININE 0.38* 0.42* 0.32*         Imaging:  DX-CHEST-PORTABLE (1 VIEW)    Result Date: 2022 1:36 PM HISTORY/REASON FOR EXAM:  possible sepsis. TECHNIQUE/EXAM DESCRIPTION AND NUMBER OF VIEWS: Single portable view of the chest. COMPARISON:  2012. FINDINGS: LUNGS: Bilateral consolidations, right worse than left. PNEUMOTHORAX: None. LINES AND TUBES: None. MEDIASTINUM: No cardiomegaly. MUSCULOSKELETAL STRUCTURES: No acute displaced fracture.     Multifocal bilateral pneumonia.     EC-ECHOCARDIOGRAM COMPLETE W/O CONT    Result Date: 2022  Transthoracic Echo Report Echocardiography Laboratory CONCLUSIONS Normal echocardiogram. ELVIS CARRASCO Exam Date:         2022                    15:57 Exam Location:     Inpatient Priority:          Routine Ordering Physician:        SAMANTHA VIGIL Referring Physician:       [id number] Mahmood Sonographer:               Yara HALL Age:    52     Gender:    F MRN:    [id number] :    [id number] BSA:    1.94   Ht (in):    66     Wt (lb):    187 Exam Type:     Complete Indications:     bacteremia ICD Codes:       r78.81 CPT Codes:       85898 BP:   110    /   75     HR:   94 Technical Quality:       Fair MEASUREMENTS  (Male / Female) Normal Values 2D ECHO LV Diastolic Diameter PLAX        4 cm                  4.2 - 5.9 / 3.9 - 5.3 cm LV Systolic Diameter PLAX         2.5 cm                2.1 - 4.0 cm IVS Diastolic Thickness           0.9 cm                LVPW Diastolic Thickness          0.98 cm               LVOT Diameter                     1.9 cm                Estimated LV Ejection  Fraction    70 %                  LV Ejection Fraction MOD 4C       70.5 %                IVC Diameter                      2.1 cm                DOPPLER AV Peak Velocity                  1.7 m/s               AV Peak Gradient                  11.1 mmHg             AV Mean Gradient                  6.6 mmHg              LVOT Peak Velocity                1.3 m/s               AV Area Cont Eq vti               2.4 cm2               MV Velocity Time Integral         25.9 cm               Mitral E Point Velocity           1.1 m/s               Mitral E to A Ratio               1.3                   MV Pressure Half Time             46 ms                 MV Area PHT                       4.8 cm2               MV Deceleration Time              159 ms                TR Peak Velocity                  255 cm/s              PV Peak Velocity                  0.94 m/s              PV Peak Gradient                  3.5 mmHg              * Indicates values subject to auto-interpretation LV EF:  70    % FINDINGS Left Ventricle Normal left ventricular chamber size. Normal left ventricular wall thickness. Normal left ventricular systolic function. The left ventricular ejection fraction is visually estimated to be 70%. Normal regional wall motion. Normal diastolic function. Right Ventricle The right ventricle is normal in size and systolic function. Right Atrium The right atrium is normal in size. Normal inferior vena cava size and inspiratory collapse. Left Atrium The left atrium is normal in size. Left atrial volume index is 32 mL/sq m. Mitral Valve Structurally normal mitral valve without significant stenosis. Mild mitral regurgitation. Aortic Valve Structurally normal aortic valve without significant stenosis or regurgitation. Tricuspid Valve Structurally normal tricuspid valve without significant stenosis. Estimated right ventricular systolic pressure is 35 mmHg. Pulmonic Valve Structurally normal pulmonic valve without  significant stenosis. Trace pulmonic insufficiency. Pericardium No pericardial effusion. Aorta Normal aortic root for body surface area. The ascending aorta diameter is 2.9 cm. Alistair Abraham MD (Electronically Signed) Final Date:     07 December 2022                 19:03    MA-SCREENING MAMMO BILAT W/TOMOSYNTHESIS W/CAD    Result Date: 11/21/2022 11/21/2022 2:09 PM HISTORY/REASON FOR EXAM:  Routine Mammographic Screening. TECHNIQUE/EXAM DESCRIPTION: Bilateral tomosynthesis screening mammography was performed with standard mammographic images generated from the data set. Images were reviewed and interpreted with CAD. COMPARISON:   November 19, 2021 and November 10, 2020 FINDINGS: There are scattered areas of fibroglandular density. There is no dominant mass, suspicious calcification, or any secondary malignant sign. Scattered benign-appearing calcifications are once again noted bilaterally.     1.  Breasts have scattered areas of fibroglandular density, with no radiographic evidence of malignancy. 2.  Screening mammogram in one year is recommended. R2 - CATEGORY 2: BENIGN FINDING(S) Ten to twenty percent of all cancers can be categorized as hereditary and the clinical and financial value of identifying patients and families at risk is well documented. If you have a personal or family history of breast, ovarian, fallopian tube, peritoneal or other cancer, please consult your physician regarding genetic counseling and testing.       Micro:  Results       Procedure Component Value Units Date/Time    C Diff by PCR rflx Toxin [[id number]] Collected: 12/08/22 1410    Order Status: Completed Specimen: Stool Updated: 12/08/22 1556     C Diff by PCR Negative     Comment: C. difficile NOT detected by PCR.  Treatment not indicated per guidelines.  Repeat testing not indicated within 7 days.          027-NAP1-BI Presumptive Negative     Comment: Presumptive 027/NAP1/BI target DNA sequences are NOT DETECTED.       Narrative:    "   Special Contact Isolation  Collected By: [id number] ZULY LUO  Does this patient have risk factors for C-diff?->Yes  C-Diff Risk Factors->antibiotic exposure    E-Test [[id number]] Collected: 12/05/22 1428    Order Status: Completed Specimen: Other Updated: 12/08/22 1547     ETEST Sensitivity FINAL    Narrative:      171 tel. [phone] 12/07/2022, 14:17, RB PERF. RESULTS CALLED TO: [phone] RN  [phone] RN  Per Hospital Policy: Only change Specimen Src: to \"Line\" if  specified by physician order.    Blood Culture [[id number]]  (Abnormal) Collected: 12/05/22 1428    Order Status: Completed Specimen: Blood from Peripheral Updated: 12/08/22 1547     Significant Indicator POS     Source BLD     Site PERIPHERAL     Culture Result Growth detected by Bactec instrument. 12/06/2022  02:45      Streptococcus pneumoniae  See previous culture for sensitivity report.      Narrative:      CALL  Ann  171 tel. [phone],  CALLED  171 tel. [phone] 12/07/2022, 14:17, RB PERF. RESULTS CALLED TO:  [phone] RN + [phone] RN  Per Hospital Policy: Only change Specimen Src: to \"Line\" if  specified by physician order.  Right Hand    Blood Culture [[id number]]  (Abnormal)  (Susceptibility) Collected: 12/05/22 1428    Order Status: Completed Specimen: Blood from Peripheral Updated: 12/08/22 1546     Significant Indicator POS     Source BLD     Site PERIPHERAL     Culture Result Growth detected by Bactec instrument. 12/06/2022  02:05      Streptococcus pneumoniae    Narrative:      CALL  Ann  171 tel. [phone],  CALLED  171 tel. [phone] 12/07/2022, 14:17, RB PERF. RESULTS CALLED TO:  [phone] RN + [phone] RN  Per Hospital Policy: Only change Specimen Src: to \"Line\" if  specified by physician order.  Right AC    Susceptibility       Streptococcus pneumoniae (1)       Antibiotic Interpretation Microscan   Method Status    Penicillin non-meningitis Sensitive 0.032 mcg/mL E-TEST Final    Cefotaxime-meningitis Sensitive 0.023 mcg/mL E-TEST Final " "   Penicillin-meningitis Sensitive 0.032 mcg/mL E-TEST Final    Cefotaxime-non meningitis Sensitive 0.023 mcg/mL E-TEST Final    Erythromycin Resistant 12 mcg/mL E-TEST Final                       C Diff by PCR rflx Toxin [[id number]] Collected: 12/08/22 1420    Order Status: Canceled Specimen: Stool     BLOOD CULTURE [[id number]] Collected: 12/07/22 1104    Order Status: Completed Specimen: Blood from Peripheral Updated: 12/08/22 0724     Significant Indicator NEG     Source BLD     Site PERIPHERAL     Culture Result No Growth  Note: Blood cultures are incubated for 5 days and  are monitored continuously.Positive blood cultures  are called to the RN and reported as soon as  they are identified.      Narrative:      Per Hospital Policy: Only change Specimen Src: to \"Line\" if  specified by physician order.  Right AC    BLOOD CULTURE [[id number]] Collected: 12/07/22 1104    Order Status: Completed Specimen: Blood from Peripheral Updated: 12/08/22 0724     Significant Indicator NEG     Source BLD     Site PERIPHERAL     Culture Result No Growth  Note: Blood cultures are incubated for 5 days and  are monitored continuously.Positive blood cultures  are called to the RN and reported as soon as  they are identified.      Narrative:      Per Hospital Policy: Only change Specimen Src: to \"Line\" if  specified by physician order.  Right Forearm/Arm    Urine Culture (New) [[id number]] Collected: 12/05/22 1457    Order Status: Completed Specimen: Urine Updated: 12/07/22 0832     Significant Indicator NEG     Source UR     Site -     Culture Result Usual urogenital yoan ,000 cfu/mL    Narrative:      Indication for culture:->Evaluation for sepsis without a  clear source of infection  Indication for culture:->Evaluation for sepsis without a    MRSA By PCR (Amp) [[id number]] Collected: 12/06/22 1427    Order Status: Completed Specimen: Respirate from Nares Updated: 12/06/22 1717     MRSA by PCR Negative    [facility] [[id number]     Order Status: Completed Specimen: Respirate from Sputum     CoV-2, FLU A/B, and RSV by PCR (2-4 Hours CEPHEID) : Collect NP swab in VTM [[id number]] Collected: 12/05/22 1554    Order Status: Completed Specimen: Respirate Updated: 12/05/22 1658     Influenza virus A RNA Negative     Influenza virus B, PCR Negative     RSV, PCR Negative     SARS-CoV-2 by PCR NotDetected     Comment: [facility] providers: PLEASE REFER TO DE-ESCALATION AND RETESTING PROTOCOL  on [email or web address]    **The [employer] GeneXpert Xpress SARS-CoV-2 RT-PCR Test has been made  available for use under the Emergency Use Authorization (EUA) only.          SARS-CoV-2 Source NP Swab    Urinalysis [[id number]]  (Abnormal) Collected: 12/05/22 1457    Order Status: Completed Specimen: Urine Updated: 12/05/22 1532     Color Yellow     Character Clear     Specific Gravity 1.020     Ph 5.5     Glucose Negative mg/dL      Ketones 15 mg/dL      Protein 100 mg/dL      Bilirubin Moderate     Urobilinogen, Urine 1.0     Nitrite Negative     Leukocyte Esterase Negative     Occult Blood Negative     Micro Urine Req Microscopic    Narrative:      Indication for culture:->Evaluation for sepsis without a  clear source of infection            Assessment:  Mónica Keita is a 52 y.o. female patient with known NAFLD, hypertension, neuroendocrine tumor, not on chemotherapy, admitted with post influenza multifocal community-acquired pneumonia and GPC bacteremia.  Blood culture organism identified as strep pneumo     Pertinent Diagnoses:  Strep pneumo bacteremia  Multifocal community-acquired pneumonia  Acute hypoxemic respiratory failure  Recent influenza A  NAFLD    Plan:  -Continue IV ceftriaxone 2 g in 24 hours.  Await sensitivities.  Follow repeat blood cultures x2 from 12/7, pending, no growth till date  -C. difficile negative  -TTE with no obvious valvular vegetations    Disposition: Anticipate no ID specific disposition needs.  Still with high  oxygen needs  Need for PICC line: No.  Anticipate being able to use oral antibiotics    Discussed with Pharm

## 2022-12-09 NOTE — THERAPY
Occupational Therapy   Initial Evaluation     Patient Name: Mónica Keita  Age:  52 y.o., Sex:  female  Medical Record #: [id number]  Today's Date: 2022    Precautions: Fall Risk    Assessment    Patient is 52 y.o. female admitted with shortness of breath, found to have CAP, pneumo bacteremia, sepsis and acute hypoxemic respiratory failure. Pt currently requires 6L supplemental O2 but desaturates into mid 80s during minimal exertion for ADLs. Pt is functionally independent for ADLs despite increased O2 needs. Pt cued for pursed lip breathing following activity participation, receptive, O2 saturations improved with ~60 seconds pursed lip breathing. No acute OT needs, anticipate DC home once medically cleared.     Plan    Recommend Occupational Therapy for Evaluation only    DC Equipment Recommendations: Tub / Shower Seat  Discharge Recommendations: Anticipate that the patient will have no further occupational therapy needs after discharge from the hospital      Objective     22 0956   Prior Living Situation   Prior Services None   Housing / Facility 2 Story House   Steps Into Home 0   Steps In Home 0   Equipment Owned Front-Wheel Walker   Lives with - Patient's Self Care Capacity Alone and Able to Care For Self   Comments   2mo ago, local family available for IADL assist   Prior Level of ADL Function   Self Feeding Independent   Grooming / Hygiene Independent   Bathing Independent   Dressing Independent   Toileting Independent   Prior Level of IADL Function   Medication Management Independent   Laundry Independent   Kitchen Mobility Independent   Finances Independent   Home Management Independent   Shopping Independent   Prior Level Of Mobility Independent Without Device in Community   Driving / Transportation Driving Independent   Precautions   Precautions Fall Risk   Pain 0 - 10 Group   Therapist Pain Assessment Post Activity Pain Same as Prior to Activity;0;Nurse Notified   Cognition     Level of Consciousness Alert   Comments good insight, cooperative   Active ROM Upper Body   Active ROM Upper Body  WDL   Strength Upper Body   Upper Body Strength  WDL   Upper Body Muscle Tone   Upper Body Muscle Tone  WDL   Coordination Upper Body   Coordination WDL   Balance Assessment   Sitting Balance (Static) Good   Sitting Balance (Dynamic) Good   Standing Balance (Static) Fair   Standing Balance (Dynamic) Fair   Weight Shift Sitting Good   Weight Shift Standing Fair   Comments no AD   Bed Mobility    Supine to Sit Supervised   Sit to Supine Supervised   Scooting Supervised   Rolling Supervised   ADL Assessment   Eating Modified Independent   Grooming Supervision;Seated   Upper Body Dressing Supervision   Lower Body Dressing Supervision   Toileting Supervision   How much help from another person does the patient currently need...   Putting on and taking off regular lower body clothing? 4   Bathing (including washing, rinsing, and drying)? 4   Toileting, which includes using a toilet, bedpan, or urinal? 4   Putting on and taking off regular upper body clothing? 4   Taking care of personal grooming such as brushing teeth? 4   Eating meals? 4   6 Clicks Daily Activity Score 24   Functional Mobility   Sit to Stand Supervised   Bed, Chair, Wheelchair Transfer Supervised   Toilet Transfers Supervised   Transfer Method Stand Step   Mobility no AD   Comments limited by SOB and desaturating on 6L O2 NC   Activity Tolerance   Comments functional for short bouts of ADL activity, requires rest breaks for pursed lip breathing   Education Group   Education Provided Role of Occupational Therapist;Activities of Daily Living;Home Safety   Role of Occupational Therapist Patient Response Patient;Acceptance;Explanation;Verbal Demonstration   Home Safety Patient Response Patient;Acceptance;Explanation;Verbal Demonstration   ADL Patient Response Patient;Acceptance;Explanation;Verbal Demonstration;Action Demonstration   Problem  List   Problem List Other (Comments)  (O2 compensation, medical needs)   Anticipated Discharge Equipment and Recommendations   DC Equipment Recommendations Tub / Shower Seat   Discharge Recommendations Anticipate that the patient will have no further occupational therapy needs after discharge from the hospital

## 2022-12-09 NOTE — THERAPY
Physical Therapy   Initial Evaluation     Patient Name: Mónica Keita  Age:  52 y.o., Sex:  female  Medical Record #: [id number]  Today's Date: 12/9/2022     Precautions  Precautions: Fall Risk  Comments: desats with ambulation    Assessment  Patient is 52 y.o. female with previous medical history that includes NAFLD, hypertension, neuroendocrine tumor.   Patient presents for evaluation of cough shortness of breath fever and chills.  She reports symptoms began about 10 days ago.  She had systemic complaints along with a fever and chills.  She was tested for COVID and influenza, and came back negative for COVID, positive for influenza .  Additional factors influencing patient status / progress : today, pt is supervised for OOB, transfers and ambulation with no AD, but shows limited endurance as she desaturates to 84% with ambulation x 15 feet on 6L nc. Pt recovered with seated rest and PLB education. Pt lives alone, but daughter can come help as needed. Pt will benefit from being up to walk consistently with nsg as her respiratory function improves. .      Plan    Recommend Physical Therapy for Evaluation only   DC Equipment Recommendations: None  Discharge Recommendations: Recommend home health for continued physical therapy services        Objective       12/09/22 0955   Charge Group   PT Evaluation PT Evaluation Mod   Total Time Spent   PT Total Time Yes   PT Evaluation Time Spent (Mins) 25   PT Total Time Spent (Calculated) 25   Initial Contact Note    Initial Contact Note Order Received and Verified, Evaluation Only - Patient Does Not Require Further Acute Physical Therapy at this Time.  However, May Benefit from Post Acute Therapy for Higher Level Functional Deficits.   Precautions   Precautions Fall Risk   Comments desats with ambulation   Vitals   Pulse Oximetry (!) 84 %  (with ambulation on 6L, recovers with PLB back to 90%)   O2 (LPM) 6   O2 Delivery Device Silicone Nasal Cannula   Pain 0 - 10 Group    Therapist Pain Assessment During Activity;Nurse Notified;0   Prior Living Situation   Prior Services None   Housing / Facility 2 Story House  (pt lives mostly on main floor, rarely needs basement.)   Steps Into Home 0  (enters thru garage)   Steps In Home   (flight)   Rail Right Rail  (Steps in Home);Right Rail (Steps into Home)   Elevator No   Equipment Owned Front-Wheel Walker   Lives with - Patient's Self Care Capacity Alone and Able to Care For Self   Comments spouse  2 months ago, pt has lots of family close by who can assist as needed. Daughter can come help as needed.   Prior Level of Functional Mobility   Bed Mobility Independent   Transfer Status Independent   Ambulation Independent   Distance Ambulation (Feet)   (community)   Assistive Devices Used None   Stairs Independent   Cognition    Level of Consciousness Alert   Active ROM Lower Body    Active ROM Lower Body  WDL   Strength Lower Body   Lower Body Strength  X   Comments functional for ambulation, but lacks endurance due to respiratory issues.   Balance Assessment   Sitting Balance (Static) Fair   Sitting Balance (Dynamic) Fair   Standing Balance (Static) Fair   Standing Balance (Dynamic) Fair   Weight Shift Sitting Good   Weight Shift Standing Good   Comments with no AD   Gait Analysis   Gait Level Of Assist Supervised   Assistive Device None   Distance (Feet) 15   # of Times Distance was Traveled 1   Deviation   (desats, needed to sit.)   # of Stairs Climbed 0   Weight Bearing Status no restrictions   Bed Mobility    Supine to Sit Supervised   Scooting Supervised   Rolling Supervised   Functional Mobility   Sit to Stand Supervised   Bed, Chair, Wheelchair Transfer Supervised   Transfer Method Stand Pivot   How much difficulty does the patient currently have...   Turning over in bed (including adjusting bedclothes, sheets and blankets)? 3   Sitting down on and standing up from a chair with arms (e.g., wheelchair, bedside commode, etc.) 3    Moving from lying on back to sitting on the side of the bed? 3   How much help from another person does the patient currently need...   Moving to and from a bed to a chair (including a wheelchair)? 3   Need to walk in a hospital room? 3   Climbing 3-5 steps with a railing? 3   6 clicks Mobility Score 18   Activity Tolerance   Sitting in Chair 10+   Education Group   Education Provided Role of Physical Therapist   Role of Physical Therapist Patient Response Patient;Acceptance;Explanation;Verbal Demonstration   Anticipated Discharge Equipment and Recommendations   DC Equipment Recommendations None   Discharge Recommendations Recommend home health for continued physical therapy services   Interdisciplinary Plan of Care Collaboration   IDT Collaboration with  Nursing   Patient Position at End of Therapy Seated;Call Light within Reach;Tray Table within Reach;Phone within Reach   Collaboration Comments nsg updated   Session Information   Date / Session Number  12/9-1x only, dc needs only

## 2022-12-09 NOTE — PROGRESS NOTES
"Hospital Medicine Daily Progress Note    Date of Service  12/9/2022    Chief Complaint  Mónica Keita is a 52 y.o. female admitted 12/5/2022 with shortness of breath.  Shortness of breath.  There is a pleasant woman with a history of nonalcoholic fatty liver disease, hypertension, neuroendocrine tumor.  Patient recently    Hospital Course  As per chart review:  \"  Mónica Keita is a 52 y.o. female who presented 12/5/2022 with shortness of breath.  This is a pleasant woman with a history of nonalcoholic fatty liver disease, hypertension, and neuroendocrine tumor.  Patient presented with several day history of shortness of breath as well as fevers and chills.  Her symptoms began approximately 10 days ago.  She recently tested for influenza but was negative for COVID-19.  She had not been vaccinated for influenza.  In the emergency room, patient had SPO2 of 75% on room air requiring supplemental oxygen.  She was tachycardic with heart rate in the 100s with white count of 3.7 meeting criteria for sepsis.  Patient was tachypneic with respiratory rate up to 29 showing signs of respiratory distress.  Lactic acid was 2.7.  Sodium of 130.  Bicarb of 21.  Chest x-ray showed right lower lobe pneumonia.  \"    Interval Problem Update  12/6: Patient feeling less short of breath.  Sepsis bolus given.  Patient requiring high flow oxygen 30 LPM this morning increasing to 40 LPM by the afternoon.  Continue to have fevers.  Blood cultures growing 2 out of 2 bottles gram-positive cocci possible Streptococcus species.  Initially due to concern for possible staph pneumonia, she was started on linezolid.  However, agreed with pharmacy's recommendations to transition to ampicillin.  Echocardiogram has been ordered.  Patient will need infectious diseases consultation in the morning tomorrow.    PATIENT SEEN BY PREVIOUS HOSPITALIST UNTIL 12/6 12/7: Patient seen at bedside this morning.  Patient still complaining of generalized " weakness, fatigue.  Still requiring high flow nasal cannula oxygen supplementation.  We have consulted ID, we appreciate further recommendations.  Pending echocardiogram.  We will continue to monitor closely.    12/8: Patient seen at bedside this morning.  The patient mentions that she feels better.  The patient is now on nasal cannula oxygen supplementation instead of high flow.  Patient currently on ceftriaxone, we appreciate further recommendations by ID.  Echocardiogram was reported as normal.  We will continue to monitor closely.    12/9: Patient seen at bedside this morning.  Overall the patient mentions she feels better, still very weak.  We are pending PT/OT evaluation.  Patient still requiring up to 8 to 10 L of oxygen while resting.  We are pending final recommendations by ID, we appreciate further recommendations.  We will continue to monitor closely.  We will replace electrolytes as needed.    I have discussed this patient's plan of care and discharge plan at IDT rounds today with Case Management, Nursing, Nursing leadership, and other members of the IDT team.    Consultants/Specialty  ID    Code Status  Full Code    Disposition  Patient is not medically cleared for discharge.   Anticipate discharge to to home with close outpatient follow-up.  I have placed the appropriate orders for post-discharge needs.    Review of Systems  Review of Systems   Constitutional:  Positive for malaise/fatigue.   Respiratory:  Positive for cough, sputum production and shortness of breath.    Cardiovascular:  Negative for chest pain and palpitations.   Gastrointestinal:  Negative for abdominal pain, nausea and vomiting.   Genitourinary:  Negative for dysuria and hematuria.   Musculoskeletal:  Negative for joint pain and myalgias.   Neurological:  Negative for dizziness and headaches.      Physical Exam  Temp:  [36.6 °C (97.9 °F)-37.2 °C (99 °F)] 37.2 °C (99 °F)  Pulse:  [81-92] 92  Resp:  [16-18] 17  BP: (133-146)/(81-90)  146/89  SpO2:  [91 %-96 %] 96 %    Physical Exam  Vitals and nursing note reviewed.   Constitutional:       Appearance: She is normal weight. She is ill-appearing. She is not diaphoretic.   HENT:      Head: Normocephalic and atraumatic.      Nose:      Comments: On NC     Mouth/Throat:      Mouth: Mucous membranes are moist.      Pharynx: Oropharynx is clear. No oropharyngeal exudate.   Eyes:      General:         Right eye: No discharge.         Left eye: No discharge.   Cardiovascular:      Rate and Rhythm: Normal rate and regular rhythm.      Pulses: Normal pulses.      Heart sounds: Normal heart sounds. No murmur heard.  Pulmonary:      Effort: Respiratory distress present.      Breath sounds: Rhonchi present.   Abdominal:      General: Abdomen is flat. Bowel sounds are normal. There is no distension.      Palpations: Abdomen is soft.      Tenderness: There is no abdominal tenderness.   Musculoskeletal:      Cervical back: Normal range of motion and neck supple. No tenderness.      Right lower leg: No edema.      Left lower leg: No edema.   Neurological:      Mental Status: She is alert and oriented to person, place, and time.      Motor: No weakness.   Psychiatric:         Mood and Affect: Mood normal.         Behavior: Behavior normal.       Fluids    Intake/Output Summary (Last 24 hours) at 12/9/2022 0812  Last data filed at 12/8/2022 1430  Gross per 24 hour   Intake 480 ml   Output 500 ml   Net -20 ml         Laboratory  Recent Labs     12/07/22  0210 12/08/22  0502 12/09/22  0135   WBC 12.2* 17.1* 12.2*   RBC 3.76* 3.77* 3.88*   HEMOGLOBIN 10.4* 10.3* 10.6*   HEMATOCRIT 30.8* 30.6* 31.0*   MCV 81.9 81.2* 79.9*   MCH 27.7 27.3 27.3   MCHC 33.8 33.7 34.2   RDW 39.9 39.0 37.9   PLATELETCT 187 188 202   MPV 9.0 8.6* 9.3       Recent Labs     12/07/22  0210 12/08/22  0502 12/09/22  0135   SODIUM 130* 131* 130*   POTASSIUM 3.1* 2.9* 2.9*   CHLORIDE 95* 92* 92*   CO2 24 27 26   GLUCOSE 102* 99 101*   BUN 8 7* 7*    CREATININE 0.38* 0.42* 0.32*   CALCIUM 8.4* 8.2* 7.9*       Recent Labs     12/06/22  0917   INR 1.10                 Imaging  EC-ECHOCARDIOGRAM COMPLETE W/O CONT   Final Result      DX-CHEST-PORTABLE (1 VIEW)   Final Result      Multifocal bilateral pneumonia.                Assessment/Plan  * Acute respiratory failure with hypoxia (HCC)- (present on admission)  Assessment & Plan  Likely due to secondary pneumonia from influenza infection.  Now with streptococcal bacteremia  Patient is a lifetime non-smoker.    Admit to telemetry inpatient status.  Continue oxygen as per respiratory protocol.  Continuous pulse oximetry.  Respiratory therapy consult.  Duo nebs every 4 hours and as needed.  Incentive spirometry  PEP therapy    12/7: We have consulted ID, we appreciate further recommendations    12/8: Improving. Patient now on nasal cannula.    Hypomagnesemia  Assessment & Plan  Replace as needed  monitor    Hypokalemia  Assessment & Plan  Replace as needed  monitor    Hyponatremia  Assessment & Plan  We will stop IV fluids.  Will encourage p.o. intake.    Leukocytosis  Assessment & Plan  In the setting of bacteremia.  Continue antibiotics.  Monitor, repeat CBC.    12/9: improving    Streptococcal bacteremia- (present on admission)  Assessment & Plan  Blood cultures from 12/5/2022 showing 2 out of 2 bottles gram-positive cocci likely streptococcal species.    12/7: ID has been consulted, we appreciate further recommendations.    12/8: Currently on ceftriaxone. Echocardiogram was reported as normal.    Sepsis (HCC)- (present on admission)  Assessment & Plan  This is Sepsis Not present on admission  SIRS criteria identified on my evaluation include: Fever, with temperature greater than 101 deg F, Hypothermia, with temperature less than 96.8 deg F and Tachypnea, with respirations greater than 20 per minute  Source is multifocal pneumonia bacteremia  Sepsis protocol initiated  Fluid resuscitation ordered per  protocol  Crystalloid Fluid Administration: Fluid resuscitation ordered per standard protocol - 30 mL/kg per current or ideal body weight  IV antibiotics as appropriate for source of sepsis: Ceftriaxone changed to ampicillin.  Continues on azithromycin  Reassessment: I have reassessed the patient's hemodynamic status    12/8: Currently on ceftriaxone, ID following patient.    Multifocal pneumonia due to Streptococcus secondary to influenza infection  Assessment & Plan  She is recovering from influenza A which was diagnosed 10 days ago  Blood cultures growing 2 out of 2 bottles likely streptococcal species    Change ceftriaxone to ampicillin  Continue azithromycin  Respiratory therapy and breathing treatments.    12/7: ID has been consulted, we appreciate further recommendations.    12/8: Currently on ceftriaxone    NAFLD (nonalcoholic fatty liver disease)- (present on admission)  Assessment & Plan  LFTs are hovering around her baseline    Type 2 diabetes mellitus, without long-term current use of insulin (HCC)- (present on admission)  Assessment & Plan  Takes metformin 500 twice daily at home.  Last A1c was 6.2, this was 2 weeks ago which demonstrates good control    Hold metformin to avoid metabolic acidosis  Continue regular insulin  Hypoglycemia protocol  Carbohydrate consistent diet    Hypertension- (present on admission)  Assessment & Plan  Patient is maintained on lisinopril 20 mg, and amlodipine 5 at home which we will continue with parameters       VTE prophylaxis: SCDs/TEDs and enoxaparin ppx    I have performed a physical exam and reviewed and updated ROS and Plan today (12/9/2022). In review of yesterday's note (12/8/2022), there are no changes except as documented above.

## 2022-12-09 NOTE — PROGRESS NOTES
Pt Cdiff negative provider informed and one time dose of imodium ordered.    Monitor Summary  SR/ST 90-100s  SD 0.17 ; QRS 0.04 ; QT 0.37

## 2022-12-10 ENCOUNTER — APPOINTMENT (OUTPATIENT)
Dept: RADIOLOGY | Facility: MEDICAL CENTER | Age: 52
DRG: 871 | End: 2022-12-10
Attending: STUDENT IN AN ORGANIZED HEALTH CARE EDUCATION/TRAINING PROGRAM
Payer: COMMERCIAL

## 2022-12-10 LAB
ANION GAP SERPL CALC-SCNC: 11 MMOL/L (ref 7–16)
BUN SERPL-MCNC: 7 MG/DL (ref 8–22)
CALCIUM SERPL-MCNC: 7.9 MG/DL (ref 8.5–10.5)
CHLORIDE SERPL-SCNC: 94 MMOL/L (ref 96–112)
CO2 SERPL-SCNC: 29 MMOL/L (ref 20–33)
CREAT SERPL-MCNC: 0.41 MG/DL (ref 0.5–1.4)
ERYTHROCYTE [DISTWIDTH] IN BLOOD BY AUTOMATED COUNT: 39.4 FL (ref 35.9–50)
GFR SERPLBLD CREATININE-BSD FMLA CKD-EPI: 119 ML/MIN/1.73 M 2
GLUCOSE BLD STRIP.AUTO-MCNC: 109 MG/DL (ref 65–99)
GLUCOSE BLD STRIP.AUTO-MCNC: 76 MG/DL (ref 65–99)
GLUCOSE BLD STRIP.AUTO-MCNC: 81 MG/DL (ref 65–99)
GLUCOSE BLD STRIP.AUTO-MCNC: 85 MG/DL (ref 65–99)
GLUCOSE BLD STRIP.AUTO-MCNC: 95 MG/DL (ref 65–99)
GLUCOSE SERPL-MCNC: 104 MG/DL (ref 65–99)
HCT VFR BLD AUTO: 32.1 % (ref 37–47)
HGB BLD-MCNC: 10.8 G/DL (ref 12–16)
MAGNESIUM SERPL-MCNC: 2 MG/DL (ref 1.5–2.5)
MCH RBC QN AUTO: 27.3 PG (ref 27–33)
MCHC RBC AUTO-ENTMCNC: 33.6 G/DL (ref 33.6–35)
MCV RBC AUTO: 81.1 FL (ref 81.4–97.8)
PLATELET # BLD AUTO: 268 K/UL (ref 164–446)
PMV BLD AUTO: 9.2 FL (ref 9–12.9)
POTASSIUM SERPL-SCNC: 3.1 MMOL/L (ref 3.6–5.5)
RBC # BLD AUTO: 3.96 M/UL (ref 4.2–5.4)
SODIUM SERPL-SCNC: 134 MMOL/L (ref 135–145)
WBC # BLD AUTO: 10 K/UL (ref 4.8–10.8)

## 2022-12-10 PROCEDURE — 85027 COMPLETE CBC AUTOMATED: CPT

## 2022-12-10 PROCEDURE — 700102 HCHG RX REV CODE 250 W/ 637 OVERRIDE(OP): Performed by: STUDENT IN AN ORGANIZED HEALTH CARE EDUCATION/TRAINING PROGRAM

## 2022-12-10 PROCEDURE — 700111 HCHG RX REV CODE 636 W/ 250 OVERRIDE (IP): Performed by: INTERNAL MEDICINE

## 2022-12-10 PROCEDURE — 99232 SBSQ HOSP IP/OBS MODERATE 35: CPT | Performed by: INTERNAL MEDICINE

## 2022-12-10 PROCEDURE — 770020 HCHG ROOM/CARE - TELE (206)

## 2022-12-10 PROCEDURE — 700102 HCHG RX REV CODE 250 W/ 637 OVERRIDE(OP): Performed by: INTERNAL MEDICINE

## 2022-12-10 PROCEDURE — 700105 HCHG RX REV CODE 258: Performed by: INTERNAL MEDICINE

## 2022-12-10 PROCEDURE — A9270 NON-COVERED ITEM OR SERVICE: HCPCS | Performed by: INTERNAL MEDICINE

## 2022-12-10 PROCEDURE — 83735 ASSAY OF MAGNESIUM: CPT

## 2022-12-10 PROCEDURE — A9270 NON-COVERED ITEM OR SERVICE: HCPCS | Performed by: STUDENT IN AN ORGANIZED HEALTH CARE EDUCATION/TRAINING PROGRAM

## 2022-12-10 PROCEDURE — 700101 HCHG RX REV CODE 250: Performed by: STUDENT IN AN ORGANIZED HEALTH CARE EDUCATION/TRAINING PROGRAM

## 2022-12-10 PROCEDURE — 82962 GLUCOSE BLOOD TEST: CPT

## 2022-12-10 PROCEDURE — 36415 COLL VENOUS BLD VENIPUNCTURE: CPT

## 2022-12-10 PROCEDURE — 94669 MECHANICAL CHEST WALL OSCILL: CPT

## 2022-12-10 PROCEDURE — 94640 AIRWAY INHALATION TREATMENT: CPT

## 2022-12-10 PROCEDURE — 80048 BASIC METABOLIC PNL TOTAL CA: CPT

## 2022-12-10 PROCEDURE — A9270 NON-COVERED ITEM OR SERVICE: HCPCS | Performed by: HOSPITALIST

## 2022-12-10 PROCEDURE — 71046 X-RAY EXAM CHEST 2 VIEWS: CPT

## 2022-12-10 PROCEDURE — 99233 SBSQ HOSP IP/OBS HIGH 50: CPT | Performed by: STUDENT IN AN ORGANIZED HEALTH CARE EDUCATION/TRAINING PROGRAM

## 2022-12-10 PROCEDURE — 700102 HCHG RX REV CODE 250 W/ 637 OVERRIDE(OP): Performed by: HOSPITALIST

## 2022-12-10 RX ORDER — AMLODIPINE BESYLATE 5 MG/1
5 TABLET ORAL ONCE
Status: COMPLETED | OUTPATIENT
Start: 2022-12-10 | End: 2022-12-10

## 2022-12-10 RX ORDER — GUAIFENESIN/DEXTROMETHORPHAN 100-10MG/5
5 SYRUP ORAL 3 TIMES DAILY
Status: DISCONTINUED | OUTPATIENT
Start: 2022-12-10 | End: 2022-12-14

## 2022-12-10 RX ORDER — POTASSIUM CHLORIDE 20 MEQ/1
40 TABLET, EXTENDED RELEASE ORAL ONCE
Status: COMPLETED | OUTPATIENT
Start: 2022-12-10 | End: 2022-12-10

## 2022-12-10 RX ORDER — AMLODIPINE BESYLATE 10 MG/1
10 TABLET ORAL DAILY
Status: DISCONTINUED | OUTPATIENT
Start: 2022-12-11 | End: 2022-12-14 | Stop reason: HOSPADM

## 2022-12-10 RX ADMIN — POTASSIUM CHLORIDE 40 MEQ: 1500 TABLET, EXTENDED RELEASE ORAL at 06:12

## 2022-12-10 RX ADMIN — POTASSIUM CHLORIDE 40 MEQ: 1500 TABLET, EXTENDED RELEASE ORAL at 08:54

## 2022-12-10 RX ADMIN — GUAIFENESIN AND DEXTROMETHORPHAN 5 ML: 100; 10 SYRUP ORAL at 12:36

## 2022-12-10 RX ADMIN — AMLODIPINE BESYLATE 5 MG: 5 TABLET ORAL at 08:54

## 2022-12-10 RX ADMIN — AMLODIPINE BESYLATE 5 MG: 5 TABLET ORAL at 06:12

## 2022-12-10 RX ADMIN — LISINOPRIL 20 MG: 20 TABLET ORAL at 06:12

## 2022-12-10 RX ADMIN — BUPROPION HYDROCHLORIDE 150 MG: 150 TABLET, EXTENDED RELEASE ORAL at 06:11

## 2022-12-10 RX ADMIN — ESCITALOPRAM OXALATE 40 MG: 10 TABLET ORAL at 06:12

## 2022-12-10 RX ADMIN — OMEPRAZOLE 20 MG: 20 CAPSULE, DELAYED RELEASE ORAL at 06:11

## 2022-12-10 RX ADMIN — BUPROPION HYDROCHLORIDE 150 MG: 150 TABLET, EXTENDED RELEASE ORAL at 17:35

## 2022-12-10 RX ADMIN — GUAIFENESIN AND DEXTROMETHORPHAN 5 ML: 100; 10 SYRUP ORAL at 17:35

## 2022-12-10 RX ADMIN — IPRATROPIUM BROMIDE AND ALBUTEROL SULFATE 3 ML: .5; 2.5 SOLUTION RESPIRATORY (INHALATION) at 21:29

## 2022-12-10 RX ADMIN — ASPIRIN 81 MG: 81 TABLET, COATED ORAL at 06:11

## 2022-12-10 RX ADMIN — SODIUM CHLORIDE 12 MILLION UNITS: 9 INJECTION, SOLUTION INTRAVENOUS at 13:43

## 2022-12-10 ASSESSMENT — ENCOUNTER SYMPTOMS
SHORTNESS OF BREATH: 1
DIARRHEA: 0
COUGH: 1
PHOTOPHOBIA: 0
DIZZINESS: 0
FEVER: 0
ABDOMINAL PAIN: 0
MYALGIAS: 0
VOMITING: 0
NECK PAIN: 0
PALPITATIONS: 0
CHILLS: 0
HEADACHES: 0
NAUSEA: 0
BACK PAIN: 0
SPUTUM PRODUCTION: 1

## 2022-12-10 ASSESSMENT — FIBROSIS 4 INDEX: FIB4 SCORE: 1.35

## 2022-12-10 ASSESSMENT — PAIN DESCRIPTION - PAIN TYPE
TYPE: ACUTE PAIN;CHRONIC PAIN
TYPE: ACUTE PAIN

## 2022-12-10 NOTE — CARE PLAN
The patient is Watcher - Medium risk of patient condition declining or worsening    Shift Goals  Clinical Goals: wean 02, decreased loose stools  Patient Goals: no loose stools  Family Goals: ANISA    Progress made toward(s) clinical / shift goals:    Problem: Knowledge Deficit - Standard  Goal: Patient and family/care givers will demonstrate understanding of plan of care, disease process/condition, diagnostic tests and medications  Outcome: Progressing     Problem: Fluid Volume  Goal: Fluid volume balance will be maintained  Outcome: Progressing     Problem: Urinary - Renal Perfusion  Goal: Ability to achieve and maintain adequate renal perfusion and functioning will improve  Outcome: Progressing     Problem: Respiratory  Goal: Patient will achieve/maintain optimum respiratory ventilation and gas exchange  Outcome: Progressing       Patient is not progressing towards the following goals:

## 2022-12-10 NOTE — PROGRESS NOTES
Assumed care of patient. Pt is A+O x4. No chest pain or SOB. No active bleeding noted. Informed of safety and call system. rhythm is sinus rhythm.  Pt satting 93% via NC at 6 lpm

## 2022-12-10 NOTE — PROGRESS NOTES
Infectious Disease Progress Note    Author: Jun Yousif M.D. Date & Time of service: 12/10/2022  10:51 AM    Chief Complaint:  Follow-up for bacteremia    Interval History:   fevers improving white count continues to go up, 17.1 today.  Patient feels better, oxygen requirements improved, down to nasal cannula.  Neck is no longer sore   patient remains afebrile, white count down to 12.2 today, tolerating ceftriaxone.  Still feels quite run down, still hypoxemic  12/10 patient remains afebrile, count continues to trend down, 10,000 today.  Repeat blood cultures as below.  Remains on 7 L nasal cannula    Labs Reviewed and Medications Reviewed.    Review of Systems:  Review of Systems   Constitutional:  Negative for chills and fever.   Eyes:  Negative for photophobia.   Gastrointestinal:  Negative for abdominal pain, diarrhea, nausea and vomiting.   Musculoskeletal:  Negative for back pain and neck pain.   Skin:  Negative for itching and rash.   Neurological:  Negative for headaches.   All other systems reviewed and are negative.    Hemodynamics:  Temp (24hrs), Av °C (98.6 °F), Min:36.6 °C (97.8 °F), Max:37.4 °C (99.3 °F)  Temperature: 36.7 °C (98.1 °F)  Pulse  Av.3  Min: 79  Max: 126   Blood Pressure: (!) 152/95       Physical Exam:  Physical Exam  Vitals and nursing note reviewed.   Constitutional:       General: She is not in acute distress.     Appearance: She is not ill-appearing.   HENT:      Mouth/Throat:      Pharynx: No oropharyngeal exudate.   Eyes:      General: No scleral icterus.        Right eye: No discharge.         Left eye: No discharge.      Conjunctiva/sclera: Conjunctivae normal.   Cardiovascular:      Rate and Rhythm: Normal rate.      Heart sounds: No murmur heard.  Pulmonary:      Effort: No respiratory distress.      Comments: Tachypnea, using accessory muscles but able to speak in complete sentences - much better compared to 2 days ago, about the same compared to  yesterday.  Abdominal:      General: Abdomen is flat. There is no distension.      Palpations: There is no mass.      Tenderness: There is no abdominal tenderness.   Musculoskeletal:         General: No swelling or tenderness.   Skin:     Findings: No erythema or rash.   Neurological:      General: No focal deficit present.      Mental Status: She is alert and oriented to person, place, and time.   Psychiatric:         Mood and Affect: Mood normal.         Behavior: Behavior normal.       Meds:    Current Facility-Administered Medications:     [START ON 12/11/2022] amLODIPine    loperamide    sodium chloride    cefTRIAXone (ROCEPHIN) IV    LR    ipratropium-albuterol    omeprazole    lisinopril    hydrOXYzine HCl    escitalopram    aspirin    Respiratory Therapy Consult    enoxaparin (LOVENOX) injection    acetaminophen    Notify provider if pain remains uncontrolled **AND** Use the Numeric Rating Scale (NRS), [place]-Baker Faces (WBF), or FLACC on regular floors and Critical-Care Pain Observation Tool (CPOT) on ICUs/Trauma to assess pain **AND** [COMPLETED] Pulse Ox **AND** Pharmacy Consult Request **AND** If patient difficult to arouse and/or has respiratory depression (respiratory rate of 10 or less), stop any opiates that are currently infusing and call a Rapid Response.    oxyCODONE immediate-release **OR** oxyCODONE immediate-release **OR** morphine injection    ondansetron    ondansetron    promethazine    promethazine    prochlorperazine    benzonatate    insulin regular    buPROPion SR    Labs:  Recent Labs     12/08/22  0502 12/09/22  0135 12/10/22  0326   WBC 17.1* 12.2* 10.0   RBC 3.77* 3.88* 3.96*   HEMOGLOBIN 10.3* 10.6* 10.8*   HEMATOCRIT 30.6* 31.0* 32.1*   MCV 81.2* 79.9* 81.1*   MCH 27.3 27.3 27.3   RDW 39.0 37.9 39.4   PLATELETCT 188 202 268   MPV 8.6* 9.3 9.2       Recent Labs     12/08/22  0502 12/09/22  0135 12/10/22  0326   SODIUM 131* 130* 134*   POTASSIUM 2.9* 2.9* 3.1*   CHLORIDE 92* 92* 94*    CO2 27 26 29   GLUCOSE 99 101* 104*   BUN 7* 7* 7*       Recent Labs     22  0502 22  0135 12/10/22  0326   ALBUMIN 2.4*  --   --    TBILIRUBIN 0.7  --   --    ALKPHOSPHAT 111*  --   --    TOTPROTEIN 5.6*  --   --    ALTSGPT 13  --   --    ASTSGOT 26  --   --    CREATININE 0.42* 0.32* 0.41*         Imaging:  DX-CHEST-PORTABLE (1 VIEW)    Result Date: 2022 1:36 PM HISTORY/REASON FOR EXAM:  possible sepsis. TECHNIQUE/EXAM DESCRIPTION AND NUMBER OF VIEWS: Single portable view of the chest. COMPARISON:  2012. FINDINGS: LUNGS: Bilateral consolidations, right worse than left. PNEUMOTHORAX: None. LINES AND TUBES: None. MEDIASTINUM: No cardiomegaly. MUSCULOSKELETAL STRUCTURES: No acute displaced fracture.     Multifocal bilateral pneumonia.     EC-ECHOCARDIOGRAM COMPLETE W/O CONT    Result Date: 2022  Transthoracic Echo Report Echocardiography Laboratory CONCLUSIONS Normal echocardiogram. ELVIS CARRASCO Exam Date:         2022                    15:57 Exam Location:     Inpatient Priority:          Routine Ordering Physician:        SAMANTHA VIGIL Referring Physician:       [id number] Mahmood Sonographer:               Yara HALL Age:    52     Gender:    F MRN:    [id number] :    [id number] BSA:    1.94   Ht (in):    66     Wt (lb):    187 Exam Type:     Complete Indications:     bacteremia ICD Codes:       r78.81 CPT Codes:       14434 BP:   110    /   75     HR:   94 Technical Quality:       Fair MEASUREMENTS  (Male / Female) Normal Values 2D ECHO LV Diastolic Diameter PLAX        4 cm                  4.2 - 5.9 / 3.9 - 5.3 cm LV Systolic Diameter PLAX         2.5 cm                2.1 - 4.0 cm IVS Diastolic Thickness           0.9 cm                LVPW Diastolic Thickness          0.98 cm               LVOT Diameter                     1.9 cm                Estimated LV Ejection Fraction    70 %                   LV Ejection Fraction MOD 4C       70.5 %                IVC Diameter                      2.1 cm                DOPPLER AV Peak Velocity                  1.7 m/s               AV Peak Gradient                  11.1 mmHg             AV Mean Gradient                  6.6 mmHg              LVOT Peak Velocity                1.3 m/s               AV Area Cont Eq vti               2.4 cm2               MV Velocity Time Integral         25.9 cm               Mitral E Point Velocity           1.1 m/s               Mitral E to A Ratio               1.3                   MV Pressure Half Time             46 ms                 MV Area PHT                       4.8 cm2               MV Deceleration Time              159 ms                TR Peak Velocity                  255 cm/s              PV Peak Velocity                  0.94 m/s              PV Peak Gradient                  3.5 mmHg              * Indicates values subject to auto-interpretation LV EF:  70    % FINDINGS Left Ventricle Normal left ventricular chamber size. Normal left ventricular wall thickness. Normal left ventricular systolic function. The left ventricular ejection fraction is visually estimated to be 70%. Normal regional wall motion. Normal diastolic function. Right Ventricle The right ventricle is normal in size and systolic function. Right Atrium The right atrium is normal in size. Normal inferior vena cava size and inspiratory collapse. Left Atrium The left atrium is normal in size. Left atrial volume index is 32 mL/sq m. Mitral Valve Structurally normal mitral valve without significant stenosis. Mild mitral regurgitation. Aortic Valve Structurally normal aortic valve without significant stenosis or regurgitation. Tricuspid Valve Structurally normal tricuspid valve without significant stenosis. Estimated right ventricular systolic pressure is 35 mmHg. Pulmonic Valve Structurally normal pulmonic valve without significant stenosis.  Trace pulmonic insufficiency. Pericardium No pericardial effusion. Aorta Normal aortic root for body surface area. The ascending aorta diameter is 2.9 cm. Alistair Abraham MD (Electronically Signed) Final Date:     07 December 2022                 19:03    MA-SCREENING MAMMO BILAT W/TOMOSYNTHESIS W/CAD    Result Date: 11/21/2022 11/21/2022 2:09 PM HISTORY/REASON FOR EXAM:  Routine Mammographic Screening. TECHNIQUE/EXAM DESCRIPTION: Bilateral tomosynthesis screening mammography was performed with standard mammographic images generated from the data set. Images were reviewed and interpreted with CAD. COMPARISON:   November 19, 2021 and November 10, 2020 FINDINGS: There are scattered areas of fibroglandular density. There is no dominant mass, suspicious calcification, or any secondary malignant sign. Scattered benign-appearing calcifications are once again noted bilaterally.     1.  Breasts have scattered areas of fibroglandular density, with no radiographic evidence of malignancy. 2.  Screening mammogram in one year is recommended. R2 - CATEGORY 2: BENIGN FINDING(S) Ten to twenty percent of all cancers can be categorized as hereditary and the clinical and financial value of identifying patients and families at risk is well documented. If you have a personal or family history of breast, ovarian, fallopian tube, peritoneal or other cancer, please consult your physician regarding genetic counseling and testing.       Micro:  Results       Procedure Component Value Units Date/Time    C Diff by PCR rflx Toxin [[id number]] Collected: 12/08/22 1410    Order Status: Completed Specimen: Stool Updated: 12/08/22 1556     C Diff by PCR Negative     Comment: C. difficile NOT detected by PCR.  Treatment not indicated per guidelines.  Repeat testing not indicated within 7 days.          027-NAP1-BI Presumptive Negative     Comment: Presumptive 027/NAP1/BI target DNA sequences are NOT DETECTED.       Narrative:      Special Contact  "Isolation  Collected By: [id number] ZULY LUO  Does this patient have risk factors for C-diff?->Yes  C-Diff Risk Factors->antibiotic exposure    E-Test [[id number]] Collected: 12/05/22 1428    Order Status: Completed Specimen: Other Updated: 12/08/22 1547     ETEST Sensitivity FINAL    Narrative:      171 tel. [phone] 12/07/2022, 14:17, RB PERF. RESULTS CALLED TO: [phone] RN  [phone] RN  Per Hospital Policy: Only change Specimen Src: to \"Line\" if  specified by physician order.    Blood Culture [[id number]]  (Abnormal) Collected: 12/05/22 1428    Order Status: Completed Specimen: Blood from Peripheral Updated: 12/08/22 1547     Significant Indicator POS     Source BLD     Site PERIPHERAL     Culture Result Growth detected by Bactec instrument. 12/06/2022  02:45      Streptococcus pneumoniae  See previous culture for sensitivity report.      Narrative:      CALL  Ann  171 tel. [phone],  CALLED  171 tel. [phone] 12/07/2022, 14:17, RB PERF. RESULTS CALLED TO:  [phone] RN + [phone] RN  Per Hospital Policy: Only change Specimen Src: to \"Line\" if  specified by physician order.  Right Hand    Blood Culture [[id number]]  (Abnormal)  (Susceptibility) Collected: 12/05/22 1428    Order Status: Completed Specimen: Blood from Peripheral Updated: 12/08/22 1546     Significant Indicator POS     Source BLD     Site PERIPHERAL     Culture Result Growth detected by Bactec instrument. 12/06/2022  02:05      Streptococcus pneumoniae    Narrative:      CALL  Ann  171 tel. [phone],  CALLED  171 tel. [phone] 12/07/2022, 14:17, RB PERF. RESULTS CALLED TO:  [phone] RN + [phone] RN  Per Hospital Policy: Only change Specimen Src: to \"Line\" if  specified by physician order.  Right AC    Susceptibility       Streptococcus pneumoniae (1)       Antibiotic Interpretation Microscan   Method Status    Penicillin non-meningitis Sensitive 0.032 mcg/mL E-TEST Final    Cefotaxime-meningitis Sensitive 0.023 mcg/mL E-TEST Final    " "Penicillin-meningitis Sensitive 0.032 mcg/mL E-TEST Final    Cefotaxime-non meningitis Sensitive 0.023 mcg/mL E-TEST Final    Erythromycin Resistant 12 mcg/mL E-TEST Final                       C Diff by PCR rflx Toxin [[id number]] Collected: 12/08/22 1420    Order Status: Canceled Specimen: Stool     BLOOD CULTURE [[id number]] Collected: 12/07/22 1104    Order Status: Completed Specimen: Blood from Peripheral Updated: 12/08/22 0724     Significant Indicator NEG     Source BLD     Site PERIPHERAL     Culture Result No Growth  Note: Blood cultures are incubated for 5 days and  are monitored continuously.Positive blood cultures  are called to the RN and reported as soon as  they are identified.      Narrative:      Per Hospital Policy: Only change Specimen Src: to \"Line\" if  specified by physician order.  Right AC    BLOOD CULTURE [[id number]] Collected: 12/07/22 1104    Order Status: Completed Specimen: Blood from Peripheral Updated: 12/08/22 0724     Significant Indicator NEG     Source BLD     Site PERIPHERAL     Culture Result No Growth  Note: Blood cultures are incubated for 5 days and  are monitored continuously.Positive blood cultures  are called to the RN and reported as soon as  they are identified.      Narrative:      Per Hospital Policy: Only change Specimen Src: to \"Line\" if  specified by physician order.  Right Forearm/Arm    Urine Culture (New) [[id number]] Collected: 12/05/22 1457    Order Status: Completed Specimen: Urine Updated: 12/07/22 0832     Significant Indicator NEG     Source UR     Site -     Culture Result Usual urogenital yoan ,000 cfu/mL    Narrative:      Indication for culture:->Evaluation for sepsis without a  clear source of infection  Indication for culture:->Evaluation for sepsis without a    MRSA By PCR (Amp) [[id number]] Collected: 12/06/22 1427    Order Status: Completed Specimen: Respirate from Nares Updated: 12/06/22 1717     MRSA by PCR Negative    CULTURE RESPIRATORY W/ GRM " STN [[id number]] Collected: 12/06/22 0000    Order Status: Canceled Specimen: Sputum     CoV-2, FLU A/B, and RSV by PCR (2-4 Hours CEPTalystID) : Collect NP swab in VTM [[id number]] Collected: 12/05/22 1554    Order Status: Completed Specimen: Respirate Updated: 12/05/22 1658     Influenza virus A RNA Negative     Influenza virus B, PCR Negative     RSV, PCR Negative     SARS-CoV-2 by PCR NotDetected     Comment: [facility] providers: PLEASE REFER TO DE-ESCALATION AND RETESTING PROTOCOL  on [email or web address]    **The [employer] GeneXpert Xpress SARS-CoV-2 RT-PCR Test has been made  available for use under the Emergency Use Authorization (EUA) only.          SARS-CoV-2 Source NP Swab    Urinalysis [[id number]]  (Abnormal) Collected: 12/05/22 1457    Order Status: Completed Specimen: Urine Updated: 12/05/22 1532     Color Yellow     Character Clear     Specific Gravity 1.020     Ph 5.5     Glucose Negative mg/dL      Ketones 15 mg/dL      Protein 100 mg/dL      Bilirubin Moderate     Urobilinogen, Urine 1.0     Nitrite Negative     Leukocyte Esterase Negative     Occult Blood Negative     Micro Urine Req Microscopic    Narrative:      Indication for culture:->Evaluation for sepsis without a  clear source of infection            Assessment:  Mónica Keita is a 52 y.o. female patient with known NAFLD, hypertension, neuroendocrine tumor, not on chemotherapy, admitted with post influenza multifocal community-acquired pneumonia and GPC bacteremia.  Blood culture organism identified as strep pneumo     Pertinent Diagnoses:  Strep pneumo bacteremia  Multifocal community-acquired pneumonia  Acute hypoxemic respiratory failure  Recent influenza A  NAFLD    Plan:  -Pansensitive, will switch to IV penicillin 2,000,000 units every 4 hours. Follow repeat blood cultures x2 from 12/7, no growth till date  -C. difficile negative  -TTE with no obvious valvular vegetations  -If no further improvement in hypoxemia, recommend repeat chest x-ray  to look for development of effusions    Disposition: Anticipate no ID specific disposition needs.  Still with high oxygen needs  Need for PICC line: No.  Anticipate being able to use oral antibiotics    Discussed with Dr. Chadwick

## 2022-12-10 NOTE — PROGRESS NOTES
"Hospital Medicine Daily Progress Note    Date of Service  12/10/2022    Chief Complaint  Mónica Keita is a 52 y.o. female admitted 12/5/2022 with shortness of breath.  Shortness of breath.  There is a pleasant woman with a history of nonalcoholic fatty liver disease, hypertension, neuroendocrine tumor.  Patient recently    Hospital Course  As per chart review:  \"  Mónica Keita is a 52 y.o. female who presented 12/5/2022 with shortness of breath.  This is a pleasant woman with a history of nonalcoholic fatty liver disease, hypertension, and neuroendocrine tumor.  Patient presented with several day history of shortness of breath as well as fevers and chills.  Her symptoms began approximately 10 days ago.  She recently tested for influenza but was negative for COVID-19.  She had not been vaccinated for influenza.  In the emergency room, patient had SPO2 of 75% on room air requiring supplemental oxygen.  She was tachycardic with heart rate in the 100s with white count of 3.7 meeting criteria for sepsis.  Patient was tachypneic with respiratory rate up to 29 showing signs of respiratory distress.  Lactic acid was 2.7.  Sodium of 130.  Bicarb of 21.  Chest x-ray showed right lower lobe pneumonia.  \"    Interval Problem Update  No acute events overnight.  On 7L oxygen, continue to wean.  CXR tomorrow morning.  Continue penicillin G started by ID today.  Repeat blood culture 12/7 so far NGTD.  K low, repleting.  Mucinex for chest congestion.    I have discussed this patient's plan of care and discharge plan at IDT rounds today with Case Management, Nursing, Nursing leadership, and other members of the IDT team.    Consultants/Specialty  ID    Code Status  Full Code    Disposition  Patient is not medically cleared for discharge.   Anticipate discharge to to home with close outpatient follow-up.  I have placed the appropriate orders for post-discharge needs.    Review of Systems  Review of Systems "   Constitutional:  Positive for malaise/fatigue.   Respiratory:  Positive for cough, sputum production and shortness of breath.    Cardiovascular:  Negative for chest pain and palpitations.   Gastrointestinal:  Negative for abdominal pain, nausea and vomiting.   Genitourinary:  Negative for dysuria and hematuria.   Musculoskeletal:  Negative for joint pain and myalgias.   Neurological:  Negative for dizziness and headaches.      Physical Exam  Temp:  [36.6 °C (97.8 °F)-37.4 °C (99.3 °F)] 36.7 °C (98.1 °F)  Pulse:  [] 105  Resp:  [17-18] 18  BP: (131-158)/(85-97) 131/85  SpO2:  [90 %-97 %] 90 %    Physical Exam  Vitals and nursing note reviewed.   Constitutional:       Appearance: She is normal weight. She is ill-appearing. She is not diaphoretic.   HENT:      Head: Normocephalic and atraumatic.      Nose:      Comments: On NC     Mouth/Throat:      Mouth: Mucous membranes are moist.      Pharynx: Oropharynx is clear. No oropharyngeal exudate.   Eyes:      General:         Right eye: No discharge.         Left eye: No discharge.   Cardiovascular:      Rate and Rhythm: Normal rate and regular rhythm.      Pulses: Normal pulses.      Heart sounds: Normal heart sounds. No murmur heard.  Pulmonary:      Effort: Respiratory distress present.      Breath sounds: Rhonchi present.   Abdominal:      General: Abdomen is flat. Bowel sounds are normal. There is no distension.      Palpations: Abdomen is soft.      Tenderness: There is no abdominal tenderness.   Musculoskeletal:      Cervical back: Normal range of motion and neck supple. No tenderness.      Right lower leg: No edema.      Left lower leg: No edema.   Neurological:      Mental Status: She is alert and oriented to person, place, and time.      Motor: No weakness.   Psychiatric:         Mood and Affect: Mood normal.         Behavior: Behavior normal.       Fluids    Intake/Output Summary (Last 24 hours) at 12/10/2022 1245  Last data filed at 12/9/2022  1553  Gross per 24 hour   Intake 300 ml   Output 400 ml   Net -100 ml       Laboratory  Recent Labs     12/08/22  0502 12/09/22 0135 12/10/22  0326   WBC 17.1* 12.2* 10.0   RBC 3.77* 3.88* 3.96*   HEMOGLOBIN 10.3* 10.6* 10.8*   HEMATOCRIT 30.6* 31.0* 32.1*   MCV 81.2* 79.9* 81.1*   MCH 27.3 27.3 27.3   MCHC 33.7 34.2 33.6   RDW 39.0 37.9 39.4   PLATELETCT 188 202 268   MPV 8.6* 9.3 9.2     Recent Labs     12/08/22  0502 12/09/22  0135 12/10/22  0326   SODIUM 131* 130* 134*   POTASSIUM 2.9* 2.9* 3.1*   CHLORIDE 92* 92* 94*   CO2 27 26 29   GLUCOSE 99 101* 104*   BUN 7* 7* 7*   CREATININE 0.42* 0.32* 0.41*   CALCIUM 8.2* 7.9* 7.9*                     Imaging  EC-ECHOCARDIOGRAM COMPLETE W/O CONT   Final Result      DX-CHEST-PORTABLE (1 VIEW)   Final Result      Multifocal bilateral pneumonia.                Assessment/Plan  * Acute respiratory failure with hypoxia (HCC)- (present on admission)  Assessment & Plan  Likely due to secondary pneumonia from influenza infection.  Now with streptococcal bacteremia  Patient is a lifetime non-smoker.  Respiratory therapy consult.  Duo nebs every 4 hours and as needed.  Incentive spirometry  PEP therapy    Improving, on 7L oxygen, continue to wean as able  See bacteremia/pneumonia problem    Hypomagnesemia  Assessment & Plan  Replace as needed  monitor    Hypokalemia  Assessment & Plan  Replace as needed  monitor    Hyponatremia  Assessment & Plan  We will stop IV fluids.  Will encourage p.o. intake.    Leukocytosis  Assessment & Plan  In the setting of bacteremia.  Continue antibiotics.  Monitor, repeat CBC.  improving    Streptococcal bacteremia- (present on admission)  Assessment & Plan  Blood cultures from 12/5/2022 showing 2 out of 2 bottles gram-positive cocci likely streptococcal species.  Repeat blood culture 12/7: NGTD  Continue penicillin G per ID recommendations  Appreciate ID input    Sepsis (HCC)- (present on admission)  Assessment & Plan  This is Sepsis Not present  on admission  SIRS criteria identified on my evaluation include: Fever, with temperature greater than 101 deg F, Hypothermia, with temperature less than 96.8 deg F and Tachypnea, with respirations greater than 20 per minute  Source is multifocal pneumonia bacteremia  Sepsis protocol initiated  Fluid resuscitation ordered per protocol  Crystalloid Fluid Administration: Fluid resuscitation ordered per standard protocol - 30 mL/kg per current or ideal body weight  IV antibiotics as appropriate for source of sepsis: Ceftriaxone changed to ampicillin.  Continues on azithromycin  Reassessment: I have reassessed the patient's hemodynamic status    12/8: Currently on ceftriaxone, ID following patient.    Multifocal pneumonia due to Streptococcus secondary to influenza infection  Assessment & Plan  She is recovering from influenza A which was diagnosed 10 days ago  Blood cultures growing 2 out of 2 bottles likely streptococcal species    Change ceftriaxone to ampicillin  Continue azithromycin  Respiratory therapy and breathing treatments.    12/7: ID has been consulted, we appreciate further recommendations.  On penicillin G, continue    NAFLD (nonalcoholic fatty liver disease)- (present on admission)  Assessment & Plan  LFTs are hovering around her baseline    Type 2 diabetes mellitus, without long-term current use of insulin (HCC)- (present on admission)  Assessment & Plan  Takes metformin 500 twice daily at home.  Last A1c was 6.2, this was 2 weeks ago which demonstrates good control    Hold metformin to avoid metabolic acidosis  Continue regular insulin  Hypoglycemia protocol  Carbohydrate consistent diet    Hypertension- (present on admission)  Assessment & Plan  Patient is maintained on lisinopril 20 mg, and amlodipine 5 at home which we will continue with parameters       VTE prophylaxis: SCDs/TEDs and enoxaparin ppx    I have performed a physical exam and reviewed and updated ROS and Plan today (12/10/2022). In  review of yesterday's note (12/9/2022), there are no changes except as documented above.

## 2022-12-10 NOTE — PROGRESS NOTES
Pt desated to 87% via NC at 6 lpm and did not recover. Titrated pt to oxymask 13 lpm sating 91%. Netta GAINES notified, no new orders at this time. Notified [facility] RT.

## 2022-12-10 NOTE — PROGRESS NOTES
Monitor summary: SR/ST , IL 0.16, QRS 0.06, QT 0.37, per strip from monitor room.

## 2022-12-11 LAB
ANION GAP SERPL CALC-SCNC: 11 MMOL/L (ref 7–16)
BUN SERPL-MCNC: 6 MG/DL (ref 8–22)
CALCIUM SERPL-MCNC: 7.9 MG/DL (ref 8.5–10.5)
CHLORIDE SERPL-SCNC: 93 MMOL/L (ref 96–112)
CO2 SERPL-SCNC: 24 MMOL/L (ref 20–33)
CREAT SERPL-MCNC: 0.4 MG/DL (ref 0.5–1.4)
ERYTHROCYTE [DISTWIDTH] IN BLOOD BY AUTOMATED COUNT: 40.6 FL (ref 35.9–50)
GFR SERPLBLD CREATININE-BSD FMLA CKD-EPI: 120 ML/MIN/1.73 M 2
GLUCOSE BLD STRIP.AUTO-MCNC: 107 MG/DL (ref 65–99)
GLUCOSE BLD STRIP.AUTO-MCNC: 107 MG/DL (ref 65–99)
GLUCOSE BLD STRIP.AUTO-MCNC: 111 MG/DL (ref 65–99)
GLUCOSE BLD STRIP.AUTO-MCNC: 133 MG/DL (ref 65–99)
GLUCOSE BLD STRIP.AUTO-MCNC: 63 MG/DL (ref 65–99)
GLUCOSE SERPL-MCNC: 103 MG/DL (ref 65–99)
HCT VFR BLD AUTO: 29.9 % (ref 37–47)
HGB BLD-MCNC: 10 G/DL (ref 12–16)
MCH RBC QN AUTO: 27.5 PG (ref 27–33)
MCHC RBC AUTO-ENTMCNC: 33.4 G/DL (ref 33.6–35)
MCV RBC AUTO: 82.1 FL (ref 81.4–97.8)
PLATELET # BLD AUTO: 300 K/UL (ref 164–446)
PMV BLD AUTO: 9.2 FL (ref 9–12.9)
POTASSIUM SERPL-SCNC: 3.4 MMOL/L (ref 3.6–5.5)
RBC # BLD AUTO: 3.64 M/UL (ref 4.2–5.4)
SODIUM SERPL-SCNC: 128 MMOL/L (ref 135–145)
WBC # BLD AUTO: 13.6 K/UL (ref 4.8–10.8)

## 2022-12-11 PROCEDURE — 82962 GLUCOSE BLOOD TEST: CPT | Mod: 91

## 2022-12-11 PROCEDURE — 700105 HCHG RX REV CODE 258: Performed by: INTERNAL MEDICINE

## 2022-12-11 PROCEDURE — 85027 COMPLETE CBC AUTOMATED: CPT

## 2022-12-11 PROCEDURE — 700102 HCHG RX REV CODE 250 W/ 637 OVERRIDE(OP): Performed by: STUDENT IN AN ORGANIZED HEALTH CARE EDUCATION/TRAINING PROGRAM

## 2022-12-11 PROCEDURE — 80048 BASIC METABOLIC PNL TOTAL CA: CPT

## 2022-12-11 PROCEDURE — A9270 NON-COVERED ITEM OR SERVICE: HCPCS | Performed by: STUDENT IN AN ORGANIZED HEALTH CARE EDUCATION/TRAINING PROGRAM

## 2022-12-11 PROCEDURE — 99233 SBSQ HOSP IP/OBS HIGH 50: CPT | Performed by: INTERNAL MEDICINE

## 2022-12-11 PROCEDURE — 700102 HCHG RX REV CODE 250 W/ 637 OVERRIDE(OP): Performed by: HOSPITALIST

## 2022-12-11 PROCEDURE — 99233 SBSQ HOSP IP/OBS HIGH 50: CPT | Performed by: STUDENT IN AN ORGANIZED HEALTH CARE EDUCATION/TRAINING PROGRAM

## 2022-12-11 PROCEDURE — 36415 COLL VENOUS BLD VENIPUNCTURE: CPT

## 2022-12-11 PROCEDURE — A9270 NON-COVERED ITEM OR SERVICE: HCPCS | Performed by: HOSPITALIST

## 2022-12-11 PROCEDURE — 700101 HCHG RX REV CODE 250: Performed by: STUDENT IN AN ORGANIZED HEALTH CARE EDUCATION/TRAINING PROGRAM

## 2022-12-11 PROCEDURE — 770020 HCHG ROOM/CARE - TELE (206)

## 2022-12-11 PROCEDURE — 94640 AIRWAY INHALATION TREATMENT: CPT

## 2022-12-11 PROCEDURE — 700111 HCHG RX REV CODE 636 W/ 250 OVERRIDE (IP): Performed by: INTERNAL MEDICINE

## 2022-12-11 PROCEDURE — 94669 MECHANICAL CHEST WALL OSCILL: CPT

## 2022-12-11 RX ORDER — POTASSIUM CHLORIDE 20 MEQ/1
40 TABLET, EXTENDED RELEASE ORAL ONCE
Status: COMPLETED | OUTPATIENT
Start: 2022-12-11 | End: 2022-12-11

## 2022-12-11 RX ADMIN — GUAIFENESIN AND DEXTROMETHORPHAN 5 ML: 100; 10 SYRUP ORAL at 12:50

## 2022-12-11 RX ADMIN — AMLODIPINE BESYLATE 10 MG: 10 TABLET ORAL at 05:15

## 2022-12-11 RX ADMIN — ASPIRIN 81 MG: 81 TABLET, COATED ORAL at 05:14

## 2022-12-11 RX ADMIN — GUAIFENESIN AND DEXTROMETHORPHAN 5 ML: 100; 10 SYRUP ORAL at 18:17

## 2022-12-11 RX ADMIN — GUAIFENESIN AND DEXTROMETHORPHAN 5 ML: 100; 10 SYRUP ORAL at 05:14

## 2022-12-11 RX ADMIN — ESCITALOPRAM OXALATE 40 MG: 10 TABLET ORAL at 05:15

## 2022-12-11 RX ADMIN — OMEPRAZOLE 20 MG: 20 CAPSULE, DELAYED RELEASE ORAL at 05:15

## 2022-12-11 RX ADMIN — BUPROPION HYDROCHLORIDE 150 MG: 150 TABLET, EXTENDED RELEASE ORAL at 18:18

## 2022-12-11 RX ADMIN — BUPROPION HYDROCHLORIDE 150 MG: 150 TABLET, EXTENDED RELEASE ORAL at 05:14

## 2022-12-11 RX ADMIN — SODIUM CHLORIDE 12 MILLION UNITS: 9 INJECTION, SOLUTION INTRAVENOUS at 12:51

## 2022-12-11 RX ADMIN — IPRATROPIUM BROMIDE AND ALBUTEROL SULFATE 3 ML: .5; 2.5 SOLUTION RESPIRATORY (INHALATION) at 01:58

## 2022-12-11 RX ADMIN — LISINOPRIL 20 MG: 20 TABLET ORAL at 05:14

## 2022-12-11 RX ADMIN — POTASSIUM CHLORIDE 40 MEQ: 20 TABLET, EXTENDED RELEASE ORAL at 08:05

## 2022-12-11 ASSESSMENT — ENCOUNTER SYMPTOMS
NAUSEA: 0
HEADACHES: 0
NECK PAIN: 0
PHOTOPHOBIA: 0
SPUTUM PRODUCTION: 1
BACK PAIN: 0
MYALGIAS: 0
DIARRHEA: 0
PALPITATIONS: 0
CHILLS: 0
VOMITING: 0
DIZZINESS: 0
ABDOMINAL PAIN: 0
COUGH: 1
FEVER: 0
SHORTNESS OF BREATH: 1

## 2022-12-11 ASSESSMENT — FIBROSIS 4 INDEX: FIB4 SCORE: 1.201850425154663098

## 2022-12-11 NOTE — CARE PLAN
The patient is Watcher - Medium risk of patient condition declining or worsening    Shift Goals  Clinical Goals: wean 02, SPUTUM SAMPLE  Patient Goals: COMFORT  Family Goals: josias    Progress made toward(s) clinical / shift goals:  comfort    Patient is not progressing towards the following goals: pt o2 demands increased to oxymask 13 lpm. Pt unable to cough sputum.       Problem: Respiratory  Goal: Patient will achieve/maintain optimum respiratory ventilation and gas exchange  Outcome: Not Progressing

## 2022-12-11 NOTE — PROGRESS NOTES
"Hospital Medicine Daily Progress Note    Date of Service  12/11/2022    Chief Complaint  Mónica Keita is a 52 y.o. female admitted 12/5/2022 with shortness of breath.  Shortness of breath.  There is a pleasant woman with a history of nonalcoholic fatty liver disease, hypertension, neuroendocrine tumor.  Patient recently    Hospital Course  As per chart review:  \"  Mónica Keita is a 52 y.o. female who presented 12/5/2022 with shortness of breath.  This is a pleasant woman with a history of nonalcoholic fatty liver disease, hypertension, and neuroendocrine tumor.  Patient presented with several day history of shortness of breath as well as fevers and chills.  Her symptoms began approximately 10 days ago.  She recently tested for influenza but was negative for COVID-19.  She had not been vaccinated for influenza.  In the emergency room, patient had SPO2 of 75% on room air requiring supplemental oxygen.  She was tachycardic with heart rate in the 100s with white count of 3.7 meeting criteria for sepsis.  Patient was tachypneic with respiratory rate up to 29 showing signs of respiratory distress.  Lactic acid was 2.7.  Sodium of 130.  Bicarb of 21.  Chest x-ray showed right lower lobe pneumonia.  \"    Interval Problem Update  No acute events overnight.  On 6L oxygen, continue to wean.  CXR shows worsening multifocal pneumonia with small pleural effusions.  ID recommend thoracentesis.  Thoracentesis ordered, not sure if there is enough fluid for tap.  Continue penicillin G.  Repeat blood culture 12/7 so far NGTD.    I have discussed this patient's plan of care and discharge plan at IDT rounds today with Case Management, Nursing, Nursing leadership, and other members of the IDT team.    Consultants/Specialty  ID    Code Status  Full Code    Disposition  Patient is not medically cleared for discharge.   Anticipate discharge to to home with close outpatient follow-up.  I have placed the appropriate orders for " post-discharge needs.    Review of Systems  Review of Systems   Constitutional:  Positive for malaise/fatigue.   Respiratory:  Positive for cough, sputum production and shortness of breath.    Cardiovascular:  Negative for chest pain and palpitations.   Gastrointestinal:  Negative for abdominal pain, nausea and vomiting.   Genitourinary:  Negative for dysuria and hematuria.   Musculoskeletal:  Negative for joint pain and myalgias.   Neurological:  Negative for dizziness and headaches.      Physical Exam  Temp:  [36.5 °C (97.7 °F)-37.6 °C (99.7 °F)] 36.5 °C (97.7 °F)  Pulse:  [] 97  Resp:  [16-22] 16  BP: (131-162)/(81-98) 133/81  SpO2:  [87 %-93 %] 88 %    Physical Exam  Vitals and nursing note reviewed.   Constitutional:       Appearance: She is normal weight. She is ill-appearing. She is not diaphoretic.   HENT:      Head: Normocephalic and atraumatic.      Nose:      Comments: On NC     Mouth/Throat:      Mouth: Mucous membranes are moist.      Pharynx: Oropharynx is clear. No oropharyngeal exudate.   Eyes:      General:         Right eye: No discharge.         Left eye: No discharge.   Cardiovascular:      Rate and Rhythm: Normal rate and regular rhythm.      Pulses: Normal pulses.      Heart sounds: Normal heart sounds. No murmur heard.  Pulmonary:      Effort: Respiratory distress present.      Breath sounds: Rhonchi present.   Abdominal:      General: Abdomen is flat. Bowel sounds are normal. There is no distension.      Palpations: Abdomen is soft.      Tenderness: There is no abdominal tenderness.   Musculoskeletal:      Cervical back: Normal range of motion and neck supple. No tenderness.      Right lower leg: No edema.      Left lower leg: No edema.   Neurological:      Mental Status: She is alert and oriented to person, place, and time.      Motor: No weakness.   Psychiatric:         Mood and Affect: Mood normal.         Behavior: Behavior normal.       Fluids    Intake/Output Summary (Last 24  hours) at 12/11/2022 1130  Last data filed at 12/11/2022 0600  Gross per 24 hour   Intake 758.95 ml   Output 1500 ml   Net -741.05 ml       Laboratory  Recent Labs     12/09/22  0135 12/10/22  0326 12/11/22  0046   WBC 12.2* 10.0 13.6*   RBC 3.88* 3.96* 3.64*   HEMOGLOBIN 10.6* 10.8* 10.0*   HEMATOCRIT 31.0* 32.1* 29.9*   MCV 79.9* 81.1* 82.1   MCH 27.3 27.3 27.5   MCHC 34.2 33.6 33.4*   RDW 37.9 39.4 40.6   PLATELETCT 202 268 300   MPV 9.3 9.2 9.2     Recent Labs     12/09/22  0135 12/10/22  0326 12/11/22  0046   SODIUM 130* 134* 128*   POTASSIUM 2.9* 3.1* 3.4*   CHLORIDE 92* 94* 93*   CO2 26 29 24   GLUCOSE 101* 104* 103*   BUN 7* 7* 6*   CREATININE 0.32* 0.41* 0.40*   CALCIUM 7.9* 7.9* 7.9*                     Imaging  DX-CHEST-2 VIEWS   Final Result      Worsening bilateral multifocal pulmonary consolidation again could be due to pneumonia or pulmonary edema with bilateral pleural effusions.      EC-ECHOCARDIOGRAM COMPLETE W/O CONT   Final Result      DX-CHEST-PORTABLE (1 VIEW)   Final Result      Multifocal bilateral pneumonia.         US-THORACENTESIS PUNCTURE RIGHT    (Results Pending)          Assessment/Plan  * Acute respiratory failure with hypoxia (HCC)- (present on admission)  Assessment & Plan  Likely due to secondary pneumonia from influenza infection.  Now with streptococcal bacteremia  Patient is a lifetime non-smoker.  Respiratory therapy consult.  Duo nebs every 4 hours and as needed.  Incentive spirometry  PEP therapy    Improving, on 7L oxygen, continue to wean as able  See bacteremia/pneumonia problem    Hypomagnesemia  Assessment & Plan  Replace as needed  monitor    Hypokalemia  Assessment & Plan  Replace as needed  monitor    Hyponatremia  Assessment & Plan  We will stop IV fluids.  Will encourage p.o. intake.    Leukocytosis  Assessment & Plan  In the setting of bacteremia.  Continue antibiotics.  Monitor, repeat CBC.  improving    Streptococcal bacteremia- (present on admission)  Assessment  & Plan  Blood cultures from 12/5/2022 showing 2 out of 2 bottles gram-positive cocci likely streptococcal species.  Repeat blood culture 12/7: NGTD  Continue penicillin G per ID recommendations  Appreciate ID input    Sepsis (HCC)- (present on admission)  Assessment & Plan  This is Sepsis Not present on admission  SIRS criteria identified on my evaluation include: Fever, with temperature greater than 101 deg F, Hypothermia, with temperature less than 96.8 deg F and Tachypnea, with respirations greater than 20 per minute  Source is multifocal pneumonia bacteremia  Sepsis protocol initiated  Fluid resuscitation ordered per protocol  Crystalloid Fluid Administration: Fluid resuscitation ordered per standard protocol - 30 mL/kg per current or ideal body weight  IV antibiotics as appropriate for source of sepsis: Ceftriaxone changed to ampicillin.  Continues on azithromycin  Reassessment: I have reassessed the patient's hemodynamic status    12/8: Currently on ceftriaxone, ID following patient.    Multifocal pneumonia due to Streptococcus secondary to influenza infection  Assessment & Plan  She is recovering from influenza A which was diagnosed 10 days ago  Blood cultures growing 2 out of 2 bottles likely streptococcal species    Change ceftriaxone to ampicillin  Continue azithromycin  Respiratory therapy and breathing treatments.    12/7: ID has been consulted, we appreciate further recommendations.  On penicillin G, continue  Thoracentesis ordered given bilateral pleural effusions    NAFLD (nonalcoholic fatty liver disease)- (present on admission)  Assessment & Plan  LFTs are hovering around her baseline    Type 2 diabetes mellitus, without long-term current use of insulin (HCC)- (present on admission)  Assessment & Plan  Takes metformin 500 twice daily at home.  Last A1c was 6.2, this was 2 weeks ago which demonstrates good control    Hold metformin to avoid metabolic acidosis  Continue regular insulin  Hypoglycemia  protocol  Carbohydrate consistent diet    Hypertension- (present on admission)  Assessment & Plan  Patient is maintained on lisinopril 20 mg, and amlodipine 5 at home which we will continue with parameters       VTE prophylaxis: SCDs/TEDs and enoxaparin ppx    I have performed a physical exam and reviewed and updated ROS and Plan today (12/11/2022). In review of yesterday's note (12/10/2022), there are no changes except as documented above.

## 2022-12-11 NOTE — PROGRESS NOTES
Monitor Report    Sinus Rhythm to Sinus Tachycardia   No ectopy  Rate 95 to 117 bpm  .16/.06/.33

## 2022-12-11 NOTE — PROGRESS NOTES
Infectious Disease Progress Note    Author: Jun Yousif M.D. Date & Time of service: 2022  9:09 AM    Chief Complaint:  Follow-up for bacteremia    Interval History:   fevers improving white count continues to go up, 17.1 today.  Patient feels better, oxygen requirements improved, down to nasal cannula.  Neck is no longer sore   patient remains afebrile, white count down to 12.2 today, tolerating ceftriaxone.  Still feels quite run down, still hypoxemic  12/10 patient remains afebrile, count continues to trend down, 10,000 today.  Repeat blood cultures as below.  Remains on 7 L nasal cannula   patient remains afebrile, white count is 13.6 today, tolerated switch to penicillin continuous infusion for repeat chest x-ray findings as below, patient feels better but remains on 9 L facemask oxygen    Labs Reviewed and Medications Reviewed.    Review of Systems:  Review of Systems   Constitutional:  Negative for chills and fever.   Eyes:  Negative for photophobia.   Gastrointestinal:  Negative for abdominal pain, diarrhea, nausea and vomiting.   Musculoskeletal:  Negative for back pain and neck pain.   Skin:  Negative for itching and rash.   Neurological:  Negative for headaches.   All other systems reviewed and are negative.    Hemodynamics:  Temp (24hrs), Av °C (98.6 °F), Min:36.7 °C (98.1 °F), Max:37.6 °C (99.7 °F)  Temperature: 36.7 °C (98.1 °F)  Pulse  Av.6  Min: 79  Max: 126   Blood Pressure: 135/87       Physical Exam:  Physical Exam  Vitals and nursing note reviewed.   Constitutional:       General: She is not in acute distress.     Appearance: She is not ill-appearing.   HENT:      Mouth/Throat:      Pharynx: No oropharyngeal exudate.   Eyes:      General: No scleral icterus.        Right eye: No discharge.         Left eye: No discharge.      Conjunctiva/sclera: Conjunctivae normal.   Cardiovascular:      Rate and Rhythm: Normal rate.      Heart sounds: No murmur  heard.  Pulmonary:      Effort: No respiratory distress.      Comments: Tachypnea, using accessory muscles but able to speak in complete sentences - much better compared to 2 days ago, about the same compared to yesterday.  Abdominal:      General: Abdomen is flat. There is no distension.      Palpations: There is no mass.      Tenderness: There is no abdominal tenderness.   Musculoskeletal:         General: No swelling or tenderness.   Skin:     Findings: No erythema or rash.   Neurological:      General: No focal deficit present.      Mental Status: She is alert and oriented to person, place, and time.   Psychiatric:         Mood and Affect: Mood normal.         Behavior: Behavior normal.       Meds:    Current Facility-Administered Medications:     amLODIPine    guaiFENesin dextromethorphan    penicillin (Pen G) continuous infusion    loperamide    sodium chloride    LR    ipratropium-albuterol    omeprazole    lisinopril    hydrOXYzine HCl    escitalopram    aspirin    Respiratory Therapy Consult    enoxaparin (LOVENOX) injection    acetaminophen    Notify provider if pain remains uncontrolled **AND** Use the Numeric Rating Scale (NRS), [place]-Baker Faces (WBF), or FLACC on regular floors and Critical-Care Pain Observation Tool (CPOT) on ICUs/Trauma to assess pain **AND** [COMPLETED] Pulse Ox **AND** Pharmacy Consult Request **AND** If patient difficult to arouse and/or has respiratory depression (respiratory rate of 10 or less), stop any opiates that are currently infusing and call a Rapid Response.    oxyCODONE immediate-release **OR** oxyCODONE immediate-release **OR** morphine injection    ondansetron    ondansetron    promethazine    promethazine    prochlorperazine    benzonatate    insulin regular    buPROPion SR    Labs:  Recent Labs     12/09/22  0135 12/10/22  0326 12/11/22  0046   WBC 12.2* 10.0 13.6*   RBC 3.88* 3.96* 3.64*   HEMOGLOBIN 10.6* 10.8* 10.0*   HEMATOCRIT 31.0* 32.1* 29.9*   MCV 79.9* 81.1*  82.1   MCH 27.3 27.3 27.5   RDW 37.9 39.4 40.6   PLATELETCT 202 268 300   MPV 9.3 9.2 9.2       Recent Labs     12/09/22  0135 12/10/22  0326 22  0046   SODIUM 130* 134* 128*   POTASSIUM 2.9* 3.1* 3.4*   CHLORIDE 92* 94* 93*   CO2 26 29 24   GLUCOSE 101* 104* 103*   BUN 7* 7* 6*       Recent Labs     12/09/22  0135 12/10/22  0326 22  0046   CREATININE 0.32* 0.41* 0.40*         Imaging:  DX-CHEST-PORTABLE (1 VIEW)    Result Date: 2022 1:36 PM HISTORY/REASON FOR EXAM:  possible sepsis. TECHNIQUE/EXAM DESCRIPTION AND NUMBER OF VIEWS: Single portable view of the chest. COMPARISON:  2012. FINDINGS: LUNGS: Bilateral consolidations, right worse than left. PNEUMOTHORAX: None. LINES AND TUBES: None. MEDIASTINUM: No cardiomegaly. MUSCULOSKELETAL STRUCTURES: No acute displaced fracture.     Multifocal bilateral pneumonia.     EC-ECHOCARDIOGRAM COMPLETE W/O CONT    Result Date: 2022  Transthoracic Echo Report Echocardiography Laboratory CONCLUSIONS Normal echocardiogram. ELVIS CARRASCO Exam Date:         2022                    15:57 Exam Location:     Inpatient Priority:          Routine Ordering Physician:        SAMANTHA VIGIL Referring Physician:       [id number] Watson Sonographer:               Yara HALL Age:    52     Gender:    F MRN:    [id number] :    [id number] BSA:    1.94   Ht (in):    66     Wt (lb):    187 Exam Type:     Complete Indications:     bacteremia ICD Codes:       r78.81 CPT Codes:       31905 BP:   110    /   75     HR:   94 Technical Quality:       Fair MEASUREMENTS  (Male / Female) Normal Values 2D ECHO LV Diastolic Diameter PLAX        4 cm                  4.2 - 5.9 / 3.9 - 5.3 cm LV Systolic Diameter PLAX         2.5 cm                2.1 - 4.0 cm IVS Diastolic Thickness           0.9 cm                LVPW Diastolic Thickness          0.98 cm               LVOT Diameter                      1.9 cm                Estimated LV Ejection Fraction    70 %                  LV Ejection Fraction MOD 4C       70.5 %                IVC Diameter                      2.1 cm                DOPPLER AV Peak Velocity                  1.7 m/s               AV Peak Gradient                  11.1 mmHg             AV Mean Gradient                  6.6 mmHg              LVOT Peak Velocity                1.3 m/s               AV Area Cont Eq vti               2.4 cm2               MV Velocity Time Integral         25.9 cm               Mitral E Point Velocity           1.1 m/s               Mitral E to A Ratio               1.3                   MV Pressure Half Time             46 ms                 MV Area PHT                       4.8 cm2               MV Deceleration Time              159 ms                TR Peak Velocity                  255 cm/s              PV Peak Velocity                  0.94 m/s              PV Peak Gradient                  3.5 mmHg              * Indicates values subject to auto-interpretation LV EF:  70    % FINDINGS Left Ventricle Normal left ventricular chamber size. Normal left ventricular wall thickness. Normal left ventricular systolic function. The left ventricular ejection fraction is visually estimated to be 70%. Normal regional wall motion. Normal diastolic function. Right Ventricle The right ventricle is normal in size and systolic function. Right Atrium The right atrium is normal in size. Normal inferior vena cava size and inspiratory collapse. Left Atrium The left atrium is normal in size. Left atrial volume index is 32 mL/sq m. Mitral Valve Structurally normal mitral valve without significant stenosis. Mild mitral regurgitation. Aortic Valve Structurally normal aortic valve without significant stenosis or regurgitation. Tricuspid Valve Structurally normal tricuspid valve without significant stenosis. Estimated right ventricular systolic pressure is 35 mmHg.  Pulmonic Valve Structurally normal pulmonic valve without significant stenosis. Trace pulmonic insufficiency. Pericardium No pericardial effusion. Aorta Normal aortic root for body surface area. The ascending aorta diameter is 2.9 cm. Alistair Abraham MD (Electronically Signed) Final Date:     07 December 2022                 19:03    MA-SCREENING MAMMO BILAT W/TOMOSYNTHESIS W/CAD    Result Date: 11/21/2022 11/21/2022 2:09 PM HISTORY/REASON FOR EXAM:  Routine Mammographic Screening. TECHNIQUE/EXAM DESCRIPTION: Bilateral tomosynthesis screening mammography was performed with standard mammographic images generated from the data set. Images were reviewed and interpreted with CAD. COMPARISON:   November 19, 2021 and November 10, 2020 FINDINGS: There are scattered areas of fibroglandular density. There is no dominant mass, suspicious calcification, or any secondary malignant sign. Scattered benign-appearing calcifications are once again noted bilaterally.     1.  Breasts have scattered areas of fibroglandular density, with no radiographic evidence of malignancy. 2.  Screening mammogram in one year is recommended. R2 - CATEGORY 2: BENIGN FINDING(S) Ten to twenty percent of all cancers can be categorized as hereditary and the clinical and financial value of identifying patients and families at risk is well documented. If you have a personal or family history of breast, ovarian, fallopian tube, peritoneal or other cancer, please consult your physician regarding genetic counseling and testing.       Micro:  Results       Procedure Component Value Units Date/Time    C Diff by PCR rflx Toxin [[id number]] Collected: 12/08/22 1410    Order Status: Completed Specimen: Stool Updated: 12/08/22 1556     C Diff by PCR Negative     Comment: C. difficile NOT detected by PCR.  Treatment not indicated per guidelines.  Repeat testing not indicated within 7 days.          027-NAP1-BI Presumptive Negative     Comment: Presumptive 027/NAP1/BI  "target DNA sequences are NOT DETECTED.       Narrative:      Special Contact Isolation  Collected By: [id number] ZULY LUO  Does this patient have risk factors for C-diff?->Yes  C-Diff Risk Factors->antibiotic exposure    E-Test [[id number]] Collected: 12/05/22 1428    Order Status: Completed Specimen: Other Updated: 12/08/22 1547     ETEST Sensitivity FINAL    Narrative:      171 tel. [phone] 12/07/2022, 14:17, RB PERF. RESULTS CALLED TO: [phone] RN  [phone] RN  Per Hospital Policy: Only change Specimen Src: to \"Line\" if  specified by physician order.    Blood Culture [[id number]]  (Abnormal) Collected: 12/05/22 1428    Order Status: Completed Specimen: Blood from Peripheral Updated: 12/08/22 1547     Significant Indicator POS     Source BLD     Site PERIPHERAL     Culture Result Growth detected by Bactec instrument. 12/06/2022  02:45      Streptococcus pneumoniae  See previous culture for sensitivity report.      Narrative:      CALL  Ann  171 tel. [phone],  CALLED  171 tel. [phone] 12/07/2022, 14:17, RB PERF. RESULTS CALLED TO:  [phone] RN + [phone] RN  Per Hospital Policy: Only change Specimen Src: to \"Line\" if  specified by physician order.  Right Hand    Blood Culture [[id number]]  (Abnormal)  (Susceptibility) Collected: 12/05/22 1428    Order Status: Completed Specimen: Blood from Peripheral Updated: 12/08/22 1546     Significant Indicator POS     Source BLD     Site PERIPHERAL     Culture Result Growth detected by Bactec instrument. 12/06/2022  02:05      Streptococcus pneumoniae    Narrative:      CALL  Ann  171 tel. [phone],  CALLED  171 tel. [phone] 12/07/2022, 14:17, RB PERF. RESULTS CALLED TO:  [phone] RN + [phone] RN  Per Hospital Policy: Only change Specimen Src: to \"Line\" if  specified by physician order.  Right AC    Susceptibility       Streptococcus pneumoniae (1)       Antibiotic Interpretation Microscan   Method Status    Penicillin non-meningitis Sensitive 0.032 mcg/mL E-TEST Final    " "Cefotaxime-meningitis Sensitive 0.023 mcg/mL E-TEST Final    Penicillin-meningitis Sensitive 0.032 mcg/mL E-TEST Final    Cefotaxime-non meningitis Sensitive 0.023 mcg/mL E-TEST Final    Erythromycin Resistant 12 mcg/mL E-TEST Final                       C Diff by PCR rflx Toxin [[id number]] Collected: 12/08/22 1420    Order Status: Canceled Specimen: Stool     BLOOD CULTURE [[id number]] Collected: 12/07/22 1104    Order Status: Completed Specimen: Blood from Peripheral Updated: 12/08/22 0724     Significant Indicator NEG     Source BLD     Site PERIPHERAL     Culture Result No Growth  Note: Blood cultures are incubated for 5 days and  are monitored continuously.Positive blood cultures  are called to the RN and reported as soon as  they are identified.      Narrative:      Per Hospital Policy: Only change Specimen Src: to \"Line\" if  specified by physician order.  Right AC    BLOOD CULTURE [[id number]] Collected: 12/07/22 1104    Order Status: Completed Specimen: Blood from Peripheral Updated: 12/08/22 0724     Significant Indicator NEG     Source BLD     Site PERIPHERAL     Culture Result No Growth  Note: Blood cultures are incubated for 5 days and  are monitored continuously.Positive blood cultures  are called to the RN and reported as soon as  they are identified.      Narrative:      Per Hospital Policy: Only change Specimen Src: to \"Line\" if  specified by physician order.  Right Forearm/Arm    Urine Culture (New) [[id number]] Collected: 12/05/22 1457    Order Status: Completed Specimen: Urine Updated: 12/07/22 0832     Significant Indicator NEG     Source UR     Site -     Culture Result Usual urogenital yoan ,000 cfu/mL    Narrative:      Indication for culture:->Evaluation for sepsis without a  clear source of infection  Indication for culture:->Evaluation for sepsis without a    MRSA By PCR (Amp) [[id number]] Collected: 12/06/22 1427    Order Status: Completed Specimen: Respirate from Nares Updated: " 12/06/22 1717     MRSA by PCR Negative    CULTURE RESPIRATORY W/ GRM STN [[id number]] Collected: 12/06/22 0000    Order Status: Canceled Specimen: Sputum     CoV-2, FLU A/B, and RSV by PCR (2-4 Hours CEPHEID) : Collect NP swab in VTM [[id number]] Collected: 12/05/22 1554    Order Status: Completed Specimen: Respirate Updated: 12/05/22 1658     Influenza virus A RNA Negative     Influenza virus B, PCR Negative     RSV, PCR Negative     SARS-CoV-2 by PCR NotDetected     Comment: [facility] providers: PLEASE REFER TO DE-ESCALATION AND RETESTING PROTOCOL  on [email or web address]    **The [facility] GeneXpert Xpress SARS-CoV-2 RT-PCR Test has been made  available for use under the Emergency Use Authorization (EUA) only.          SARS-CoV-2 Source NP Swab    Urinalysis [[id number]]  (Abnormal) Collected: 12/05/22 1457    Order Status: Completed Specimen: Urine Updated: 12/05/22 1532     Color Yellow     Character Clear     Specific Gravity 1.020     Ph 5.5     Glucose Negative mg/dL      Ketones 15 mg/dL      Protein 100 mg/dL      Bilirubin Moderate     Urobilinogen, Urine 1.0     Nitrite Negative     Leukocyte Esterase Negative     Occult Blood Negative     Micro Urine Req Microscopic    Narrative:      Indication for culture:->Evaluation for sepsis without a  clear source of infection            Assessment:  Mónica Keita is a 52 y.o. female patient with known NAFLD, hypertension, neuroendocrine tumor, not on chemotherapy, admitted with post influenza multifocal community-acquired pneumonia and GPC bacteremia.  Blood culture organism identified as strep pneumo     Pertinent Diagnoses:  Strep pneumo bacteremia  Multifocal community-acquired pneumonia  Acute hypoxemic respiratory failure  Recent influenza A  NAFLD    Plan:  -Continue IV penicillin 2,000,000 units every 4 hours (currently receiving this as continuous infusion). Follow repeat blood cultures x2 from 12/7, no growth till date  -C. difficile negative  -TTE with no  obvious valvular vegetations  -Persistent hypoxemia, repeat chest x-ray with worsening opacities and bilateral pleural effusions.  Recommend diagnostic/therapeutic tap    Disposition: Anticipate no ID specific disposition needs.  Still with high oxygen needs  Need for PICC line: No.  Anticipate being able to use oral antibiotics    Discussed with Dr. Chadwick

## 2022-12-11 NOTE — CARE PLAN
The patient is Watcher - Medium risk of patient condition declining or worsening    Shift Goals  Clinical Goals: wean 02, SPUTUM SAMPLE  Patient Goals: COMFORT  Family Goals: josias    Progress made toward(s) clinical / shift goals:      Problem: Fluid Volume  Goal: Fluid volume balance will be maintained  Outcome: Met     Problem: Urinary - Renal Perfusion  Goal: Ability to achieve and maintain adequate renal perfusion and functioning will improve  Outcome: Met     Problem: Knowledge Deficit - Standard  Goal: Patient and family/care givers will demonstrate understanding of plan of care, disease process/condition, diagnostic tests and medications  Outcome: Progressing     Problem: Hemodynamics  Goal: Patient's hemodynamics, fluid balance and neurologic status will be stable or improve  Outcome: Progressing     Problem: Respiratory  Goal: Patient will achieve/maintain optimum respiratory ventilation and gas exchange  Outcome: Progressing     Problem: Physical Regulation  Goal: Signs and symptoms of infection will decrease  Outcome: Progressing

## 2022-12-12 ENCOUNTER — APPOINTMENT (OUTPATIENT)
Dept: RADIOLOGY | Facility: MEDICAL CENTER | Age: 52
DRG: 871 | End: 2022-12-12
Attending: STUDENT IN AN ORGANIZED HEALTH CARE EDUCATION/TRAINING PROGRAM
Payer: COMMERCIAL

## 2022-12-12 LAB
ANION GAP SERPL CALC-SCNC: 10 MMOL/L (ref 7–16)
BACTERIA BLD CULT: NORMAL
BACTERIA BLD CULT: NORMAL
BUN SERPL-MCNC: 7 MG/DL (ref 8–22)
CALCIUM SERPL-MCNC: 8.1 MG/DL (ref 8.5–10.5)
CHLORIDE SERPL-SCNC: 95 MMOL/L (ref 96–112)
CO2 SERPL-SCNC: 25 MMOL/L (ref 20–33)
CREAT SERPL-MCNC: 0.37 MG/DL (ref 0.5–1.4)
ERYTHROCYTE [DISTWIDTH] IN BLOOD BY AUTOMATED COUNT: 41.7 FL (ref 35.9–50)
GFR SERPLBLD CREATININE-BSD FMLA CKD-EPI: 122 ML/MIN/1.73 M 2
GLUCOSE SERPL-MCNC: 101 MG/DL (ref 65–99)
HCT VFR BLD AUTO: 31.6 % (ref 37–47)
HGB BLD-MCNC: 10.3 G/DL (ref 12–16)
MCH RBC QN AUTO: 27.3 PG (ref 27–33)
MCHC RBC AUTO-ENTMCNC: 32.6 G/DL (ref 33.6–35)
MCV RBC AUTO: 83.8 FL (ref 81.4–97.8)
PLATELET # BLD AUTO: 363 K/UL (ref 164–446)
PMV BLD AUTO: 8.8 FL (ref 9–12.9)
POTASSIUM SERPL-SCNC: 3.8 MMOL/L (ref 3.6–5.5)
RBC # BLD AUTO: 3.77 M/UL (ref 4.2–5.4)
SIGNIFICANT IND 70042: NORMAL
SIGNIFICANT IND 70042: NORMAL
SITE SITE: NORMAL
SITE SITE: NORMAL
SODIUM SERPL-SCNC: 130 MMOL/L (ref 135–145)
SOURCE SOURCE: NORMAL
SOURCE SOURCE: NORMAL
WBC # BLD AUTO: 9 K/UL (ref 4.8–10.8)

## 2022-12-12 PROCEDURE — 99232 SBSQ HOSP IP/OBS MODERATE 35: CPT | Performed by: INTERNAL MEDICINE

## 2022-12-12 PROCEDURE — 700105 HCHG RX REV CODE 258: Performed by: INTERNAL MEDICINE

## 2022-12-12 PROCEDURE — 94669 MECHANICAL CHEST WALL OSCILL: CPT

## 2022-12-12 PROCEDURE — 700102 HCHG RX REV CODE 250 W/ 637 OVERRIDE(OP): Performed by: STUDENT IN AN ORGANIZED HEALTH CARE EDUCATION/TRAINING PROGRAM

## 2022-12-12 PROCEDURE — 94640 AIRWAY INHALATION TREATMENT: CPT

## 2022-12-12 PROCEDURE — 85027 COMPLETE CBC AUTOMATED: CPT

## 2022-12-12 PROCEDURE — 700101 HCHG RX REV CODE 250: Performed by: STUDENT IN AN ORGANIZED HEALTH CARE EDUCATION/TRAINING PROGRAM

## 2022-12-12 PROCEDURE — 700111 HCHG RX REV CODE 636 W/ 250 OVERRIDE (IP): Performed by: HOSPITALIST

## 2022-12-12 PROCEDURE — 94760 N-INVAS EAR/PLS OXIMETRY 1: CPT

## 2022-12-12 PROCEDURE — 700102 HCHG RX REV CODE 250 W/ 637 OVERRIDE(OP): Performed by: HOSPITALIST

## 2022-12-12 PROCEDURE — A9270 NON-COVERED ITEM OR SERVICE: HCPCS | Performed by: HOSPITALIST

## 2022-12-12 PROCEDURE — 99233 SBSQ HOSP IP/OBS HIGH 50: CPT | Performed by: STUDENT IN AN ORGANIZED HEALTH CARE EDUCATION/TRAINING PROGRAM

## 2022-12-12 PROCEDURE — 36415 COLL VENOUS BLD VENIPUNCTURE: CPT

## 2022-12-12 PROCEDURE — A9270 NON-COVERED ITEM OR SERVICE: HCPCS | Performed by: STUDENT IN AN ORGANIZED HEALTH CARE EDUCATION/TRAINING PROGRAM

## 2022-12-12 PROCEDURE — 80048 BASIC METABOLIC PNL TOTAL CA: CPT

## 2022-12-12 PROCEDURE — 770020 HCHG ROOM/CARE - TELE (206)

## 2022-12-12 PROCEDURE — 76604 US EXAM CHEST: CPT

## 2022-12-12 PROCEDURE — 700111 HCHG RX REV CODE 636 W/ 250 OVERRIDE (IP): Performed by: INTERNAL MEDICINE

## 2022-12-12 RX ORDER — ALPRAZOLAM 0.25 MG/1
0.25 TABLET ORAL 2 TIMES DAILY PRN
Status: DISCONTINUED | OUTPATIENT
Start: 2022-12-12 | End: 2022-12-14 | Stop reason: HOSPADM

## 2022-12-12 RX ORDER — QUETIAPINE FUMARATE 25 MG/1
25 TABLET, FILM COATED ORAL NIGHTLY
Status: DISCONTINUED | OUTPATIENT
Start: 2022-12-12 | End: 2022-12-13

## 2022-12-12 RX ORDER — QUETIAPINE FUMARATE 25 MG/1
25 TABLET, FILM COATED ORAL
Status: DISCONTINUED | OUTPATIENT
Start: 2022-12-12 | End: 2022-12-12

## 2022-12-12 RX ADMIN — ESCITALOPRAM OXALATE 40 MG: 10 TABLET ORAL at 04:47

## 2022-12-12 RX ADMIN — BUPROPION HYDROCHLORIDE 150 MG: 150 TABLET, EXTENDED RELEASE ORAL at 04:47

## 2022-12-12 RX ADMIN — LISINOPRIL 20 MG: 20 TABLET ORAL at 04:47

## 2022-12-12 RX ADMIN — AMLODIPINE BESYLATE 10 MG: 10 TABLET ORAL at 04:46

## 2022-12-12 RX ADMIN — QUETIAPINE FUMARATE 25 MG: 25 TABLET ORAL at 20:45

## 2022-12-12 RX ADMIN — SODIUM CHLORIDE 12 MILLION UNITS: 9 INJECTION, SOLUTION INTRAVENOUS at 13:18

## 2022-12-12 RX ADMIN — BUPROPION HYDROCHLORIDE 150 MG: 150 TABLET, EXTENDED RELEASE ORAL at 17:08

## 2022-12-12 RX ADMIN — ENOXAPARIN SODIUM 40 MG: 40 INJECTION SUBCUTANEOUS at 17:08

## 2022-12-12 RX ADMIN — GUAIFENESIN AND DEXTROMETHORPHAN 5 ML: 100; 10 SYRUP ORAL at 13:21

## 2022-12-12 RX ADMIN — ASPIRIN 81 MG: 81 TABLET, COATED ORAL at 04:46

## 2022-12-12 RX ADMIN — OMEPRAZOLE 20 MG: 20 CAPSULE, DELAYED RELEASE ORAL at 04:46

## 2022-12-12 RX ADMIN — GUAIFENESIN AND DEXTROMETHORPHAN 5 ML: 100; 10 SYRUP ORAL at 04:46

## 2022-12-12 RX ADMIN — IPRATROPIUM BROMIDE AND ALBUTEROL SULFATE 3 ML: .5; 2.5 SOLUTION RESPIRATORY (INHALATION) at 00:49

## 2022-12-12 RX ADMIN — GUAIFENESIN AND DEXTROMETHORPHAN 5 ML: 100; 10 SYRUP ORAL at 17:08

## 2022-12-12 ASSESSMENT — ENCOUNTER SYMPTOMS
VOMITING: 0
SHORTNESS OF BREATH: 1
PHOTOPHOBIA: 0
SPUTUM PRODUCTION: 1
NECK PAIN: 0
DIARRHEA: 0
COUGH: 1
MYALGIAS: 0
FEVER: 0
NAUSEA: 0
ABDOMINAL PAIN: 0
HEADACHES: 0
PALPITATIONS: 0
DIZZINESS: 0
CHILLS: 0
BACK PAIN: 0

## 2022-12-12 NOTE — PROGRESS NOTES
"Hospital Medicine Daily Progress Note    Date of Service  12/12/2022    Chief Complaint  Mónica Keita is a 52 y.o. female admitted 12/5/2022 with shortness of breath.  Shortness of breath.  There is a pleasant woman with a history of nonalcoholic fatty liver disease, hypertension, neuroendocrine tumor.  Patient recently    Hospital Course  As per chart review:  \"  Mónica Keita is a 52 y.o. female who presented 12/5/2022 with shortness of breath.  This is a pleasant woman with a history of nonalcoholic fatty liver disease, hypertension, and neuroendocrine tumor.  Patient presented with several day history of shortness of breath as well as fevers and chills.  Her symptoms began approximately 10 days ago.  She recently tested for influenza but was negative for COVID-19.  She had not been vaccinated for influenza.  In the emergency room, patient had SPO2 of 75% on room air requiring supplemental oxygen.  She was tachycardic with heart rate in the 100s with white count of 3.7 meeting criteria for sepsis.  Patient was tachypneic with respiratory rate up to 29 showing signs of respiratory distress.  Lactic acid was 2.7.  Sodium of 130.  Bicarb of 21.  Chest x-ray showed right lower lobe pneumonia.  \"    Interval Problem Update  No acute events overnight.  On 5L oxygen, continue to wean.  Thoracentesis ordered for pleural effusions.  Patient very eager and anxious to go home. Discussed safe discharge home when oxygen levels are safe.  Continue penicillin G. Appreciate ID recommendations for antibiotics on discharge.  Home oxygen evaluation today and daily, okay to discharge home with home oxygen when ambulating on 6L oxygen or less.  Shower today, consider anxiolytic for nerves, seroquel PM for sleep.    I have discussed this patient's plan of care and discharge plan at IDT rounds today with Case Management, Nursing, Nursing leadership, and other members of the IDT team.    Consultants/Specialty  ID    Code " Status  Full Code    Disposition  Patient is not medically cleared for discharge.   Anticipate discharge to to home with close outpatient follow-up.  I have placed the appropriate orders for post-discharge needs.    Review of Systems  Review of Systems   Constitutional:  Positive for malaise/fatigue.   Respiratory:  Positive for cough, sputum production and shortness of breath.    Cardiovascular:  Negative for chest pain and palpitations.   Gastrointestinal:  Negative for abdominal pain, nausea and vomiting.   Genitourinary:  Negative for dysuria and hematuria.   Musculoskeletal:  Negative for joint pain and myalgias.   Neurological:  Negative for dizziness and headaches.      Physical Exam  Temp:  [36.6 °C (97.9 °F)-37.2 °C (99 °F)] 36.6 °C (97.9 °F)  Pulse:  [] 85  Resp:  [16-20] 19  BP: (131-152)/(81-94) 131/81  SpO2:  [88 %-96 %] 91 %    Physical Exam  Vitals and nursing note reviewed.   Constitutional:       Appearance: She is normal weight. She is ill-appearing. She is not diaphoretic.   HENT:      Head: Normocephalic and atraumatic.      Nose:      Comments: On NC     Mouth/Throat:      Mouth: Mucous membranes are moist.      Pharynx: Oropharynx is clear. No oropharyngeal exudate.   Eyes:      General:         Right eye: No discharge.         Left eye: No discharge.   Cardiovascular:      Rate and Rhythm: Normal rate and regular rhythm.      Pulses: Normal pulses.      Heart sounds: Normal heart sounds. No murmur heard.  Pulmonary:      Effort: Respiratory distress present.      Breath sounds: Rhonchi present.   Abdominal:      General: Abdomen is flat. Bowel sounds are normal. There is no distension.      Palpations: Abdomen is soft.      Tenderness: There is no abdominal tenderness.   Musculoskeletal:      Cervical back: Normal range of motion and neck supple. No tenderness.      Right lower leg: No edema.      Left lower leg: No edema.   Neurological:      Mental Status: She is alert and oriented to  person, place, and time.      Motor: No weakness.   Psychiatric:         Mood and Affect: Mood normal.         Behavior: Behavior normal.       Fluids    Intake/Output Summary (Last 24 hours) at 12/12/2022 1203  Last data filed at 12/12/2022 1117  Gross per 24 hour   Intake 689.65 ml   Output 2600 ml   Net -1910.35 ml         Laboratory  Recent Labs     12/10/22  0326 12/11/22  0046 12/12/22  0143   WBC 10.0 13.6* 9.0   RBC 3.96* 3.64* 3.77*   HEMOGLOBIN 10.8* 10.0* 10.3*   HEMATOCRIT 32.1* 29.9* 31.6*   MCV 81.1* 82.1 83.8   MCH 27.3 27.5 27.3   MCHC 33.6 33.4* 32.6*   RDW 39.4 40.6 41.7   PLATELETCT 268 300 363   MPV 9.2 9.2 8.8*       Recent Labs     12/10/22  0326 12/11/22 0046 12/12/22  0143   SODIUM 134* 128* 130*   POTASSIUM 3.1* 3.4* 3.8   CHLORIDE 94* 93* 95*   CO2 29 24 25   GLUCOSE 104* 103* 101*   BUN 7* 6* 7*   CREATININE 0.41* 0.40* 0.37*   CALCIUM 7.9* 7.9* 8.1*                       Imaging  DX-CHEST-2 VIEWS   Final Result      Worsening bilateral multifocal pulmonary consolidation again could be due to pneumonia or pulmonary edema with bilateral pleural effusions.      EC-ECHOCARDIOGRAM COMPLETE W/O CONT   Final Result      DX-CHEST-PORTABLE (1 VIEW)   Final Result      Multifocal bilateral pneumonia.         US-CHEST    (Results Pending)          Assessment/Plan  * Acute respiratory failure with hypoxia (HCC)- (present on admission)  Assessment & Plan  Likely due to secondary pneumonia from influenza infection.  Now with streptococcal bacteremia  Patient is a lifetime non-smoker.  Respiratory therapy consult.  Duo nebs every 4 hours and as needed.  Incentive spirometry  PEP therapy    Improving, on 7L oxygen, continue to wean as able  See bacteremia/pneumonia problem    Hypomagnesemia  Assessment & Plan  Replace as needed  monitor    Hypokalemia  Assessment & Plan  Replace as needed  monitor    Hyponatremia  Assessment & Plan  We will stop IV fluids.  Will encourage p.o.  intake.    Leukocytosis  Assessment & Plan  In the setting of bacteremia.  Continue antibiotics.  Monitor, repeat CBC.  improving    Streptococcal bacteremia- (present on admission)  Assessment & Plan  Blood cultures from 12/5/2022 showing 2 out of 2 bottles gram-positive cocci likely streptococcal species.  Repeat blood culture 12/7: NGTD  Continue penicillin G per ID recommendations  Appreciate ID input    Sepsis (HCC)- (present on admission)  Assessment & Plan  This is Sepsis Not present on admission  SIRS criteria identified on my evaluation include: Fever, with temperature greater than 101 deg F, Hypothermia, with temperature less than 96.8 deg F and Tachypnea, with respirations greater than 20 per minute  Source is multifocal pneumonia bacteremia  Sepsis protocol initiated  Fluid resuscitation ordered per protocol  Crystalloid Fluid Administration: Fluid resuscitation ordered per standard protocol - 30 mL/kg per current or ideal body weight  IV antibiotics as appropriate for source of sepsis: Ceftriaxone changed to ampicillin.  Continues on azithromycin  Reassessment: I have reassessed the patient's hemodynamic status    12/8: Currently on ceftriaxone, ID following patient.    Multifocal pneumonia due to Streptococcus secondary to influenza infection  Assessment & Plan  She is recovering from influenza A which was diagnosed 10 days ago  Blood cultures growing 2 out of 2 bottles likely streptococcal species    Change ceftriaxone to ampicillin  Continue azithromycin  Respiratory therapy and breathing treatments.    12/7: ID has been consulted, we appreciate further recommendations.  On penicillin G, continue  Thoracentesis ordered given bilateral pleural effusions    NAFLD (nonalcoholic fatty liver disease)- (present on admission)  Assessment & Plan  LFTs are hovering around her baseline    Type 2 diabetes mellitus, without long-term current use of insulin (HCC)- (present on admission)  Assessment & Plan  Takes  metformin 500 twice daily at home.  Last A1c was 6.2, this was 2 weeks ago which demonstrates good control    Hold metformin to avoid metabolic acidosis  Continue regular insulin  Hypoglycemia protocol  Carbohydrate consistent diet    Hypertension- (present on admission)  Assessment & Plan  Patient is maintained on lisinopril 20 mg, and amlodipine 5 at home which we will continue with parameters         VTE prophylaxis: SCDs/TEDs and enoxaparin ppx    I have performed a physical exam and reviewed and updated ROS and Plan today (12/12/2022). In review of yesterday's note (12/11/2022), there are no changes except as documented above.

## 2022-12-12 NOTE — CARE PLAN
The patient is Watcher - Medium risk of patient condition declining or worsening    Shift Goals  Clinical Goals: wean oxygen  Patient Goals: wean oxygen  Family Goals: josias    Progress made toward(s) clinical / shift goals: PCN infusing, afebrile     Patient is not progressing towards the following goals:      Problem: Knowledge Deficit - Standard  Goal: Patient and family/care givers will demonstrate understanding of plan of care, disease process/condition, diagnostic tests and medications  Outcome: Not Progressing     Problem: Respiratory  Goal: Patient will achieve/maintain optimum respiratory ventilation and gas exchange  Outcome: Not Progressing   Educated on goals of  oxygen requirement, refused face mask. Increased oxygen needs this shift.

## 2022-12-12 NOTE — CARE PLAN
The patient is Stable - Low risk of patient condition declining or worsening    Shift Goals  Clinical Goals: Titrate oxygen as tolerable  Patient Goals: wean oxygen  Family Goals: josias    Problem: Knowledge Deficit - Standard  Goal: Patient and family/care givers will demonstrate understanding of plan of care, disease process/condition, diagnostic tests and medications  Outcome: Progressing     Problem: Respiratory  Goal: Patient will achieve/maintain optimum respiratory ventilation and gas exchange  Outcome: Progressing

## 2022-12-12 NOTE — PROGRESS NOTES
Assumed care of PT A&O 3. Pt resting in bed with no signs of labored breathing. On 3L. Tele monitor in place, cardiac rhythm being monitored. Call light within reach, bed in lowest position, upper bed rails up. Pt was updated on plan of care for the day.

## 2022-12-12 NOTE — PROGRESS NOTES
"Called into room by patient due to bedside alarm. O2 sat at 86% on 5L NC. Increased O2 to 6L Humidified NC, encouraged deep breaths. Informed patient that next step would be to place oxymask - patient refusing oxymask. \"I am going home tomorrow. I have to go home tomorrow. I am not taking steps backwards.I feel fine.\" NP Rife notified. Advised to consult Respiratory Therapy to their recommendations.Waiting for response from Respiratory Therapy.   "

## 2022-12-12 NOTE — PROGRESS NOTES
Infectious Disease Progress Note    Author: Jun Yousif M.D. Date & Time of service: 2022  9:56 AM    Chief Complaint:  Follow-up for bacteremia    Interval History:   fevers improving white count continues to go up, 17.1 today.  Patient feels better, oxygen requirements improved, down to nasal cannula.  Neck is no longer sore   patient remains afebrile, white count down to 12.2 today, tolerating ceftriaxone.  Still feels quite run down, still hypoxemic  12/10 patient remains afebrile, count continues to trend down, 10,000 today.  Repeat blood cultures as below.  Remains on 7 L nasal cannula   patient remains afebrile, white count is 13.6 today, tolerated switch to penicillin continuous infusion for repeat chest x-ray findings as below, patient feels better but remains on 9 L facemask oxygen   T-max 99, white count is 9000, tolerating continuous infusion penicillin.  On 5 L oxygen today    Labs Reviewed and Medications Reviewed.    Review of Systems:  Review of Systems   Constitutional:  Negative for chills and fever.   Eyes:  Negative for photophobia.   Gastrointestinal:  Negative for abdominal pain, diarrhea, nausea and vomiting.   Musculoskeletal:  Negative for back pain and neck pain.   Skin:  Negative for itching and rash.   Neurological:  Negative for headaches.   All other systems reviewed and are negative.    Hemodynamics:  Temp (24hrs), Av.8 °C (98.3 °F), Min:36.5 °C (97.7 °F), Max:37.2 °C (99 °F)  Temperature: 36.6 °C (97.9 °F)  Pulse  Av.6  Min: 79  Max: 126   Blood Pressure: (!) 146/89       Physical Exam:  Physical Exam  Vitals and nursing note reviewed.   Constitutional:       General: She is not in acute distress.     Appearance: She is not ill-appearing.   HENT:      Mouth/Throat:      Pharynx: No oropharyngeal exudate.   Eyes:      General: No scleral icterus.        Right eye: No discharge.         Left eye: No discharge.      Conjunctiva/sclera: Conjunctivae  normal.   Cardiovascular:      Rate and Rhythm: Normal rate.      Heart sounds: No murmur heard.  Pulmonary:      Effort: No respiratory distress.      Comments: Tachypnea, using accessory muscles but able to speak in complete sentences - much improved overall  Abdominal:      General: Abdomen is flat. There is no distension.      Palpations: There is no mass.      Tenderness: There is no abdominal tenderness.   Musculoskeletal:         General: No swelling or tenderness.   Skin:     Findings: No erythema or rash.   Neurological:      General: No focal deficit present.      Mental Status: She is alert and oriented to person, place, and time.   Psychiatric:         Mood and Affect: Mood normal.         Behavior: Behavior normal.       Meds:    Current Facility-Administered Medications:     amLODIPine    guaiFENesin dextromethorphan    penicillin (Pen G) continuous infusion    loperamide    sodium chloride    LR    ipratropium-albuterol    omeprazole    lisinopril    hydrOXYzine HCl    escitalopram    aspirin    Respiratory Therapy Consult    [Held by provider] enoxaparin (LOVENOX) injection    acetaminophen    Notify provider if pain remains uncontrolled **AND** Use the Numeric Rating Scale (NRS), [place]-B[place] Faces (WBF), or FLACC on regular floors and Critical-Care Pain Observation Tool (CPOT) on ICUs/Trauma to assess pain **AND** [COMPLETED] Pulse Ox **AND** Pharmacy Consult Request **AND** If patient difficult to arouse and/or has respiratory depression (respiratory rate of 10 or less), stop any opiates that are currently infusing and call a Rapid Response.    oxyCODONE immediate-release **OR** oxyCODONE immediate-release **OR** morphine injection    ondansetron    ondansetron    promethazine    promethazine    prochlorperazine    benzonatate    buPROPion SR    Labs:  Recent Labs     12/10/22  0326 12/11/22  0046 12/12/22  0143   WBC 10.0 13.6* 9.0   RBC 3.96* 3.64* 3.77*   HEMOGLOBIN 10.8* 10.0* 10.3*   HEMATOCRIT  32.1* 29.9* 31.6*   MCV 81.1* 82.1 83.8   MCH 27.3 27.5 27.3   RDW 39.4 40.6 41.7   PLATELETCT 268 300 363   MPV 9.2 9.2 8.8*       Recent Labs     12/10/22  0326 22  0046 22  0143   SODIUM 134* 128* 130*   POTASSIUM 3.1* 3.4* 3.8   CHLORIDE 94* 93* 95*   CO2 29 24 25   GLUCOSE 104* 103* 101*   BUN 7* 6* 7*       Recent Labs     12/10/22  0326 22  0046 22  0143   CREATININE 0.41* 0.40* 0.37*         Imaging:  DX-CHEST-PORTABLE (1 VIEW)    Result Date: 2022 1:36 PM HISTORY/REASON FOR EXAM:  possible sepsis. TECHNIQUE/EXAM DESCRIPTION AND NUMBER OF VIEWS: Single portable view of the chest. COMPARISON:  2012. FINDINGS: LUNGS: Bilateral consolidations, right worse than left. PNEUMOTHORAX: None. LINES AND TUBES: None. MEDIASTINUM: No cardiomegaly. MUSCULOSKELETAL STRUCTURES: No acute displaced fracture.     Multifocal bilateral pneumonia.     EC-ECHOCARDIOGRAM COMPLETE W/O CONT    Result Date: 2022  Transthoracic Echo Report Echocardiography Laboratory CONCLUSIONS Normal echocardiogram. ELVIS CARRASCO Exam Date:         2022                    15:57 Exam Location:     Inpatient Priority:          Routine Ordering Physician:        SAMANTHA VIGIL Referring Physician:       [id number] Watson Sonographer:               Yara HALL Age:    52     Gender:    F MRN:    [id number] :    1970 BSA:    1.94   Ht (in):    66     Wt (lb):    187 Exam Type:     Complete Indications:     bacteremia ICD Codes:       r78.81 CPT Codes:       11425 BP:   110    /   75     HR:   94 Technical Quality:       Fair MEASUREMENTS  (Male / Female) Normal Values 2D ECHO LV Diastolic Diameter PLAX        4 cm                  4.2 - 5.9 / 3.9 - 5.3 cm LV Systolic Diameter PLAX         2.5 cm                2.1 - 4.0 cm IVS Diastolic Thickness           0.9 cm                LVPW Diastolic Thickness          0.98  cm               LVOT Diameter                     1.9 cm                Estimated LV Ejection Fraction    70 %                  LV Ejection Fraction MOD 4C       70.5 %                IVC Diameter                      2.1 cm                DOPPLER AV Peak Velocity                  1.7 m/s               AV Peak Gradient                  11.1 mmHg             AV Mean Gradient                  6.6 mmHg              LVOT Peak Velocity                1.3 m/s               AV Area Cont Eq vti               2.4 cm2               MV Velocity Time Integral         25.9 cm               Mitral E Point Velocity           1.1 m/s               Mitral E to A Ratio               1.3                   MV Pressure Half Time             46 ms                 MV Area PHT                       4.8 cm2               MV Deceleration Time              159 ms                TR Peak Velocity                  255 cm/s              PV Peak Velocity                  0.94 m/s              PV Peak Gradient                  3.5 mmHg              * Indicates values subject to auto-interpretation LV EF:  70    % FINDINGS Left Ventricle Normal left ventricular chamber size. Normal left ventricular wall thickness. Normal left ventricular systolic function. The left ventricular ejection fraction is visually estimated to be 70%. Normal regional wall motion. Normal diastolic function. Right Ventricle The right ventricle is normal in size and systolic function. Right Atrium The right atrium is normal in size. Normal inferior vena cava size and inspiratory collapse. Left Atrium The left atrium is normal in size. Left atrial volume index is 32 mL/sq m. Mitral Valve Structurally normal mitral valve without significant stenosis. Mild mitral regurgitation. Aortic Valve Structurally normal aortic valve without significant stenosis or regurgitation. Tricuspid Valve Structurally normal tricuspid valve without significant stenosis. Estimated right ventricular  systolic pressure is 35 mmHg. Pulmonic Valve Structurally normal pulmonic valve without significant stenosis. Trace pulmonic insufficiency. Pericardium No pericardial effusion. Aorta Normal aortic root for body surface area. The ascending aorta diameter is 2.9 cm. Alistair Abraham MD (Electronically Signed) Final Date:     07 December 2022                 19:03    MA-SCREENING MAMMO BILAT W/TOMOSYNTHESIS W/CAD    Result Date: 11/21/2022 11/21/2022 2:09 PM HISTORY/REASON FOR EXAM:  Routine Mammographic Screening. TECHNIQUE/EXAM DESCRIPTION: Bilateral tomosynthesis screening mammography was performed with standard mammographic images generated from the data set. Images were reviewed and interpreted with CAD. COMPARISON:   November 19, 2021 and November 10, 2020 FINDINGS: There are scattered areas of fibroglandular density. There is no dominant mass, suspicious calcification, or any secondary malignant sign. Scattered benign-appearing calcifications are once again noted bilaterally.     1.  Breasts have scattered areas of fibroglandular density, with no radiographic evidence of malignancy. 2.  Screening mammogram in one year is recommended. R2 - CATEGORY 2: BENIGN FINDING(S) Ten to twenty percent of all cancers can be categorized as hereditary and the clinical and financial value of identifying patients and families at risk is well documented. If you have a personal or family history of breast, ovarian, fallopian tube, peritoneal or other cancer, please consult your physician regarding genetic counseling and testing.       Micro:  Results       Procedure Component Value Units Date/Time    FLUID CULTURE W/GRAM STAIN [[id number]]     Order Status: No result Specimen: Body Fluid from Thoracentesis Fluid     AFB CULTURE [[id number]]     Order Status: No result Specimen: Body Fluid from Thoracentesis Fluid     FUNGAL CULTURE [[id number]]     Order Status: No result Specimen: Body Fluid from Thoracentesis Fluid     FLUID CULTURE  "W/GRAM STAIN [[id number]]     Order Status: Canceled Specimen: Body Fluid from Thoracentesis Fluid     AFB CULTURE [[id number]]     Order Status: Canceled Specimen: Body Fluid from Thoracentesis Fluid     FUNGAL CULTURE [[id number]]     Order Status: Canceled Specimen: Body Fluid from Thoracentesis Fluid     C Diff by PCR rflx Toxin [[id number]] Collected: 12/08/22 1410    Order Status: Completed Specimen: Stool Updated: 12/08/22 1556     C Diff by PCR Negative     Comment: C. difficile NOT detected by PCR.  Treatment not indicated per guidelines.  Repeat testing not indicated within 7 days.          027-NAP1-BI Presumptive Negative     Comment: Presumptive 027/NAP1/BI target DNA sequences are NOT DETECTED.       Narrative:      Special Contact Isolation  Collected By: [id number] ZULY LUO  Does this patient have risk factors for C-diff?->Yes  C-Diff Risk Factors->antibiotic exposure    E-Test [[id number]] Collected: 12/05/22 1428    Order Status: Completed Specimen: Other Updated: 12/08/22 1547     ETEST Sensitivity FINAL    Narrative:      171 tel. [phone] 12/07/2022, 14:17, RB PERF. RESULTS CALLED TO: [phone] RN  [phone] RN  Per Hospital Policy: Only change Specimen Src: to \"Line\" if  specified by physician order.    Blood Culture [[id number]]  (Abnormal) Collected: 12/05/22 1428    Order Status: Completed Specimen: Blood from Peripheral Updated: 12/08/22 1547     Significant Indicator POS     Source BLD     Site PERIPHERAL     Culture Result Growth detected by Bactec instrument. 12/06/2022  02:45      Streptococcus pneumoniae  See previous culture for sensitivity report.      Narrative:      CALL  Ann  171 tel. [phone],  CALLED  171 tel. [phone] 12/07/2022, 14:17, RB PERF. RESULTS CALLED TO:  [phone] RN + [phone] RN  Per Hospital Policy: Only change Specimen Src: to \"Line\" if  specified by physician order.  Right Hand    Blood Culture [[id number]]  (Abnormal)  (Susceptibility) Collected: 12/05/22 1428    Order " "Status: Completed Specimen: Blood from Peripheral Updated: 12/08/22 1546     Significant Indicator POS     Source BLD     Site PERIPHERAL     Culture Result Growth detected by Bactec instrument. 12/06/2022  02:05      Streptococcus pneumoniae    Narrative:      CALL  Ann  171 tel. [phone],  CALLED  171 tel. [phone] 12/07/2022, 14:17, RB PERF. RESULTS CALLED TO:  [phone] RN + [phone] RN  Per Hospital Policy: Only change Specimen Src: to \"Line\" if  specified by physician order.  Right AC    Susceptibility       Streptococcus pneumoniae (1)       Antibiotic Interpretation Microscan   Method Status    Penicillin non-meningitis Sensitive 0.032 mcg/mL E-TEST Final    Cefotaxime-meningitis Sensitive 0.023 mcg/mL E-TEST Final    Penicillin-meningitis Sensitive 0.032 mcg/mL E-TEST Final    Cefotaxime-non meningitis Sensitive 0.023 mcg/mL E-TEST Final    Erythromycin Resistant 12 mcg/mL E-TEST Final                       C Diff by PCR rflx Toxin [[id number]] Collected: 12/08/22 1420    Order Status: Canceled Specimen: Stool     BLOOD CULTURE [[id number]] Collected: 12/07/22 1104    Order Status: Completed Specimen: Blood from Peripheral Updated: 12/08/22 0724     Significant Indicator NEG     Source BLD     Site PERIPHERAL     Culture Result No Growth  Note: Blood cultures are incubated for 5 days and  are monitored continuously.Positive blood cultures  are called to the RN and reported as soon as  they are identified.      Narrative:      Per Hospital Policy: Only change Specimen Src: to \"Line\" if  specified by physician order.  Right AC    BLOOD CULTURE [[id number]] Collected: 12/07/22 1104    Order Status: Completed Specimen: Blood from Peripheral Updated: 12/08/22 0724     Significant Indicator NEG     Source BLD     Site PERIPHERAL     Culture Result No Growth  Note: Blood cultures are incubated for 5 days and  are monitored continuously.Positive blood cultures  are called to the RN and reported as soon as  they are " "identified.      Narrative:      Per Hospital Policy: Only change Specimen Src: to \"Line\" if  specified by physician order.  Right Forearm/Arm    Urine Culture (New) [[id number]] Collected: 12/05/22 1457    Order Status: Completed Specimen: Urine Updated: 12/07/22 0832     Significant Indicator NEG     Source UR     Site -     Culture Result Usual urogenital yoan ,000 cfu/mL    Narrative:      Indication for culture:->Evaluation for sepsis without a  clear source of infection  Indication for culture:->Evaluation for sepsis without a    MRSA By PCR (Amp) [[id number]] Collected: 12/06/22 1427    Order Status: Completed Specimen: Respirate from Nares Updated: 12/06/22 1717     MRSA by PCR Negative    CULTURE RESPIRATORY W/ GRM STN [[id number]] Collected: 12/06/22 0000    Order Status: Canceled Specimen: Sputum     CoV-2, FLU A/B, and RSV by PCR (2-4 Hours CEPHEID) : Collect NP swab in VTM [[id number]] Collected: 12/05/22 1554    Order Status: Completed Specimen: Respirate Updated: 12/05/22 1658     Influenza virus A RNA Negative     Influenza virus B, PCR Negative     RSV, PCR Negative     SARS-CoV-2 by PCR NotDetected     Comment: [facility] providers: PLEASE REFER TO DE-ESCALATION AND RETESTING PROTOCOL  on [email or web address]    **The [employer] GeneXpert Xpress SARS-CoV-2 RT-PCR Test has been made  available for use under the Emergency Use Authorization (EUA) only.          SARS-CoV-2 Source NP Swab    Urinalysis [[id number]]  (Abnormal) Collected: 12/05/22 1457    Order Status: Completed Specimen: Urine Updated: 12/05/22 1532     Color Yellow     Character Clear     Specific Gravity 1.020     Ph 5.5     Glucose Negative mg/dL      Ketones 15 mg/dL      Protein 100 mg/dL      Bilirubin Moderate     Urobilinogen, Urine 1.0     Nitrite Negative     Leukocyte Esterase Negative     Occult Blood Negative     Micro Urine Req Microscopic    Narrative:      Indication for culture:->Evaluation for sepsis without a  clear source of " infection            Assessment:  Mónica Keita is a 52 y.o. female patient with known NAFLD, hypertension, neuroendocrine tumor, not on chemotherapy, admitted with post influenza multifocal community-acquired pneumonia and GPC bacteremia.  Blood culture organism identified as strep pneumo     Pertinent Diagnoses:  Strep pneumo bacteremia  Multifocal community-acquired pneumonia  Acute hypoxemic respiratory failure  Recent influenza A  NAFLD    Plan:  -Continue IV penicillin 2,000,000 units every 4 hours (currently receiving this as continuous infusion). Follow repeat blood cultures x2 from 12/7, no growth till date  -C. difficile negative  -TTE with no obvious valvular vegetations  -Persistent hypoxemia, repeat chest x-ray with worsening opacities and bilateral pleural effusions which may be contributing.  Recommend diagnostic/therapeutic tap    Disposition: Anticipate no ID specific disposition needs.  Still with high oxygen needs  Need for PICC line: No.  Anticipate being able to use oral antibiotics    Discussed with Dr. Chadwick

## 2022-12-13 ENCOUNTER — APPOINTMENT (OUTPATIENT)
Dept: RADIOLOGY | Facility: MEDICAL CENTER | Age: 52
DRG: 871 | End: 2022-12-13
Payer: COMMERCIAL

## 2022-12-13 LAB
ANION GAP SERPL CALC-SCNC: 10 MMOL/L (ref 7–16)
BUN SERPL-MCNC: 8 MG/DL (ref 8–22)
CALCIUM SERPL-MCNC: 8.2 MG/DL (ref 8.5–10.5)
CHLORIDE SERPL-SCNC: 97 MMOL/L (ref 96–112)
CO2 SERPL-SCNC: 24 MMOL/L (ref 20–33)
CREAT SERPL-MCNC: 0.46 MG/DL (ref 0.5–1.4)
D DIMER PPP IA.FEU-MCNC: 3.67 UG/ML (FEU) (ref 0–0.5)
ERYTHROCYTE [DISTWIDTH] IN BLOOD BY AUTOMATED COUNT: 41.1 FL (ref 35.9–50)
GFR SERPLBLD CREATININE-BSD FMLA CKD-EPI: 116 ML/MIN/1.73 M 2
GLUCOSE SERPL-MCNC: 118 MG/DL (ref 65–99)
HCT VFR BLD AUTO: 30.4 % (ref 37–47)
HGB BLD-MCNC: 10 G/DL (ref 12–16)
MCH RBC QN AUTO: 27.3 PG (ref 27–33)
MCHC RBC AUTO-ENTMCNC: 32.9 G/DL (ref 33.6–35)
MCV RBC AUTO: 83.1 FL (ref 81.4–97.8)
PLATELET # BLD AUTO: 381 K/UL (ref 164–446)
PMV BLD AUTO: 8.7 FL (ref 9–12.9)
POTASSIUM SERPL-SCNC: 4.1 MMOL/L (ref 3.6–5.5)
RBC # BLD AUTO: 3.66 M/UL (ref 4.2–5.4)
SODIUM SERPL-SCNC: 131 MMOL/L (ref 135–145)
WBC # BLD AUTO: 7.9 K/UL (ref 4.8–10.8)

## 2022-12-13 PROCEDURE — A9270 NON-COVERED ITEM OR SERVICE: HCPCS | Performed by: INTERNAL MEDICINE

## 2022-12-13 PROCEDURE — 85379 FIBRIN DEGRADATION QUANT: CPT

## 2022-12-13 PROCEDURE — 700102 HCHG RX REV CODE 250 W/ 637 OVERRIDE(OP): Performed by: STUDENT IN AN ORGANIZED HEALTH CARE EDUCATION/TRAINING PROGRAM

## 2022-12-13 PROCEDURE — 700102 HCHG RX REV CODE 250 W/ 637 OVERRIDE(OP): Performed by: INTERNAL MEDICINE

## 2022-12-13 PROCEDURE — 700111 HCHG RX REV CODE 636 W/ 250 OVERRIDE (IP): Performed by: INTERNAL MEDICINE

## 2022-12-13 PROCEDURE — A9270 NON-COVERED ITEM OR SERVICE: HCPCS | Performed by: STUDENT IN AN ORGANIZED HEALTH CARE EDUCATION/TRAINING PROGRAM

## 2022-12-13 PROCEDURE — A9270 NON-COVERED ITEM OR SERVICE: HCPCS

## 2022-12-13 PROCEDURE — 700102 HCHG RX REV CODE 250 W/ 637 OVERRIDE(OP): Performed by: HOSPITALIST

## 2022-12-13 PROCEDURE — 99233 SBSQ HOSP IP/OBS HIGH 50: CPT | Performed by: INTERNAL MEDICINE

## 2022-12-13 PROCEDURE — 80048 BASIC METABOLIC PNL TOTAL CA: CPT

## 2022-12-13 PROCEDURE — 85027 COMPLETE CBC AUTOMATED: CPT

## 2022-12-13 PROCEDURE — 770020 HCHG ROOM/CARE - TELE (206)

## 2022-12-13 PROCEDURE — A9270 NON-COVERED ITEM OR SERVICE: HCPCS | Performed by: HOSPITALIST

## 2022-12-13 PROCEDURE — 71275 CT ANGIOGRAPHY CHEST: CPT

## 2022-12-13 PROCEDURE — 700117 HCHG RX CONTRAST REV CODE 255

## 2022-12-13 PROCEDURE — 71045 X-RAY EXAM CHEST 1 VIEW: CPT

## 2022-12-13 PROCEDURE — 700111 HCHG RX REV CODE 636 W/ 250 OVERRIDE (IP): Performed by: HOSPITALIST

## 2022-12-13 PROCEDURE — 36415 COLL VENOUS BLD VENIPUNCTURE: CPT

## 2022-12-13 PROCEDURE — 700102 HCHG RX REV CODE 250 W/ 637 OVERRIDE(OP)

## 2022-12-13 PROCEDURE — 700105 HCHG RX REV CODE 258: Performed by: INTERNAL MEDICINE

## 2022-12-13 RX ORDER — CHOLECALCIFEROL (VITAMIN D3) 125 MCG
5 CAPSULE ORAL NIGHTLY PRN
Status: DISCONTINUED | OUTPATIENT
Start: 2022-12-13 | End: 2022-12-14 | Stop reason: HOSPADM

## 2022-12-13 RX ORDER — FUROSEMIDE 10 MG/ML
20 INJECTION INTRAMUSCULAR; INTRAVENOUS
Status: DISCONTINUED | OUTPATIENT
Start: 2022-12-13 | End: 2022-12-14 | Stop reason: HOSPADM

## 2022-12-13 RX ORDER — LEVOFLOXACIN 750 MG/1
750 TABLET, FILM COATED ORAL DAILY
Status: DISCONTINUED | OUTPATIENT
Start: 2022-12-13 | End: 2022-12-14 | Stop reason: HOSPADM

## 2022-12-13 RX ADMIN — GUAIFENESIN AND DEXTROMETHORPHAN 5 ML: 100; 10 SYRUP ORAL at 17:16

## 2022-12-13 RX ADMIN — BUPROPION HYDROCHLORIDE 150 MG: 150 TABLET, EXTENDED RELEASE ORAL at 05:20

## 2022-12-13 RX ADMIN — BUPROPION HYDROCHLORIDE 150 MG: 150 TABLET, EXTENDED RELEASE ORAL at 17:16

## 2022-12-13 RX ADMIN — ESCITALOPRAM OXALATE 40 MG: 10 TABLET ORAL at 05:20

## 2022-12-13 RX ADMIN — GUAIFENESIN AND DEXTROMETHORPHAN 5 ML: 100; 10 SYRUP ORAL at 12:58

## 2022-12-13 RX ADMIN — GUAIFENESIN AND DEXTROMETHORPHAN 5 ML: 100; 10 SYRUP ORAL at 05:20

## 2022-12-13 RX ADMIN — OMEPRAZOLE 20 MG: 20 CAPSULE, DELAYED RELEASE ORAL at 05:20

## 2022-12-13 RX ADMIN — Medication 5 MG: at 20:44

## 2022-12-13 RX ADMIN — ENOXAPARIN SODIUM 40 MG: 40 INJECTION SUBCUTANEOUS at 17:16

## 2022-12-13 RX ADMIN — ALPRAZOLAM 0.25 MG: 0.25 TABLET ORAL at 00:50

## 2022-12-13 RX ADMIN — LISINOPRIL 20 MG: 20 TABLET ORAL at 05:20

## 2022-12-13 RX ADMIN — AMLODIPINE BESYLATE 10 MG: 10 TABLET ORAL at 05:20

## 2022-12-13 RX ADMIN — SODIUM CHLORIDE 12 MILLION UNITS: 9 INJECTION, SOLUTION INTRAVENOUS at 12:58

## 2022-12-13 RX ADMIN — FUROSEMIDE 20 MG: 10 INJECTION, SOLUTION INTRAMUSCULAR; INTRAVENOUS at 09:21

## 2022-12-13 RX ADMIN — LEVOFLOXACIN 750 MG: 750 TABLET, FILM COATED ORAL at 17:16

## 2022-12-13 RX ADMIN — IOHEXOL 60 ML: 350 INJECTION, SOLUTION INTRAVENOUS at 09:06

## 2022-12-13 RX ADMIN — ASPIRIN 81 MG: 81 TABLET, COATED ORAL at 05:20

## 2022-12-13 ASSESSMENT — ENCOUNTER SYMPTOMS
BACK PAIN: 0
PHOTOPHOBIA: 0
HEADACHES: 0
MYALGIAS: 0
FEVER: 0
NECK PAIN: 0
ABDOMINAL PAIN: 0
NAUSEA: 0
SHORTNESS OF BREATH: 1
DIZZINESS: 0
DIARRHEA: 0
COUGH: 1
VOMITING: 0
SPUTUM PRODUCTION: 1
PALPITATIONS: 0
CHILLS: 0

## 2022-12-13 ASSESSMENT — FIBROSIS 4 INDEX: FIB4 SCORE: 0.95

## 2022-12-13 NOTE — PROGRESS NOTES
"[facility] Medicine Daily Progress Note    Date of Service  12/13/2022    Chief Complaint  Mónica Keita is a 52 y.o. female admitted 12/5/2022 with shortness of breath.  Shortness of breath.  There is a pleasant woman with a history of nonalcoholic fatty liver disease, hypertension, neuroendocrine tumor.  Patient recently    Hospital Course  As per chart review:  \"  Mónica Keita is a 52 y.o. female who presented 12/5/2022 with shortness of breath.  This is a pleasant woman with a history of nonalcoholic fatty liver disease, hypertension, and neuroendocrine tumor.  Patient presented with several day history of shortness of breath as well as fevers and chills.  Her symptoms began approximately 10 days ago.  She recently tested for influenza but was negative for COVID-19.  She had not been vaccinated for influenza.  In the emergency room, patient had SPO2 of 75% on room air requiring supplemental oxygen.  She was tachycardic with heart rate in the 100s with white count of 3.7 meeting criteria for sepsis.  Patient was tachypneic with respiratory rate up to 29 showing signs of respiratory distress.  Lactic acid was 2.7.  Sodium of 130.  Bicarb of 21.  Chest x-ray showed right lower lobe pneumonia.  \"    Interval Problem Update  Patient seen and examined, still requiring 5-6 L of oxygen CXR shows pulmonary edema vs infiltrate. On abx as per ID rec, now switching to Levaquin I have also started on IV lasix   Wean off O2 as tolerated      I have discussed this patient's plan of care and discharge plan at IDT rounds today with Case Management, Nursing, Nursing leadership, and other members of the IDT team.    Consultants/Specialty  ID    Code Status  Full Code    Disposition  Patient is not medically cleared for discharge.   Anticipate discharge to to home with close outpatient follow-up.  I have placed the appropriate orders for post-discharge needs.    Review of Systems  Review of Systems   Constitutional:  " Positive for malaise/fatigue.   Respiratory:  Positive for cough, sputum production and shortness of breath.    Cardiovascular:  Negative for chest pain and palpitations.   Gastrointestinal:  Negative for abdominal pain, nausea and vomiting.   Genitourinary:  Negative for dysuria and hematuria.   Musculoskeletal:  Negative for joint pain and myalgias.   Neurological:  Negative for dizziness and headaches.      Physical Exam  Temp:  [36.4 °C (97.5 °F)-37 °C (98.6 °F)] 36.7 °C (98.1 °F)  Pulse:  [90-94] 90  Resp:  [18-20] 20  BP: (117-146)/(71-88) 124/71  SpO2:  [83 %-96 %] 94 %    Physical Exam  Vitals and nursing note reviewed.   Constitutional:       Appearance: She is normal weight. She is ill-appearing. She is not diaphoretic.   HENT:      Head: Normocephalic and atraumatic.      Nose:      Comments: On NC     Mouth/Throat:      Mouth: Mucous membranes are moist.      Pharynx: Oropharynx is clear. No oropharyngeal exudate.   Eyes:      General:         Right eye: No discharge.         Left eye: No discharge.   Cardiovascular:      Rate and Rhythm: Normal rate and regular rhythm.      Pulses: Normal pulses.      Heart sounds: Normal heart sounds. No murmur heard.  Pulmonary:      Effort: Respiratory distress present.      Breath sounds: Rhonchi present.   Abdominal:      General: Abdomen is flat. Bowel sounds are normal. There is no distension.      Palpations: Abdomen is soft.      Tenderness: There is no abdominal tenderness.   Musculoskeletal:      Cervical back: Normal range of motion and neck supple. No tenderness.      Right lower leg: No edema.      Left lower leg: No edema.   Neurological:      Mental Status: She is alert and oriented to person, place, and time.      Motor: No weakness.   Psychiatric:         Mood and Affect: Mood normal.         Behavior: Behavior normal.       Fluids    Intake/Output Summary (Last 24 hours) at 12/13/2022 1425  Last data filed at 12/13/2022 1007  Gross per 24 hour    Intake 840 ml   Output --   Net 840 ml       Laboratory  Recent Labs     12/11/22  0046 12/12/22  0143 12/13/22  0314   WBC 13.6* 9.0 7.9   RBC 3.64* 3.77* 3.66*   HEMOGLOBIN 10.0* 10.3* 10.0*   HEMATOCRIT 29.9* 31.6* 30.4*   MCV 82.1 83.8 83.1   MCH 27.5 27.3 27.3   MCHC 33.4* 32.6* 32.9*   RDW 40.6 41.7 41.1   PLATELETCT 300 363 381   MPV 9.2 8.8* 8.7*     Recent Labs     12/11/22  0046 12/12/22  0143 12/13/22  0421   SODIUM 128* 130* 131*   POTASSIUM 3.4* 3.8 4.1   CHLORIDE 93* 95* 97   CO2 24 25 24   GLUCOSE 103* 101* 118*   BUN 6* 7* 8   CREATININE 0.40* 0.37* 0.46*   CALCIUM 7.9* 8.1* 8.2*                     Imaging  CT-CTA CHEST PULMONARY ARTERY W/ RECONS   Final Result         1.  No evidence of pulmonary embolism.   2.  Multifocal pneumonia.   3.  Trace/small pleural effusions.   4.  Mild right hilar adenopathy is likely reactive.      Fleischner Society pulmonary nodule recommendations:   Not applicable      DX-CHEST-PORTABLE (1 VIEW)   Final Result         1.  Pulmonary edema and/or infiltrates are identified, which are stable since the prior exam.      US-CHEST   Final Result      Insufficient pleural fluid for performance of thoracentesis.      DX-CHEST-2 VIEWS   Final Result      Worsening bilateral multifocal pulmonary consolidation again could be due to pneumonia or pulmonary edema with bilateral pleural effusions.      EC-ECHOCARDIOGRAM COMPLETE W/O CONT   Final Result      DX-CHEST-PORTABLE (1 VIEW)   Final Result      Multifocal bilateral pneumonia.                Assessment/Plan  * Acute respiratory failure with hypoxia (HCC)- (present on admission)  Assessment & Plan  Likely due to secondary pneumonia from influenza infection.  Now with streptococcal bacteremia  Patient is a lifetime non-smoker.  Respiratory therapy consult.  Duo nebs every 4 hours and as needed.  Incentive spirometry  PEP therapy    Improving, on 5L oxygen, continue to wean as able  See bacteremia/pneumonia  problem    Hypomagnesemia  Assessment & Plan  Replace as needed  monitor    Hypokalemia  Assessment & Plan  Replace as needed  monitor    Hyponatremia  Assessment & Plan  We will stop IV fluids.  Will encourage p.o. intake.    Leukocytosis  Assessment & Plan  In the setting of bacteremia.  Continue antibiotics.  Monitor, repeat CBC.  improving    Streptococcal bacteremia- (present on admission)  Assessment & Plan  Blood cultures from 12/5/2022 showing 2 out of 2 bottles gram-positive cocci likely streptococcal species.  Repeat blood culture 12/7: NGTD  Continue penicillin G per ID recommendations  Appreciate ID input    Sepsis (HCC)- (present on admission)  Assessment & Plan  This is Sepsis Not present on admission  SIRS criteria identified on my evaluation include: Fever, with temperature greater than 101 deg F, Hypothermia, with temperature less than 96.8 deg F and Tachypnea, with respirations greater than 20 per minute  Source is multifocal pneumonia bacteremia  Sepsis protocol initiated  Fluid resuscitation ordered per protocol  Crystalloid Fluid Administration: Fluid resuscitation ordered per standard protocol - 30 mL/kg per current or ideal body weight  IV antibiotics as appropriate for source of sepsis: Ceftriaxone changed to ampicillin.  Continues on azithromycin  Reassessment: I have reassessed the patient's hemodynamic status    12/8: Currently on ceftriaxone, ID following patient.    Multifocal pneumonia due to Streptococcus secondary to influenza infection  Assessment & Plan  She is recovering from influenza A which was diagnosed 10 days ago  Blood cultures growing 2 out of 2 bottles likely streptococcal species    Change ceftriaxone to ampicillin  Continue azithromycin  Respiratory therapy and breathing treatments.    12/7: ID has been consulted, we appreciate further recommendations.  On penicillin G, continue  Thoracentesis ordered given bilateral pleural effusions    12/13: ID switching to Levaquin  today. As there is concern for pulmonary edema I have started on lasix   Wean off O2 as tolerated     NAFLD (nonalcoholic fatty liver disease)- (present on admission)  Assessment & Plan  LFTs are hovering around her baseline    Type 2 diabetes mellitus, without long-term current use of insulin (HCC)- (present on admission)  Assessment & Plan  Takes metformin 500 twice daily at home.  Last A1c was 6.2, this was 2 weeks ago which demonstrates good control    Hold metformin to avoid metabolic acidosis  Continue regular insulin  Hypoglycemia protocol  Carbohydrate consistent diet    Hypertension- (present on admission)  Assessment & Plan  Patient is maintained on lisinopril 20 mg, and amlodipine 5 at home which we will continue with parameters       VTE prophylaxis: SCDs/TEDs and enoxaparin ppx    I have performed a physical exam and reviewed and updated ROS and Plan today (12/13/2022). In review of yesterday's note (12/12/2022), there are no changes except as documented above.

## 2022-12-13 NOTE — CARE PLAN
The patient is Watcher - Medium risk of patient condition declining or worsening    Shift Goals  Clinical Goals: R/O PE  Patient Goals: rest and wean o2  Family Goals: josias      Problem: Knowledge Deficit - Standard  Goal: Patient and family/care givers will demonstrate understanding of plan of care, disease process/condition, diagnostic tests and medications  Outcome: Progressing     Problem: Respiratory  Goal: Patient will achieve/maintain optimum respiratory ventilation and gas exchange  Outcome: Progressing

## 2022-12-13 NOTE — PROGRESS NOTES
Pt walked to the bathroom and could not recover O2 statsafter. Pt dropped to 83 at lowest point. Went from 5L nc to needing 15 L oxymask to get to 90%. Writer notified on call hospitalist and RT. Chest xray done and xanax for anxiety given per hospitalst recommendation. Pt now on 12L oxy mask at 93%. Will continue to monitor.

## 2022-12-13 NOTE — PROGRESS NOTES
"[facility] HOSPITALIST CROSS COVER    Notified by RN regarding patient having a desaturation to low 80s after getting up to use the bathroom.  The patient in this time went from needing 5 L nasal cannula to needing 5 L oxymask to maintain an O2 sat of 90%.  Patient was seen and examined at bedside.  She was extremely tearful and anxious and verbalized significant frustrations about continuing to be in the hospital.  She states that she just wants to go home and feels like she is not getting any better.  She feels as though the hospital is making her worse, because \"I have to drag around his IV pole to go to the bathroom and cannot go home to just lay on my bed.\"  Patient is noted to have rales throughout on auscultation.  Normal S1 and S2 heart sounds without murmur, rub, clicks.  Abdomen is soft, nontender, nondistended.  Patient has had fluctuations of her oxygen demand throughout her hospital stay.  Unfortunately despite IV antibiotics, she continues to have persistent shortness of breath.  Chart review demonstrates that her illness started approximately 10 days prior to hospitalization.  She has now been in the hospital for 8 days with minimal improvement in her oxygen requirements.  D-dimer was ordered which returned elevated at 3.67.        Vitals:    12/13/22 0038   BP: 117/72   Pulse: 92   Resp: 20   Temp:    SpO2: 90% on 15 L oxymask        Plan:  #Acute hypoxic respiratory failure  -Encourage incentive spirometry  -Encouraged patient to take previously ordered Xanax as the patient was extremely tearful and anxious  -D-dimer resulted at 3.67, stat CTA ordered and pending        -----------------------------------------------------------------------------------------------------------    Electronically signed by:  MADAN Ervin AGACNP-BC  Hospitalist Services         "

## 2022-12-13 NOTE — PROGRESS NOTES
Infectious Disease Progress Note    Author: Jun Yousif M.D. Date & Time of service: 2022  10:01 AM    Chief Complaint:  Follow-up for bacteremia    Interval History:   fevers improving white count continues to go up, 17.1 today.  Patient feels better, oxygen requirements improved, down to nasal cannula.  Neck is no longer sore   patient remains afebrile, white count down to 12.2 today, tolerating ceftriaxone.  Still feels quite run down, still hypoxemic  12/10 patient remains afebrile, count continues to trend down, 10,000 today.  Repeat blood cultures as below.  Remains on 7 L nasal cannula   patient remains afebrile, white count is 13.6 today, tolerated switch to penicillin continuous infusion for repeat chest x-ray findings as below, patient feels better but remains on 9 L facemask oxygen   T-max 99, white count is 9000, tolerating continuous infusion penicillin.  On 5 L oxygen today   patient remains afebrile, currently 7.9, effusions too small to drain.  Remains on 6 L nasal cannula oxygen but feels much better overall and is ready to go home    Labs Reviewed and Medications Reviewed.    Review of Systems:  Review of Systems   Constitutional:  Negative for chills and fever.   Eyes:  Negative for photophobia.   Gastrointestinal:  Negative for abdominal pain, diarrhea, nausea and vomiting.   Musculoskeletal:  Negative for back pain and neck pain.   Skin:  Negative for itching and rash.   Neurological:  Negative for headaches.   All other systems reviewed and are negative.    Hemodynamics:  Temp (24hrs), Av.6 °C (97.8 °F), Min:36.4 °C (97.5 °F), Max:37 °C (98.6 °F)  Temperature: 36.4 °C (97.5 °F)  Pulse  Av.8  Min: 79  Max: 126   Blood Pressure: (!) 146/88       Physical Exam:  Physical Exam  Vitals and nursing note reviewed.   Constitutional:       General: She is not in acute distress.     Appearance: She is not ill-appearing.   HENT:      Nose:      Comments: Nasal  cannula oxygen     Mouth/Throat:      Pharynx: No oropharyngeal exudate.   Eyes:      General: No scleral icterus.        Right eye: No discharge.         Left eye: No discharge.      Conjunctiva/sclera: Conjunctivae normal.   Cardiovascular:      Rate and Rhythm: Normal rate.      Heart sounds: No murmur heard.  Pulmonary:      Effort: No respiratory distress.      Comments: No longer tachypneic.  Scattered rales  Abdominal:      General: Abdomen is flat. There is no distension.      Palpations: There is no mass.      Tenderness: There is no abdominal tenderness.   Musculoskeletal:         General: No swelling or tenderness.   Skin:     Findings: No erythema or rash.   Neurological:      General: No focal deficit present.      Mental Status: She is alert and oriented to person, place, and time.   Psychiatric:         Mood and Affect: Mood normal.         Behavior: Behavior normal.       Meds:    Current Facility-Administered Medications:     furosemide    ALPRAZolam    QUEtiapine    amLODIPine    guaiFENesin dextromethorphan    penicillin (Pen G) continuous infusion    loperamide    sodium chloride    LR    ipratropium-albuterol    omeprazole    lisinopril    hydrOXYzine HCl    escitalopram    aspirin    Respiratory Therapy Consult    enoxaparin (LOVENOX) injection    acetaminophen    Notify provider if pain remains uncontrolled **AND** Use the Numeric Rating Scale (NRS), [place]-Baker Faces (WBF), or FLACC on regular floors and Critical-Care Pain Observation Tool (CPOT) on ICUs/Trauma to assess pain **AND** [COMPLETED] Pulse Ox **AND** Pharmacy Consult Request **AND** If patient difficult to arouse and/or has respiratory depression (respiratory rate of 10 or less), stop any opiates that are currently infusing and call a Rapid Response.    oxyCODONE immediate-release **OR** oxyCODONE immediate-release **OR** morphine injection    ondansetron    ondansetron    promethazine    promethazine    prochlorperazine     benzonatate    buPROPion SR    Labs:  Recent Labs     22  0046 22  0314   WBC 13.6* 9.0 7.9   RBC 3.64* 3.77* 3.66*   HEMOGLOBIN 10.0* 10.3* 10.0*   HEMATOCRIT 29.9* 31.6* 30.4*   MCV 82.1 83.8 83.1   MCH 27.5 27.3 27.3   RDW 40.6 41.7 41.1   PLATELETCT 300 363 381   MPV 9.2 8.8* 8.7*       Recent Labs     22  0046 22  0421   SODIUM 128* 130* 131*   POTASSIUM 3.4* 3.8 4.1   CHLORIDE 93* 95* 97   CO2 24 25 24   GLUCOSE 103* 101* 118*   BUN 6* 7* 8       Recent Labs     226 22  0421   CREATININE 0.40* 0.37* 0.46*         Imaging:  DX-CHEST-PORTABLE (1 VIEW)    Result Date: 2022 1:36 PM HISTORY/REASON FOR EXAM:  possible sepsis. TECHNIQUE/EXAM DESCRIPTION AND NUMBER OF VIEWS: Single portable view of the chest. COMPARISON:  2012. FINDINGS: LUNGS: Bilateral consolidations, right worse than left. PNEUMOTHORAX: None. LINES AND TUBES: None. MEDIASTINUM: No cardiomegaly. MUSCULOSKELETAL STRUCTURES: No acute displaced fracture.     Multifocal bilateral pneumonia.     EC-ECHOCARDIOGRAM COMPLETE W/O CONT    Result Date: 2022  Transthoracic Echo Report Echocardiography Laboratory CONCLUSIONS Normal echocardiogram. TULIOGINNY RUBALCAVAIE Exam Date:         2022                    15:57 Exam Location:     Inpatient Priority:          Routine Ordering Physician:        SAMANTHA VIGIL Referring Physician:       [id number] Mahmood Sonographer:               Yara HALL Age:    52     Gender:    F MRN:    [id number] :    1970 BSA:    1.94   Ht (in):    66     Wt (lb):    187 Exam Type:     Complete Indications:     bacteremia ICD Codes:       r78.81 CPT Codes:       25079 BP:   110    /   75     HR:   94 Technical Quality:       Fair MEASUREMENTS  (Male / Female) Normal Values 2D ECHO LV Diastolic Diameter PLAX        4 cm                  4.2  - 5.9 / 3.9 - 5.3 cm LV Systolic Diameter PLAX         2.5 cm                2.1 - 4.0 cm IVS Diastolic Thickness           0.9 cm                LVPW Diastolic Thickness          0.98 cm               LVOT Diameter                     1.9 cm                Estimated LV Ejection Fraction    70 %                  LV Ejection Fraction MOD 4C       70.5 %                IVC Diameter                      2.1 cm                DOPPLER AV Peak Velocity                  1.7 m/s               AV Peak Gradient                  11.1 mmHg             AV Mean Gradient                  6.6 mmHg              LVOT Peak Velocity                1.3 m/s               AV Area Cont Eq vti               2.4 cm2               MV Velocity Time Integral         25.9 cm               Mitral E Point Velocity           1.1 m/s               Mitral E to A Ratio               1.3                   MV Pressure Half Time             46 ms                 MV Area PHT                       4.8 cm2               MV Deceleration Time              159 ms                TR Peak Velocity                  255 cm/s              PV Peak Velocity                  0.94 m/s              PV Peak Gradient                  3.5 mmHg              * Indicates values subject to auto-interpretation LV EF:  70    % FINDINGS Left Ventricle Normal left ventricular chamber size. Normal left ventricular wall thickness. Normal left ventricular systolic function. The left ventricular ejection fraction is visually estimated to be 70%. Normal regional wall motion. Normal diastolic function. Right Ventricle The right ventricle is normal in size and systolic function. Right Atrium The right atrium is normal in size. Normal inferior vena cava size and inspiratory collapse. Left Atrium The left atrium is normal in size. Left atrial volume index is 32 mL/sq m. Mitral Valve Structurally normal mitral valve without significant stenosis. Mild mitral regurgitation. Aortic Valve  Structurally normal aortic valve without significant stenosis or regurgitation. Tricuspid Valve Structurally normal tricuspid valve without significant stenosis. Estimated right ventricular systolic pressure is 35 mmHg. Pulmonic Valve Structurally normal pulmonic valve without significant stenosis. Trace pulmonic insufficiency. Pericardium No pericardial effusion. Aorta Normal aortic root for body surface area. The ascending aorta diameter is 2.9 cm. Alistair Abraham MD (Electronically Signed) Final Date:     07 December 2022                 19:03    MA-SCREENING MAMMO BILAT W/TOMOSYNTHESIS W/CAD    Result Date: 11/21/2022 11/21/2022 2:09 PM HISTORY/REASON FOR EXAM:  Routine Mammographic Screening. TECHNIQUE/EXAM DESCRIPTION: Bilateral tomosynthesis screening mammography was performed with standard mammographic images generated from the data set. Images were reviewed and interpreted with CAD. COMPARISON:   November 19, 2021 and November 10, 2020 FINDINGS: There are scattered areas of fibroglandular density. There is no dominant mass, suspicious calcification, or any secondary malignant sign. Scattered benign-appearing calcifications are once again noted bilaterally.     1.  Breasts have scattered areas of fibroglandular density, with no radiographic evidence of malignancy. 2.  Screening mammogram in one year is recommended. R2 - CATEGORY 2: BENIGN FINDING(S) Ten to twenty percent of all cancers can be categorized as hereditary and the clinical and financial value of identifying patients and families at risk is well documented. If you have a personal or family history of breast, ovarian, fallopian tube, peritoneal or other cancer, please consult your physician regarding genetic counseling and testing.       Micro:  Results       Procedure Component Value Units Date/Time    BLOOD CULTURE [[id number]] Collected: 12/07/22 1104    Order Status: Completed Specimen: Blood from Peripheral Updated: 12/12/22 1300      "Significant Indicator NEG     Source BLD     Site PERIPHERAL     Culture Result No growth after 5 days of incubation.    Narrative:      Per Hospital Policy: Only change Specimen Src: to \"Line\" if  specified by physician order.  Right AC    BLOOD CULTURE [[id number]] Collected: 12/07/22 1104    Order Status: Completed Specimen: Blood from Peripheral Updated: 12/12/22 1300     Significant Indicator NEG     Source BLD     Site PERIPHERAL     Culture Result No growth after 5 days of incubation.    Narrative:      Per Hospital Policy: Only change Specimen Src: to \"Line\" if  specified by physician order.  Right Forearm/Arm    FLUID CULTURE W/GRAM STAIN [[id number]]     Order Status: No result Specimen: Body Fluid from Thoracentesis Fluid     AFB CULTURE [[id number]]     Order Status: No result Specimen: Body Fluid from Thoracentesis Fluid     FUNGAL CULTURE [[id number]]     Order Status: No result Specimen: Body Fluid from Thoracentesis Fluid     FLUID CULTURE W/GRAM STAIN [[id number]]     Order Status: Canceled Specimen: Body Fluid from Thoracentesis Fluid     AFB CULTURE [[id number]]     Order Status: Canceled Specimen: Body Fluid from Thoracentesis Fluid     FUNGAL CULTURE [[id number]]     Order Status: Canceled Specimen: Body Fluid from Thoracentesis Fluid     C Diff by PCR rflx Toxin [[id number]] Collected: 12/08/22 1410    Order Status: Completed Specimen: Stool Updated: 12/08/22 1556     C Diff by PCR Negative     Comment: C. difficile NOT detected by PCR.  Treatment not indicated per guidelines.  Repeat testing not indicated within 7 days.          027-NAP1-BI Presumptive Negative     Comment: Presumptive 027/NAP1/BI target DNA sequences are NOT DETECTED.       Narrative:      Special Contact Isolation  Collected By: [id number] ZULY LUO  Does this patient have risk factors for C-diff?->Yes  C-Diff Risk Factors->antibiotic exposure    E-Test [[id number]] Collected: 12/05/22 1428    Order Status: Completed Specimen: " "Other Updated: 12/08/22 1547     ETEST Sensitivity FINAL    Narrative:      171 tel. [phone] 12/07/2022, 14:17, RB PERF. RESULTS CALLED TO: [phone] RN  [phone] RN  Per Hospital Policy: Only change Specimen Src: to \"Line\" if  specified by physician order.    Blood Culture [[id number]]  (Abnormal) Collected: 12/05/22 1428    Order Status: Completed Specimen: Blood from Peripheral Updated: 12/08/22 1547     Significant Indicator POS     Source BLD     Site PERIPHERAL     Culture Result Growth detected by Bactec instrument. 12/06/2022  02:45      Streptococcus pneumoniae  See previous culture for sensitivity report.      Narrative:      CALL  Ann  171 tel. [phone],  CALLED  171 tel. [phone] 12/07/2022, 14:17, RB PERF. RESULTS CALLED TO:  [phone] RN + [phone] RN  Per Hospital Policy: Only change Specimen Src: to \"Line\" if  specified by physician order.  Right Hand    Blood Culture [[id number]]  (Abnormal)  (Susceptibility) Collected: 12/05/22 1428    Order Status: Completed Specimen: Blood from Peripheral Updated: 12/08/22 1546     Significant Indicator POS     Source BLD     Site PERIPHERAL     Culture Result Growth detected by Bactec instrument. 12/06/2022  02:05      Streptococcus pneumoniae    Narrative:      CALL  Ann  171 tel. [phone],  CALLED  171 tel. [phone] 12/07/2022, 14:17, RB PERF. RESULTS CALLED TO:  [phone] RN + [phone] RN  Per Hospital Policy: Only change Specimen Src: to \"Line\" if  specified by physician order.  Right AC    Susceptibility       Streptococcus pneumoniae (1)       Antibiotic Interpretation Microscan   Method Status    Penicillin non-meningitis Sensitive 0.032 mcg/mL E-TEST Final    Cefotaxime-meningitis Sensitive 0.023 mcg/mL E-TEST Final    Penicillin-meningitis Sensitive 0.032 mcg/mL E-TEST Final    Cefotaxime-non meningitis Sensitive 0.023 mcg/mL E-TEST Final    Erythromycin Resistant 12 mcg/mL E-TEST Final                       C Diff by PCR rflx Toxin [[id number]] Collected: " 12/08/22 1420    Order Status: Canceled Specimen: Stool     Urine Culture (New) [[id number]] Collected: 12/05/22 1457    Order Status: Completed Specimen: Urine Updated: 12/07/22 0832     Significant Indicator NEG     Source UR     Site -     Culture Result Usual urogenital yoan ,000 cfu/mL    Narrative:      Indication for culture:->Evaluation for sepsis without a  clear source of infection  Indication for culture:->Evaluation for sepsis without a    MRSA By PCR (Amp) [[id number]] Collected: 12/06/22 1427    Order Status: Completed Specimen: Respirate from Nares Updated: 12/06/22 1717     MRSA by PCR Negative            Assessment:  Mónica Keita is a 52 y.o. female patient with known NAFLD, hypertension, neuroendocrine tumor, not on chemotherapy, admitted with post influenza multifocal community-acquired pneumonia and GPC bacteremia.  Blood culture organism identified as strep pneumo     Pertinent Diagnoses:  Strep pneumo bacteremia  Multifocal community-acquired pneumonia  Acute hypoxemic respiratory failure  Recent influenza A  NAFLD    Plan:  -Continue IV penicillin 2,000,000 units every 4 hours (currently receiving this as continuous infusion). Follow repeat blood cultures x2 from 12/7, no growth till date  -C. difficile negative  -TTE with no obvious valvular vegetations  -Persistent hypoxemia, CTA obtained 12/12 with no PE, effusions too small to drain.  Recommend pulmonary rehab    Disposition: Anticipate no ID specific disposition needs.  On discharge, can use p.o. levofloxacin 750 mg daily through 12/20/2022  Need for PICC line: No.  Anticipate being able to use oral antibiotics    Discussed with Dr. Christianson    ID will sign off.  Please call with questions.

## 2022-12-13 NOTE — CARE PLAN
The patient is Watcher - Medium risk of patient condition declining or worsening    Shift Goals  Clinical Goals: titarte o2 as able  Patient Goals: rest and wean o2  Family Goals:     Progress made toward(s) clinical / shift goals:    Problem: Respiratory  Goal: Patient will achieve/maintain optimum respiratory ventilation and gas exchange  Outcome: Not Progressing       Patient is not progressing towards the following goals:      Problem: Respiratory  Goal: Patient will achieve/maintain optimum respiratory ventilation and gas exchange  Outcome: Not Progressing   PT O2 needs increased overnight chest x-ray done and pt placed on oxymask

## 2022-12-14 ENCOUNTER — PHARMACY VISIT (OUTPATIENT)
Dept: PHARMACY | Facility: MEDICAL CENTER | Age: 52
End: 2022-12-14
Payer: COMMERCIAL

## 2022-12-14 VITALS
OXYGEN SATURATION: 91 % | WEIGHT: 171.2 LBS | DIASTOLIC BLOOD PRESSURE: 62 MMHG | SYSTOLIC BLOOD PRESSURE: 107 MMHG | TEMPERATURE: 98.2 F | HEART RATE: 83 BPM | BODY MASS INDEX: 27.51 KG/M2 | RESPIRATION RATE: 16 BRPM | HEIGHT: 66 IN

## 2022-12-14 LAB
ANION GAP SERPL CALC-SCNC: 11 MMOL/L (ref 7–16)
BUN SERPL-MCNC: 9 MG/DL (ref 8–22)
CALCIUM SERPL-MCNC: 8.5 MG/DL (ref 8.5–10.5)
CHLORIDE SERPL-SCNC: 97 MMOL/L (ref 96–112)
CO2 SERPL-SCNC: 23 MMOL/L (ref 20–33)
CREAT SERPL-MCNC: 0.46 MG/DL (ref 0.5–1.4)
ERYTHROCYTE [DISTWIDTH] IN BLOOD BY AUTOMATED COUNT: 42.4 FL (ref 35.9–50)
GFR SERPLBLD CREATININE-BSD FMLA CKD-EPI: 116 ML/MIN/1.73 M 2
GLUCOSE SERPL-MCNC: 98 MG/DL (ref 65–99)
HCT VFR BLD AUTO: 30.7 % (ref 37–47)
HGB BLD-MCNC: 10 G/DL (ref 12–16)
MCH RBC QN AUTO: 27.3 PG (ref 27–33)
MCHC RBC AUTO-ENTMCNC: 32.6 G/DL (ref 33.6–35)
MCV RBC AUTO: 83.9 FL (ref 81.4–97.8)
PLATELET # BLD AUTO: 452 K/UL (ref 164–446)
PMV BLD AUTO: 8.7 FL (ref 9–12.9)
POTASSIUM SERPL-SCNC: 3.9 MMOL/L (ref 3.6–5.5)
RBC # BLD AUTO: 3.66 M/UL (ref 4.2–5.4)
SODIUM SERPL-SCNC: 131 MMOL/L (ref 135–145)
WBC # BLD AUTO: 7.8 K/UL (ref 4.8–10.8)

## 2022-12-14 PROCEDURE — 700102 HCHG RX REV CODE 250 W/ 637 OVERRIDE(OP): Performed by: STUDENT IN AN ORGANIZED HEALTH CARE EDUCATION/TRAINING PROGRAM

## 2022-12-14 PROCEDURE — A9270 NON-COVERED ITEM OR SERVICE: HCPCS | Performed by: INTERNAL MEDICINE

## 2022-12-14 PROCEDURE — 700111 HCHG RX REV CODE 636 W/ 250 OVERRIDE (IP): Performed by: INTERNAL MEDICINE

## 2022-12-14 PROCEDURE — 80048 BASIC METABOLIC PNL TOTAL CA: CPT

## 2022-12-14 PROCEDURE — 85027 COMPLETE CBC AUTOMATED: CPT

## 2022-12-14 PROCEDURE — 99239 HOSP IP/OBS DSCHRG MGMT >30: CPT | Performed by: INTERNAL MEDICINE

## 2022-12-14 PROCEDURE — 700102 HCHG RX REV CODE 250 W/ 637 OVERRIDE(OP): Performed by: INTERNAL MEDICINE

## 2022-12-14 PROCEDURE — 36415 COLL VENOUS BLD VENIPUNCTURE: CPT

## 2022-12-14 PROCEDURE — 700102 HCHG RX REV CODE 250 W/ 637 OVERRIDE(OP): Performed by: HOSPITALIST

## 2022-12-14 PROCEDURE — A9270 NON-COVERED ITEM OR SERVICE: HCPCS | Performed by: HOSPITALIST

## 2022-12-14 PROCEDURE — RXMED WILLOW AMBULATORY MEDICATION CHARGE: Performed by: INTERNAL MEDICINE

## 2022-12-14 PROCEDURE — A9270 NON-COVERED ITEM OR SERVICE: HCPCS | Performed by: STUDENT IN AN ORGANIZED HEALTH CARE EDUCATION/TRAINING PROGRAM

## 2022-12-14 RX ORDER — LEVOFLOXACIN 750 MG/1
750 TABLET, FILM COATED ORAL DAILY
Qty: 5 TABLET | Refills: 0 | Status: SHIPPED | OUTPATIENT
Start: 2022-12-15 | End: 2022-12-20

## 2022-12-14 RX ORDER — GUAIFENESIN/DEXTROMETHORPHAN 100-10MG/5
5 SYRUP ORAL EVERY 8 HOURS PRN
Status: DISCONTINUED | OUTPATIENT
Start: 2022-12-14 | End: 2022-12-14 | Stop reason: HOSPADM

## 2022-12-14 RX ORDER — AMLODIPINE BESYLATE 10 MG/1
10 TABLET ORAL DAILY
Qty: 30 TABLET | Refills: 0 | Status: SHIPPED | OUTPATIENT
Start: 2022-12-15 | End: 2023-09-29

## 2022-12-14 RX ORDER — FUROSEMIDE 20 MG/1
20 TABLET ORAL DAILY
Qty: 15 TABLET | Refills: 0 | Status: SHIPPED | OUTPATIENT
Start: 2022-12-14 | End: 2022-12-28

## 2022-12-14 RX ADMIN — OMEPRAZOLE 20 MG: 20 CAPSULE, DELAYED RELEASE ORAL at 05:26

## 2022-12-14 RX ADMIN — ASPIRIN 81 MG: 81 TABLET, COATED ORAL at 05:25

## 2022-12-14 RX ADMIN — BUPROPION HYDROCHLORIDE 150 MG: 150 TABLET, EXTENDED RELEASE ORAL at 05:25

## 2022-12-14 RX ADMIN — ESCITALOPRAM OXALATE 40 MG: 10 TABLET ORAL at 05:25

## 2022-12-14 RX ADMIN — FUROSEMIDE 20 MG: 10 INJECTION, SOLUTION INTRAMUSCULAR; INTRAVENOUS at 05:26

## 2022-12-14 RX ADMIN — AMLODIPINE BESYLATE 10 MG: 10 TABLET ORAL at 05:26

## 2022-12-14 RX ADMIN — LEVOFLOXACIN 750 MG: 750 TABLET, FILM COATED ORAL at 05:25

## 2022-12-14 RX ADMIN — LISINOPRIL 20 MG: 20 TABLET ORAL at 05:25

## 2022-12-14 RX ADMIN — GUAIFENESIN AND DEXTROMETHORPHAN 5 ML: 100; 10 SYRUP ORAL at 05:26

## 2022-12-14 NOTE — PROGRESS NOTES
Report received from Joyce Adame. Assumed pt care. Pt resting in bed on 5 L O2. Pt A&O x 4. Fall precautions in place, call light and belongings within reach, bed in lowest position. No signs of distress.

## 2022-12-14 NOTE — PROGRESS NOTES
Received report from night nurse, pt AOX4, continues on 4.5L NC, pt aware of increased O2 needs when OOB, walking study to be performed and updated, will continue to titrate O2.

## 2022-12-14 NOTE — DISCHARGE PLANNING
RN ELVIA spoke with patient in room. Informed her that O2 eval warrants home oxygen for discharge. Patient is self-pay. Informed her it would cost approximately $180/month out-of-pocket. Patient states she could absorb that cost. She has no preference. Referral sent to [facility] to deliver portable tank to room.

## 2022-12-14 NOTE — DISCHARGE INSTRUCTIONS
ENDOSCOPY HOME CARE INSTRUCTIONS    GASTROSCOPY OR ERCP  1. Don't eat or drink anything for about an hour after the test. You can then resume your regular diet.  2. Don't drive or drink alcohol for 24 hours. The medication you received will make you too drowsy.  3. Don't take any coffee, tea, or aspirin products until after you see your doctor. These can harm the lining of your stomach.  4. If you begin to vomit bloody material, or develop black or bloody stools, call your doctor as soon as possible.  5. If you have any neck, chest, abdominal pain or temp of 100 degrees, call your doctor.    Do not drive today  Resume pre procedural diet    Dr. Aguillon  [facility]  [facility]  [phone]     EGD with:        Endoscopic mucosal resection -submucosal injection, snare with cautery resection     EUS upper     Indication:        Gastric neuroendocrine tumor (carcinoid)     Sedation:         GA ([facility])     Findings:              EGD                          Esophagus                                      Unremarkable mucosa                          Stomach                                      Mass                                                  15 mm, semipedunculated, ulcerated surface                                                  Proximal body posterior aspect                                                  Resected at end of case with EMR technique                                      Polyps                                                  Fundic gland polyp appearance                                                  3 polyps resected with cold snare -8 mm, 10 mm and 10 mm                          Duodenum                                      Unremarkable mucosa                 EUS                          Celiac axis                                      No adenopathy                          Pancreas body/tail                                      Grossly unremarkable parenchyma                           Pancreatic duct                                      Normal course and caliber                          Gastric mass                                      Involving mucosa and muscularis mucosa                                      No invasive features                                      Clear demarcation of deeper layers                          Perigastric space                                      No adenopathy appreciated     Plan:                Follow-up final pathology                          Agree with proceeding with dotatate scan    You should call 911 if you develop problems with breathing or chest pain.  If any questions arise, call your doctor. If your doctor is not available, please feel free to call [phone]. You can also call the HEALTH HOTLINE open 24 hours/day, 7 days/week and speak to a nurse at [phone], or toll free [phone].    Depression / Suicide Risk    As you are discharged from this [facility] facility, it is important to learn how to keep safe from harming yourself.    Recognize the warning signs:  · Abrupt changes in personality, positive or negative- including increase in energy   · Giving away possessions  · Change in eating patterns- significant weight changes-  positive or negative  · Change in sleeping patterns- unable to sleep or sleeping all the time   · Unwillingness or inability to communicate  · Depression  · Unusual sadness, discouragement and loneliness  · Talk of wanting to die  · Neglect of personal appearance   · Rebelliousness- reckless behavior  · Withdrawal from people/activities they love  · Confusion- inability to concentrate     If you or a loved one observes any of these behaviors or has concerns about self-harm, here's what you can do:  · Talk about it- your feelings and reasons for harming yourself  · Remove any means that you might use to hurt yourself (examples: pills, rope, extension cords, firearm)  · Get professional help from the  community (Mental Health, Substance Abuse, psychological counseling)  · Do not be alone:Call your Safe Contact- someone whom you trust who will be there for you.  · Call your local CRISIS HOTLINE 454-3327 or 594-991-3609  · Call your local Children's Mobile Crisis Response Team [place] (365) 848-9181 or www.[employer]  · Call the toll free National Suicide Prevention Hotlines   · National Suicide Prevention Lifeline 892-669-DLHN (6595)  · National Hope Line Network 800-SUICIDE (134-0176)    I acknowledge receipt and understanding of these Home Care Instructions.   Render Post-Care Instructions In Note?: no Medical Necessity Information: It is in your best interest to select a reason for this procedure from the list below. All of these items fulfill various CMS LCD requirements except the new and changing color options. Post-Care Instructions: I reviewed with the patient in detail post-care instructions. Patient is to wear sunprotection, and avoid picking at any of the treated lesions. Pt may apply Vaseline to crusted or scabbing areas. Consent: The patient's verbal consent was obtained including but not limited to risks of crusting, scabbing, blistering, scarring, darker or lighter pigmentary change, recurrence, incomplete removal and infection. Treatment Number (Will Not Render If 0): 0 Anesthesia Volume In Cc: 0.5 Medical Necessity Clause: This procedure was medically necessary because the lesions that were treated were: Detail Level: Detailed

## 2022-12-14 NOTE — DISCHARGE SUMMARY
Discharge Summary    CHIEF COMPLAINT ON ADMISSION  Chief Complaint   Patient presents with    Shortness of Breath     For the last day, report pain with coughing.  Pt on 4 liters NC normally on RA.  Tested for covid 11/25 and was negative.  Reports temps since before thanksgiving.         Reason for Admission  SOB     Admission Date  12/5/2022    CODE STATUS  Full Code    HPI & HOSPITAL COURSE  This is a 52 y.o. female who presented 12/5/2022 with shortness of breath.  This is a pleasant woman with a history of nonalcoholic fatty liver disease, hypertension, and neuroendocrine tumor.  Patient presented with several day history of shortness of breath as well as fevers and chills.  Her symptoms began approximately 10 days ago.  She recently tested for influenza but was negative for COVID-19.  She had not been vaccinated for influenza.  In the emergency room, patient had SPO2 of 75% on room air requiring supplemental oxygen.  She was tachycardic with heart rate in the 100s with white count of 3.7 meeting criteria for sepsis.  Patient was tachypneic with respiratory rate up to 29 showing signs of respiratory distress.  Lactic acid was 2.7.  Sodium of 130.  Bicarb of 21.  Chest x-ray showed right lower lobe pneumonia.  Patient was started on IV abx, her blood cultures grew streptococcus pneumoniae. ID was consulted she was started on penicillin, and hs now switched to Levaquin to 750 mg daily through 12/20/22 as per ID rec.    Also her oxygen requirement started trending down, she was initially on ismael flow oxygen and is now down to 4L of oxygen   Will arrange home oxygen for patient and discharge her home today     The patient met 2-midnight criteria for an inpatient stay at the time of discharge.    Discharge Date  12/14/22    FOLLOW UP ITEMS POST DISCHARGE  PCP    DISCHARGE DIAGNOSES  Principal Problem:    Acute respiratory failure with hypoxia (HCC) POA: Yes  Active Problems:    Hypertension POA: Yes    Type 2  diabetes mellitus, without long-term current use of insulin (HCC) POA: Yes    NAFLD (nonalcoholic fatty liver disease) POA: Yes    Multifocal pneumonia due to Streptococcus secondary to influenza infection POA: Unknown    Sepsis (HCC) POA: Yes    Streptococcal bacteremia POA: Yes    Leukocytosis POA: Unknown    Hyponatremia POA: Unknown    Hypokalemia POA: Unknown    Hypomagnesemia POA: Unknown  Resolved Problems:    * No resolved hospital problems. *      FOLLOW UP  Future Appointments   Date Time Provider Department Center   12/28/2022  2:40 PM Eren Heath M.D. [facility]      No follow-up provider specified.    MEDICATIONS ON DISCHARGE     Medication List        START taking these medications        Instructions   furosemide 20 MG Tabs  Commonly known as: LASIX   Take 1 Tablet by mouth every day for 15 days.  Dose: 20 mg     levoFLOXacin 750 MG tablet  Start taking on: December 15, 2022  Commonly known as: LEVAQUIN   Take 1 Tablet by mouth every day for 5 days.  Dose: 750 mg            CHANGE how you take these medications        Instructions   amLODIPine 10 MG Tabs  Start taking on: December 15, 2022  What changed:   medication strength  how much to take  Commonly known as: NORVASC   Take 1 Tablet by mouth every day.  Dose: 10 mg            CONTINUE taking these medications        Instructions   aspirin 81 MG EC tablet   Take 1 Tablet by mouth every day.  Dose: 81 mg     Biotin 5000 MCG Caps   Take 5,000 mcg by mouth every day.  Dose: 5,000 mcg     buPROPion 300 MG XL tablet  Commonly known as: WELLBUTRIN XL   Take 1 Tablet by mouth every morning.  Dose: 300 mg     COQ10 PO   Take 1 Tab by mouth every day.  Dose: 1 Tablet     escitalopram 20 MG tablet  Commonly known as: LEXAPRO   Take 2 Tablets by mouth every day.  Dose: 40 mg     hydrOXYzine HCl 25 MG Tabs  Commonly known as: ATARAX   Take 1 Tablet by mouth 3 times a day as needed for Anxiety.  Dose: 25 mg     IRON PO   Take 1 Tablet by mouth every  Monday, Wednesday, and Friday.  Dose: 1 Tablet     lisinopril 20 MG Tabs  Commonly known as: PRINIVIL   Take 1 Tablet by mouth every day.  Dose: 20 mg     metFORMIN 500 MG Tabs  Commonly known as: GLUCOPHAGE   Take 1 Tablet by mouth 2 times a day with meals.  Dose: 500 mg     omeprazole 20 MG delayed-release capsule  Commonly known as: PRILOSEC   Take 1 Capsule by mouth every day.  Dose: 20 mg     vitamin D3 125 MCG (5000 UT) Caps   Take 1 Capsule by mouth every day.  Dose: 1 Capsule              Allergies  No Known Allergies    DIET  Orders Placed This Encounter   Procedures    Diet Order Diet: Consistent CHO (Diabetic)     Standing Status:   Standing     Number of Occurrences:   1     Order Specific Question:   Diet:     Answer:   Consistent CHO (Diabetic) [4]       ACTIVITY  As tolerated.  Weight bearing as tolerated    CONSULTATIONS  Infection disease     PROCEDURES  None     LABORATORY  Lab Results   Component Value Date    SODIUM 131 (L) 12/14/2022    POTASSIUM 3.9 12/14/2022    CHLORIDE 97 12/14/2022    CO2 23 12/14/2022    GLUCOSE 98 12/14/2022    BUN 9 12/14/2022    CREATININE 0.46 (L) 12/14/2022        Lab Results   Component Value Date    WBC 7.8 12/14/2022    HEMOGLOBIN 10.0 (L) 12/14/2022    HEMATOCRIT 30.7 (L) 12/14/2022    PLATELETCT 452 (H) 12/14/2022        Total time of the discharge process exceeds 41 minutes.

## 2022-12-14 NOTE — CARE PLAN
The patient is Watcher - Medium risk of patient condition declining or worsening    Shift Goals  Clinical Goals: Titrate O2 as able  Patient Goals: Rest and decrease O2  Family Goals: josias    Progress made toward(s) clinical / shift goals:    Problem: Hemodynamics  Goal: Patient's hemodynamics, fluid balance and neurologic status will be stable or improve  Outcome: Progressing     Problem: Respiratory  Goal: Patient will achieve/maintain optimum respiratory ventilation and gas exchange  Outcome: Progressing

## 2022-12-14 NOTE — CARE PLAN
The patient is Stable - Low risk of patient condition declining or worsening    Shift Goals  Clinical Goals: Titrate O2 as able  Patient Goals: Rest and decrease O2  Family Goals: josias    Progress made toward(s) clinical / shift goals:    Problem: Knowledge Deficit - Standard  Goal: Patient and family/care givers will demonstrate understanding of plan of care, disease process/condition, diagnostic tests and medications  Outcome: Progressing     Problem: Hemodynamics  Goal: Patient's hemodynamics, fluid balance and neurologic status will be stable or improve  Outcome: Progressing     Problem: Respiratory  Goal: Patient will achieve/maintain optimum respiratory ventilation and gas exchange  Outcome: Progressing     Problem: Mechanical Ventilation  Goal: Safe management of artificial airway and ventilation  Outcome: Progressing     Problem: Mechanical Ventilation  Goal: Safe management of artificial airway and ventilation  Outcome: Progressing       Patient is not progressing towards the following goals:

## 2022-12-14 NOTE — DISCHARGE PLANNING
Received Choice form at 1123  Agency/Facility Name: [facility]  Referral sent per Choice form @ [phone]  Agency/Facility Name: [facility]  Spoke To: Maxi  Outcome: O2 will be delivered within the hour. No further follow up needs     RN CM Notified

## 2022-12-14 NOTE — FACE TO FACE
"Face to Face Note  -  Durable Medical Equipment    Grace Christianson M.D. - NPI: [id number]  I certify that this patient is under my care and that they had a durable medical equipment(DME)face to face encounter by myself that meets the physician DME face-to-face encounter requirements with this patient on:    Date of encounter:   Patient:                    MRN:                       YOB: 2020  Mónica Keita  [id number]  1970     The encounter with the patient was in whole, or in part, for the following medical condition, which is the primary reason for durable medical equipment:  COPD    I certify that, based on my findings, the following durable medical equipment is medically necessary:    Oxygen   HOME O2 Saturation Measurements:(Values must be present for Home Oxygen orders)  Room air sat at rest: 82  Room air sat with amb: 79  With liters of O2: 4, O2 sat at rest with O2: 95  With Liters of O2: 4, O2 sat with amb with O2 : 89  Is the patient mobile?: Yes  If patient feels more short of breath, they can go up to 6 liters per minute and contact healthcare provider.    Supporting Symptoms: The patient requires supplemental oxygen, as the following interventions have been tried with limited or no improvement: \"Bronchodilators and/or steroid inhalers, \"Oral and/or IV steroids, \"Ambulation with oximetry, and \"Incentive spirometry.    My Clinical findings support the need for the above equipment due to:  Hypoxia  "

## 2022-12-14 NOTE — DISCHARGE PLANNING
Patient is self-pay and paid out-of-pocket for cost of Home O2. [facility] to deliver portable O2 tank to room for discharge.    Scheduled procedure with Patient at  [phone] (home)   Scheduled Via: Case Request Order for  [facility]   If non-ambulatory location, select reason: Not applicable  Did patient request a specific facility other than MD's preferred facility? No; If so, which facility?   Procedure date:  8/21/2018  Procedure time: 0830AM ; Did patient request this specific time? Yes  Rep Contacted?: No  Notified patient about receiving an animated Kezia program? Yes    The following have been confirmed:  Insurance name confirmed as [facility], will be the same at time of procedure?: Yes Ins Accepted at Facility? Yes  Latex Allergy No  Diabetic No  Sleep Apnea No  Diuretic/Water pill No  Defibrillator/Pacemaker No  MRSA hx No  Blood thinners: Coumadin (Warfarin) or Plavix No      Aspirin No      Phentermine (diet pill) No    Pre-Op testing required No, Patient informed N/A  Prep required? No; Briefly reviewed? n/a; Prep cost range discussed? n/a  If procedure is scheduled 7 days or less, patient was told to  prep letter?: No

## 2022-12-27 NOTE — DOCUMENTATION QUERY
"                                                                         [facility]                                                                       Query Response Note      PATIENT:               ELVIS CARRASCO  ACCT #:                  [id number]  MRN:                     [id number]  :                      1970  ADMIT DATE:       2022 1:20 PM  DISCH DATE:        2022 4:33 PM  RESPONDING  PROVIDER #:        [id number]           QUERY TEXT:    There is conflicting documentation in the medical record.      Sepsis present on admission is documented in progress note by Dr. Christianson on 22.      This is Sepsis Not present on admission is documented in progress note by Dr. Christianson  on 22.    Sepsis POA: Yes is documented on the Discharge Summary by Dr. Christianson on 22.      Based on treatment, clinical findings and risk factors, can this documentation be further clarified?    Note: If you agree with a diagnosis listed above, please remember to include it in your concurrent daily documentation and onto the Discharge Summary      The patient's Clinical Indicators include:  52 year old female admitted for pneumonia on  and documented sepsis on .     Keisha \"Sepsis (HCC)- (present on admission)\"  \"This is Sepsis Not present on admission\"     Mahad \"Sepsis (HCC)- (present on admission)\"  \"This is Sepsis Not present on admission\"     Sammy \"Sepsis (HCC)- (present on admission)\"  \"This is Sepsis Not present on admission\"    Blood cultures from 2022 showing 2 out of 2 bottles gram-positive cocci likely streptococcal species.     WBC 3.7, Lac 2.7, procal 35.70, Pulse 110, T 99.3, Resp rate up to 29      Risk factors: recent influenza, pneumonia, PNA, bacteremia  Treatment: labs, CXR, ABX, IS, O2, nebs, tele, IVF    If you have any questions please contact:  Meghan Cornoa RN CDI [facility]  [email or web address]  Meghan Corona Via Voal[employer]  Options provided:   " -- Sepsis present on admission   -- Sepsis not present on admission   -- Other explanation, please specify   -- Unable to determine      Query created by: Meghan Corona on 12/21/2022 12:10 PM    RESPONSE TEXT:    Sepsis present on admission          Electronically signed by:  TD TREVINO MD 12/27/2022 10:41 AM

## 2022-12-28 ENCOUNTER — OFFICE VISIT (OUTPATIENT)
Dept: MEDICAL GROUP | Facility: PHYSICIAN GROUP | Age: 52
End: 2022-12-28

## 2022-12-28 VITALS
HEART RATE: 93 BPM | BODY MASS INDEX: 25.55 KG/M2 | HEIGHT: 66 IN | WEIGHT: 159 LBS | SYSTOLIC BLOOD PRESSURE: 108 MMHG | OXYGEN SATURATION: 95 % | TEMPERATURE: 97.4 F | DIASTOLIC BLOOD PRESSURE: 62 MMHG

## 2022-12-28 DIAGNOSIS — J18.9 MULTIFOCAL PNEUMONIA: ICD-10-CM

## 2022-12-28 DIAGNOSIS — J96.01 ACUTE RESPIRATORY FAILURE WITH HYPOXIA (HCC): ICD-10-CM

## 2022-12-28 DIAGNOSIS — F43.0 ACUTE STRESS REACTION: ICD-10-CM

## 2022-12-28 PROCEDURE — 99213 OFFICE O/P EST LOW 20 MIN: CPT | Performed by: FAMILY MEDICINE

## 2022-12-28 RX ORDER — BUPROPION HYDROCHLORIDE 300 MG/1
300 TABLET ORAL EVERY MORNING
Qty: 90 TABLET | Refills: 1 | Status: SHIPPED | OUTPATIENT
Start: 2022-12-28 | End: 2023-07-03

## 2022-12-28 ASSESSMENT — FIBROSIS 4 INDEX: FIB4 SCORE: 0.8

## 2022-12-28 NOTE — PROGRESS NOTES
Subjective:     Chief Complaint   Patient presents with    Hospital Follow-up     Pneumonia     Follow-Up     Loss of        HPI:   Mónica presents today to discuss the following.    Acute respiratory failure with hypoxia (HCC)  2/2 PNA  Weaning off  Currently 95% RA  was initially @ 5L and now at 1.5L PRN       Multifocal pneumonia due to Streptococcus secondary to influenza infection  New problem  Completed antibiotics 12.20  Doing better    Acute stress reaction  New problem  Started on wellbutrin 6 weeks ago and feels better  Denies any Si/Hi      Past Medical History:   Diagnosis Date    Adverse effect of anesthesia     Laryngospasm in the past with extubation    Anemia     Anxiety     Bronchitis 2009    Diabetes (HCC)     type 2    Dissection of cervical artery (HCC)     Elevated liver enzymes     unknown cause    Hypertension     Neuroendocrine tumor 11/2021    abdomen    Pneumonia 2009       Current Outpatient Medications Ordered in Epic   Medication Sig Dispense Refill    buPROPion (WELLBUTRIN XL) 300 MG XL tablet Take 1 Tablet by mouth every morning. 90 Tablet 1    amLODIPine (NORVASC) 10 MG Tab Take 1 Tablet by mouth every day. 30 Tablet 0    aspirin 81 MG EC tablet Take 1 Tablet by mouth every day. 100 Tablet 0    metFORMIN (GLUCOPHAGE) 500 MG Tab Take 1 Tablet by mouth 2 times a day with meals. 180 Tablet 3    Ferrous Sulfate (IRON PO) Take 1 Tablet by mouth every Monday, Wednesday, and Friday.      hydrOXYzine HCl (ATARAX) 25 MG Tab Take 1 Tablet by mouth 3 times a day as needed for Anxiety. 30 Tablet 3    escitalopram (LEXAPRO) 20 MG tablet Take 2 Tablets by mouth every day. 160 Tablet 3    omeprazole (PRILOSEC) 20 MG delayed-release capsule Take 1 Capsule by mouth every day. 90 Capsule 2    lisinopril (PRINIVIL) 20 MG Tab Take 1 Tablet by mouth every day. 90 Tablet 3    Cholecalciferol (VITAMIN D3) 125 MCG (5000 UT) Cap Take 1 Capsule by mouth every day.      Coenzyme Q10 (COQ10 PO) Take 1 Tab  "by mouth every day.      Biotin 5000 MCG Cap Take 5,000 mcg by mouth every day.       No current [facility]-ordered facility-administered medications on file.       Allergies:  Patient has no known allergies.    Health Maintenance: Completed    ROS:  Gen: no fevers/chills, no changes in weight  Eyes: no changes in vision  Pulm: no sob, no cough  CV: no chest pain, no palpitations  GI: no nausea/vomiting, no diarrhea      Objective:     Exam:  /62 (BP Location: Left arm, Patient Position: Sitting, BP Cuff Size: Adult)   Pulse 93   Temp 36.3 °C (97.4 °F) (Temporal)   Ht 1.676 m (5' 6\")   Wt 72.1 kg (159 lb)   LMP 06/14/2012   SpO2 95%   BMI 25.66 kg/m²  Body mass index is 25.66 kg/m².      Constitutional: Alert, no distress, well-groomed.  Skin: Warm, dry, good turgor, no rashes in visible areas.  Eye: Equal, round and reactive, conjunctiva clear, lids normal.  ENMT: Lips without lesions, good dentition, moist mucous membranes.  Neck: Trachea midline, no masses, no thyromegaly.  Respiratory: Unlabored respiratory effort, no cough.  MSK: Normal gait, moves all extremities.  Neuro: Grossly non-focal.   Psych: Alert and oriented x3, normal affect and mood.        Assessment & Plan:     52 y.o. female with the following -     1. Acute respiratory failure with hypoxia (HCC)  2. Multifocal pneumonia due to Streptococcus secondary to influenza infection  New problem.  Secondary to pneumonia.  Continue with home oxygen and slowly wean off.    3. Acute stress reaction  New problem.  Getting better on Wellbutrin.  Continue with Wellbutrin and reassess in 3 months.  - buPROPion (WELLBUTRIN XL) 300 MG XL tablet; Take 1 Tablet by mouth every morning.  Dispense: 90 Tablet; Refill: 1      Return in about 3 months (around 3/28/2023).          Please note that this dictation was created using voice recognition software. I have made every reasonable attempt to correct obvious errors, but I expect that there are errors of grammar " and possibly content that I did not discover before finalizing the note.

## 2023-01-06 DIAGNOSIS — K21.9 GASTROESOPHAGEAL REFLUX DISEASE WITHOUT ESOPHAGITIS: ICD-10-CM

## 2023-01-06 RX ORDER — OMEPRAZOLE 20 MG/1
CAPSULE, DELAYED RELEASE ORAL
Qty: 90 CAPSULE | Refills: 0 | Status: SHIPPED | OUTPATIENT
Start: 2023-01-06 | End: 2023-04-03

## 2023-01-06 NOTE — TELEPHONE ENCOUNTER
Received request via: Pharmacy    Was the patient seen in the last year in this department? Yes    Does the patient have an active prescription (recently filled or refills available) for medication(s) requested? No    Does the patient have long term Plus and need 100 day supply (blood pressure, diabetes and cholesterol meds only)? Patient does not have SCP

## 2023-01-09 ENCOUNTER — PATIENT MESSAGE (OUTPATIENT)
Dept: MEDICAL GROUP | Facility: PHYSICIAN GROUP | Age: 53
End: 2023-01-09
Payer: COMMERCIAL

## 2023-01-09 NOTE — LETTER
January 16, 2023        Mónica Keita  [address] [place] [address]        To whom it may concern:    Okay to discontinue home oxygen. Thank you.     If you have any questions or concerns, please don't hesitate to call.        Sincerely,        Eren Heath M.D.    Electronically Signed

## 2023-01-16 NOTE — TELEPHONE ENCOUNTER
Im not seeing a letter from you on patients Chart stating Patient is okay to discontinue?    Please advise.

## 2023-02-15 DIAGNOSIS — I10 PRIMARY HYPERTENSION: ICD-10-CM

## 2023-02-16 RX ORDER — LISINOPRIL 20 MG/1
20 TABLET ORAL DAILY
Qty: 90 TABLET | Refills: 3 | Status: SHIPPED | OUTPATIENT
Start: 2023-02-16 | End: 2024-02-06 | Stop reason: SDUPTHER

## 2023-04-02 DIAGNOSIS — K21.9 GASTROESOPHAGEAL REFLUX DISEASE WITHOUT ESOPHAGITIS: ICD-10-CM

## 2023-04-03 RX ORDER — OMEPRAZOLE 20 MG/1
CAPSULE, DELAYED RELEASE ORAL
Qty: 90 CAPSULE | Refills: 0 | Status: SHIPPED | OUTPATIENT
Start: 2023-04-03 | End: 2023-07-03

## 2023-04-12 DIAGNOSIS — E83.110 HEREDITARY HEMOCHROMATOSIS (HCC): ICD-10-CM

## 2023-04-20 ENCOUNTER — HOSPITAL ENCOUNTER (OUTPATIENT)
Dept: LAB | Facility: MEDICAL CENTER | Age: 53
End: 2023-04-20
Attending: INTERNAL MEDICINE
Payer: COMMERCIAL

## 2023-04-20 DIAGNOSIS — E83.110 HEREDITARY HEMOCHROMATOSIS (HCC): ICD-10-CM

## 2023-04-24 ENCOUNTER — OFFICE VISIT (OUTPATIENT)
Dept: MEDICAL GROUP | Facility: PHYSICIAN GROUP | Age: 53
End: 2023-04-24
Payer: COMMERCIAL

## 2023-04-24 VITALS
BODY MASS INDEX: 27.47 KG/M2 | HEIGHT: 67 IN | DIASTOLIC BLOOD PRESSURE: 60 MMHG | RESPIRATION RATE: 16 BRPM | HEART RATE: 90 BPM | SYSTOLIC BLOOD PRESSURE: 98 MMHG | OXYGEN SATURATION: 98 % | TEMPERATURE: 96.4 F | WEIGHT: 175 LBS

## 2023-04-24 DIAGNOSIS — F43.0 ACUTE STRESS REACTION: ICD-10-CM

## 2023-04-24 DIAGNOSIS — E11.9 TYPE 2 DIABETES MELLITUS WITHOUT COMPLICATION, WITHOUT LONG-TERM CURRENT USE OF INSULIN (HCC): ICD-10-CM

## 2023-04-24 PROCEDURE — 99214 OFFICE O/P EST MOD 30 MIN: CPT | Performed by: FAMILY MEDICINE

## 2023-04-24 ASSESSMENT — FIBROSIS 4 INDEX: FIB4 SCORE: 0.81

## 2023-04-24 ASSESSMENT — PATIENT HEALTH QUESTIONNAIRE - PHQ9: CLINICAL INTERPRETATION OF PHQ2 SCORE: 0

## 2023-04-24 NOTE — PROGRESS NOTES
Subjective:     Chief Complaint   Patient presents with    Follow-Up     Follow up from when I had pneumonia        HPI:   Mónica presents today to discuss the following.    Acute stress reaction  Ongoing issue  On wellbutrin and doing well  Also taking lexapro    Type 2 diabetes mellitus, without long-term current use of insulin (HCC)  Chronic issue  On metformin 500mg BID  A1c 6.2%    Past Medical History:   Diagnosis Date    Adverse effect of anesthesia     Laryngospasm in the past with extubation    Anemia     Anxiety     Bronchitis 2009    Diabetes (HCC)     type 2    Dissection of cervical artery (HCC)     Elevated liver enzymes     unknown cause    Hypertension     Neuroendocrine tumor 11/2021    abdomen    Pneumonia 2009       Current Outpatient Medications Ordered in Epic   Medication Sig Dispense Refill    omeprazole (PRILOSEC) 20 MG delayed-release capsule TAKE ONE CAPSULE BY MOUTH ONE TIME DAILY 90 Capsule 0    lisinopril (PRINIVIL) 20 MG Tab Take 1 Tablet by mouth every day. 90 Tablet 3    buPROPion (WELLBUTRIN XL) 300 MG XL tablet Take 1 Tablet by mouth every morning. 90 Tablet 1    amLODIPine (NORVASC) 10 MG Tab Take 1 Tablet by mouth every day. 30 Tablet 0    aspirin 81 MG EC tablet Take 1 Tablet by mouth every day. 100 Tablet 0    metFORMIN (GLUCOPHAGE) 500 MG Tab Take 1 Tablet by mouth 2 times a day with meals. 180 Tablet 3    Ferrous Sulfate (IRON PO) Take 1 Tablet by mouth every Monday, Wednesday, and Friday.      hydrOXYzine HCl (ATARAX) 25 MG Tab Take 1 Tablet by mouth 3 times a day as needed for Anxiety. 30 Tablet 3    escitalopram (LEXAPRO) 20 MG tablet Take 2 Tablets by mouth every day. 160 Tablet 3    Cholecalciferol (VITAMIN D3) 125 MCG (5000 UT) Cap Take 1 Capsule by mouth every day.      Coenzyme Q10 (COQ10 PO) Take 1 Tab by mouth every day.      Biotin 5000 MCG Cap Take 5,000 mcg by mouth every day.       No current [facility]-ordered facility-administered medications on file.  "      Allergies:  Patient has no known allergies.    Health Maintenance: Completed    ROS:  Gen: no fevers/chills, no changes in weight  Eyes: no changes in vision  Pulm: no sob, no cough  CV: no chest pain, no palpitations  GI: no nausea/vomiting, no diarrhea      Objective:     Exam:  BP 98/60 (BP Location: Left arm, Patient Position: Sitting, BP Cuff Size: Adult)   Pulse 90   Temp (!) 35.8 °C (96.4 °F) (Temporal)   Resp 16   Ht 1.702 m (5' 7\")   Wt 79.4 kg (175 lb)   LMP 06/14/2012   SpO2 98%   BMI 27.41 kg/m²  Body mass index is 27.41 kg/m².    Gen: Alert and oriented, No apparent distress.  Eyes: PERRLA  Lungs: Normal effort, CTA bilaterally, no wheezes, rhonchi, or rales  CV: Regular rate and rhythm. No murmurs, rubs, or gallops.  Ext: No clubbing, cyanosis, edema.  Skin: no rash, lesions or ulcers  Neuro: Moves all extremities.  Psych: AAOx3          Assessment & Plan:     53 y.o. female with the following -     1. Acute stress reaction  Ongoing condition.  Stable at this time.  Continue with Lexapro and Wellbutrin.  Denies any acute flares at this time.    2. Type 2 diabetes mellitus without complication, without long-term current use of insulin (HCC)  Chronic, stable condition.  Due for repeat labs.  Continue with metformin.  - Hemoglobin A1c; Future  - Microalbumin Creat Ratio Urine - Lab Collect; Future      Return in about 6 months (around 10/24/2023).          Please note that this dictation was created using voice recognition software. I have made every reasonable attempt to correct obvious errors, but I expect that there are errors of grammar and possibly content that I did not discover before finalizing the note.        "

## 2023-05-05 RX ORDER — ESCITALOPRAM OXALATE 20 MG/1
40 TABLET ORAL DAILY
Qty: 160 TABLET | Refills: 0 | Status: SHIPPED | OUTPATIENT
Start: 2023-05-05 | End: 2023-07-24

## 2023-05-24 ENCOUNTER — HOSPITAL ENCOUNTER (OUTPATIENT)
Dept: LAB | Facility: MEDICAL CENTER | Age: 53
End: 2023-05-24
Attending: INTERNAL MEDICINE
Payer: COMMERCIAL

## 2023-05-24 ENCOUNTER — HOSPITAL ENCOUNTER (OUTPATIENT)
Dept: LAB | Facility: MEDICAL CENTER | Age: 53
End: 2023-05-24
Attending: FAMILY MEDICINE
Payer: COMMERCIAL

## 2023-05-24 DIAGNOSIS — E83.110 HEREDITARY HEMOCHROMATOSIS (HCC): ICD-10-CM

## 2023-05-24 DIAGNOSIS — E11.9 TYPE 2 DIABETES MELLITUS WITHOUT COMPLICATION, WITHOUT LONG-TERM CURRENT USE OF INSULIN (HCC): ICD-10-CM

## 2023-05-24 LAB
BASOPHILS # BLD AUTO: 0.7 % (ref 0–1.8)
BASOPHILS # BLD: 0.04 K/UL (ref 0–0.12)
EOSINOPHIL # BLD AUTO: 0.18 K/UL (ref 0–0.51)
EOSINOPHIL NFR BLD: 2.9 % (ref 0–6.9)
ERYTHROCYTE [DISTWIDTH] IN BLOOD BY AUTOMATED COUNT: 44.2 FL (ref 35.9–50)
EST. AVERAGE GLUCOSE BLD GHB EST-MCNC: 140 MG/DL
FERRITIN SERPL-MCNC: 64.5 NG/ML (ref 10–291)
HBA1C MFR BLD: 6.5 % (ref 4–5.6)
HCT VFR BLD AUTO: 44.2 % (ref 37–47)
HGB BLD-MCNC: 14.6 G/DL (ref 12–16)
IMM GRANULOCYTES # BLD AUTO: 0.02 K/UL (ref 0–0.11)
IMM GRANULOCYTES NFR BLD AUTO: 0.3 % (ref 0–0.9)
IRON SATN MFR SERPL: 32 % (ref 15–55)
IRON SERPL-MCNC: 122 UG/DL (ref 40–170)
LYMPHOCYTES # BLD AUTO: 2.02 K/UL (ref 1–4.8)
LYMPHOCYTES NFR BLD: 33 % (ref 22–41)
MCH RBC QN AUTO: 27.6 PG (ref 27–33)
MCHC RBC AUTO-ENTMCNC: 33 G/DL (ref 32.2–35.5)
MCV RBC AUTO: 83.6 FL (ref 81.4–97.8)
MONOCYTES # BLD AUTO: 0.37 K/UL (ref 0–0.85)
MONOCYTES NFR BLD AUTO: 6 % (ref 0–13.4)
NEUTROPHILS # BLD AUTO: 3.49 K/UL (ref 1.82–7.42)
NEUTROPHILS NFR BLD: 57.1 % (ref 44–72)
NRBC # BLD AUTO: 0 K/UL
NRBC BLD-RTO: 0 /100 WBC (ref 0–0.2)
PLATELET # BLD AUTO: 237 K/UL (ref 164–446)
PMV BLD AUTO: 10 FL (ref 9–12.9)
RBC # BLD AUTO: 5.29 M/UL (ref 4.2–5.4)
TIBC SERPL-MCNC: 383 UG/DL (ref 250–450)
UIBC SERPL-MCNC: 261 UG/DL (ref 110–370)
WBC # BLD AUTO: 6.1 K/UL (ref 4.8–10.8)

## 2023-05-24 PROCEDURE — 85025 COMPLETE CBC W/AUTO DIFF WBC: CPT

## 2023-05-24 PROCEDURE — 82728 ASSAY OF FERRITIN: CPT

## 2023-05-24 PROCEDURE — 83540 ASSAY OF IRON: CPT

## 2023-05-24 PROCEDURE — 83036 HEMOGLOBIN GLYCOSYLATED A1C: CPT

## 2023-05-24 PROCEDURE — 82043 UR ALBUMIN QUANTITATIVE: CPT

## 2023-05-24 PROCEDURE — 83550 IRON BINDING TEST: CPT

## 2023-05-24 PROCEDURE — 82570 ASSAY OF URINE CREATININE: CPT

## 2023-05-24 PROCEDURE — 36415 COLL VENOUS BLD VENIPUNCTURE: CPT

## 2023-05-25 LAB
CREAT UR-MCNC: 215.69 MG/DL
MICROALBUMIN UR-MCNC: <1.2 MG/DL
MICROALBUMIN/CREAT UR: NORMAL MG/G (ref 0–30)

## 2023-06-13 NOTE — PROGRESS NOTES
06/19/23    Subjective    Chief Complaint:  Follow up hemochromatosis and neuroendocrine tumor    HPI:  53 female last seen by me in September 2021. She had previously been a patient of Dr. Oh at [facility] and came with a diagnosis of heterozygous hemochromatosis as well as having had a low grade neuroendocrine tumor based on an EGD. On that visit we requested a PET dototate and iron studies and a hemochromatosis genotype. The latter test showed her to be a heterozygote for H63D. She says she did have the PET and it was negative. Is followed by GI for her neuroendocrine tumor. She was hospitalized 12/22 with flu and pneumonia. Was anemic at that time. Has been taking iron TIW.     ROS:    Constitutional: No weight loss  Skin: No rash or jaundice  HENT: No change in eyesight or hearing  Cardiovascular:No chest pain or arrythmia  Respiratory:No cough or SOB  GI:No nausea, vomiting, diarrhea, constipation  :No dysuria or frequency  Musculoskeletal:No bone or joint pain  Neuro:No sx's of neuropathy  Psych: No complaints    PMH:      No Known Allergies    Past Medical History:   Diagnosis Date    Adverse effect of anesthesia     Laryngospasm in the past with extubation    Anemia     Anxiety     Bronchitis 2009    Diabetes (HCC)     type 2    Dissection of cervical artery (HCC)     Elevated liver enzymes     unknown cause    Hypertension     Neuroendocrine tumor 11/2021    abdomen    Pneumonia 2009        Past Surgical History:   Procedure Laterality Date    MI UPPER GI ENDOSCOPY,DIAGNOSIS N/A 11/01/2021    Procedure: GASTROSCOPY- WITH ENDOSCOPIC MUCOSAL RESECTION;  Surgeon: Edwin Aguillon M.D.;  Location: [facility];  Service: EUS    EGD W/ENDOSCOPIC ULTRASOUND N/A 11/01/2021    Procedure: EGD, WITH ENDOSCOPIC US - UPPER RADIAL;  Surgeon: Edwin Aguillon M.D.;  Location: [facility];  Service: EUS    PELVIC EXAM UNDER ANESTHESIA  06/17/2014    Performed by Enedina Grant M.D. at  SURGERY [facility] ORS    VAGINAL HYSTERECTOMY TOTAL  06/17/2014    Performed by Enedina Grant M.D. at SURGERY [facility] ORS    HYSTERECTOMY, VAGINAL  06/04/2014    [facility]    APPENDECTOMY  2014        Medications:    Current Outpatient Medications on File Prior to Encounter   Medication Sig Dispense Refill    metFORMIN (GLUCOPHAGE) 500 MG Tab Take 1 Tablet by mouth 2 times a day with meals. 180 Tablet 1    escitalopram (LEXAPRO) 20 MG tablet Take 2 Tablets by mouth every day. 160 Tablet 0    hydrOXYzine HCl (ATARAX) 25 MG Tab Take 1 Tablet by mouth 3 times a day as needed for Anxiety. 90 Tablet 3    omeprazole (PRILOSEC) 20 MG delayed-release capsule TAKE ONE CAPSULE BY MOUTH ONE TIME DAILY 90 Capsule 0    lisinopril (PRINIVIL) 20 MG Tab Take 1 Tablet by mouth every day. 90 Tablet 3    buPROPion (WELLBUTRIN XL) 300 MG XL tablet Take 1 Tablet by mouth every morning. 90 Tablet 1    aspirin 81 MG EC tablet Take 1 Tablet by mouth every day. 100 Tablet 0    Ferrous Sulfate (IRON PO) Take 1 Tablet by mouth every Monday, Wednesday, and Friday.      Cholecalciferol (VITAMIN D3) 125 MCG (5000 UT) Cap Take 1 Capsule by mouth every day.      Coenzyme Q10 (COQ10 PO) Take 1 Tab by mouth every day.      Biotin 5000 MCG Cap Take 5,000 mcg by mouth every day.      amLODIPine (NORVASC) 10 MG Tab Take 1 Tablet by mouth every day. 30 Tablet 0     No current facility-administered medications on file prior to encounter.       Social History     Tobacco Use    Smoking status: Never    Smokeless tobacco: Never   Vaping Use    Vaping Use: Never used   Substance Use Topics    Alcohol use: No        Family History   Problem Relation Age of Onset    Heart Disease Father     No Known Problems Sister     Heart Attack Brother         CAD. 3 MI's.     No Known Problems Son     No Known Problems Daughter         Objective    Vitals:    /80 (BP Location: Right arm, Patient Position: Sitting, BP Cuff Size: Adult)   Pulse  "(!) 110   Temp 36.4 °C (97.5 °F) (Temporal)   Resp 16   Ht 1.702 m (5' 7.01\")   Wt 81.8 kg (180 lb 7.1 oz)   LMP 06/14/2012   SpO2 97%   BMI 28.26 kg/m²     Physical Exam:    Appears well-developed and well-nourished. No distress.    Head -  Normocephalic .   Eyes - Pupils are equal. Conjunctivae normal. No scleral icterus.   Ears - normal hearing  Neck - Neck supple. No thyromegaly  Cardiovascular - Normal rate, regular rhythm, normal heart sounds and intact distal pulses. No  gallop, murmur or rub  Pulmonary - Normal breath sounds.  No wheeze, rales or rhonchi  Breast - Not examined  Abdominal -Soft. No distension, tenderness, organomegaly or mass  Extremities-  No edema or tenderness.    Nodes - No submental, submandibular, preauricular, cervical, axillary or inguinal adenopathy.    Neurological -   Alert and oriented.  Skin - Skin is warm and dry. No rash noted. Not diaphoretic. No erythema. No pallor. No jaundice   Psychiatric -  Normal mood and affect.    Labs:     Most Recent   C282Y Mutation Negative  11/9/21 11:06   H63D Mutation Heterozygous  11/9/21 11:06   S65C Mutation Negative  11/9/21 11:06      Latest Reference Range & Units 05/24/23 11:19   WBC 4.8 - 10.8 K/uL 6.1   RBC 4.20 - 5.40 M/uL 5.29   Hemoglobin 12.0 - 16.0 g/dL 14.6   Hematocrit 37.0 - 47.0 % 44.2   MCV 81.4 - 97.8 fL 83.6   MCH 27.0 - 33.0 pg 27.6   MCHC 32.2 - 35.5 g/dL 33.0   RDW 35.9 - 50.0 fL 44.2   Platelet Count 164 - 446 K/uL 237      Latest Reference Range & Units 11/23/22 17:45 05/24/23 11:19   Ferritin 10.0 - 291.0 ng/mL 30.5 64.5      Latest Reference Range & Units 05/24/23 11:19   Iron 40 - 170 ug/dL 122   Total Iron Binding 250 - 450 ug/dL 383   % Saturation 15 - 55 % 32       Assessment    Imp:    Visit Diagnosis:    1. Hereditary hemochromatosis (HCC)  IRON/TOTAL IRON BIND    CBC WITH DIFFERENTIAL    FERRITIN      2. Type 2 diabetes mellitus without complication, without long-term current use of insulin (HCC)      "   3. Neuroendocrine neoplasm of stomach          Plan:  Stop the iron pills  3 months with lab prior    Nik Lawrence M.D.

## 2023-06-19 ENCOUNTER — HOSPITAL ENCOUNTER (OUTPATIENT)
Dept: HEMATOLOGY ONCOLOGY | Facility: MEDICAL CENTER | Age: 53
End: 2023-06-19
Attending: INTERNAL MEDICINE
Payer: COMMERCIAL

## 2023-06-19 VITALS
DIASTOLIC BLOOD PRESSURE: 80 MMHG | HEART RATE: 110 BPM | TEMPERATURE: 97.5 F | RESPIRATION RATE: 16 BRPM | OXYGEN SATURATION: 97 % | BODY MASS INDEX: 28.32 KG/M2 | SYSTOLIC BLOOD PRESSURE: 122 MMHG | HEIGHT: 67 IN | WEIGHT: 180.45 LBS

## 2023-06-19 DIAGNOSIS — E11.9 TYPE 2 DIABETES MELLITUS WITHOUT COMPLICATION, WITHOUT LONG-TERM CURRENT USE OF INSULIN (HCC): ICD-10-CM

## 2023-06-19 DIAGNOSIS — D3A.8 NEUROENDOCRINE NEOPLASM OF STOMACH: ICD-10-CM

## 2023-06-19 DIAGNOSIS — E83.110 HEREDITARY HEMOCHROMATOSIS (HCC): ICD-10-CM

## 2023-06-19 PROCEDURE — 99212 OFFICE O/P EST SF 10 MIN: CPT | Performed by: INTERNAL MEDICINE

## 2023-06-19 PROCEDURE — 99213 OFFICE O/P EST LOW 20 MIN: CPT | Performed by: INTERNAL MEDICINE

## 2023-06-19 ASSESSMENT — FIBROSIS 4 INDEX: FIB4 SCORE: 1.55

## 2023-06-19 NOTE — ADDENDUM NOTE
Encounter addended by: Jose M Burton on: 6/19/2023 3:49 PM   Actions taken: Charge Capture section accepted

## 2023-07-02 DIAGNOSIS — K21.9 GASTROESOPHAGEAL REFLUX DISEASE WITHOUT ESOPHAGITIS: ICD-10-CM

## 2023-07-02 DIAGNOSIS — F43.0 ACUTE STRESS REACTION: ICD-10-CM

## 2023-07-03 RX ORDER — OMEPRAZOLE 20 MG/1
CAPSULE, DELAYED RELEASE ORAL
Qty: 90 CAPSULE | Refills: 0 | Status: SHIPPED | OUTPATIENT
Start: 2023-07-03 | End: 2023-09-28 | Stop reason: SDUPTHER

## 2023-07-03 RX ORDER — BUPROPION HYDROCHLORIDE 300 MG/1
TABLET ORAL
Qty: 90 TABLET | Refills: 0 | Status: SHIPPED | OUTPATIENT
Start: 2023-07-03 | End: 2023-09-28 | Stop reason: SDUPTHER

## 2023-07-24 RX ORDER — ESCITALOPRAM OXALATE 20 MG/1
40 TABLET ORAL DAILY
Qty: 160 TABLET | Refills: 0 | Status: SHIPPED | OUTPATIENT
Start: 2023-07-24 | End: 2023-10-12 | Stop reason: SDUPTHER

## 2023-08-07 ENCOUNTER — TELEPHONE (OUTPATIENT)
Dept: HEALTH INFORMATION MANAGEMENT | Facility: OTHER | Age: 53
End: 2023-08-07
Payer: COMMERCIAL

## 2023-09-08 ENCOUNTER — HOSPITAL ENCOUNTER (OUTPATIENT)
Dept: LAB | Facility: MEDICAL CENTER | Age: 53
End: 2023-09-08
Attending: INTERNAL MEDICINE
Payer: COMMERCIAL

## 2023-09-08 DIAGNOSIS — E83.110 HEREDITARY HEMOCHROMATOSIS (HCC): ICD-10-CM

## 2023-09-08 LAB
BASOPHILS # BLD AUTO: 0.6 % (ref 0–1.8)
BASOPHILS # BLD: 0.03 K/UL (ref 0–0.12)
EOSINOPHIL # BLD AUTO: 0.1 K/UL (ref 0–0.51)
EOSINOPHIL NFR BLD: 1.9 % (ref 0–6.9)
ERYTHROCYTE [DISTWIDTH] IN BLOOD BY AUTOMATED COUNT: 39.5 FL (ref 35.9–50)
FERRITIN SERPL-MCNC: 68.9 NG/ML (ref 10–291)
HCT VFR BLD AUTO: 45 % (ref 37–47)
HGB BLD-MCNC: 14.9 G/DL (ref 12–16)
IMM GRANULOCYTES # BLD AUTO: 0.01 K/UL (ref 0–0.11)
IMM GRANULOCYTES NFR BLD AUTO: 0.2 % (ref 0–0.9)
IRON SATN MFR SERPL: 23 % (ref 15–55)
IRON SERPL-MCNC: 90 UG/DL (ref 40–170)
LYMPHOCYTES # BLD AUTO: 2.09 K/UL (ref 1–4.8)
LYMPHOCYTES NFR BLD: 39.9 % (ref 22–41)
MCH RBC QN AUTO: 28 PG (ref 27–33)
MCHC RBC AUTO-ENTMCNC: 33.1 G/DL (ref 32.2–35.5)
MCV RBC AUTO: 84.4 FL (ref 81.4–97.8)
MONOCYTES # BLD AUTO: 0.34 K/UL (ref 0–0.85)
MONOCYTES NFR BLD AUTO: 6.5 % (ref 0–13.4)
NEUTROPHILS # BLD AUTO: 2.67 K/UL (ref 1.82–7.42)
NEUTROPHILS NFR BLD: 50.9 % (ref 44–72)
NRBC # BLD AUTO: 0 K/UL
NRBC BLD-RTO: 0 /100 WBC (ref 0–0.2)
PLATELET # BLD AUTO: 222 K/UL (ref 164–446)
PMV BLD AUTO: 10.5 FL (ref 9–12.9)
RBC # BLD AUTO: 5.33 M/UL (ref 4.2–5.4)
TIBC SERPL-MCNC: 389 UG/DL (ref 250–450)
UIBC SERPL-MCNC: 299 UG/DL (ref 110–370)
WBC # BLD AUTO: 5.2 K/UL (ref 4.8–10.8)

## 2023-09-08 PROCEDURE — 83540 ASSAY OF IRON: CPT

## 2023-09-08 PROCEDURE — 85025 COMPLETE CBC W/AUTO DIFF WBC: CPT

## 2023-09-08 PROCEDURE — 82728 ASSAY OF FERRITIN: CPT

## 2023-09-08 PROCEDURE — 36415 COLL VENOUS BLD VENIPUNCTURE: CPT

## 2023-09-08 PROCEDURE — 83550 IRON BINDING TEST: CPT

## 2023-09-13 ENCOUNTER — APPOINTMENT (OUTPATIENT)
Dept: DERMATOLOGY | Facility: IMAGING CENTER | Age: 53
End: 2023-09-13
Payer: COMMERCIAL

## 2023-09-15 NOTE — PROGRESS NOTES
09/20/23    Subjective    Chief Complaint:  Follow up hemochromatosis     HPI:  53 female H63D heterozygote. Also had h/o a low grade neuroendocrine tumor of the stomach followed by GI. Previously treated by Dr. Oh and became iron deficient and subsequently treated with iron. Most recent lab shows normnal CBC, MCV and iron studies with a ferritin of 68. Owns a gymnastic studio.     ROS:    Constitutional: No weight loss  Skin: No rash or jaundice  HENT: No change in eyesight or hearing  Cardiovascular:No chest pain or arrythmia  Respiratory:No cough or SOB  GI:No nausea, vomiting, diarrhea, constipation  :No dysuria or frequency  Musculoskeletal:No bone or joint pain  Neuro:No sx's of neuropathy  Psych: No complaints    PMH:      No Known Allergies    Past Medical History:   Diagnosis Date    Adverse effect of anesthesia     Laryngospasm in the past with extubation    Anemia     Anxiety     Bronchitis 2009    Diabetes (HCC)     type 2    Dissection of cervical artery (HCC)     Elevated liver enzymes     unknown cause    Hypertension     Neuroendocrine tumor 11/2021    abdomen    Pneumonia 2009        Past Surgical History:   Procedure Laterality Date    OR UPPER GI ENDOSCOPY,DIAGNOSIS N/A 11/01/2021    Procedure: GASTROSCOPY- WITH ENDOSCOPIC MUCOSAL RESECTION;  Surgeon: Edwin Aguillon M.D.;  Location: [facility];  Service: EUS    EGD W/ENDOSCOPIC ULTRASOUND N/A 11/01/2021    Procedure: EGD, WITH ENDOSCOPIC US - UPPER RADIAL;  Surgeon: Edwin Aguillon M.D.;  Location: [facility];  Service: EUS    PELVIC EXAM UNDER ANESTHESIA  06/17/2014    Performed by Enedina Grant M.D. at SURGERY [facility]    VAGINAL HYSTERECTOMY TOTAL  06/17/2014    Performed by Enedina Grant M.D. at SURGERY [facility] ORS    HYSTERECTOMY, VAGINAL  06/04/2014    [facility]    APPENDECTOMY  2014        Medications:    Current Outpatient Medications on File Prior to Visit    Medication Sig Dispense Refill    escitalopram (LEXAPRO) 20 MG tablet Take 2 Tablets by mouth every day. 160 Tablet 0    buPROPion (WELLBUTRIN XL) 300 MG XL tablet TAKE ONE TABLET BY MOUTH EVERY DAY IN THE MORNING. 90 Tablet 0    omeprazole (PRILOSEC) 20 MG delayed-release capsule TAKE ONE CAPSULE BY MOUTH ONE TIME DAILY 90 Capsule 0    metFORMIN (GLUCOPHAGE) 500 MG Tab Take 1 Tablet by mouth 2 times a day with meals. 180 Tablet 1    hydrOXYzine HCl (ATARAX) 25 MG Tab Take 1 Tablet by mouth 3 times a day as needed for Anxiety. 90 Tablet 3    lisinopril (PRINIVIL) 20 MG Tab Take 1 Tablet by mouth every day. 90 Tablet 3    amLODIPine (NORVASC) 10 MG Tab Take 1 Tablet by mouth every day. 30 Tablet 0    aspirin 81 MG EC tablet Take 1 Tablet by mouth every day. 100 Tablet 0    Ferrous Sulfate (IRON PO) Take 1 Tablet by mouth every Monday, Wednesday, and Friday.      Cholecalciferol (VITAMIN D3) 125 MCG (5000 UT) Cap Take 1 Capsule by mouth every day.      Coenzyme Q10 (COQ10 PO) Take 1 Tab by mouth every day.      Biotin 5000 MCG Cap Take 5,000 mcg by mouth every day.       No current facility-administered medications on file prior to visit.       Social History     Tobacco Use    Smoking status: Never    Smokeless tobacco: Never   Substance Use Topics    Alcohol use: No        Family History   Problem Relation Age of Onset    Heart Disease Father     No Known Problems Sister     Heart Attack Brother         CAD. 3 MI's.     No Known Problems Son     No Known Problems Daughter         Objective    Vitals:    [facility] 06/14/2012     Physical Exam:    Appears well-developed and well-nourished. No distress.    Head -  Normocephalic .   Eyes - Pupils are equal. Conjunctivae normal. No scleral icterus.   Ears - normal hearing    Neurological -   Alert and oriented.  Skin - Skin is warm and dry. No rash noted. Not diaphoretic. No erythema. No pallor. No jaundice   Psychiatric -  Normal mood and affect.    Labs:     Latest  Reference Range & Units 12/12/22 01:43 12/13/22 03:14 12/14/22 01:23 05/24/23 11:19 09/08/23 13:13   WBC 4.8 - 10.8 K/uL 9.0 7.9 7.8 6.1 5.2   RBC 4.20 - 5.40 M/uL 3.77 (L) 3.66 (L) 3.66 (L) 5.29 5.33   Hemoglobin 12.0 - 16.0 g/dL 10.3 (L) 10.0 (L) 10.0 (L) 14.6 14.9   Hematocrit 37.0 - 47.0 % 31.6 (L) 30.4 (L) 30.7 (L) 44.2 45.0   MCV 81.4 - 97.8 fL 83.8 83.1 83.9 83.6 84.4   MCH 27.0 - 33.0 pg 27.3 27.3 27.3 27.6 28.0   MCHC 32.2 - 35.5 g/dL 32.6 (L) 32.9 (L) 32.6 (L) 33.0 33.1   RDW 35.9 - 50.0 fL 41.7 41.1 42.4 44.2 39.5   Platelet Count 164 - 446 K/uL 363 381 452 (H) 237 222      Latest Reference Range & Units 05/24/23 11:19 09/08/23 13:13   Iron 40 - 170 ug/dL 122 90   Total Iron Binding 250 - 450 ug/dL 383 389   % Saturation 15 - 55 % 32 23      Latest Reference Range & Units 11/23/22 17:45 05/24/23 11:19 09/08/23 13:13   Ferritin 10.0 - 291.0 ng/mL 30.5 64.5 68.9     Assessment  Stable  Imp:    Visit Diagnosis:    1. Hereditary hemochromatosis (HCC)        2. Type 2 diabetes mellitus without complication, without long-term current use of insulin (HCC)        3. Neuroendocrine neoplasm of stomach            Plan:  Stay off iron  Follow up with GI re neuroendocrine tumor hx  See us in 6 months    Nik Lawrence M.D.

## 2023-09-20 ENCOUNTER — HOSPITAL ENCOUNTER (OUTPATIENT)
Dept: HEMATOLOGY ONCOLOGY | Facility: MEDICAL CENTER | Age: 53
End: 2023-09-20
Attending: INTERNAL MEDICINE
Payer: COMMERCIAL

## 2023-09-20 VITALS
WEIGHT: 184.08 LBS | BODY MASS INDEX: 28.89 KG/M2 | DIASTOLIC BLOOD PRESSURE: 90 MMHG | HEIGHT: 67 IN | OXYGEN SATURATION: 96 % | RESPIRATION RATE: 22 BRPM | HEART RATE: 110 BPM | TEMPERATURE: 98.2 F | SYSTOLIC BLOOD PRESSURE: 100 MMHG

## 2023-09-20 DIAGNOSIS — D3A.8 NEUROENDOCRINE NEOPLASM OF STOMACH: ICD-10-CM

## 2023-09-20 DIAGNOSIS — E11.9 TYPE 2 DIABETES MELLITUS WITHOUT COMPLICATION, WITHOUT LONG-TERM CURRENT USE OF INSULIN (HCC): ICD-10-CM

## 2023-09-20 DIAGNOSIS — E83.110 HEREDITARY HEMOCHROMATOSIS (HCC): ICD-10-CM

## 2023-09-20 PROCEDURE — 99212 OFFICE O/P EST SF 10 MIN: CPT | Performed by: INTERNAL MEDICINE

## 2023-09-20 PROCEDURE — 99213 OFFICE O/P EST LOW 20 MIN: CPT | Performed by: INTERNAL MEDICINE

## 2023-09-20 ASSESSMENT — FIBROSIS 4 INDEX: FIB4 SCORE: 1.66

## 2023-09-20 ASSESSMENT — PAIN SCALES - GENERAL: PAINLEVEL: NO PAIN

## 2023-09-21 ENCOUNTER — OFFICE VISIT (OUTPATIENT)
Dept: DERMATOLOGY | Facility: IMAGING CENTER | Age: 53
End: 2023-09-21
Payer: COMMERCIAL

## 2023-09-21 DIAGNOSIS — L71.9 ROSACEA: ICD-10-CM

## 2023-09-21 DIAGNOSIS — L82.0 LICHENOID KERATOSIS: ICD-10-CM

## 2023-09-21 PROCEDURE — 99214 OFFICE O/P EST MOD 30 MIN: CPT | Performed by: NURSE PRACTITIONER

## 2023-09-21 NOTE — PROGRESS NOTES
DERMATOLOGY NOTE  NEW VISIT       Chief complaint: Establish Care and Skin Lesion (back)     HPI/location: Mid back  Time present: Approx 1month  Painful lesion: No  Itching lesion: Yes  Enlarging lesion: No  Anything make it better or worse? No      History of skin cancer: No  History of precancers/actinic keratoses: No  History of biopsies:Yes, Details: Left arm benign  History of blistering/severe sunburns:Yes, Details: Teens   Family history of skin cancer:No  Family history of atypical moles:No      No Known Allergies     MEDICATIONS:  Medications relevant to specialty reviewed.     REVIEW OF SYSTEMS:   Positive for skin (see HPI)  Negative for fevers and chills       EXAM:  LMP 06/14/2012   Constitutional: Well-developed, well-nourished, and in no distress.     A focused skin exam was performed including the affected areas of the back. Notable findings on exam today listed below and/or in assessment/plan.     Erythematous inflamed papule with remnants of pigment pattern to L mid back  Central face erythema with very few erythematous papules, no pustules    IMPRESSION / PLAN:    1. Rosacea  Favored diagnosis  Discussed course/nature of disease  Advised to avoid known triggers  Rx below  Follow up for no improvement  - metronidazole (METROCREAM) 0.75 % cream; AAA twice a day  Dispense: 45 g; Refill: 1    2. Lichenoid keratosis  Discussed course/nature of disease  Shared content from [employer]  Advised follow up for no improvement      Patient verbalized understanding and agrees with plan regarding the above      Please note that this dictation was created using voice recognition software. I have made every reasonable attempt to correct obvious errors, but I expect that there are errors of grammar and possibly content that I did not discover before finalizing the note.      Return to clinic in: Return for PRN. and as needed for any new or changing skin lesions.

## 2023-09-26 SDOH — ECONOMIC STABILITY: HOUSING INSECURITY: IN THE LAST 12 MONTHS, HOW MANY PLACES HAVE YOU LIVED?: 1

## 2023-09-26 SDOH — ECONOMIC STABILITY: TRANSPORTATION INSECURITY
IN THE PAST 12 MONTHS, HAS THE LACK OF TRANSPORTATION KEPT YOU FROM MEDICAL APPOINTMENTS OR FROM GETTING MEDICATIONS?: NO

## 2023-09-26 SDOH — ECONOMIC STABILITY: HOUSING INSECURITY
IN THE LAST 12 MONTHS, WAS THERE A TIME WHEN YOU DID NOT HAVE A STEADY PLACE TO SLEEP OR SLEPT IN A SHELTER (INCLUDING NOW)?: NO

## 2023-09-26 SDOH — ECONOMIC STABILITY: INCOME INSECURITY: HOW HARD IS IT FOR YOU TO PAY FOR THE VERY BASICS LIKE FOOD, HOUSING, MEDICAL CARE, AND HEATING?: NOT HARD AT ALL

## 2023-09-26 SDOH — ECONOMIC STABILITY: FOOD INSECURITY: WITHIN THE PAST 12 MONTHS, THE FOOD YOU BOUGHT JUST DIDN'T LAST AND YOU DIDN'T HAVE MONEY TO GET MORE.: NEVER TRUE

## 2023-09-26 SDOH — ECONOMIC STABILITY: INCOME INSECURITY: IN THE LAST 12 MONTHS, WAS THERE A TIME WHEN YOU WERE NOT ABLE TO PAY THE MORTGAGE OR RENT ON TIME?: NO

## 2023-09-26 SDOH — ECONOMIC STABILITY: TRANSPORTATION INSECURITY
IN THE PAST 12 MONTHS, HAS LACK OF RELIABLE TRANSPORTATION KEPT YOU FROM MEDICAL APPOINTMENTS, MEETINGS, WORK OR FROM GETTING THINGS NEEDED FOR DAILY LIVING?: NO

## 2023-09-26 SDOH — ECONOMIC STABILITY: FOOD INSECURITY: WITHIN THE PAST 12 MONTHS, YOU WORRIED THAT YOUR FOOD WOULD RUN OUT BEFORE YOU GOT MONEY TO BUY MORE.: NEVER TRUE

## 2023-09-26 SDOH — HEALTH STABILITY: PHYSICAL HEALTH: ON AVERAGE, HOW MANY DAYS PER WEEK DO YOU ENGAGE IN MODERATE TO STRENUOUS EXERCISE (LIKE A BRISK WALK)?: 2 DAYS

## 2023-09-26 SDOH — HEALTH STABILITY: PHYSICAL HEALTH: ON AVERAGE, HOW MANY MINUTES DO YOU ENGAGE IN EXERCISE AT THIS LEVEL?: 30 MIN

## 2023-09-26 SDOH — ECONOMIC STABILITY: TRANSPORTATION INSECURITY
IN THE PAST 12 MONTHS, HAS LACK OF TRANSPORTATION KEPT YOU FROM MEETINGS, WORK, OR FROM GETTING THINGS NEEDED FOR DAILY LIVING?: NO

## 2023-09-26 SDOH — HEALTH STABILITY: MENTAL HEALTH
STRESS IS WHEN SOMEONE FEELS TENSE, NERVOUS, ANXIOUS, OR CAN'T SLEEP AT NIGHT BECAUSE THEIR MIND IS TROUBLED. HOW STRESSED ARE YOU?: TO SOME EXTENT

## 2023-09-26 ASSESSMENT — SOCIAL DETERMINANTS OF HEALTH (SDOH)
DO YOU BELONG TO ANY CLUBS OR ORGANIZATIONS SUCH AS CHURCH GROUPS UNIONS, FRATERNAL OR ATHLETIC GROUPS, OR SCHOOL GROUPS?: YES
WITHIN THE PAST 12 MONTHS, YOU WORRIED THAT YOUR FOOD WOULD RUN OUT BEFORE YOU GOT THE MONEY TO BUY MORE: NEVER TRUE
HOW HARD IS IT FOR YOU TO PAY FOR THE VERY BASICS LIKE FOOD, HOUSING, MEDICAL CARE, AND HEATING?: NOT HARD AT ALL
HOW OFTEN DO YOU GET TOGETHER WITH FRIENDS OR RELATIVES?: ONCE A WEEK
HOW OFTEN DO YOU HAVE SIX OR MORE DRINKS ON ONE OCCASION: NEVER
HOW OFTEN DO YOU ATTEND CHURCH OR RELIGIOUS SERVICES?: NEVER
HOW OFTEN DO YOU GET TOGETHER WITH FRIENDS OR RELATIVES?: ONCE A WEEK
HOW OFTEN DO YOU ATTEND CHURCH OR RELIGIOUS SERVICES?: NEVER
HOW OFTEN DO YOU ATTENT MEETINGS OF THE CLUB OR ORGANIZATION YOU BELONG TO?: MORE THAN 4 TIMES PER YEAR
HOW MANY DRINKS CONTAINING ALCOHOL DO YOU HAVE ON A TYPICAL DAY WHEN YOU ARE DRINKING: PATIENT DOES NOT DRINK
IN A TYPICAL WEEK, HOW MANY TIMES DO YOU TALK ON THE PHONE WITH FAMILY, FRIENDS, OR NEIGHBORS?: NEVER
IN A TYPICAL WEEK, HOW MANY TIMES DO YOU TALK ON THE PHONE WITH FAMILY, FRIENDS, OR NEIGHBORS?: NEVER
HOW OFTEN DO YOU HAVE A DRINK CONTAINING ALCOHOL: NEVER
DO YOU BELONG TO ANY CLUBS OR ORGANIZATIONS SUCH AS CHURCH GROUPS UNIONS, FRATERNAL OR ATHLETIC GROUPS, OR SCHOOL GROUPS?: YES
HOW OFTEN DO YOU ATTENT MEETINGS OF THE CLUB OR ORGANIZATION YOU BELONG TO?: MORE THAN 4 TIMES PER YEAR

## 2023-09-26 ASSESSMENT — LIFESTYLE VARIABLES
HOW OFTEN DO YOU HAVE A DRINK CONTAINING ALCOHOL: NEVER
HOW MANY STANDARD DRINKS CONTAINING ALCOHOL DO YOU HAVE ON A TYPICAL DAY: PATIENT DOES NOT DRINK
SKIP TO QUESTIONS 9-10: 1
HOW OFTEN DO YOU HAVE SIX OR MORE DRINKS ON ONE OCCASION: NEVER
AUDIT-C TOTAL SCORE: 0

## 2023-09-28 DIAGNOSIS — K21.9 GASTROESOPHAGEAL REFLUX DISEASE WITHOUT ESOPHAGITIS: ICD-10-CM

## 2023-09-28 DIAGNOSIS — F43.0 ACUTE STRESS REACTION: ICD-10-CM

## 2023-09-28 RX ORDER — OMEPRAZOLE 20 MG/1
20 CAPSULE, DELAYED RELEASE ORAL DAILY
Qty: 90 CAPSULE | Refills: 0 | Status: SHIPPED | OUTPATIENT
Start: 2023-09-28 | End: 2023-12-26

## 2023-09-28 RX ORDER — BUPROPION HYDROCHLORIDE 300 MG/1
TABLET ORAL
Qty: 90 TABLET | Refills: 0 | Status: SHIPPED | OUTPATIENT
Start: 2023-09-28 | End: 2023-12-26

## 2023-09-29 ENCOUNTER — APPOINTMENT (OUTPATIENT)
Dept: MEDICAL GROUP | Facility: MEDICAL CENTER | Age: 53
End: 2023-09-29
Payer: COMMERCIAL

## 2023-09-29 ENCOUNTER — OFFICE VISIT (OUTPATIENT)
Dept: MEDICAL GROUP | Facility: PHYSICIAN GROUP | Age: 53
End: 2023-09-29
Payer: COMMERCIAL

## 2023-09-29 VITALS
BODY MASS INDEX: 28.85 KG/M2 | WEIGHT: 184.2 LBS | HEART RATE: 117 BPM | TEMPERATURE: 95.6 F | OXYGEN SATURATION: 98 % | SYSTOLIC BLOOD PRESSURE: 118 MMHG | DIASTOLIC BLOOD PRESSURE: 78 MMHG

## 2023-09-29 DIAGNOSIS — R23.2 HOT FLASHES: ICD-10-CM

## 2023-09-29 DIAGNOSIS — F51.04 PSYCHOPHYSIOLOGICAL INSOMNIA: ICD-10-CM

## 2023-09-29 DIAGNOSIS — K76.9 CHRONIC NONALCOHOLIC LIVER DISEASE: ICD-10-CM

## 2023-09-29 DIAGNOSIS — E83.119 HEMOCHROMATOSIS, UNSPECIFIED HEMOCHROMATOSIS TYPE: ICD-10-CM

## 2023-09-29 DIAGNOSIS — I10 PRIMARY HYPERTENSION: ICD-10-CM

## 2023-09-29 DIAGNOSIS — Z23 NEED FOR VACCINATION: ICD-10-CM

## 2023-09-29 DIAGNOSIS — Z13.6 SCREENING FOR CARDIOVASCULAR CONDITION: ICD-10-CM

## 2023-09-29 DIAGNOSIS — I77.71 CAROTID ARTERY DISSECTION (HCC): ICD-10-CM

## 2023-09-29 DIAGNOSIS — E11.65 TYPE 2 DIABETES MELLITUS WITH HYPERGLYCEMIA, WITHOUT LONG-TERM CURRENT USE OF INSULIN (HCC): ICD-10-CM

## 2023-09-29 DIAGNOSIS — K21.9 GASTROESOPHAGEAL REFLUX DISEASE WITHOUT ESOPHAGITIS: ICD-10-CM

## 2023-09-29 DIAGNOSIS — F41.9 ANXIETY: ICD-10-CM

## 2023-09-29 PROCEDURE — 90686 IIV4 VACC NO PRSV 0.5 ML IM: CPT

## 2023-09-29 PROCEDURE — 90472 IMMUNIZATION ADMIN EACH ADD: CPT

## 2023-09-29 PROCEDURE — 99215 OFFICE O/P EST HI 40 MIN: CPT | Mod: 25

## 2023-09-29 PROCEDURE — 90677 PCV20 VACCINE IM: CPT

## 2023-09-29 PROCEDURE — 90471 IMMUNIZATION ADMIN: CPT

## 2023-09-29 PROCEDURE — 3078F DIAST BP <80 MM HG: CPT

## 2023-09-29 PROCEDURE — 3074F SYST BP LT 130 MM HG: CPT

## 2023-09-29 PROCEDURE — 90715 TDAP VACCINE 7 YRS/> IM: CPT

## 2023-09-29 RX ORDER — GABAPENTIN 300 MG/1
300 CAPSULE ORAL 3 TIMES DAILY
Qty: 180 CAPSULE | Refills: 1 | Status: SHIPPED | OUTPATIENT
Start: 2023-09-29 | End: 2024-01-22

## 2023-09-29 RX ORDER — METFORMIN HYDROCHLORIDE 750 MG/1
750 TABLET, EXTENDED RELEASE ORAL DAILY
Qty: 90 TABLET | Refills: 1 | Status: SHIPPED | OUTPATIENT
Start: 2023-09-29 | End: 2023-12-14 | Stop reason: SDUPTHER

## 2023-09-29 ASSESSMENT — ENCOUNTER SYMPTOMS
COUGH: 0
WEIGHT LOSS: 0
FEVER: 0
SHORTNESS OF BREATH: 0
CHILLS: 0
NAUSEA: 0
WEAKNESS: 0
ABDOMINAL PAIN: 0
MYALGIAS: 0
BLURRED VISION: 0
HEADACHES: 0
VOMITING: 0
DIARRHEA: 0
CONSTIPATION: 0
DIZZINESS: 0

## 2023-09-29 ASSESSMENT — FIBROSIS 4 INDEX: FIB4 SCORE: 1.66

## 2023-09-29 NOTE — LETTER
[facility]  JARON StapletonP.  [address]  [place] [address]  Fax: [phone]   Authorization for Release/Disclosure of   Protected Health Information   Name: MÓNICA KEITA : 1970 SSN: [id number]   Address: [address] [place] [address] [address] [place] [address] Phone:    [phone] (home) [phone] (work)   I authorize the entity listed below to release/disclose the PHI below to:   [facility]/Carlos Tatum D.N.P. and Carlos Tatum D.N.P.   Provider or Entity Name:  [facility]   Address   City, State, [facility]   Phone:      Fax:     Reason for request: continuity of care   Information to be released:    [  ] LAST COLONOSCOPY,  including any PATH REPORT and follow-up  [  ] LAST FIT/COLOGUARD RESULT [  ] LAST DEXA  [  ] LAST MAMMOGRAM  [  ] LAST PAP  [  ] LAST LABS [X  ] RETINA EXAM REPORT  [  ] IMMUNIZATION RECORDS  [  ] Release all info      [  ] Check here and initial the line next to each item to release ALL health information INCLUDING  _____ Care and treatment for drug and / or alcohol abuse  _____ HIV testing, infection status, or AIDS  _____ Genetic Testing    DATES OF SERVICE OR TIME PERIOD TO BE DISCLOSED: _____________  I understand and acknowledge that:  * This Authorization may be revoked at any time by you in writing, except if your health information has already been used or disclosed.  * Your health information that will be used or disclosed as a result of you signing this authorization could be re-disclosed by the recipient. If this occurs, your re-disclosed health information may no longer be protected by State or Federal laws.  * You may refuse to sign this Authorization. Your refusal will not affect your ability to obtain treatment.  * This Authorization becomes effective upon signing and will  on (date) __________.      If no date is indicated, this Authorization will  one (1) year from the signature date.    Name: Mónica Keita  Signature: Date:    9/29/2023     PLEASE FAX REQUESTED RECORDS BACK TO: [phone]

## 2023-09-29 NOTE — ASSESSMENT & PLAN NOTE
Chronic condition uncontrolled  We will trial patient on gabapentin 300 mg 3 times daily.  Reinforced healthy lifestyle choices such as healthy diet and exercise for help with vasomotor symptoms of menopause.  -Patient will discuss possible hormone replacement with Dr. Lawrence to see if she would be a safe candidate

## 2023-09-29 NOTE — ASSESSMENT & PLAN NOTE
Chronic, stable  Due to diarrhea we will place patient on extended release formula and place her at 750 once a day.  -Monofilament testing with a 10 gram force: sensation intact: intact bilaterally  Visual Inspection: Feet without maceration, ulcers, fissures.  Pedal pulses: intact bilaterally

## 2023-09-29 NOTE — ASSESSMENT & PLAN NOTE
Chronic condition uncontrolled  -We will trial patient on gabapentin 300 mg 3 times daily with last dose prior to bedtime  SLEEP HYGIENE CHECKLIST:    -Avoid naps.  Napping during the day can disturb the normal pattern of sleep and wakefulness.  -Avoid stimulants, such as caffeine and nicotine, and alcohol as bedtime approaches.  While alcohol is well known to speed the onset of sleep, the process of the body metabolizing the alcohol can cause arousal, thus disrupting sleep.  -Exercise.  All forms of exercise help to ensure sound sleep.  Vigorous activity should be conducted in the morning or late afternoon, while a relaxing exercise, like yoga, can be done before bed to help initiate a restful night sleep.  -Avoid foods too close to bedtime-particularly large meals and chocolate (which contains caffeine).  And try not to make any significant change to your diet.  For example, if you are struggling with sleep problem, is not a good time to start experimenting with spicy dishes.  -Soak up some natural light.  This is particularly important for older people who may not venture outside as frequently as children in younger adults.  Light exposure helps maintain a healthy sleep-wake cycle.  -Establish a regular bedtime routine.  Try to avoid emotionally upsetting conversations and activities before going to sleep.  -Associated bed with sleep.  Is not a good idea to watch television, use her computer or phone, listen to the radio, or read while in bed.  -Ensure a pleasant, relaxing sleep environment.  The bed should be comfortable, in the room should not be too hot, cold, or bright.  -Alternative measures  -Take a hot bath with epsom salt or shower prior to bed  -Lavender essential oil spray to pillow

## 2023-09-29 NOTE — PROGRESS NOTES
Subjective:     CC:  Diagnoses of Type 2 diabetes mellitus with hyperglycemia, without long-term current use of insulin (HCC), Screening for cardiovascular condition, Need for vaccination, Carotid artery dissection (HCC), Chronic nonalcoholic liver disease, Gastroesophageal reflux disease without esophagitis, Hemochromatosis, unspecified hemochromatosis type, Primary hypertension, Anxiety, Psychophysiological insomnia, and Hot flashes were pertinent to this visit.    HISTORY OF THE PRESENT ILLNESS: Patient is a 53 y.o. female. This pleasant patient is here today to establish care and discuss the following problems:    Problem   Psychophysiological Insomnia    Chronic for the past few years  Has great difficulty falling asleep  Sleep hygiene measure tried, unsuccessful  Has not tried any treatments  Also suffers from hot flashes        Hot Flashes    Reports frequent hot flashes  Hysterectomy in 2014, ovaries in place  Suffers from hot flashes about 10 times daily  No treatments tried     Carotid Artery Dissection (Hcc)    June 2022  Found in ER, placed on antihypertensive  Has been seen by neurology and cleared     Gastroesophageal Reflux Disease    Chronic stable  Taking omeprazole and doing well.      Chronic Nonalcoholic Liver Disease    Chronic fatty liver  Does not drink or take acetaminophen products  [facility], needs to establish with new GI doc as Dr. Gibbons has left       Type 2 Diabetes Mellitus With Hyperglycemia, Without Long-Term Current Use of Insulin (Hcc)    Diagnosed 2019  Lab Results   Component Value Date/Time    HBA1C 6.5 (H) 05/24/2023 11:22 AM    HBA1C 6.2 (H) 11/23/2022 05:52 PM   Reports to take Metformin 500 mg BID- has diarrhea  Due for monofiliament  Recent [facility] screening, records requested     Hypertension    Chronic condition stable with blood pressure today in clinic 118/78.  Patient taking lisinopril 20 mg daily doing well on this medication without reported side  effects.  History of carotid artery dissection in 2022 secondary to elevated blood pressures.  Neurology has cleared her of this condition.     Anxiety    Chronic condition for which patient is taking Wellbutrin 300 mg daily, escitalopram 40 mg daily, and hydroxyzine 25 along milligrams up to 3 times a day if needed.  She is also seeing behavioral health and has a counselor.  She is feeling good and condition is well managed.     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 Seeing Dr. Lawrence  Hysterectomy in 2014 and was placed on iron. She did genetic screenings and she does not believe she has this condition         Health Maintenance: Completed    ROS:   Review of Systems   Constitutional:  Negative for chills, fever, malaise/fatigue and weight loss.   Eyes:  Negative for blurred vision.   Respiratory:  Negative for cough and shortness of breath.    Cardiovascular:  Negative for chest pain.   Gastrointestinal:  Negative for abdominal pain, constipation, diarrhea, nausea and vomiting.   Musculoskeletal:  Negative for myalgias.   Neurological:  Negative for dizziness, weakness and headaches.         Objective:     Exam: /78 (BP Location: Left arm, Patient Position: Sitting, BP Cuff Size: Adult)   Pulse (!) 117   Temp (!) 35.3 °C (95.6 °F) (Temporal)   Wt 83.6 kg (184 lb 3.2 oz)   SpO2 98%  Body mass index is 28.85 kg/m².    Physical Exam  Constitutional:       General: She is not in acute distress.     Appearance: Normal appearance. She is not ill-appearing or toxic-appearing.   HENT:      Head: Normocephalic.   Eyes:      Conjunctiva/sclera: Conjunctivae normal.   Cardiovascular:      Rate and Rhythm: Normal rate and regular rhythm.      Pulses: Normal pulses.      Heart sounds: Normal heart sounds. No murmur heard.  Pulmonary:      Effort: Pulmonary effort is normal. No respiratory distress.      Breath sounds: Normal breath sounds.   Skin:     General:  Skin is warm and dry.   Neurological:      General: No focal deficit present.      Mental Status: She is alert and oriented to person, place, and time.   Psychiatric:         Mood and Affect: Mood normal.         Behavior: Behavior normal.           Labs:   Hospital Outpatient Visit on 09/08/2023   Component Date Value    Ferritin 09/08/2023 68.9     WBC 09/08/2023 5.2     RBC 09/08/2023 5.33     Hemoglobin 09/08/2023 14.9     Hematocrit 09/08/2023 45.0     MCV 09/08/2023 84.4     MCH 09/08/2023 28.0     MCHC 09/08/2023 33.1     RDW 09/08/2023 39.5     Platelet Count 09/08/2023 222     MPV 09/08/2023 10.5     Neutrophils-Polys 09/08/2023 50.90     Lymphocytes 09/08/2023 39.90     Monocytes 09/08/2023 6.50     Eosinophils 09/08/2023 1.90     Basophils 09/08/2023 0.60     Immature Granulocytes 09/08/2023 0.20     Nucleated RBC 09/08/2023 0.00     Neutrophils (Absolute) 09/08/2023 2.67     Lymphs (Absolute) 09/08/2023 2.09     Monos (Absolute) 09/08/2023 0.34     Eos (Absolute) 09/08/2023 0.10     Baso (Absolute) 09/08/2023 0.03     Immature Granulocytes (a* 09/08/2023 0.01     NRBC (Absolute) 09/08/2023 0.00     Iron 09/08/2023 90     Total Iron Binding 09/08/2023 389     Unsat Iron Binding 09/08/2023 299     % Saturation 09/08/2023 23          Assessment & Plan: Medical Decision Making   53 y.o. female with the following -    Problem List Items Addressed This Visit       Anxiety     Chronic stable condition therefore we will continue current regimen of the Wellbutrin 300 mg XL tablet daily, escitalopram 40 mg daily, and hydroxyzine 25 mg up to 3 times daily if needed.  - Recommend continuation of behavioral health counseling.         Hemochromatosis     Chronic condition stable  Continue to follow with Dr. Lawrence as scheduled         Hypertension     Chronic stable condition therefore we will continue current dose of lisinopril at 20 mg daily and continue to monitor blood pressures regularly.         Type 2 diabetes  mellitus with hyperglycemia, without long-term current use of insulin (HCC)     Chronic, stable  Due to diarrhea we will place patient on extended release formula and place her at 750 once a day.  -Monofilament testing with a 10 gram force: sensation intact: intact bilaterally  Visual Inspection: Feet without maceration, ulcers, fissures.  Pedal pulses: intact bilaterally           Relevant Medications    metFORMIN ER (GLUCOPHAGE XR) 750 MG TABLET SR 24 HR    Other Relevant Orders    Diabetic Monofilament LE Exam (Completed)    Comp Metabolic Panel    Lipid Profile    HEMOGLOBIN A1C    Gastroesophageal reflux disease     Chronic, stable   Will continue on ompeprazole 20 mg daily  Antireflux measures:  Eat smaller portions.  Avoid lying down after meals  Normalize body weight  Avoid common reflux triggers such as spicy, greasy foods, peppers, onions.  Avoid caffeine, carbonation, alcohol, tobacco.  Raise the head of your bed by placing bricks or phone books between the floor and the legs of the headboard.   Use OTC medicines such as TUMS, Rolaids, Maalox, Gaviscon, Zantac, Pepcid, Tagamet            Chronic nonalcoholic liver disease    Carotid artery dissection (HCC)    Psychophysiological insomnia     Chronic condition uncontrolled  -We will trial patient on gabapentin 300 mg 3 times daily with last dose prior to bedtime  SLEEP HYGIENE CHECKLIST:    -Avoid naps.  Napping during the day can disturb the normal pattern of sleep and wakefulness.  -Avoid stimulants, such as caffeine and nicotine, and alcohol as bedtime approaches.  While alcohol is well known to speed the onset of sleep, the process of the body metabolizing the alcohol can cause arousal, thus disrupting sleep.  -Exercise.  All forms of exercise help to ensure sound sleep.  Vigorous activity should be conducted in the morning or late afternoon, while a relaxing exercise, like yoga, can be done before bed to help initiate a restful night sleep.  -Avoid  foods too close to bedtime-particularly large meals and chocolate (which contains caffeine).  And try not to make any significant change to your diet.  For example, if you are struggling with sleep problem, is not a good time to start experimenting with spicy dishes.  -Soak up some natural light.  This is particularly important for older people who may not venture outside as frequently as children in younger adults.  Light exposure helps maintain a healthy sleep-wake cycle.  -Establish a regular bedtime routine.  Try to avoid emotionally upsetting conversations and activities before going to sleep.  -Associated bed with sleep.  Is not a good idea to watch television, use her computer or phone, listen to the radio, or read while in bed.  -Ensure a pleasant, relaxing sleep environment.  The bed should be comfortable, in the room should not be too hot, cold, or bright.  -Alternative measures  -Take a hot bath with epsom salt or shower prior to bed  -Lavender essential oil spray to pillow           Relevant Medications    gabapentin (NEURONTIN) 300 MG Cap    Hot flashes     Chronic condition uncontrolled  We will trial patient on gabapentin 300 mg 3 times daily.  Reinforced healthy lifestyle choices such as healthy diet and exercise for help with vasomotor symptoms of menopause.  -Patient will discuss possible hormone replacement with Dr. Lawrence to see if she would be a safe candidate         Relevant Medications    gabapentin (NEURONTIN) 300 MG Cap     Other Visit Diagnoses       Screening for cardiovascular condition        Relevant Orders    Comp Metabolic Panel    Lipid Profile    Need for vaccination        Relevant Orders    Pneumococcal Conjugate Vaccine 20-Valent (19 yrs+) (Completed)    Tdap Vaccine =>6YO IM (Completed)    INFLUENZA VACCINE QUAD INJ (PF) (Completed)          Time: 46 minutes in total spend on patient care including record review, face-to-face- time with patient including counseling and  coordinating care, ordering and reviewing lab results and/or imaging studies, medication management, and documentation of this encounter.     Differential diagnosis, natural history, supportive care, and indications for immediate follow-up discussed.  Shared decision making approach was utilized, and patient is amendable with plan of care.  Patient understands to return to clinic or go to the emergency department if symptoms worsen. All questions and concerns addressed to the best of my knowledge.      Return in about 3 months (around 12/29/2023).    Please note that this dictation was created using voice recognition software. I have made every reasonable attempt to correct obvious errors, but I expect that there are errors of grammar and possibly content that I did not discover before finalizing the note.

## 2023-09-29 NOTE — ASSESSMENT & PLAN NOTE
Chronic stable condition therefore we will continue current dose of lisinopril at 20 mg daily and continue to monitor blood pressures regularly.

## 2023-09-29 NOTE — ASSESSMENT & PLAN NOTE
Chronic stable condition therefore we will continue current regimen of the Wellbutrin 300 mg XL tablet daily, escitalopram 40 mg daily, and hydroxyzine 25 mg up to 3 times daily if needed.  - Recommend continuation of behavioral health counseling.

## 2023-09-29 NOTE — ASSESSMENT & PLAN NOTE
Chronic, stable   Will continue on ompeprazole 20 mg daily  Antireflux measures:  · Eat smaller portions.  · Avoid lying down after meals  · Normalize body weight  · Avoid common reflux triggers such as spicy, greasy foods, peppers, onions.  · Avoid caffeine, carbonation, alcohol, tobacco.  · Raise the head of your bed by placing bricks or phone books between the floor and the legs of the headboard.   · Use OTC medicines such as TUMS, Rolaids, Maalox, Gaviscon, Zantac, Pepcid, Tagamet

## 2023-10-04 NOTE — LETTER
Request for Medical Records    Patient Name: Mónica Keita    : 1970      Dear Doctor: LOLLY GORE     The above named patient receives primary care at the [facility] by Grace Riddle P.A.-C..  The patient informs us that you are her eye care Provider.    Please fax a copy of the most recent eye exam to [phone] or answer the  questions below and fax this sheet back to us at the above number.  Attached is a signed Release of Information.      Date of last eye exam: _____________    [facility] eye exam summary:        Please select the choice(s) that apply.    ____ No diabetic retinopathy    ____    Diabetic retinopathy present      Printed Name and Credentials: __________________________________    Signature of Eye Care Provider: _________________________________    We appreciate your assistance and collaboration in providing efficient patient care!    Kindest Regards,    [address]  [facility]  [address]  [place] [id number] [phone]   Yes

## 2023-10-12 RX ORDER — ESCITALOPRAM OXALATE 20 MG/1
40 TABLET ORAL DAILY
Qty: 160 TABLET | Refills: 2 | Status: SHIPPED | OUTPATIENT
Start: 2023-10-12

## 2023-12-14 DIAGNOSIS — E11.65 TYPE 2 DIABETES MELLITUS WITH HYPERGLYCEMIA, WITHOUT LONG-TERM CURRENT USE OF INSULIN (HCC): ICD-10-CM

## 2023-12-14 RX ORDER — METFORMIN HYDROCHLORIDE 750 MG/1
750 TABLET, EXTENDED RELEASE ORAL DAILY
Qty: 90 TABLET | Refills: 3 | Status: SHIPPED | OUTPATIENT
Start: 2023-12-14 | End: 2024-02-15 | Stop reason: SDUPTHER

## 2023-12-24 ENCOUNTER — OFFICE VISIT (OUTPATIENT)
Dept: URGENT CARE | Facility: CLINIC | Age: 53
End: 2023-12-24
Payer: COMMERCIAL

## 2023-12-24 VITALS
RESPIRATION RATE: 22 BRPM | BODY MASS INDEX: 28.25 KG/M2 | OXYGEN SATURATION: 92 % | HEIGHT: 67 IN | TEMPERATURE: 99.4 F | DIASTOLIC BLOOD PRESSURE: 76 MMHG | HEART RATE: 107 BPM | SYSTOLIC BLOOD PRESSURE: 120 MMHG | WEIGHT: 180 LBS

## 2023-12-24 DIAGNOSIS — J22 ACUTE LOWER RESPIRATORY INFECTION: ICD-10-CM

## 2023-12-24 DIAGNOSIS — K21.9 GASTROESOPHAGEAL REFLUX DISEASE WITHOUT ESOPHAGITIS: ICD-10-CM

## 2023-12-24 DIAGNOSIS — F43.0 ACUTE STRESS REACTION: ICD-10-CM

## 2023-12-24 DIAGNOSIS — R05.1 ACUTE COUGH: ICD-10-CM

## 2023-12-24 DIAGNOSIS — J02.9 SORE THROAT: ICD-10-CM

## 2023-12-24 PROCEDURE — 99213 OFFICE O/P EST LOW 20 MIN: CPT

## 2023-12-24 PROCEDURE — 3078F DIAST BP <80 MM HG: CPT

## 2023-12-24 PROCEDURE — 3074F SYST BP LT 130 MM HG: CPT

## 2023-12-24 RX ORDER — ALBUTEROL SULFATE 90 UG/1
2 AEROSOL, METERED RESPIRATORY (INHALATION) EVERY 6 HOURS PRN
Qty: 8.5 G | Refills: 0 | Status: SHIPPED | OUTPATIENT
Start: 2023-12-24 | End: 2024-01-09

## 2023-12-24 RX ORDER — AMOXICILLIN 500 MG/1
500 CAPSULE ORAL 3 TIMES DAILY
Qty: 15 CAPSULE | Refills: 0 | Status: SHIPPED | OUTPATIENT
Start: 2023-12-24 | End: 2023-12-29

## 2023-12-24 ASSESSMENT — FIBROSIS 4 INDEX: FIB4 SCORE: 1.66

## 2023-12-26 RX ORDER — OMEPRAZOLE 20 MG/1
20 CAPSULE, DELAYED RELEASE ORAL DAILY
Qty: 90 CAPSULE | Refills: 0 | Status: SHIPPED | OUTPATIENT
Start: 2023-12-26 | End: 2024-03-19

## 2023-12-26 RX ORDER — BUPROPION HYDROCHLORIDE 300 MG/1
TABLET ORAL
Qty: 90 TABLET | Refills: 0 | Status: SHIPPED | OUTPATIENT
Start: 2023-12-26 | End: 2024-03-19

## 2024-01-05 ENCOUNTER — HOSPITAL ENCOUNTER (OUTPATIENT)
Dept: LAB | Facility: MEDICAL CENTER | Age: 54
End: 2024-01-05
Payer: COMMERCIAL

## 2024-01-05 DIAGNOSIS — E11.65 TYPE 2 DIABETES MELLITUS WITH HYPERGLYCEMIA, WITHOUT LONG-TERM CURRENT USE OF INSULIN (HCC): ICD-10-CM

## 2024-01-05 DIAGNOSIS — Z13.6 SCREENING FOR CARDIOVASCULAR CONDITION: ICD-10-CM

## 2024-01-05 LAB
ALBUMIN SERPL BCP-MCNC: 4 G/DL (ref 3.2–4.9)
ALBUMIN/GLOB SERPL: 1.3 G/DL
ALP SERPL-CCNC: 80 U/L (ref 30–99)
ALT SERPL-CCNC: 51 U/L (ref 2–50)
ANION GAP SERPL CALC-SCNC: 10 MMOL/L (ref 7–16)
AST SERPL-CCNC: 46 U/L (ref 12–45)
BILIRUB SERPL-MCNC: 0.6 MG/DL (ref 0.1–1.5)
BUN SERPL-MCNC: 11 MG/DL (ref 8–22)
CALCIUM ALBUM COR SERPL-MCNC: 9 MG/DL (ref 8.5–10.5)
CALCIUM SERPL-MCNC: 9 MG/DL (ref 8.5–10.5)
CHLORIDE SERPL-SCNC: 96 MMOL/L (ref 96–112)
CHOLEST SERPL-MCNC: 119 MG/DL (ref 100–199)
CO2 SERPL-SCNC: 30 MMOL/L (ref 20–33)
CREAT SERPL-MCNC: 0.69 MG/DL (ref 0.5–1.4)
EST. AVERAGE GLUCOSE BLD GHB EST-MCNC: 252 MG/DL
FASTING STATUS PATIENT QL REPORTED: NORMAL
GFR SERPLBLD CREATININE-BSD FMLA CKD-EPI: 104 ML/MIN/1.73 M 2
GLOBULIN SER CALC-MCNC: 3.2 G/DL (ref 1.9–3.5)
GLUCOSE SERPL-MCNC: 334 MG/DL (ref 65–99)
HBA1C MFR BLD: 10.4 % (ref 4–5.6)
HDLC SERPL-MCNC: 46 MG/DL
LDLC SERPL CALC-MCNC: 60 MG/DL
POTASSIUM SERPL-SCNC: 5 MMOL/L (ref 3.6–5.5)
PROT SERPL-MCNC: 7.2 G/DL (ref 6–8.2)
SODIUM SERPL-SCNC: 136 MMOL/L (ref 135–145)
TRIGL SERPL-MCNC: 63 MG/DL (ref 0–149)

## 2024-01-05 PROCEDURE — 80061 LIPID PANEL: CPT

## 2024-01-05 PROCEDURE — 36415 COLL VENOUS BLD VENIPUNCTURE: CPT

## 2024-01-05 PROCEDURE — 83036 HEMOGLOBIN GLYCOSYLATED A1C: CPT

## 2024-01-05 PROCEDURE — 80053 COMPREHEN METABOLIC PANEL: CPT

## 2024-01-09 ENCOUNTER — OFFICE VISIT (OUTPATIENT)
Dept: MEDICAL GROUP | Facility: PHYSICIAN GROUP | Age: 54
End: 2024-01-09
Payer: COMMERCIAL

## 2024-01-09 VITALS
DIASTOLIC BLOOD PRESSURE: 60 MMHG | BODY MASS INDEX: 28.72 KG/M2 | HEART RATE: 105 BPM | TEMPERATURE: 96.9 F | SYSTOLIC BLOOD PRESSURE: 100 MMHG | HEIGHT: 67 IN | OXYGEN SATURATION: 94 % | WEIGHT: 183 LBS

## 2024-01-09 DIAGNOSIS — E11.65 TYPE 2 DIABETES MELLITUS WITH HYPERGLYCEMIA, WITHOUT LONG-TERM CURRENT USE OF INSULIN (HCC): ICD-10-CM

## 2024-01-09 DIAGNOSIS — R74.01 TRANSAMINITIS: ICD-10-CM

## 2024-01-09 DIAGNOSIS — I10 PRIMARY HYPERTENSION: ICD-10-CM

## 2024-01-09 PROCEDURE — 99214 OFFICE O/P EST MOD 30 MIN: CPT

## 2024-01-09 PROCEDURE — 3074F SYST BP LT 130 MM HG: CPT

## 2024-01-09 PROCEDURE — 3078F DIAST BP <80 MM HG: CPT

## 2024-01-09 ASSESSMENT — ENCOUNTER SYMPTOMS
WEIGHT LOSS: 0
WEAKNESS: 0
VOMITING: 0
BLURRED VISION: 0
CONSTIPATION: 0
FEVER: 0
DIZZINESS: 0
DIARRHEA: 0
SHORTNESS OF BREATH: 0
NAUSEA: 0
CHILLS: 0
ABDOMINAL PAIN: 0
HEADACHES: 0
COUGH: 0
MYALGIAS: 0

## 2024-01-09 ASSESSMENT — FIBROSIS 4 INDEX: FIB4 SCORE: 1.48

## 2024-01-09 ASSESSMENT — PATIENT HEALTH QUESTIONNAIRE - PHQ9: CLINICAL INTERPRETATION OF PHQ2 SCORE: 0

## 2024-01-09 NOTE — ASSESSMENT & PLAN NOTE
Chronic condition appears to be stable/improved  If levels continue to increase may consider revisit to GI  Recheck liver enzymes next lab draw

## 2024-01-09 NOTE — ASSESSMENT & PLAN NOTE
Chronic, Uncontrolled  Continue Metformin 750 mg XR BID  -Will recheck A1c in 3 months.  I have also supplied her with continuous glucose monitor applied here in clinic to assess for glucose levels in response to food intake.  Extensive discussion about prioritizing protein and aiming to achieve at least 90 to 140 g of protein per day including lowering carbohydrate threshold as well as calorie control around 2000 silvano/day balanced fats, carbs, and proteins.  We also discussed eating low glycemic index carbs along with fats and proteins together.  We discussed intermittent fasting 12 to 16 hours/day.  Strength training and cardiovascular activity also discussed.  Will recheck A1c in 3 months for reevaluation.  Patient does not want to go on insulin at this time nor she interested in GLP-1.

## 2024-01-09 NOTE — PROGRESS NOTES
"Subjective:     CC: follow up, labs    HPI:   Mónica presents today with    Problem   Transaminitis    To be a chronic condition dating back to 2015 2016.  Patient does report to have been diagnosed with fatty liver for which she was seen under the care of GI.       Type 2 Diabetes Mellitus With Hyperglycemia, Without Long-Term Current Use of Insulin (Hcc)    Diagnosed 2019  Recent increase in Metformin to 750 mg BID last week due to markedly elevated A1C. Patient does state to have had holiday indulgence but did not eat dramatically different from before when BS was controlled. She denies polyuria, polydipsia, or polyphagia. Does state to feel light headed the past few days and has had cough and sore throat.  Lab Results   Component Value Date/Time    HBA1C 10.4 (H) 01/05/2024 12:24 PM    HBA1C 6.5 (H) 05/24/2023 11:22 AM        Hypertension    Chronic condition stable with blood pressure today in clinic 100/60.  Patient taking lisinopril 20 mg daily doing well on this medication without reported side effects.  History of carotid artery dissection in 2022 secondary to elevated blood pressures.  Neurology has cleared her of this condition.           ROS:  Review of Systems   Constitutional:  Negative for chills, fever, malaise/fatigue and weight loss.   Eyes:  Negative for blurred vision.   Respiratory:  Negative for cough and shortness of breath.    Cardiovascular:  Negative for chest pain.   Gastrointestinal:  Negative for abdominal pain, constipation, diarrhea, nausea and vomiting.   Musculoskeletal:  Negative for myalgias.   Neurological:  Negative for dizziness, weakness and headaches.       Objective:     Exam:  /60 (BP Location: Left arm, Patient Position: Sitting, BP Cuff Size: Adult)   Pulse (!) 105   Temp 36.1 °C (96.9 °F) (Temporal)   Ht 1.702 m (5' 7\")   Wt 83 kg (183 lb)   LMP 06/14/2012   SpO2 94%   BMI 28.66 kg/m²  Body mass index is 28.66 kg/m².    Physical Exam  Constitutional:       " General: She is not in acute distress.     Appearance: Normal appearance. She is not ill-appearing or toxic-appearing.   HENT:      Head: Normocephalic.   Eyes:      Conjunctiva/sclera: Conjunctivae normal.   Cardiovascular:      Rate and Rhythm: Normal rate and regular rhythm.      Pulses: Normal pulses.      Heart sounds: Normal heart sounds. No murmur heard.  Pulmonary:      Effort: Pulmonary effort is normal. No respiratory distress.      Breath sounds: Normal breath sounds.   Skin:     General: Skin is warm and dry.   Neurological:      General: No focal deficit present.      Mental Status: She is alert and oriented to person, place, and time.   Psychiatric:         Mood and Affect: Mood normal.         Behavior: Behavior normal.         Labs:   Hospital Outpatient Visit on 01/05/2024   Component Date Value    Glycohemoglobin 01/05/2024 10.4 (H)     Est Avg Glucose 01/05/2024 252     Cholesterol,Tot 01/05/2024 119     Triglycerides 01/05/2024 63     HDL 01/05/2024 46     LDL 01/05/2024 60     Sodium 01/05/2024 136     Potassium 01/05/2024 5.0     Chloride 01/05/2024 96     Co2 01/05/2024 30     Anion Gap 01/05/2024 10.0     Glucose 01/05/2024 334 (H)     Bun 01/05/2024 11     Creatinine 01/05/2024 0.69     Calcium 01/05/2024 9.0     Correct Calcium 01/05/2024 9.0     AST(SGOT) 01/05/2024 46 (H)     ALT(SGPT) 01/05/2024 51 (H)     Alkaline Phosphatase 01/05/2024 80     Total Bilirubin 01/05/2024 0.6     Albumin 01/05/2024 4.0     Total Protein 01/05/2024 7.2     Globulin 01/05/2024 3.2     A-G Ratio 01/05/2024 1.3     Fasting Status 01/05/2024 Fasting     GFR (CKD-EPI) 01/05/2024 104            Assessment & Plan: Medical Decision Making     53 y.o. female with the following -     Problem List Items Addressed This Visit       Hypertension     Chronic stable condition therefore we will continue current dose of lisinopril at 20 mg daily and continue to monitor blood pressures regularly.         Type 2 diabetes  mellitus with hyperglycemia, without long-term current use of insulin (HCC)     Chronic, Uncontrolled  Continue Metformin 750 mg XR BID  -Will recheck A1c in 3 months.  I have also supplied her with continuous glucose monitor applied here in clinic to assess for glucose levels in response to food intake.  Extensive discussion about prioritizing protein and aiming to achieve at least 90 to 140 g of protein per day including lowering carbohydrate threshold as well as calorie control around 2000 silvano/day balanced fats, carbs, and proteins.  We also discussed eating low glycemic index carbs along with fats and proteins together.  We discussed intermittent fasting 12 to 16 hours/day.  Strength training and cardiovascular activity also discussed.  Will recheck A1c in 3 months for reevaluation.  Patient does not want to go on insulin at this time nor she interested in GLP-1.           Transaminitis     Chronic condition appears to be stable/improved  If levels continue to increase may consider revisit to GI  Recheck liver enzymes next lab draw            Differential diagnosis, natural history, supportive care, and indications for immediate follow-up discussed.  Shared decision making approach utilized, and patient is amendable with plan of care.  Patient understands to return to clinic or go to the emergency department if symptoms worsen. All questions and concerns addressed to the best of my knowledge.    Return in about 3 months (around 4/9/2024).    Please note that this dictation was created using voice recognition software. I have made every reasonable attempt to correct obvious errors, but I expect that there are errors of grammar and possibly content that I did not discover before finalizing the note.

## 2024-01-16 DIAGNOSIS — E11.65 TYPE 2 DIABETES MELLITUS WITH HYPERGLYCEMIA, WITHOUT LONG-TERM CURRENT USE OF INSULIN (HCC): ICD-10-CM

## 2024-01-16 RX ORDER — BLOOD-GLUCOSE SENSOR
1 EACH MISCELLANEOUS
Qty: 6 EACH | Refills: 3 | Status: SHIPPED | OUTPATIENT
Start: 2024-01-16

## 2024-01-18 ENCOUNTER — HOSPITAL ENCOUNTER (OUTPATIENT)
Dept: RADIOLOGY | Facility: MEDICAL CENTER | Age: 54
End: 2024-01-18
Payer: COMMERCIAL

## 2024-01-18 DIAGNOSIS — Z12.31 VISIT FOR SCREENING MAMMOGRAM: ICD-10-CM

## 2024-01-18 PROCEDURE — 77067 SCR MAMMO BI INCL CAD: CPT

## 2024-01-22 DIAGNOSIS — R23.2 HOT FLASHES: ICD-10-CM

## 2024-01-22 DIAGNOSIS — F51.04 PSYCHOPHYSIOLOGICAL INSOMNIA: ICD-10-CM

## 2024-01-22 RX ORDER — GABAPENTIN 300 MG/1
300 CAPSULE ORAL 3 TIMES DAILY
Qty: 180 CAPSULE | Refills: 3 | Status: SHIPPED | OUTPATIENT
Start: 2024-01-22

## 2024-01-22 NOTE — TELEPHONE ENCOUNTER
Received request via: Pharmacy    Was the patient seen in the last year in this department? Yes    Does the patient have an active prescription (recently filled or refills available) for medication(s) requested? No    Pharmacy Name: [facility] [place], [place] - [id number]     Does the patient have [facility] Plus and need 100 day supply (blood pressure, diabetes and cholesterol meds only)? Patient does not have SCP

## 2024-02-06 DIAGNOSIS — I10 PRIMARY HYPERTENSION: ICD-10-CM

## 2024-02-06 RX ORDER — LISINOPRIL 20 MG/1
20 TABLET ORAL DAILY
Qty: 90 TABLET | Refills: 3 | Status: SHIPPED | OUTPATIENT
Start: 2024-02-06

## 2024-02-06 NOTE — TELEPHONE ENCOUNTER
Received request via: Pharmacy    Was the patient seen in the last year in this department? Yes    Does the patient have an active prescription (recently filled or refills available) for medication(s) requested? No    [facility] Name: [facility]    Does the patient have [facility] Plus and need 100 day supply (blood pressure, diabetes and cholesterol meds only)? Patient does not have SCP

## 2024-02-15 DIAGNOSIS — E11.65 TYPE 2 DIABETES MELLITUS WITH HYPERGLYCEMIA, WITHOUT LONG-TERM CURRENT USE OF INSULIN (HCC): ICD-10-CM

## 2024-02-15 RX ORDER — METFORMIN HYDROCHLORIDE 750 MG/1
750 TABLET, EXTENDED RELEASE ORAL 2 TIMES DAILY
Qty: 180 TABLET | Refills: 3 | Status: SHIPPED | OUTPATIENT
Start: 2024-02-15 | End: 2024-02-16 | Stop reason: SDUPTHER

## 2024-02-15 NOTE — TELEPHONE ENCOUNTER
Received request via: Patient    Was the patient seen in the last year in this department? Yes    Does the patient have an active prescription (recently filled or refills available) for medication(s) requested? No    [facility] Name[id number]    Does the patient have [facility] Plus and need 100 day supply (blood pressure, diabetes and cholesterol meds only)? Patient does not have SCP

## 2024-02-16 DIAGNOSIS — E11.65 TYPE 2 DIABETES MELLITUS WITH HYPERGLYCEMIA, WITHOUT LONG-TERM CURRENT USE OF INSULIN (HCC): ICD-10-CM

## 2024-02-16 RX ORDER — METFORMIN HYDROCHLORIDE 750 MG/1
750 TABLET, EXTENDED RELEASE ORAL 2 TIMES DAILY
Qty: 180 TABLET | Refills: 0 | Status: SHIPPED | OUTPATIENT
Start: 2024-02-16

## 2024-03-06 NOTE — PROGRESS NOTES
03/20/24    Subjective    Chief Complaint:  Follow up hereditary hemochromatosis    HPI:  54 female with H63D heterozygous hereditary hemochromatosis. Previous treated by Dr. Oh with phlebotomies and then with oral iron. 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 We saw her in September 2023 and held the iron supplements.     ROS:    Constitutional: No weight loss  Skin: No rash or jaundice  HENT: No change in eyesight or hearing  Cardiovascular:No chest pain or arrythmia  Respiratory:No cough or SOB  GI:No nausea, vomiting, diarrhea, constipation  :No dysuria or frequency  Musculoskeletal:No bone or joint pain  Neuro:No sx's of neuropathy  Psych: No complaints    PMH:      No Known Allergies    Past Medical History:   Diagnosis Date    Adverse effect of anesthesia     Laryngospasm in the past with extubation    Anemia     Anxiety     Bronchitis 2009    Diabetes (HCC)     type 2    Dissection of cervical artery (HCC)     Elevated liver enzymes     unknown cause    Hypertension     Neuroendocrine tumor 11/2021    abdomen    Pneumonia 2009        Past Surgical History:   Procedure Laterality Date    MS UPPER GI ENDOSCOPY,DIAGNOSIS N/A 11/01/2021    Procedure: GASTROSCOPY- WITH ENDOSCOPIC MUCOSAL RESECTION;  Surgeon: Edwin Aguillon M.D.;  Location: [facility];  Service: EUS    EGD W/ENDOSCOPIC ULTRASOUND N/A 11/01/2021    Procedure: EGD, WITH ENDOSCOPIC US - UPPER RADIAL;  Surgeon: Edwin Aguillon M.D.;  Location: [facility];  Service: EUS    PELVIC EXAM UNDER ANESTHESIA  06/17/2014    Performed by Enedina Grant M.D. at SURGERY [facility]    VAGINAL HYSTERECTOMY TOTAL  06/17/2014    Performed by Enedina Grant M.D. at SURGERY [facility]    HYSTERECTOMY, VAGINAL  06/04/2014    [facility]    APPENDECTOMY  2014        Medications:    Current Outpatient Medications on File Prior to Encounter   Medication Sig Dispense  "Refill    omeprazole (PRILOSEC) 20 MG delayed-release capsule TAKE 1 CAPSULE BY MOUTH DAILY 90 Capsule 3    buPROPion (WELLBUTRIN XL) 300 MG XL tablet TAKE 1 TABLET BY MOUTH EVERY DAY IN THE MORNING. 90 Tablet 3    metFORMIN ER (GLUCOPHAGE XR) 750 MG TABLET SR 24 HR Take 1 Tablet by mouth 2 times a day. 180 Tablet 0    lisinopril (PRINIVIL) 20 MG Tab Take 1 Tablet by mouth every day. 90 Tablet 3    gabapentin (NEURONTIN) 300 MG Cap TAKE 1 CAPSULE BY MOUTH 3 TIMES A  Capsule 3    Continuous Blood Gluc Sensor (FREESTYLE MIN 3 SENSOR) Misc 1 Each every 14 days. 6 Each 3    escitalopram (LEXAPRO) 20 MG tablet Take 2 Tablets by mouth every day. 160 Tablet 2    hydrOXYzine HCl (ATARAX) 25 MG Tab Take 1 Tablet by mouth 3 times a day as needed for Anxiety. 90 Tablet 3    Cholecalciferol (VITAMIN D3) 125 MCG (5000 UT) Cap Take 1 Capsule by mouth every day.      Coenzyme Q10 (COQ10 PO) Take 1 Tab by mouth every day.      Biotin 5000 MCG Cap Take 5,000 mcg by mouth every day.      metronidazole (METROCREAM) 0.75 % cream AAA twice a day 45 g 1     No current facility-administered medications on file prior to encounter.       Social History     Tobacco Use    Smoking status: Never    Smokeless tobacco: Never   Substance Use Topics    Alcohol use: No        Family History   Problem Relation Age of Onset    Heart Disease Father     No Known Problems Sister     Heart Attack Brother         CAD. 3 MI's.     No Known Problems Son     No Known Problems Daughter         Objective    Vitals:    /68 (BP Location: Right arm, Patient Position: Sitting, BP Cuff Size: Adult)   Pulse 94   Temp 36.4 °C (97.5 °F) (Temporal)   Resp 14   Ht 1.702 m (5' 7\")   Wt 81.6 kg (180 lb)   LMP 06/14/2012   SpO2 96%   BMI 28.19 kg/m²     Physical Exam:    Appears well-developed and well-nourished. No distress.    Head -  Normocephalic .   Eyes - Pupils are equal. Conjunctivae normal. No scleral icterus.   Ears - normal " hearing  Neurological -   Alert and oriented.  Skin - Skin is warm and dry. No rash noted. Not diaphoretic. No erythema. No pallor. No jaundice   Psychiatric -  Normal mood and affect.    Labs:     Latest Reference Range & Units 09/08/23 13:13 03/15/24 11:05   WBC 4.8 - 10.8 K/uL 5.2 6.0   RBC 4.20 - 5.40 M/uL 5.33 5.56 (H)   Hemoglobin 12.0 - 16.0 g/dL 14.9 15.0   Hematocrit 37.0 - 47.0 % 45.0 45.4   MCV 81.4 - 97.8 fL 84.4 81.7   MCH 27.0 - 33.0 pg 28.0 27.0   MCHC 32.2 - 35.5 g/dL 33.1 33.0   RDW 35.9 - 50.0 fL 39.5 40.9   Platelet Count 164 - 446 K/uL 222 214      Latest Reference Range & Units 09/08/23 13:13 03/15/24 11:05   Iron 40 - 170 ug/dL 90 63   Total Iron Binding 250 - 450 ug/dL 389 413   % Saturation 15 - 55 % 23 15      Latest Reference Range & Units 05/24/23 11:19 09/08/23 13:13 03/15/24 11:05   Ferritin 10.0 - 291.0 ng/mL 64.5 68.9 24.9     Assessment    Imp:    Visit Diagnosis:    1. Hereditary hemochromatosis (HCC)  FERRITIN    IRON/TOTAL IRON BIND    CBC WITH DIFFERENTIAL          Plan:  Recheck in 6 months    Nik Lawrence M.D.

## 2024-03-15 ENCOUNTER — HOSPITAL ENCOUNTER (OUTPATIENT)
Dept: LAB | Facility: MEDICAL CENTER | Age: 54
End: 2024-03-15
Attending: INTERNAL MEDICINE
Payer: COMMERCIAL

## 2024-03-15 DIAGNOSIS — E83.110 HEREDITARY HEMOCHROMATOSIS (HCC): ICD-10-CM

## 2024-03-15 LAB
BASOPHILS # BLD AUTO: 0.8 % (ref 0–1.8)
BASOPHILS # BLD: 0.05 K/UL (ref 0–0.12)
EOSINOPHIL # BLD AUTO: 0.34 K/UL (ref 0–0.51)
EOSINOPHIL NFR BLD: 5.7 % (ref 0–6.9)
ERYTHROCYTE [DISTWIDTH] IN BLOOD BY AUTOMATED COUNT: 40.9 FL (ref 35.9–50)
FERRITIN SERPL-MCNC: 24.9 NG/ML (ref 10–291)
HCT VFR BLD AUTO: 45.4 % (ref 37–47)
HGB BLD-MCNC: 15 G/DL (ref 12–16)
IMM GRANULOCYTES # BLD AUTO: 0.01 K/UL (ref 0–0.11)
IMM GRANULOCYTES NFR BLD AUTO: 0.2 % (ref 0–0.9)
IRON SATN MFR SERPL: 15 % (ref 15–55)
IRON SERPL-MCNC: 63 UG/DL (ref 40–170)
LYMPHOCYTES # BLD AUTO: 2.21 K/UL (ref 1–4.8)
LYMPHOCYTES NFR BLD: 36.8 % (ref 22–41)
MCH RBC QN AUTO: 27 PG (ref 27–33)
MCHC RBC AUTO-ENTMCNC: 33 G/DL (ref 32.2–35.5)
MCV RBC AUTO: 81.7 FL (ref 81.4–97.8)
MONOCYTES # BLD AUTO: 0.43 K/UL (ref 0–0.85)
MONOCYTES NFR BLD AUTO: 7.2 % (ref 0–13.4)
NEUTROPHILS # BLD AUTO: 2.97 K/UL (ref 1.82–7.42)
NEUTROPHILS NFR BLD: 49.3 % (ref 44–72)
NRBC # BLD AUTO: 0 K/UL
NRBC BLD-RTO: 0 /100 WBC (ref 0–0.2)
PLATELET # BLD AUTO: 214 K/UL (ref 164–446)
PMV BLD AUTO: 10.3 FL (ref 9–12.9)
RBC # BLD AUTO: 5.56 M/UL (ref 4.2–5.4)
TIBC SERPL-MCNC: 413 UG/DL (ref 250–450)
UIBC SERPL-MCNC: 350 UG/DL (ref 110–370)
WBC # BLD AUTO: 6 K/UL (ref 4.8–10.8)

## 2024-03-15 PROCEDURE — 85025 COMPLETE CBC W/AUTO DIFF WBC: CPT

## 2024-03-15 PROCEDURE — 36415 COLL VENOUS BLD VENIPUNCTURE: CPT

## 2024-03-15 PROCEDURE — 83550 IRON BINDING TEST: CPT

## 2024-03-15 PROCEDURE — 83540 ASSAY OF IRON: CPT

## 2024-03-15 PROCEDURE — 82728 ASSAY OF FERRITIN: CPT

## 2024-03-19 DIAGNOSIS — K21.9 GASTROESOPHAGEAL REFLUX DISEASE WITHOUT ESOPHAGITIS: ICD-10-CM

## 2024-03-19 DIAGNOSIS — F43.0 ACUTE STRESS REACTION: ICD-10-CM

## 2024-03-19 RX ORDER — BUPROPION HYDROCHLORIDE 300 MG/1
TABLET ORAL
Qty: 90 TABLET | Refills: 3 | Status: SHIPPED | OUTPATIENT
Start: 2024-03-19

## 2024-03-19 RX ORDER — OMEPRAZOLE 20 MG/1
20 CAPSULE, DELAYED RELEASE ORAL DAILY
Qty: 90 CAPSULE | Refills: 3 | Status: SHIPPED | OUTPATIENT
Start: 2024-03-19

## 2024-03-19 NOTE — TELEPHONE ENCOUNTER
Received request via: Pharmacy    Was the patient seen in the last year in this department? Yes    Does the patient have an active prescription (recently filled or refills available) for medication(s) requested? No    Pharmacy Name: [facility] - [place], [place] - [id number] [phone]     Does the patient have prison Plus and need 100 day supply (blood pressure, diabetes and cholesterol meds only)? Patient does not have SCP

## 2024-03-20 ENCOUNTER — HOSPITAL ENCOUNTER (OUTPATIENT)
Dept: HEMATOLOGY ONCOLOGY | Facility: MEDICAL CENTER | Age: 54
End: 2024-03-20
Attending: INTERNAL MEDICINE
Payer: COMMERCIAL

## 2024-03-20 VITALS
HEART RATE: 94 BPM | WEIGHT: 180 LBS | BODY MASS INDEX: 28.25 KG/M2 | RESPIRATION RATE: 14 BRPM | HEIGHT: 67 IN | OXYGEN SATURATION: 96 % | TEMPERATURE: 97.5 F | DIASTOLIC BLOOD PRESSURE: 68 MMHG | SYSTOLIC BLOOD PRESSURE: 100 MMHG

## 2024-03-20 DIAGNOSIS — E83.110 HEREDITARY HEMOCHROMATOSIS (HCC): ICD-10-CM

## 2024-03-20 PROCEDURE — 99212 OFFICE O/P EST SF 10 MIN: CPT | Performed by: INTERNAL MEDICINE

## 2024-03-20 PROCEDURE — 99213 OFFICE O/P EST LOW 20 MIN: CPT | Performed by: INTERNAL MEDICINE

## 2024-03-20 ASSESSMENT — PAIN SCALES - GENERAL: PAINLEVEL: NO PAIN

## 2024-03-20 ASSESSMENT — FIBROSIS 4 INDEX: FIB4 SCORE: 1.57

## 2024-04-09 ENCOUNTER — OFFICE VISIT (OUTPATIENT)
Dept: MEDICAL GROUP | Facility: PHYSICIAN GROUP | Age: 54
End: 2024-04-09
Payer: COMMERCIAL

## 2024-04-09 VITALS
OXYGEN SATURATION: 94 % | HEIGHT: 67 IN | WEIGHT: 189.4 LBS | SYSTOLIC BLOOD PRESSURE: 104 MMHG | DIASTOLIC BLOOD PRESSURE: 70 MMHG | HEART RATE: 87 BPM | TEMPERATURE: 97.1 F | BODY MASS INDEX: 29.73 KG/M2

## 2024-04-09 DIAGNOSIS — E11.65 TYPE 2 DIABETES MELLITUS WITH HYPERGLYCEMIA, WITHOUT LONG-TERM CURRENT USE OF INSULIN (HCC): ICD-10-CM

## 2024-04-09 DIAGNOSIS — K76.9 CHRONIC NONALCOHOLIC LIVER DISEASE: ICD-10-CM

## 2024-04-09 DIAGNOSIS — R74.01 TRANSAMINITIS: ICD-10-CM

## 2024-04-09 DIAGNOSIS — Z13.6 SCREENING FOR CARDIOVASCULAR CONDITION: ICD-10-CM

## 2024-04-09 DIAGNOSIS — R23.2 HOT FLASHES: ICD-10-CM

## 2024-04-09 DIAGNOSIS — N95.1 VASOMOTOR SYMPTOMS DUE TO MENOPAUSE: ICD-10-CM

## 2024-04-09 LAB
HBA1C MFR BLD: 7.2 % (ref ?–5.8)
POCT INT CON NEG: NEGATIVE
POCT INT CON POS: POSITIVE

## 2024-04-09 PROCEDURE — 99214 OFFICE O/P EST MOD 30 MIN: CPT

## 2024-04-09 PROCEDURE — 83036 HEMOGLOBIN GLYCOSYLATED A1C: CPT

## 2024-04-09 PROCEDURE — 3074F SYST BP LT 130 MM HG: CPT

## 2024-04-09 PROCEDURE — 3078F DIAST BP <80 MM HG: CPT

## 2024-04-09 RX ORDER — ESTRADIOL 0.04 MG/D
1 FILM, EXTENDED RELEASE TRANSDERMAL
Qty: 16 PATCH | Refills: 3 | Status: SHIPPED | OUTPATIENT
Start: 2024-04-09

## 2024-04-09 RX ORDER — ESTRADIOL 0.04 MG/D
1 PATCH TRANSDERMAL
Qty: 12 PATCH | Refills: 3 | Status: CANCELLED | OUTPATIENT
Start: 2024-04-09

## 2024-04-09 ASSESSMENT — FIBROSIS 4 INDEX: FIB4 SCORE: 1.57

## 2024-04-09 ASSESSMENT — ENCOUNTER SYMPTOMS
ABDOMINAL PAIN: 0
SHORTNESS OF BREATH: 0
MYALGIAS: 0
CHILLS: 0
NAUSEA: 0
VOMITING: 0
DIARRHEA: 0
BLURRED VISION: 0
CONSTIPATION: 0
HEADACHES: 0
COUGH: 0
FEVER: 0
WEIGHT LOSS: 0
DIZZINESS: 0
WEAKNESS: 0

## 2024-04-09 NOTE — ASSESSMENT & PLAN NOTE
Chronic, improving with A1C at 7.2% much improved from last visit. Therefore will continue current regimen of Metformin 750 mg BID

## 2024-04-09 NOTE — PROGRESS NOTES
Verbal consent was acquired by the patient to use [employer] ambient listening note generation during this visit  Subjective:     CC: lab follow up    HPI:   Mónica presents today with  History of Present Illness  The patient presents for evaluation of hot flashes.    The patient underwent a hysterectomy in 2014, however, her ovaries were not removed. She experiences 5 to 6 hot flashes daily, each lasting between 20 to 30 minutes. To manage her hot flashes, she has been utilizing neck fans and gabapentin which offers little relief. She undergoes annual mammograms. She initiated antidepressant therapy at the age of 29, following the birth of her second child. At that time, she did not experience depression, but rather experienced anxiety. Approximately 20 years ago, she was prescribed Lexapro and another unspecified medication, the name of which she can not recall.    The patient's fasting blood glucose levels have been slightly elevated, currently at 130. She has been diligently monitoring her diet and is exploring dietary modifications to manage her condition. She expresses satisfaction with the continuous glucose monitor, which she finds beneficial. If her blood glucose levels are elevated, she expresses a desire to delay the introduction of weekly injections such as Ozempic or Mounjaro. She denies experiencing polydipsia, polyphagia, or polyuria. She has eliminated bread from her diet and has been consuming sprouted bread,  jelly, and peanut butter, which she finds beneficial. She previously consumed a significant amount of oatmeal, but has since discontinued it. She has found that cinnamon in her smoothies has been beneficial.      Problem   Type 2 Diabetes Mellitus With Hyperglycemia, Without Long-Term Current Use of Insulin (Hcc)    Diagnosed 2019  Recent increase in Metformin to 750 mg BID last week due to markedly elevated A1C. Patient does state to have had holiday indulgence but did not eat dramatically  "different from before when BS was controlled. She denies polyuria, polydipsia, or polyphagia.  Using CGM which is working well for her.                ROS:  Review of Systems   Constitutional:  Negative for chills, fever, malaise/fatigue and weight loss.   Eyes:  Negative for blurred vision.   Respiratory:  Negative for cough and shortness of breath.    Cardiovascular:  Negative for chest pain.   Gastrointestinal:  Negative for abdominal pain, constipation, diarrhea, nausea and vomiting.   Musculoskeletal:  Negative for myalgias.   Neurological:  Negative for dizziness, weakness and headaches.       Objective:     Exam:  /70 (BP Location: Left arm, Patient Position: Sitting, BP Cuff Size: Adult)   Pulse 87   Temp 36.2 °C (97.1 °F) (Temporal)   Ht 1.702 m (5' 7\")   Wt 85.9 kg (189 lb 6.4 oz)   LMP 06/14/2012   SpO2 94%   BMI 29.66 kg/m²  Body mass index is 29.66 kg/m².    Physical Exam  Constitutional:       General: She is not in acute distress.     Appearance: Normal appearance. She is not ill-appearing or toxic-appearing.   HENT:      Head: Normocephalic.   Eyes:      Conjunctiva/sclera: Conjunctivae normal.   Cardiovascular:      Rate and Rhythm: Normal rate and regular rhythm.      Pulses: Normal pulses.      Heart sounds: Normal heart sounds. No murmur heard.  Pulmonary:      Effort: Pulmonary effort is normal. No respiratory distress.      Breath sounds: Normal breath sounds.   Skin:     General: Skin is warm and dry.   Neurological:      General: No focal deficit present.      Mental Status: She is alert and oriented to person, place, and time.   Psychiatric:         Mood and Affect: Mood normal.         Behavior: Behavior normal.         Labs:   Hospital Outpatient Visit on 03/15/2024   Component Date Value    WBC 03/15/2024 6.0     RBC 03/15/2024 5.56 (H)     Hemoglobin 03/15/2024 15.0     Hematocrit 03/15/2024 45.4     MCV 03/15/2024 81.7     MCH 03/15/2024 27.0     MCHC 03/15/2024 33.0     " RDW 03/15/2024 40.9     Platelet Count 03/15/2024 214     MPV 03/15/2024 10.3     Neutrophils-Polys 03/15/2024 49.30     Lymphocytes 03/15/2024 36.80     Monocytes 03/15/2024 7.20     Eosinophils 03/15/2024 5.70     Basophils 03/15/2024 0.80     Immature Granulocytes 03/15/2024 0.20     Nucleated RBC 03/15/2024 0.00     Neutrophils (Absolute) 03/15/2024 2.97     Lymphs (Absolute) 03/15/2024 2.21     Monos (Absolute) 03/15/2024 0.43     Eos (Absolute) 03/15/2024 0.34     Baso (Absolute) 03/15/2024 0.05     Immature Granulocytes (a* 03/15/2024 0.01     NRBC (Absolute) 03/15/2024 0.00     Iron 03/15/2024 63     Total Iron Binding 03/15/2024 413     Unsat Iron Binding 03/15/2024 350     % Saturation 03/15/2024 15     Ferritin 03/15/2024 24.9          Assessment & Plan: Medical Decision Making     54 y.o. female with the following -   Assessment & Plan  1. Hot flashes.  The patient's hot flashes, which commenced around the age of 52, are not considered a high-risk category, even considering her diabetes and overall health status. A prescription for estradiol 0.0375 patches, 16 patches with 3 refills, has been issued, to be changed biweekly, changed every 72 hours. The patient has been informed of the potential skin irritation caused by the patches. If she experiences any adhesive reactions, she has been advised to inform us so we can transition her to a gel cream. Gabapentin has been discontinued as it does not alleviate her hot flashes. Should she continue to experience hot flashes 2 to 3 times daily on the current dosage, she has been advised to notify us so we can adjust the dosage accordingly.    2. Diabetes.  The patient's A1c level has shown significant improvement, currently at 7.2. An A1c test will be conducted today. She has been advised to continue with her current dietary regimen.  She will also continue metformin 750 mg twice daily she is up-to-date on all diabetic screenings    Follow-up  The patient is  scheduled for a follow-up visit on 07/30/2024 at 2:00 PM.    Problem List Items Addressed This Visit       Type 2 diabetes mellitus with hyperglycemia, without long-term current use of insulin (HCC)     Chronic, improving with A1C at 7.2% much improved from last visit. Therefore will continue current regimen of Metformin 750 mg BID         Relevant Orders    POCT  A1C (Completed)    Comp Metabolic Panel    HEMOGLOBIN A1C    Lipid Profile    MICROALBUMIN CREAT RATIO URINE    Transaminitis    Relevant Orders    Comp Metabolic Panel    Lipid Profile    Chronic nonalcoholic liver disease    Relevant Orders    Comp Metabolic Panel    HEMOGLOBIN A1C    Lipid Profile    Hot flashes    Relevant Medications    estradiol (VIVELLE) 0.0375 MG/24HR patch     Other Visit Diagnoses       Screening for cardiovascular condition        Relevant Orders    Lipid Profile    Vasomotor symptoms due to menopause        Relevant Medications    estradiol (VIVELLE) 0.0375 MG/24HR patch            Differential diagnosis, natural history, supportive care, and indications for immediate follow-up discussed.  Shared decision making approach utilized, and patient is amendable with plan of care.  Patient understands to return to clinic or go to the emergency department if symptoms worsen. All questions and concerns addressed to the best of my knowledge.    Return in about 3 months (around 7/9/2024).    Please note that this dictation was created using voice recognition software. I have made every reasonable attempt to correct obvious errors, but I expect that there are errors of grammar and possibly content that I did not discover before finalizing the note.

## 2024-05-11 DIAGNOSIS — E11.65 TYPE 2 DIABETES MELLITUS WITH HYPERGLYCEMIA, WITHOUT LONG-TERM CURRENT USE OF INSULIN (HCC): ICD-10-CM

## 2024-05-13 RX ORDER — METFORMIN HYDROCHLORIDE 750 MG/1
750 TABLET, EXTENDED RELEASE ORAL 2 TIMES DAILY
Qty: 180 TABLET | Refills: 3 | Status: SHIPPED | OUTPATIENT
Start: 2024-05-13

## 2024-05-13 NOTE — TELEPHONE ENCOUNTER
Received request via: Pharmacy    Was the patient seen in the last year in this department? Yes    Does the patient have an active prescription (recently filled or refills available) for medication(s) requested? No    [facility]    Does the patient have [facility] Plus and need 100 day supply (blood pressure, diabetes and cholesterol meds only)? Patient does not have SCP

## 2024-06-03 RX ORDER — ESCITALOPRAM OXALATE 20 MG/1
40 TABLET ORAL DAILY
Qty: 160 TABLET | Refills: 3 | Status: SHIPPED | OUTPATIENT
Start: 2024-06-03

## 2024-06-03 NOTE — TELEPHONE ENCOUNTER
Received request via: Pharmacy    Was the patient seen in the last year in this department? Yes    Does the patient have an active prescription (recently filled or refills available) for medication(s) requested? No    [facility]    Does the patient have intermediate Plus and need 100 day supply (blood pressure, diabetes and cholesterol meds only)? Patient does not have SCP

## 2024-07-26 ENCOUNTER — HOSPITAL ENCOUNTER (OUTPATIENT)
Dept: LAB | Facility: MEDICAL CENTER | Age: 54
End: 2024-07-26
Payer: COMMERCIAL

## 2024-07-26 DIAGNOSIS — Z13.6 SCREENING FOR CARDIOVASCULAR CONDITION: ICD-10-CM

## 2024-07-26 DIAGNOSIS — R74.01 TRANSAMINITIS: ICD-10-CM

## 2024-07-26 DIAGNOSIS — K76.9 CHRONIC NONALCOHOLIC LIVER DISEASE: ICD-10-CM

## 2024-07-26 DIAGNOSIS — E11.65 TYPE 2 DIABETES MELLITUS WITH HYPERGLYCEMIA, WITHOUT LONG-TERM CURRENT USE OF INSULIN (HCC): ICD-10-CM

## 2024-07-26 LAB
ALBUMIN SERPL BCP-MCNC: 4.3 G/DL (ref 3.2–4.9)
ALBUMIN/GLOB SERPL: 1.4 G/DL
ALP SERPL-CCNC: 60 U/L (ref 30–99)
ALT SERPL-CCNC: 28 U/L (ref 2–50)
ANION GAP SERPL CALC-SCNC: 11 MMOL/L (ref 7–16)
AST SERPL-CCNC: 25 U/L (ref 12–45)
BILIRUB SERPL-MCNC: 0.4 MG/DL (ref 0.1–1.5)
BUN SERPL-MCNC: 15 MG/DL (ref 8–22)
CALCIUM ALBUM COR SERPL-MCNC: 9 MG/DL (ref 8.5–10.5)
CALCIUM SERPL-MCNC: 9.2 MG/DL (ref 8.5–10.5)
CHLORIDE SERPL-SCNC: 102 MMOL/L (ref 96–112)
CHOLEST SERPL-MCNC: 124 MG/DL (ref 100–199)
CO2 SERPL-SCNC: 26 MMOL/L (ref 20–33)
CREAT SERPL-MCNC: 0.67 MG/DL (ref 0.5–1.4)
CREAT UR-MCNC: 151.44 MG/DL
EST. AVERAGE GLUCOSE BLD GHB EST-MCNC: 134 MG/DL
GFR SERPLBLD CREATININE-BSD FMLA CKD-EPI: 105 ML/MIN/1.73 M 2
GLOBULIN SER CALC-MCNC: 3 G/DL (ref 1.9–3.5)
GLUCOSE SERPL-MCNC: 106 MG/DL (ref 65–99)
HBA1C MFR BLD: 6.3 % (ref 4–5.6)
HDLC SERPL-MCNC: 53 MG/DL
LDLC SERPL CALC-MCNC: 63 MG/DL
MICROALBUMIN UR-MCNC: <1.2 MG/DL
MICROALBUMIN/CREAT UR: NORMAL MG/G (ref 0–30)
POTASSIUM SERPL-SCNC: 4.8 MMOL/L (ref 3.6–5.5)
PROT SERPL-MCNC: 7.3 G/DL (ref 6–8.2)
SODIUM SERPL-SCNC: 139 MMOL/L (ref 135–145)
TRIGL SERPL-MCNC: 40 MG/DL (ref 0–149)

## 2024-07-26 PROCEDURE — 80061 LIPID PANEL: CPT

## 2024-07-26 PROCEDURE — 36415 COLL VENOUS BLD VENIPUNCTURE: CPT

## 2024-07-26 PROCEDURE — 83036 HEMOGLOBIN GLYCOSYLATED A1C: CPT

## 2024-07-26 PROCEDURE — 82570 ASSAY OF URINE CREATININE: CPT

## 2024-07-26 PROCEDURE — 82043 UR ALBUMIN QUANTITATIVE: CPT

## 2024-07-26 PROCEDURE — 80053 COMPREHEN METABOLIC PANEL: CPT

## 2024-07-30 ENCOUNTER — OFFICE VISIT (OUTPATIENT)
Dept: MEDICAL GROUP | Facility: PHYSICIAN GROUP | Age: 54
End: 2024-07-30
Payer: COMMERCIAL

## 2024-07-30 VITALS
BODY MASS INDEX: 29.16 KG/M2 | WEIGHT: 185.8 LBS | DIASTOLIC BLOOD PRESSURE: 60 MMHG | HEIGHT: 67 IN | OXYGEN SATURATION: 97 % | HEART RATE: 91 BPM | TEMPERATURE: 97 F | SYSTOLIC BLOOD PRESSURE: 96 MMHG

## 2024-07-30 DIAGNOSIS — M77.11 LATERAL EPICONDYLITIS OF RIGHT ELBOW: ICD-10-CM

## 2024-07-30 DIAGNOSIS — E11.65 TYPE 2 DIABETES MELLITUS WITH HYPERGLYCEMIA, WITHOUT LONG-TERM CURRENT USE OF INSULIN (HCC): ICD-10-CM

## 2024-07-30 DIAGNOSIS — N95.1 MENOPAUSAL HOT FLUSHES: ICD-10-CM

## 2024-07-30 ASSESSMENT — ENCOUNTER SYMPTOMS
HEADACHES: 0
NAUSEA: 0
WEAKNESS: 0
COUGH: 0
BLURRED VISION: 0
CHILLS: 0
DIARRHEA: 0
SHORTNESS OF BREATH: 0
FEVER: 0
ABDOMINAL PAIN: 0
VOMITING: 0
WEIGHT LOSS: 0
DIZZINESS: 0
CONSTIPATION: 0
MYALGIAS: 0

## 2024-07-30 ASSESSMENT — FIBROSIS 4 INDEX: FIB4 SCORE: 1.15

## 2024-09-20 ENCOUNTER — HOSPITAL ENCOUNTER (OUTPATIENT)
Dept: LAB | Facility: MEDICAL CENTER | Age: 54
End: 2024-09-20
Attending: INTERNAL MEDICINE
Payer: COMMERCIAL

## 2024-09-20 DIAGNOSIS — E83.110 HEREDITARY HEMOCHROMATOSIS (HCC): ICD-10-CM

## 2024-09-20 LAB
BASOPHILS # BLD AUTO: 0.7 % (ref 0–1.8)
BASOPHILS # BLD: 0.06 K/UL (ref 0–0.12)
EOSINOPHIL # BLD AUTO: 0.31 K/UL (ref 0–0.51)
EOSINOPHIL NFR BLD: 3.7 % (ref 0–6.9)
ERYTHROCYTE [DISTWIDTH] IN BLOOD BY AUTOMATED COUNT: 39.9 FL (ref 35.9–50)
HCT VFR BLD AUTO: 46.2 % (ref 37–47)
HGB BLD-MCNC: 15.2 G/DL (ref 12–16)
IMM GRANULOCYTES # BLD AUTO: 0.02 K/UL (ref 0–0.11)
IMM GRANULOCYTES NFR BLD AUTO: 0.2 % (ref 0–0.9)
LYMPHOCYTES # BLD AUTO: 2.6 K/UL (ref 1–4.8)
LYMPHOCYTES NFR BLD: 30.7 % (ref 22–41)
MCH RBC QN AUTO: 27.4 PG (ref 27–33)
MCHC RBC AUTO-ENTMCNC: 32.9 G/DL (ref 32.2–35.5)
MCV RBC AUTO: 83.2 FL (ref 81.4–97.8)
MONOCYTES # BLD AUTO: 0.51 K/UL (ref 0–0.85)
MONOCYTES NFR BLD AUTO: 6 % (ref 0–13.4)
NEUTROPHILS # BLD AUTO: 4.97 K/UL (ref 1.82–7.42)
NEUTROPHILS NFR BLD: 58.7 % (ref 44–72)
NRBC # BLD AUTO: 0 K/UL
NRBC BLD-RTO: 0 /100 WBC (ref 0–0.2)
PLATELET # BLD AUTO: 270 K/UL (ref 164–446)
PMV BLD AUTO: 9.9 FL (ref 9–12.9)
RBC # BLD AUTO: 5.55 M/UL (ref 4.2–5.4)
WBC # BLD AUTO: 8.5 K/UL (ref 4.8–10.8)

## 2024-09-20 PROCEDURE — 85025 COMPLETE CBC W/AUTO DIFF WBC: CPT

## 2024-09-20 PROCEDURE — 36415 COLL VENOUS BLD VENIPUNCTURE: CPT

## 2024-09-20 PROCEDURE — 82728 ASSAY OF FERRITIN: CPT

## 2024-09-20 PROCEDURE — 83540 ASSAY OF IRON: CPT

## 2024-09-20 PROCEDURE — 83550 IRON BINDING TEST: CPT

## 2024-09-21 LAB
FERRITIN SERPL-MCNC: 13.9 NG/ML (ref 10–291)
IRON SATN MFR SERPL: 15 % (ref 15–55)
IRON SERPL-MCNC: 70 UG/DL (ref 40–170)
TIBC SERPL-MCNC: 479 UG/DL (ref 250–450)
UIBC SERPL-MCNC: 409 UG/DL (ref 110–370)

## 2024-09-24 ENCOUNTER — HOSPITAL ENCOUNTER (OUTPATIENT)
Dept: HEMATOLOGY ONCOLOGY | Facility: MEDICAL CENTER | Age: 54
End: 2024-09-24
Attending: NURSE PRACTITIONER
Payer: COMMERCIAL

## 2024-09-24 VITALS
OXYGEN SATURATION: 97 % | HEART RATE: 84 BPM | TEMPERATURE: 98.2 F | HEIGHT: 67 IN | DIASTOLIC BLOOD PRESSURE: 78 MMHG | BODY MASS INDEX: 28.79 KG/M2 | RESPIRATION RATE: 14 BRPM | WEIGHT: 183.4 LBS | SYSTOLIC BLOOD PRESSURE: 116 MMHG

## 2024-09-24 DIAGNOSIS — Z09 ENCOUNTER FOR HEMATOLOGY FOLLOW-UP: ICD-10-CM

## 2024-09-24 DIAGNOSIS — E83.118 OTHER HEMOCHROMATOSIS: ICD-10-CM

## 2024-09-24 DIAGNOSIS — E83.110 HEREDITARY HEMOCHROMATOSIS (HCC): ICD-10-CM

## 2024-09-24 PROCEDURE — 99213 OFFICE O/P EST LOW 20 MIN: CPT | Performed by: NURSE PRACTITIONER

## 2024-09-24 PROCEDURE — 99212 OFFICE O/P EST SF 10 MIN: CPT | Performed by: NURSE PRACTITIONER

## 2024-09-24 ASSESSMENT — ENCOUNTER SYMPTOMS
HEADACHES: 0
DIARRHEA: 0
TINGLING: 1
BLOOD IN STOOL: 0
DIZZINESS: 0
INSOMNIA: 0
WEIGHT LOSS: 0
COUGH: 0
WHEEZING: 0
CONSTIPATION: 0
SHORTNESS OF BREATH: 0
VOMITING: 0
NAUSEA: 0
CHILLS: 0
PALPITATIONS: 0
FEVER: 0
MYALGIAS: 0

## 2024-09-24 ASSESSMENT — FIBROSIS 4 INDEX: FIB4 SCORE: 0.91

## 2024-09-24 NOTE — PROGRESS NOTES
"Subjective     Mónica Manuela Keita is a 54 y.o. female who presents with Follow-Up (Lawrence/ Lorenza/6mo fv)            HPI    54 female with H63D heterozygous hereditary hemochromatosis. Previous treated by Dr. Oh with phlebotomies and then with oral iron. 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 We saw her in September 2023 and held the iron supplements.       Last TP 3/18/2018 w/ Adriano & Isaura her at [facility]  Transferred 6/24/2019        Review of Systems   Constitutional:  Negative for chills, fever, malaise/fatigue (\"always tired\" concsistent with baseline) and weight loss (stable).   Respiratory:  Negative for cough, shortness of breath and wheezing.    Cardiovascular:  Negative for chest pain, palpitations and leg swelling.   Gastrointestinal:  Negative for blood in stool, constipation (Last BM this am), diarrhea, melena, nausea and vomiting.   Genitourinary:  Negative for dysuria and hematuria.        S/p hyst 2014   Musculoskeletal:  Negative for joint pain and myalgias.   Neurological:  Positive for tingling (fingers - r/t carpel tunnel). Negative for dizziness and headaches.   Psychiatric/Behavioral:  The patient does not have insomnia.               Objective     /78 (BP Location: Right arm, Patient Position: Sitting, BP Cuff Size: Adult)   Pulse 84   Temp 36.8 °C (98.2 °F) (Temporal)   Resp 14   Ht 1.702 m (5' 7\")   Wt 83.2 kg (183 lb 6.4 oz)   LMP 06/14/2012   SpO2 97%   BMI 28.72 kg/m²      Physical Exam           Latest Reference Range & Units 09/08/23 13:13 03/15/24 11:05 09/20/24 15:47   WBC 4.8 - 10.8 K/uL 5.2 6.0 8.5   RBC 4.20 - 5.40 M/uL 5.33 5.56 (H) 5.55 (H)   Hemoglobin 12.0 - 16.0 g/dL 14.9 15.0 15.2   Hematocrit 37.0 - 47.0 % 45.0 45.4 46.2   MCV 81.4 - 97.8 fL 84.4 81.7 83.2   MCH 27.0 - 33.0 pg 28.0 27.0 27.4   MCHC 32.2 - 35.5 g/dL 33.1 33.0 32.9   RDW 35.9 - 50.0 fL 39.5 40.9 39.9   Platelet Count 164 - 446 K/uL 222 214 270   MPV 9.0 " - 12.9 fL 10.5 10.3 9.9   Neutrophils-Polys 44.00 - 72.00 % 50.90 49.30 58.70   Neutrophils (Absolute) 1.82 - 7.42 K/uL 2.67 2.97 4.97   Lymphocytes 22.00 - 41.00 % 39.90 36.80 30.70   Lymphs (Absolute) 1.00 - 4.80 K/uL 2.09 2.21 2.60   Monocytes 0.00 - 13.40 % 6.50 7.20 6.00   Monos (Absolute) 0.00 - 0.85 K/uL 0.34 0.43 0.51   Eosinophils 0.00 - 6.90 % 1.90 5.70 3.70   Eos (Absolute) 0.00 - 0.51 K/uL 0.10 0.34 0.31   Basophils 0.00 - 1.80 % 0.60 0.80 0.70   Baso (Absolute) 0.00 - 0.12 K/uL 0.03 0.05 0.06   Immature Granulocytes 0.00 - 0.90 % 0.20 0.20 0.20   Immature Granulocytes (abs) 0.00 - 0.11 K/uL 0.01 0.01 0.02   Nucleated RBC 0.00 - 0.20 /100 WBC 0.00 0.00 0.00   NRBC (Absolute) K/uL 0.00 0.00 0.00   (H): Data is abnormally high                    Assessment & Plan     No diagnosis found.        Labs q6, rto in 1 year   Issue ws resolved with TP and no more iron intake - not HH was secondary to long term iron intalke follwonr hysterectomy      Fuihte eval per PCP - f/v only as needed           respiratory distress.      Breath sounds: Normal breath sounds. No wheezing.   Abdominal:      General: Bowel sounds are normal. There is no distension.      Palpations: Abdomen is soft.      Tenderness: There is no abdominal tenderness.   Musculoskeletal:         General: Normal range of motion.      Cervical back: Normal range of motion and neck supple.   Skin:     General: Skin is warm and dry.      Coloration: Skin is not pale.      Findings: No erythema or rash.   Neurological:      Mental Status: She is alert and oriented to person, place, and time.   Psychiatric:         Mood and Affect: Mood normal.         Behavior: Behavior normal.                Latest Reference Range & Units 09/08/23 13:13 03/15/24 11:05 09/20/24 15:47   WBC 4.8 - 10.8 K/uL 5.2 6.0 8.5   RBC 4.20 - 5.40 M/uL 5.33 5.56 (H) 5.55 (H)   Hemoglobin 12.0 - 16.0 g/dL 14.9 15.0 15.2   Hematocrit 37.0 - 47.0 % 45.0 45.4 46.2   MCV 81.4 - 97.8 fL 84.4 81.7 83.2   MCH 27.0 - 33.0 pg 28.0 27.0 27.4   MCHC 32.2 - 35.5 g/dL 33.1 33.0 32.9   RDW 35.9 - 50.0 fL 39.5 40.9 39.9   Platelet Count 164 - 446 K/uL 222 214 270   MPV 9.0 - 12.9 fL 10.5 10.3 9.9   Neutrophils-Polys 44.00 - 72.00 % 50.90 49.30 58.70   Neutrophils (Absolute) 1.82 - 7.42 K/uL 2.67 2.97 4.97   Lymphocytes 22.00 - 41.00 % 39.90 36.80 30.70   Lymphs (Absolute) 1.00 - 4.80 K/uL 2.09 2.21 2.60   Monocytes 0.00 - 13.40 % 6.50 7.20 6.00   Monos (Absolute) 0.00 - 0.85 K/uL 0.34 0.43 0.51   Eosinophils 0.00 - 6.90 % 1.90 5.70 3.70   Eos (Absolute) 0.00 - 0.51 K/uL 0.10 0.34 0.31   Basophils 0.00 - 1.80 % 0.60 0.80 0.70   Baso (Absolute) 0.00 - 0.12 K/uL 0.03 0.05 0.06   Immature Granulocytes 0.00 - 0.90 % 0.20 0.20 0.20   Immature Granulocytes (abs) 0.00 - 0.11 K/uL 0.01 0.01 0.02   Nucleated RBC 0.00 - 0.20 /100 WBC 0.00 0.00 0.00   NRBC (Absolute) K/uL 0.00 0.00 0.00   (H): Data is abnormally high                    Assessment & Plan     1. Hereditary hemochromatosis (HCC)  CANCELED: Comp  Metabolic Panel    CANCELED: CBC WITH DIFFERENTIAL    CANCELED: IRON/TOTAL IRON BIND    CANCELED: FERRITIN    heterozygous H63D      2. Encounter for hematology follow-up             1.  HH: Diagnosed 12/21/17; initial TP, last competed 3/18/18 d/t anemia    CBC, ferritin have been evaluated and found to be within acceptable limits.  No TP is indicated, patient will continue to monitor symptoms.  She is released from clinic to follow-up with PCP for routine surveillance, follow-up in our office only as needed.        The patient verbalized agreement and understanding of current plan. All questions and concerns were addressed at time of visit.    Please note that this dictation was created using voice recognition software. I have made every reasonable attempt to correct obvious errors, but I expect that there are errors of grammar and possibly content that I did not discover before finalizing the note.

## 2024-09-24 NOTE — Clinical Note
Dr. Tatum, She's stable and hasn't needed TP in years - she'd like to f/v with you, send her back if you develop any concerns

## 2024-11-02 DIAGNOSIS — N95.1 VASOMOTOR SYMPTOMS DUE TO MENOPAUSE: ICD-10-CM

## 2024-11-02 DIAGNOSIS — R23.2 HOT FLASHES: ICD-10-CM

## 2024-11-04 ENCOUNTER — HOSPITAL ENCOUNTER (OUTPATIENT)
Dept: RADIOLOGY | Facility: MEDICAL CENTER | Age: 54
End: 2024-11-04

## 2024-11-04 ENCOUNTER — APPOINTMENT (OUTPATIENT)
Dept: RADIOLOGY | Facility: MEDICAL CENTER | Age: 54
DRG: 949 | End: 2024-11-04
Attending: NURSE PRACTITIONER
Payer: COMMERCIAL

## 2024-11-04 ENCOUNTER — HOSPITAL ENCOUNTER (INPATIENT)
Facility: MEDICAL CENTER | Age: 54
LOS: 1 days | DRG: 949 | End: 2024-11-05
Attending: EMERGENCY MEDICINE | Admitting: HOSPITALIST
Payer: COMMERCIAL

## 2024-11-04 ENCOUNTER — APPOINTMENT (OUTPATIENT)
Dept: RADIOLOGY | Facility: MEDICAL CENTER | Age: 54
DRG: 949 | End: 2024-11-04
Attending: EMERGENCY MEDICINE
Payer: COMMERCIAL

## 2024-11-04 DIAGNOSIS — M50.90 CERVICAL DISC DISEASE: ICD-10-CM

## 2024-11-04 DIAGNOSIS — E83.51 HYPOCALCEMIA: ICD-10-CM

## 2024-11-04 DIAGNOSIS — R53.1 WEAKNESS: ICD-10-CM

## 2024-11-04 DIAGNOSIS — I77.71 CAROTID ARTERY DISSECTION (HCC): ICD-10-CM

## 2024-11-04 DIAGNOSIS — R20.0 ARM NUMBNESS: ICD-10-CM

## 2024-11-04 LAB
ALBUMIN SERPL BCP-MCNC: 3 G/DL (ref 3.2–4.9)
ALBUMIN/GLOB SERPL: 1.5 G/DL
ALP SERPL-CCNC: 38 U/L (ref 30–99)
ALT SERPL-CCNC: 35 U/L (ref 2–50)
ANION GAP SERPL CALC-SCNC: 10 MMOL/L (ref 7–16)
AST SERPL-CCNC: 31 U/L (ref 12–45)
BASOPHILS # BLD AUTO: 0.5 % (ref 0–1.8)
BASOPHILS # BLD: 0.04 K/UL (ref 0–0.12)
BILIRUB SERPL-MCNC: 0.3 MG/DL (ref 0.1–1.5)
BUN SERPL-MCNC: 13 MG/DL (ref 8–22)
CALCIUM ALBUM COR SERPL-MCNC: 7.5 MG/DL (ref 8.5–10.5)
CALCIUM SERPL-MCNC: 6.7 MG/DL (ref 8.5–10.5)
CHLORIDE SERPL-SCNC: 115 MMOL/L (ref 96–112)
CO2 SERPL-SCNC: 16 MMOL/L (ref 20–33)
CREAT SERPL-MCNC: 0.46 MG/DL (ref 0.5–1.4)
EKG IMPRESSION: NORMAL
EKG IMPRESSION: NORMAL
EOSINOPHIL # BLD AUTO: 0.23 K/UL (ref 0–0.51)
EOSINOPHIL NFR BLD: 2.8 % (ref 0–6.9)
ERYTHROCYTE [DISTWIDTH] IN BLOOD BY AUTOMATED COUNT: 38.3 FL (ref 35.9–50)
EST. AVERAGE GLUCOSE BLD GHB EST-MCNC: 148 MG/DL
GFR SERPLBLD CREATININE-BSD FMLA CKD-EPI: 114 ML/MIN/1.73 M 2
GLOBULIN SER CALC-MCNC: 2 G/DL (ref 1.9–3.5)
GLUCOSE SERPL-MCNC: 104 MG/DL (ref 65–99)
HBA1C MFR BLD: 6.8 % (ref 4–5.6)
HCT VFR BLD AUTO: 42.8 % (ref 37–47)
HGB BLD-MCNC: 13.9 G/DL (ref 12–16)
IMM GRANULOCYTES # BLD AUTO: 0.02 K/UL (ref 0–0.11)
IMM GRANULOCYTES NFR BLD AUTO: 0.2 % (ref 0–0.9)
LYMPHOCYTES # BLD AUTO: 1.9 K/UL (ref 1–4.8)
LYMPHOCYTES NFR BLD: 23 % (ref 22–41)
MCH RBC QN AUTO: 26.2 PG (ref 27–33)
MCHC RBC AUTO-ENTMCNC: 32.5 G/DL (ref 32.2–35.5)
MCV RBC AUTO: 80.6 FL (ref 81.4–97.8)
MONOCYTES # BLD AUTO: 0.42 K/UL (ref 0–0.85)
MONOCYTES NFR BLD AUTO: 5.1 % (ref 0–13.4)
NEUTROPHILS # BLD AUTO: 5.66 K/UL (ref 1.82–7.42)
NEUTROPHILS NFR BLD: 68.4 % (ref 44–72)
NRBC # BLD AUTO: 0 K/UL
NRBC BLD-RTO: 0 /100 WBC (ref 0–0.2)
NT-PROBNP SERPL IA-MCNC: 57 PG/ML (ref 0–125)
PLATELET # BLD AUTO: 228 K/UL (ref 164–446)
PMV BLD AUTO: 9.4 FL (ref 9–12.9)
POTASSIUM SERPL-SCNC: 3.2 MMOL/L (ref 3.6–5.5)
PROT SERPL-MCNC: 5 G/DL (ref 6–8.2)
RBC # BLD AUTO: 5.31 M/UL (ref 4.2–5.4)
SODIUM SERPL-SCNC: 141 MMOL/L (ref 135–145)
TROPONIN T SERPL-MCNC: <6 NG/L (ref 6–19)
TROPONIN T SERPL-MCNC: <6 NG/L (ref 6–19)
WBC # BLD AUTO: 8.3 K/UL (ref 4.8–10.8)

## 2024-11-04 PROCEDURE — 700102 HCHG RX REV CODE 250 W/ 637 OVERRIDE(OP): Performed by: HOSPITALIST

## 2024-11-04 PROCEDURE — 96365 THER/PROPH/DIAG IV INF INIT: CPT

## 2024-11-04 PROCEDURE — 99222 1ST HOSP IP/OBS MODERATE 55: CPT | Performed by: NURSE PRACTITIONER

## 2024-11-04 PROCEDURE — 71045 X-RAY EXAM CHEST 1 VIEW: CPT

## 2024-11-04 PROCEDURE — 84484 ASSAY OF TROPONIN QUANT: CPT | Mod: 91

## 2024-11-04 PROCEDURE — 99223 1ST HOSP IP/OBS HIGH 75: CPT | Performed by: HOSPITALIST

## 2024-11-04 PROCEDURE — 700111 HCHG RX REV CODE 636 W/ 250 OVERRIDE (IP): Performed by: HOSPITALIST

## 2024-11-04 PROCEDURE — 96375 TX/PRO/DX INJ NEW DRUG ADDON: CPT

## 2024-11-04 PROCEDURE — 96366 THER/PROPH/DIAG IV INF ADDON: CPT

## 2024-11-04 PROCEDURE — 770020 HCHG ROOM/CARE - TELE (206)

## 2024-11-04 PROCEDURE — 99285 EMERGENCY DEPT VISIT HI MDM: CPT

## 2024-11-04 PROCEDURE — 36415 COLL VENOUS BLD VENIPUNCTURE: CPT

## 2024-11-04 PROCEDURE — 80053 COMPREHEN METABOLIC PANEL: CPT

## 2024-11-04 PROCEDURE — 85025 COMPLETE CBC W/AUTO DIFF WBC: CPT

## 2024-11-04 PROCEDURE — 72141 MRI NECK SPINE W/O DYE: CPT

## 2024-11-04 PROCEDURE — 700111 HCHG RX REV CODE 636 W/ 250 OVERRIDE (IP): Performed by: EMERGENCY MEDICINE

## 2024-11-04 PROCEDURE — A9270 NON-COVERED ITEM OR SERVICE: HCPCS | Performed by: HOSPITALIST

## 2024-11-04 PROCEDURE — 93005 ELECTROCARDIOGRAM TRACING: CPT

## 2024-11-04 PROCEDURE — 83036 HEMOGLOBIN GLYCOSYLATED A1C: CPT

## 2024-11-04 PROCEDURE — 93005 ELECTROCARDIOGRAM TRACING: CPT | Performed by: EMERGENCY MEDICINE

## 2024-11-04 PROCEDURE — 70551 MRI BRAIN STEM W/O DYE: CPT

## 2024-11-04 PROCEDURE — 83880 ASSAY OF NATRIURETIC PEPTIDE: CPT

## 2024-11-04 RX ORDER — INSULIN LISPRO 100 [IU]/ML
2-9 INJECTION, SOLUTION INTRAVENOUS; SUBCUTANEOUS
Status: DISCONTINUED | OUTPATIENT
Start: 2024-11-04 | End: 2024-11-05 | Stop reason: HOSPADM

## 2024-11-04 RX ORDER — ONDANSETRON 4 MG/1
4 TABLET, ORALLY DISINTEGRATING ORAL EVERY 4 HOURS PRN
Status: DISCONTINUED | OUTPATIENT
Start: 2024-11-04 | End: 2024-11-05 | Stop reason: HOSPADM

## 2024-11-04 RX ORDER — MIDAZOLAM HYDROCHLORIDE 1 MG/ML
2 INJECTION INTRAMUSCULAR; INTRAVENOUS ONCE
Status: COMPLETED | OUTPATIENT
Start: 2024-11-04 | End: 2024-11-04

## 2024-11-04 RX ORDER — CALCIUM GLUCONATE 20 MG/ML
2 INJECTION, SOLUTION INTRAVENOUS ONCE
Status: COMPLETED | OUTPATIENT
Start: 2024-11-04 | End: 2024-11-04

## 2024-11-04 RX ORDER — ENOXAPARIN SODIUM 100 MG/ML
40 INJECTION SUBCUTANEOUS DAILY
Status: DISCONTINUED | OUTPATIENT
Start: 2024-11-04 | End: 2024-11-05 | Stop reason: HOSPADM

## 2024-11-04 RX ORDER — HYDROMORPHONE HYDROCHLORIDE 1 MG/ML
0.25 INJECTION, SOLUTION INTRAMUSCULAR; INTRAVENOUS; SUBCUTANEOUS
Status: DISCONTINUED | OUTPATIENT
Start: 2024-11-04 | End: 2024-11-05 | Stop reason: HOSPADM

## 2024-11-04 RX ORDER — POTASSIUM CHLORIDE 1500 MG/1
40 TABLET, EXTENDED RELEASE ORAL 3 TIMES DAILY
Status: COMPLETED | OUTPATIENT
Start: 2024-11-04 | End: 2024-11-05

## 2024-11-04 RX ORDER — ONDANSETRON 2 MG/ML
4 INJECTION INTRAMUSCULAR; INTRAVENOUS EVERY 4 HOURS PRN
Status: DISCONTINUED | OUTPATIENT
Start: 2024-11-04 | End: 2024-11-05 | Stop reason: HOSPADM

## 2024-11-04 RX ORDER — ACETAMINOPHEN 325 MG/1
650 TABLET ORAL EVERY 6 HOURS PRN
Status: DISCONTINUED | OUTPATIENT
Start: 2024-11-04 | End: 2024-11-05 | Stop reason: HOSPADM

## 2024-11-04 RX ORDER — ASPIRIN 81 MG/1
81 TABLET, CHEWABLE ORAL EVERY EVENING
Status: DISCONTINUED | OUTPATIENT
Start: 2024-11-04 | End: 2024-11-05 | Stop reason: HOSPADM

## 2024-11-04 RX ORDER — ESTRADIOL 0.04 MG/D
1 PATCH, EXTENDED RELEASE TRANSDERMAL
Qty: 16 PATCH | Refills: 2 | Status: SHIPPED | OUTPATIENT
Start: 2024-11-04

## 2024-11-04 RX ORDER — DEXTROSE MONOHYDRATE 25 G/50ML
25 INJECTION, SOLUTION INTRAVENOUS
Status: DISCONTINUED | OUTPATIENT
Start: 2024-11-04 | End: 2024-11-05 | Stop reason: HOSPADM

## 2024-11-04 RX ORDER — OXYCODONE HYDROCHLORIDE 5 MG/1
5 TABLET ORAL
Status: DISCONTINUED | OUTPATIENT
Start: 2024-11-04 | End: 2024-11-05 | Stop reason: HOSPADM

## 2024-11-04 RX ORDER — HYDROXYZINE HYDROCHLORIDE 25 MG/1
25 TABLET, FILM COATED ORAL 3 TIMES DAILY PRN
Status: DISCONTINUED | OUTPATIENT
Start: 2024-11-04 | End: 2024-11-05 | Stop reason: HOSPADM

## 2024-11-04 RX ORDER — POTASSIUM CHLORIDE 1500 MG/1
40 TABLET, EXTENDED RELEASE ORAL 3 TIMES DAILY
Status: DISCONTINUED | OUTPATIENT
Start: 2024-11-04 | End: 2024-11-04

## 2024-11-04 RX ORDER — PROMETHAZINE HYDROCHLORIDE 25 MG/1
12.5-25 TABLET ORAL EVERY 4 HOURS PRN
Status: DISCONTINUED | OUTPATIENT
Start: 2024-11-04 | End: 2024-11-05 | Stop reason: HOSPADM

## 2024-11-04 RX ORDER — ENOXAPARIN SODIUM 100 MG/ML
40 INJECTION SUBCUTANEOUS DAILY
Status: DISCONTINUED | OUTPATIENT
Start: 2024-11-05 | End: 2024-11-04

## 2024-11-04 RX ORDER — PROMETHAZINE HYDROCHLORIDE 25 MG/1
12.5-25 SUPPOSITORY RECTAL EVERY 4 HOURS PRN
Status: DISCONTINUED | OUTPATIENT
Start: 2024-11-04 | End: 2024-11-05 | Stop reason: HOSPADM

## 2024-11-04 RX ORDER — IBUPROFEN 200 MG
950 CAPSULE ORAL EVERY EVENING
Status: DISCONTINUED | OUTPATIENT
Start: 2024-11-05 | End: 2024-11-05 | Stop reason: HOSPADM

## 2024-11-04 RX ORDER — ESCITALOPRAM OXALATE 10 MG/1
40 TABLET ORAL DAILY
Status: DISCONTINUED | OUTPATIENT
Start: 2024-11-05 | End: 2024-11-05 | Stop reason: HOSPADM

## 2024-11-04 RX ORDER — ASPIRIN 300 MG/1
300 SUPPOSITORY RECTAL EVERY EVENING
Status: DISCONTINUED | OUTPATIENT
Start: 2024-11-04 | End: 2024-11-05 | Stop reason: HOSPADM

## 2024-11-04 RX ORDER — LISINOPRIL 20 MG/1
20 TABLET ORAL DAILY
Status: DISCONTINUED | OUTPATIENT
Start: 2024-11-05 | End: 2024-11-05 | Stop reason: HOSPADM

## 2024-11-04 RX ORDER — OXYCODONE HYDROCHLORIDE 5 MG/1
2.5 TABLET ORAL
Status: DISCONTINUED | OUTPATIENT
Start: 2024-11-04 | End: 2024-11-05 | Stop reason: HOSPADM

## 2024-11-04 RX ORDER — CALCIUM GLUCONATE 20 MG/ML
1 INJECTION, SOLUTION INTRAVENOUS ONCE
Status: COMPLETED | OUTPATIENT
Start: 2024-11-04 | End: 2024-11-04

## 2024-11-04 RX ORDER — PROCHLORPERAZINE EDISYLATE 5 MG/ML
5-10 INJECTION INTRAMUSCULAR; INTRAVENOUS EVERY 4 HOURS PRN
Status: DISCONTINUED | OUTPATIENT
Start: 2024-11-04 | End: 2024-11-05 | Stop reason: HOSPADM

## 2024-11-04 RX ORDER — BUPROPION HYDROCHLORIDE 150 MG/1
150 TABLET, EXTENDED RELEASE ORAL 2 TIMES DAILY
Status: DISCONTINUED | OUTPATIENT
Start: 2024-11-05 | End: 2024-11-05 | Stop reason: HOSPADM

## 2024-11-04 RX ADMIN — HYDROMORPHONE HYDROCHLORIDE 0.25 MG: 1 INJECTION, SOLUTION INTRAMUSCULAR; INTRAVENOUS; SUBCUTANEOUS at 22:31

## 2024-11-04 RX ADMIN — OXYCODONE 5 MG: 5 TABLET ORAL at 20:11

## 2024-11-04 RX ADMIN — POTASSIUM CHLORIDE 40 MEQ: 1500 TABLET, EXTENDED RELEASE ORAL at 20:12

## 2024-11-04 RX ADMIN — ENOXAPARIN SODIUM 40 MG: 100 INJECTION SUBCUTANEOUS at 20:12

## 2024-11-04 RX ADMIN — OXYCODONE 2.5 MG: 5 TABLET ORAL at 15:52

## 2024-11-04 RX ADMIN — CALCIUM GLUCONATE 2 G: 20 INJECTION, SOLUTION INTRAVENOUS at 14:14

## 2024-11-04 RX ADMIN — ASPIRIN 81 MG: 81 TABLET, CHEWABLE ORAL at 20:11

## 2024-11-04 RX ADMIN — CALCIUM GLUCONATE 1 G: 20 INJECTION, SOLUTION INTRAVENOUS at 15:16

## 2024-11-04 RX ADMIN — MIDAZOLAM HYDROCHLORIDE 2 MG: 1 INJECTION, SOLUTION INTRAMUSCULAR; INTRAVENOUS at 17:28

## 2024-11-04 ASSESSMENT — COGNITIVE AND FUNCTIONAL STATUS - GENERAL
PERSONAL GROOMING: A LITTLE
MOBILITY SCORE: 24
HELP NEEDED FOR BATHING: A LITTLE
EATING MEALS: A LITTLE
DRESSING REGULAR LOWER BODY CLOTHING: A LITTLE
SUGGESTED CMS G CODE MODIFIER MOBILITY: CH
TOILETING: A LITTLE
SUGGESTED CMS G CODE MODIFIER DAILY ACTIVITY: CK
DAILY ACTIVITIY SCORE: 18
DRESSING REGULAR UPPER BODY CLOTHING: A LITTLE

## 2024-11-04 ASSESSMENT — SOCIAL DETERMINANTS OF HEALTH (SDOH)
IN THE PAST 12 MONTHS, HAS THE ELECTRIC, GAS, OIL, OR WATER COMPANY THREATENED TO SHUT OFF SERVICE IN YOUR HOME?: NO
WITHIN THE PAST 12 MONTHS, YOU WORRIED THAT YOUR FOOD WOULD RUN OUT BEFORE YOU GOT THE MONEY TO BUY MORE: NEVER TRUE
WITHIN THE LAST YEAR, HAVE TO BEEN RAPED OR FORCED TO HAVE ANY KIND OF SEXUAL ACTIVITY BY YOUR PARTNER OR EX-PARTNER?: NO
WITHIN THE LAST YEAR, HAVE YOU BEEN AFRAID OF YOUR PARTNER OR EX-PARTNER?: NO
WITHIN THE PAST 12 MONTHS, THE FOOD YOU BOUGHT JUST DIDN'T LAST AND YOU DIDN'T HAVE MONEY TO GET MORE: NEVER TRUE
WITHIN THE LAST YEAR, HAVE YOU BEEN KICKED, HIT, SLAPPED, OR OTHERWISE PHYSICALLY HURT BY YOUR PARTNER OR EX-PARTNER?: NO
WITHIN THE LAST YEAR, HAVE YOU BEEN HUMILIATED OR EMOTIONALLY ABUSED IN OTHER WAYS BY YOUR PARTNER OR EX-PARTNER?: NO

## 2024-11-04 ASSESSMENT — LIFESTYLE VARIABLES
AVERAGE NUMBER OF DAYS PER WEEK YOU HAVE A DRINK CONTAINING ALCOHOL: 0
TOTAL SCORE: 0
DOES PATIENT WANT TO STOP DRINKING: NO
HAVE PEOPLE ANNOYED YOU BY CRITICIZING YOUR DRINKING: NO
TOTAL SCORE: 0
HAVE YOU EVER FELT YOU SHOULD CUT DOWN ON YOUR DRINKING: NO
HOW MANY TIMES IN THE PAST YEAR HAVE YOU HAD 5 OR MORE DRINKS IN A DAY: 0
EVER FELT BAD OR GUILTY ABOUT YOUR DRINKING: NO
ALCOHOL_USE: NO
TOTAL SCORE: 0
CONSUMPTION TOTAL: NEGATIVE
EVER HAD A DRINK FIRST THING IN THE MORNING TO STEADY YOUR NERVES TO GET RID OF A HANGOVER: NO
ON A TYPICAL DAY WHEN YOU DRINK ALCOHOL HOW MANY DRINKS DO YOU HAVE: 0

## 2024-11-04 ASSESSMENT — PATIENT HEALTH QUESTIONNAIRE - PHQ9
1. LITTLE INTEREST OR PLEASURE IN DOING THINGS: NOT AT ALL
SUM OF ALL RESPONSES TO PHQ9 QUESTIONS 1 AND 2: 0
2. FEELING DOWN, DEPRESSED, IRRITABLE, OR HOPELESS: NOT AT ALL

## 2024-11-04 ASSESSMENT — PAIN DESCRIPTION - PAIN TYPE
TYPE: ACUTE PAIN

## 2024-11-04 ASSESSMENT — FIBROSIS 4 INDEX
FIB4 SCORE: 0.91
FIB4 SCORE: 1.2

## 2024-11-04 NOTE — ED NOTES
Medication history reviewed with patient at bedside.   Med rec is complete  Allergies reviewed.     Patient has not had any outpatient antibiotics in the last 30 days.   Anticoagulants: No    Moo Magallanes

## 2024-11-04 NOTE — ED PROVIDER NOTES
ED Provider Note    CHIEF COMPLAINT  Chief Complaint   Patient presents with    Chest Pain     Pt found to possible R carotid dissection and R vetebral dissection from urgent care findings. Pain in chest radiating to R arm and back numbness in R arm     Neck Pain       EXTERNAL RECORDS REVIEWED  Inpatient Notes   and Outpatient labs & studies she was admitted back in June 2022 for left ICA dissection.  Antiplatelet therapy at that time and blood pressure control.    HPI/ROS  LIMITATION TO HISTORY   Select: : None      Mónica Keita is a 54 y.o. female who presents patient presents via ambulance from her home with concern for right internal carotid artery dissection.  She apparently was called and told that this is what she had.  I have not received any call from the referring ER nor have we been able to obtain any records from them.  She woke up yesterday morning with some pain in her neck.  She did not think too much of this however as the pain started to go down to her right shoulder, she was more concerned about this, and sought evaluation.  She was seen at a freestanding ER.  She did CT scan she was told was normal and sent home.  This morning she is feeling better but she does have some numbness and weakness in her upper extremity at the shoulder.  She was called this morning and told that reread of the CT and she was sent here.    PAST MEDICAL HISTORY   has a past medical history of Adverse effect of anesthesia, Anemia, Anxiety, Bronchitis (2009), Diabetes (HCC), Dissection of cervical artery (HCC), Elevated liver enzymes, Hypertension, Neuroendocrine tumor (11/2021), and Pneumonia (2009).    SURGICAL HISTORY   has a past surgical history that includes hysterectomy, vaginal (06/04/2014); pelvic exam under anesthesia (06/17/2014); vaginal hysterectomy total (06/17/2014); appendectomy (2014); upper gi endoscopy,diagnosis (N/A, 11/01/2021); and egd w/endoscopic ultrasound (N/A, 11/01/2021).    FAMILY  "HISTORY  Family History   Problem Relation Age of Onset    Heart Disease Father     No Known Problems Sister     Heart Attack Brother         CAD. 3 MI's.     No Known Problems Son     No Known Problems Daughter        SOCIAL HISTORY  Social History     Tobacco Use    Smoking status: Never    Smokeless tobacco: Never   Vaping Use    Vaping status: Never Used   Substance and Sexual Activity    Alcohol use: No    Drug use: No    Sexual activity: Yes     Partners: Male     Birth control/protection: Surgical     Comment: .        CURRENT MEDICATIONS  Home Medications       Reviewed by Leandra Boland R.N. (Registered Nurse) on 11/04/24 at 1101  Med List Status: Not Addressed     Medication Last Dose Status   Biotin 5000 MCG Cap  Active   buPROPion (WELLBUTRIN XL) 300 MG XL tablet  Active   Cholecalciferol (VITAMIN D3) 125 MCG (5000 UT) Cap  Active   Coenzyme Q10 (COQ10 PO)  Active   Continuous Blood Gluc Sensor (FREESTYLE MIN 3 SENSOR) Misc  Active   escitalopram (LEXAPRO) 20 MG tablet  Active   estradiol (VIVELLE) 0.0375 MG/24HR patch  Active   hydrOXYzine HCl (ATARAX) 25 MG Tab  Active   lisinopril (PRINIVIL) 20 MG Tab  Active   metFORMIN ER (GLUCOPHAGE XR) 750 MG TABLET SR 24 HR  Active   omeprazole (PRILOSEC) 20 MG delayed-release capsule  Active                  Audit from Redirected Encounters    **Home medications have not yet been reviewed for this encounter**         ALLERGIES  No Known Allergies    PHYSICAL EXAM  VITAL SIGNS: /68   Pulse 82   Temp 36.7 °C (98.1 °F) (Temporal)   Resp 14   Ht 1.702 m (5' 7\")   Wt 81.6 kg (180 lb)   LMP 06/14/2012   SpO2 91%   BMI 28.19 kg/m²    Constitutional: Well appearing patient in no acute distress.  HENT: Head is without trauma.  Oropharynx is clear.  Mucous membranes are moist.  Eyes: Sclerae are nonicteric, pupils are equally round.  Neck: Supple with grossly normal range of motion.  Cardiovascular: Heart is regular rate and rhythm without murmur " rub or gallop.  Peripheral pulses are intact and symmetric throughout.  Thorax & Lungs: Breathing easily.  Good air movement.  There is no wheeze, rhonchi or rales.  Abdomen: Bowel sounds normal, soft, non-distended, nontender, no mass nor pulsatile mass. I do not appreciate hepatosplenomegaly.  Skin: No apparent rash.  I do not see petechiae or purpura.  Extremities: No evidence of acute trauma.  No clubbing, cyanosis, edema, no Homans or cords.  Neurologic: Alert. Moving all extremities, however she does have some weakness at the right shoulder and right biceps.  Sensation is intact throughout.  Cranial nerves are normal throughout.  Psychiatric: Normal for situation      EKG/LABS  To my interpretation this is a twelve-lead study showing a normal sinus rhythm rate of 82.  There are no ST segment or T wave changes.  Intervals are normal.  Impression: Unremarkable EKG  I have independently interpreted this EKG  Labs Reviewed   COMP METABOLIC PANEL - Abnormal; Notable for the following components:       Result Value    Potassium 3.2 (*)     Chloride 115 (*)     Co2 16 (*)     Glucose 104 (*)     Creatinine 0.46 (*)     Calcium 6.7 (*)     Correct Calcium 7.5 (*)     Albumin 3.0 (*)     Total Protein 5.0 (*)     All other components within normal limits    Narrative:     SPECIMEN IS A RECOLLECT   PROBRAIN NATRIURETIC PEPTIDE, NT    Narrative:     SPECIMEN IS A RECOLLECT   TROPONIN    Narrative:     SPECIMEN IS A RECOLLECT   ESTIMATED GFR    Narrative:     SPECIMEN IS A RECOLLECT   TROPONIN   CBC WITH DIFFERENTIAL         RADIOLOGY/PROCEDURES   I have independently interpreted the diagnostic imaging associated with this visit and am waiting the final reading from the radiologist.   My interpretation is as follows: Carotid dissection is noted    Radiologist interpretation:  CT-OUTSIDE IMAGES-CT HEAD   Final Result      DX-CHEST-PORTABLE (1 VIEW)   Final Result      No acute cardiac or pulmonary abnormalities are  identified.          COURSE & MEDICAL DECISION MAKING    ASSESSMENT, COURSE AND PLAN  Care Narrative: This patient is referred in for carotid artery dissection.  She has some weakness and numbness in the right proximal upper extremity.  Review of CTs does show that dissection.  This was discussed with Dr. Gaines, vascular surgery has reviewed her scans.  He is recommended no surgical intervention, but did recommend that I speak with the vascular neurology team and as well as Dr. Kaur and interventional.      Subsequently, however  Upon speaking with Dr. Ivy, he points out that that dissection was present on her scan in 2022.  However, agrees that would not explain her ipsilateral symptoms, and would like to still workup for that to include MRI of her head and neck.  For this reason we will put her into the hospital, I have asked with the CDU.  Ultimately spoke to Dr. Pandey however, he has seen and admitted her.  I do note her calcium is low, I spoke with the pharmacist about repeating this.    Interestingly, despite multiple phone calls and fax request to the freestanding ER, we have still not received records from them as of yet at the time of this dictation.      DISPOSITION AND DISCUSSIONS  I have discussed management of the patient with the following physicians and KAVON's: Vascular surgery, hospitalist, vascular neurology    Discussion of management with other [facility] or appropriate source(s): Pharmacy        Addendum: I did speak with Dr. Kaur, he agrees that there is no acute intervention necessary from his standpoint based upon that CT.    FINAL DIAGNOSIS  1. Arm numbness    2. Carotid artery dissection (HCC)    3. Hypocalcemia         Electronically signed by: Shay Sofia M.D., 11/4/2024 12:27 PM

## 2024-11-04 NOTE — ED TRIAGE NOTES
"Chief Complaint   Patient presents with    Chest Pain     Pt found to possible R carotid dissection and R vetebral dissection from urgent care findings. Pain in chest radiating to R arm and back numbness in R arm      Pt BIBA after above results from outside facility. Pt states pain was 8/10 during the night woke up feeling like neck was kinked from sleep but pain was increasing with no relief from interventions. Pt states went to urgent care and was sent home then urgent care called [facility] w/ results. Pt states pain is better now.     Pt denies N/V/D denies dizzyness. Pt Aox4 RA VSBEATRIZ Willett @ free standing ER     /89   Pulse 84   Resp 18   Ht 1.702 m (5' 7\")   Wt 81.6 kg (180 lb)   LMP 06/14/2012   SpO2 98%   BMI 28.19 kg/m²         "

## 2024-11-04 NOTE — DISCHARGE PLANNING
[facility]    Referral from:  Dr. Don Han  Insurance Provider on Facesheet:  Loren[employer]  Potential Rehab diagnosis:  Carotid artery dissection    Chart review indicates patient has ongoing medical management and may have therapy needs to possibly meet inpatient rehab facility criteria with the goal of returning to community.      D/C Support: TBD    Physiatry consult pended, waiting for additional information.  History of carotid artery dissection and vertebral artery dissection.  W/u ongoing, MR brain and MR C-spine pending.  PT/OT pending as clinically appropriate.  TCC will follow.  Please reach out sooner if PMR consult requested for medical management.      Last Covid test:    Thank you for the referral.

## 2024-11-04 NOTE — CONSULTS
Chief Complaint   Patient presents with    Chest Pain     Pt found to possible R carotid dissection and R vetebral dissection from urgent care findings. Pain in chest radiating to R arm and back numbness in R arm     Neck Pain         Neurology Initial Consult H&P  Vascular Neurology Service, [facility]    History of present illness:  Mónica Keita is a 54 y.o. female with a PMHx of vertebral artery dissection, carotid artery dissection (both in 2022), HTN, elevated liver enzymes, and neuroendocrine tumor who presents today for concerns for carotid and vertebral artery dissection. Patient complained of right neck pain that extended to the right arm yesterday and presented to an Urgent Care where CTA head and neck were obtained. The read for these images were delayed, and this morning it was read as having the above mentioned dissections and the patient was prompted to present to the ED. On exam the patient has a nonfocal neurological exam and is presently denying paraesthesias, but reports discomfort in elevating her right arm. Upon further evaluation the carotid and vertebral artery dissections were determined to be chronic from her initial event in 2022. Neurology has been consulted for further evaluation of the above.    Referring Provider: Shay Sofia M.D. has consulted Neurology for further evaluation    Past medical history:   Past Medical History:   Diagnosis Date    Adverse effect of anesthesia     Laryngospasm in the past with extubation    Anemia     Anxiety     Bronchitis 2009    Diabetes (HCC)     type 2    Dissection of cervical artery (HCC)     Elevated liver enzymes     unknown cause    Hypertension     Neuroendocrine tumor 11/2021    abdomen    Pneumonia 2009       Past surgical history:   Past Surgical History:   Procedure Laterality Date    NM UPPER GI ENDOSCOPY,DIAGNOSIS N/A 11/01/2021    Procedure: GASTROSCOPY- WITH ENDOSCOPIC MUCOSAL RESECTION;  Surgeon:  Edwin Aguillon M.D.;  Location: SURGERY [facility];  Service: EUS    EGD W/ENDOSCOPIC ULTRASOUND N/A 11/01/2021    Procedure: EGD, WITH ENDOSCOPIC US - UPPER RADIAL;  Surgeon: Edwin Aguillon M.D.;  Location: [facility];  Service: EUS    PELVIC EXAM UNDER ANESTHESIA  06/17/2014    Performed by Enedina Grant M.D. at SURGERY [facility] ORS    VAGINAL HYSTERECTOMY TOTAL  06/17/2014    Performed by Enedina Grant M.D. at SURGERY [facility] ORS    HYSTERECTOMY, VAGINAL  06/04/2014    [facility]    APPENDECTOMY  2014       Family history:   Family History   Problem Relation Age of Onset    Heart Disease Father     No Known Problems Sister     Heart Attack Brother         CAD. 3 MI's.     No Known Problems Son     No Known Problems Daughter        Social history:   Social History     Socioeconomic History    Marital status:      Spouse name: Not on file    Number of children: Not on file    Years of education: Not on file    Highest education level: GED or equivalent   Occupational History    Not on file   Tobacco Use    Smoking status: Never    Smokeless tobacco: Never   Vaping Use    Vaping status: Never Used   Substance and Sexual Activity    Alcohol use: No    Drug use: No    Sexual activity: Yes     Partners: Male     Birth control/protection: Surgical     Comment: .    Other Topics Concern    Not on file   Social History Narrative    Not on file     Social Drivers of Health     Financial Resource Strain: Low Risk  (9/26/2023)    Overall Financial Resource Strain (CARDIA)     Difficulty of Paying Living Expenses: Not hard at all   Food Insecurity: No Food Insecurity (9/26/2023)    Hunger Vital Sign     Worried About Running Out of Food in the Last Year: Never true     Ran Out of Food in the Last Year: Never true   Transportation Needs: No Transportation Needs (9/26/2023)    PRAPARE - Transportation     Lack of Transportation (Medical): No     Lack of  Transportation (Non-Medical): No   Physical Activity: Insufficiently Active (9/26/2023)    Exercise Vital Sign     Days of Exercise per Week: 2 days     Minutes of Exercise per Session: 30 min   Stress: Stress Concern Present (9/26/2023)    [place] [employer] - Occupational Stress Questionnaire     Feeling of Stress : To some extent   Social Connections: Socially Isolated (9/26/2023)    Social Connection and Isolation Panel [NHANES]     Frequency of Communication with Friends and Family: Never     Frequency of Social Gatherings with Friends and Family: Once a week     Attends Taoism Services: Never     Active Member of Clubs or Organizations: Yes     Attends Club or Organization Meetings: More than 4 times per year     Marital Status:    Intimate Partner Violence: Not on file   Housing Stability: Low Risk  (9/26/2023)    Housing Stability Vital Sign     Unable to Pay for Housing in the Last Year: No     Number of Places Lived in the Last Year: 1     Unstable Housing in the Last Year: No       Current medications:   No current facility-administered medications for this encounter.     Current Outpatient Medications   Medication    estradiol (VIVELLE) 0.0375 MG/24HR patch    escitalopram (LEXAPRO) 20 MG tablet    metFORMIN ER (GLUCOPHAGE XR) 750 MG TABLET SR 24 HR    omeprazole (PRILOSEC) 20 MG delayed-release capsule    buPROPion (WELLBUTRIN XL) 300 MG XL tablet    lisinopril (PRINIVIL) 20 MG Tab    Continuous Blood Gluc Sensor (FREESTYLE MIN 3 SENSOR) Misc    hydrOXYzine HCl (ATARAX) 25 MG Tab    Cholecalciferol (VITAMIN D3) 125 MCG (5000 UT) Cap    Coenzyme Q10 (COQ10 PO)    Biotin 5000 MCG Cap       Medication Allergy:  No Known Allergies    Review of systems:   Constitutional: denies fever, night sweats, weight loss.   Eyes: denies acute vision change, eye pain or secretion.   Ears, Nose, Mouth, Throat: denies nasal secretion, nasal bleeding, difficulty swallowing, hearing loss,  tinnitus, vertigo, ear pain, acute dental problems, oral ulcers or lesions.   Endocrine: denies recent weight changes, heat or cold intolerance, polyuria, polydypsia, polyphagia,abnormal hair growth.  Cardiovascular: denies new onset of chest pain, palpitations, syncope, or dyspnea of exertion.  Pulmonary: denies shortness of breath, new onset of cough, hemoptysis, wheezing, chest pain or flu-like symptoms.   GI: denies nausea, vomiting, diarrhea, GI bleeding, change in appetite, abdominal pain, and change in bowel habits.  : denies dysuria, urinary incontinence, hematuria.  Heme/oncology: denies history of easy bruising or bleeding. No history of cancer, DVTor PE.  Allergy/immunology: denies hives/urticaria, or itching.   Dermatologic: denies new rash, or new skin lesions.  Musculoskeletal:denies joint swelling or pain, muscle pain, neck and back pain.   Neurologic: denies headaches, acute visual changes, facial droopiness, muscle weakness (focal or generalized), paresthesias, anesthesia, ataxia, change in speech or language, memory loss, abnormal movements, seizures, loss of consciousness, or episodes of confusion.   Psychiatric: denies symptoms of depression, anxiety, hallucinations, mood swings or changes, suicidal or homicidal thoughts.     Physical examination:   Vitals:    11/04/24 1100 11/04/24 1121 11/04/24 1151 11/04/24 1206   BP: 130/87 128/82 126/77 126/68   Pulse: 90 82 80 82   Resp: 18  12 14   Temp: 36.7 °C (98.1 °F)      TempSrc: Temporal      SpO2: 96% 95% 96% 91%   Weight:       Height:         General: Patient in no acute distress, pleasant and cooperative.  HEENT: Normocephalic, no signs of acute trauma.   Neck: supple, no meningeal signs or carotid bruits. There is normal range of motion. No tenderness on exam.   Chest: clear to auscultation. No cough.   CV: RRR, no murmurs.   Skin: no signs of acute rashes or trauma.   Musculoskeletal: joints exhibit full range of motion, without any pain to  palpation. There are no signs of joint or muscle swelling. There is no tenderness to deep palpation of muscles.   Psychiatric: No hallucinatory behavior. Denies symptoms of depression or suicidal ideation. Mood and affect appear normal on exam.     NEUROLOGICAL EXAM:   Mental status, orientation: Awake, alert and fully oriented.   Speech and language: speech is clear and fluent. The patient is able to name, repeat and comprehend.   Memory: There is intact recollection of recent and remote events.   Cranial nerve exam: Pupils are 3-4 mm bilaterally and equally reactive to light and accommodation. Visual fields are intact by confrontation. There is no nystagmus on primary or secondary gaze. Intact full EOM in all directions of gaze. Face appears symmetric. Sensation in the face is intact to light touch. Uvula is midline. Tongue is midline. Shoulder shrug is intact bilaterally.   Motor exam: Strength is 5/5 in all extremities. Tone is normal.   Sensory exam reveals normal sense of light touch, proprioception, vibration and pinprick in all extremities.   Deep tendon reflexes:  2+ throughout. Plantar responses are flexor. There is no clonus.   Coordination: shows a normal finger-nose-finger. No ataxia noted  Gait: Not assessed due to patient preference    NIHSS: National Institutes of Health Stroke Scale    [0] 1a:Level of Consciousness    0-alert 1-drowsy   2-stupor   3-coma  [0] 1b:LOC Questions                  0-both  1-one      2-neither  [0] 1c:LOC Commands                   0-both  1-one      2-neither  [0] 2: Best Gaze                     0-nl    1-partial  2-forced  [0] 3: Visual Fields                   0-nl    1-partial  2-complete 3-bilat  [0] 4: Facial Paresis                0-nl    1-minor    2-partial  3-full  MOTOR                       0-nl  [0] 5: Right Arm           1-drift  [0] 6: Left Arm             2-some effort vs gravity  [0] 7: Right Leg           3-no effort vs gravity  [0] 8: Left Leg              4-no movement                             x-untestable  [0] 9: Limb Ataxia                    0-abs   1-1_limb   2-2+_limbs       x-untestable  [0] 10:Sensory                        0-nl    1-partial  2-dense  [0] 11:Best Language/Aphasia         0-nl    1-mild/mod 2-severe   3-mute  [0] 12:Dysarthria                     0-nl    1-mild/mod 2-severe       x-untestable  [0] 13:Neglect/Inattention            0-none  1-partial  2-complete  [0] TOTAL    NIHSS Time: 2024 @ 1310    ANCILLARY DATA REVIEWED:     Lab Data Review:  Recent Results (from the past 24 hours)   EKG    Collection Time: 24 10:59 AM   Result Value Ref Range    Report       [facility] Emergency Dept.    Test Date:  2024  Pt Name:    ELVIS CARRASCO               Department: ER  MRN:        [id number]                      Room:       [facility]  Gender:     Female                       Technician: [id number]  :        1970                   Requested By:ER TRIAGE PROTOCOL  Order #:    [id number]                    Reading MD:    Measurements  Intervals                                Axis  Rate:       82                           P:          31  AK:         169                          QRS:        6  QRSD:       80                           T:          33  QT:         374  QTc:        437    Interpretive Statements  Sinus rhythm  Probable left atrial enlargement  Probable anterior infarct, old  Baseline wander in lead(s) II  Compared to ECG 2022 11:52:49  Myocardial infarct finding now present  Sinus tachycardia no longer present         Labs reviewed by me.     No intake/output data recorded.    Imaging reviewed by me:     CT-OUTSIDE IMAGES-CT HEAD   Final Result      DX-CHEST-PORTABLE (1 VIEW)   Final Result      No acute cardiac or pulmonary abnormalities are identified.          ASSESSMENT AND PLAN:    Assessment and Plan:     54 y.o. F with history of carotid and artery dissections presenting with right neck  pain that extends to the right arm. Patient was initially seen at an Urgent Care where CTA head and neck were obtained. There was a delay in the read of these studies and the above mentioned dissections were revealed and the patient was instructed to present to the ED this morning. On exam she is nonfocal but endorses right arm discomfort on elevation. Upon further evaluation the dissections were revealed to be chronic, and present since 2022. At this time we recommend obtaining an MRI brain without contrast, as well at MRI C-spine without contrast. Normotensive blood pressure goal 110-130/60-80. Patient should start ASA 81mg daily as well. Please reconsult neurology with acute findings.       Case reviewed and plan created with Dr. Haroon Ivy, Vascular Neurology, Dr. Shay Sofia, Emergency Medicine.  Please call with any questions      VICKY Fuller.  Vascular Neurology, [facility]

## 2024-11-04 NOTE — TELEPHONE ENCOUNTER
Received request via: Pharmacy    Was the patient seen in the last year in this department? Yes    Does the patient have an active prescription (recently filled or refills available) for medication(s) requested? No    [facility]     Does the patient have intermediate Plus and need 100-day supply? (This applies to ALL medications) Patient does not have SCP

## 2024-11-05 ENCOUNTER — HOSPITAL ENCOUNTER (INPATIENT)
Facility: MEDICAL CENTER | Age: 54
DRG: 949 | End: 2024-11-05
Attending: HOSPITALIST | Admitting: HOSPITALIST
Payer: COMMERCIAL

## 2024-11-05 ENCOUNTER — PHARMACY VISIT (OUTPATIENT)
Dept: PHARMACY | Facility: MEDICAL CENTER | Age: 54
End: 2024-11-05
Payer: COMMERCIAL

## 2024-11-05 VITALS
SYSTOLIC BLOOD PRESSURE: 134 MMHG | TEMPERATURE: 97.2 F | WEIGHT: 184.3 LBS | BODY MASS INDEX: 28.93 KG/M2 | HEIGHT: 67 IN | HEART RATE: 86 BPM | OXYGEN SATURATION: 92 % | RESPIRATION RATE: 16 BRPM | DIASTOLIC BLOOD PRESSURE: 99 MMHG

## 2024-11-05 LAB
ANION GAP SERPL CALC-SCNC: 11 MMOL/L (ref 7–16)
BUN SERPL-MCNC: 15 MG/DL (ref 8–22)
CA-I SERPL-SCNC: 1.2 MMOL/L (ref 1.1–1.3)
CALCIUM SERPL-MCNC: 10.2 MG/DL (ref 8.5–10.5)
CHLORIDE SERPL-SCNC: 100 MMOL/L (ref 96–112)
CHOLEST SERPL-MCNC: 111 MG/DL (ref 100–199)
CO2 SERPL-SCNC: 28 MMOL/L (ref 20–33)
CREAT SERPL-MCNC: 0.73 MG/DL (ref 0.5–1.4)
GFR SERPLBLD CREATININE-BSD FMLA CKD-EPI: 98 ML/MIN/1.73 M 2
GLUCOSE SERPL-MCNC: 115 MG/DL (ref 65–99)
HDLC SERPL-MCNC: 56 MG/DL
LDLC SERPL CALC-MCNC: 43 MG/DL
MAGNESIUM SERPL-MCNC: 2.1 MG/DL (ref 1.5–2.5)
PHOSPHATE SERPL-MCNC: 5.7 MG/DL (ref 2.5–4.5)
POTASSIUM SERPL-SCNC: 4.9 MMOL/L (ref 3.6–5.5)
PTH-INTACT SERPL-MCNC: 13.2 PG/ML (ref 14–72)
SODIUM SERPL-SCNC: 139 MMOL/L (ref 135–145)
TRIGL SERPL-MCNC: 58 MG/DL (ref 0–149)

## 2024-11-05 PROCEDURE — RXMED WILLOW AMBULATORY MEDICATION CHARGE: Performed by: STUDENT IN AN ORGANIZED HEALTH CARE EDUCATION/TRAINING PROGRAM

## 2024-11-05 PROCEDURE — 83735 ASSAY OF MAGNESIUM: CPT

## 2024-11-05 PROCEDURE — 97535 SELF CARE MNGMENT TRAINING: CPT

## 2024-11-05 PROCEDURE — 700102 HCHG RX REV CODE 250 W/ 637 OVERRIDE(OP)

## 2024-11-05 PROCEDURE — A9270 NON-COVERED ITEM OR SERVICE: HCPCS

## 2024-11-05 PROCEDURE — 700111 HCHG RX REV CODE 636 W/ 250 OVERRIDE (IP): Mod: JZ | Performed by: HOSPITALIST

## 2024-11-05 PROCEDURE — 92610 EVALUATE SWALLOWING FUNCTION: CPT

## 2024-11-05 PROCEDURE — 80048 BASIC METABOLIC PNL TOTAL CA: CPT

## 2024-11-05 PROCEDURE — 99239 HOSP IP/OBS DSCHRG MGMT >30: CPT | Performed by: STUDENT IN AN ORGANIZED HEALTH CARE EDUCATION/TRAINING PROGRAM

## 2024-11-05 PROCEDURE — 84100 ASSAY OF PHOSPHORUS: CPT

## 2024-11-05 PROCEDURE — 83970 ASSAY OF PARATHORMONE: CPT

## 2024-11-05 PROCEDURE — 80061 LIPID PANEL: CPT

## 2024-11-05 PROCEDURE — A9270 NON-COVERED ITEM OR SERVICE: HCPCS | Performed by: HOSPITALIST

## 2024-11-05 PROCEDURE — 700111 HCHG RX REV CODE 636 W/ 250 OVERRIDE (IP)

## 2024-11-05 PROCEDURE — 97161 PT EVAL LOW COMPLEX 20 MIN: CPT

## 2024-11-05 PROCEDURE — 700102 HCHG RX REV CODE 250 W/ 637 OVERRIDE(OP): Performed by: HOSPITALIST

## 2024-11-05 PROCEDURE — 82330 ASSAY OF CALCIUM: CPT

## 2024-11-05 RX ORDER — MAGNESIUM SULFATE HEPTAHYDRATE 40 MG/ML
2 INJECTION, SOLUTION INTRAVENOUS ONCE
Status: COMPLETED | OUTPATIENT
Start: 2024-11-05 | End: 2024-11-05

## 2024-11-05 RX ORDER — HYDROCODONE BITARTRATE AND ACETAMINOPHEN 5; 325 MG/1; MG/1
1 TABLET ORAL EVERY 8 HOURS PRN
Qty: 10 TABLET | Refills: 0 | Status: SHIPPED | OUTPATIENT
Start: 2024-11-05 | End: 2024-11-10

## 2024-11-05 RX ORDER — ZOLMITRIPTAN 2.5 MG/1
2.5 TABLET, ORALLY DISINTEGRATING ORAL
Status: DISCONTINUED | OUTPATIENT
Start: 2024-11-05 | End: 2024-11-05 | Stop reason: HOSPADM

## 2024-11-05 RX ORDER — ASPIRIN 81 MG/1
81 TABLET, CHEWABLE ORAL EVERY EVENING
Qty: 100 TABLET | Refills: 1 | Status: SHIPPED | OUTPATIENT
Start: 2024-11-05

## 2024-11-05 RX ADMIN — ESCITALOPRAM OXALATE 40 MG: 10 TABLET ORAL at 09:27

## 2024-11-05 RX ADMIN — OMEPRAZOLE 20 MG: 20 CAPSULE, DELAYED RELEASE ORAL at 09:27

## 2024-11-05 RX ADMIN — BUPROPION HYDROCHLORIDE 150 MG: 150 TABLET, FILM COATED, EXTENDED RELEASE ORAL at 09:27

## 2024-11-05 RX ADMIN — POTASSIUM CHLORIDE 40 MEQ: 1500 TABLET, EXTENDED RELEASE ORAL at 09:27

## 2024-11-05 RX ADMIN — MAGNESIUM SULFATE HEPTAHYDRATE 2 G: 2 INJECTION, SOLUTION INTRAVENOUS at 04:53

## 2024-11-05 RX ADMIN — ZOLMITRIPTAN 2.5 MG: 2.5 TABLET, ORALLY DISINTEGRATING ORAL at 05:20

## 2024-11-05 RX ADMIN — ONDANSETRON 4 MG: 2 INJECTION INTRAMUSCULAR; INTRAVENOUS at 04:19

## 2024-11-05 RX ADMIN — ACETAMINOPHEN 650 MG: 325 TABLET ORAL at 02:54

## 2024-11-05 RX ADMIN — LISINOPRIL 20 MG: 20 TABLET ORAL at 09:27

## 2024-11-05 ASSESSMENT — COGNITIVE AND FUNCTIONAL STATUS - GENERAL
MOBILITY SCORE: 24
DAILY ACTIVITIY SCORE: 20
SUGGESTED CMS G CODE MODIFIER DAILY ACTIVITY: CJ
SUGGESTED CMS G CODE MODIFIER MOBILITY: CH
DRESSING REGULAR UPPER BODY CLOTHING: A LITTLE
HELP NEEDED FOR BATHING: A LITTLE
TOILETING: A LITTLE
PERSONAL GROOMING: A LITTLE

## 2024-11-05 ASSESSMENT — PAIN DESCRIPTION - PAIN TYPE
TYPE: ACUTE PAIN

## 2024-11-05 ASSESSMENT — ACTIVITIES OF DAILY LIVING (ADL): TOILETING: INDEPENDENT

## 2024-11-05 ASSESSMENT — GAIT ASSESSMENTS
DEVIATION: OTHER (COMMENT)
DISTANCE (FEET): 600
GAIT LEVEL OF ASSIST: SUPERVISED

## 2024-11-05 ASSESSMENT — FIBROSIS 4 INDEX: FIB4 SCORE: 1.2

## 2024-11-05 NOTE — ASSESSMENT & PLAN NOTE
Chronic  Blood pressure control  Low-dose aspirin  Follow-up on MRI results  Patient evaluated by neurology

## 2024-11-05 NOTE — DISCHARGE SUMMARY
Discharge Summary    CHIEF COMPLAINT ON ADMISSION  Chief Complaint   Patient presents with    Chest Pain     Pt found to possible R carotid dissection and R vetebral dissection from urgent care findings. Pain in chest radiating to R arm and back numbness in R arm     Neck Pain       Reason for Admission  EMS     Admission Date  11/4/2024    CODE STATUS  Prior    HPI & HOSPITAL COURSE    Mónica Keita is a 54-year-old female with PMHx neuroendocrine tumor s/p resection, history of right carotid artery dissection 2022, HTN.  Admitted for right carotid artery dissection, vertebral dissection.    Per history-patient presented to [facility] emergency room for right shoulder and neck pain.  CT workup was ordered.  Completed in the outpatient setting.  [facility] ER called patient to present to the emergency room for further evaluation.  CT scan showing possible right carotid artery dissection.    Neurology was consulted on admission.  Recommending MRI for further evaluation.  MRI brain: Without acute processes.  MRI cervical spine: Not adequate for evaluation of vasculature.  Multiple bulging disks noted at C3-C7.    Discussed with neurovascular prior to discharge.  CTA head and neck was reviewed and showed chronic changes secondary to known right carotid artery dissection.  They are recommending continuing aspirin therapy for the next 3 to 6 months.  Patient will need outpatient follow-up with neurovascular-this referral has been placed.    I have also placed referrals for patient to follow-up with PT and neurosurgery for her cervical spine with bulging disc.    Therefore, she is discharged in good and stable condition to home with close outpatient follow-up.    The patient recovered much more quickly than anticipated on admission.    Discharge Date  11/5/2024    FOLLOW UP ITEMS POST DISCHARGE  Follow-up with outpatient physical therapy  Follow-up with neurosurgery for cervical disc bulge  Follow-up with  neurovascular surgery for carotid artery dissection    DISCHARGE DIAGNOSES  Principal Problem:    Weakness (POA: Yes)  Active Problems:    Hypertension (POA: Yes)      Overview: Chronic condition stable with blood pressure today in clinic 100/60.        Patient taking lisinopril 20 mg daily doing well on this medication       without reported side effects.      History of carotid artery dissection in 2022 secondary to elevated blood       pressures.  Neurology has cleared her of this condition.    Type 2 diabetes mellitus with hyperglycemia, without long-term current use of insulin (HCC) (POA: Yes)      Overview: Diagnosed 2019      Recent increase in Metformin to 750 mg BID last week due to markedly       elevated A1C. Patient does state to have had holiday indulgence but did       not eat dramatically different from before when BS was controlled. She       denies polyuria, polydipsia, or polyphagia.      Using CGM which is working well for her.                 Neuroendocrine neoplasm of stomach (POA: Yes)    Carotid artery dissection (HCC) (POA: Yes)      Overview: June 2022      Found in ER, placed on antihypertensive      Has been seen by neurology and cleared    Hypokalemia (POA: Yes)    Hypocalcemia (POA: Unknown)  Resolved Problems:    * No resolved hospital problems. *      FOLLOW UP  Future Appointments   Date Time Provider Department Center   1/28/2025  2:00 PM [address]     No follow-up provider specified.    MEDICATIONS ON DISCHARGE     Medication List        START taking these medications        Instructions   aspirin 81 MG Chew chewable tablet  Commonly known as: Asa   Chew 1 Tablet every evening.  Dose: 81 mg     HYDROcodone-acetaminophen 5-325 MG Tabs per tablet  Commonly known as: Norco   Take 1 Tablet by mouth every 8 hours as needed (severe pain) for up to 5 days.  Dose: 1 Tablet            CHANGE how you take these medications        Instructions   buPROPion 300 MG XL  tablet  What changed:   how much to take  how to take this  when to take this  Commonly known as: Wellbutrin XL   TAKE 1 TABLET BY MOUTH EVERY DAY IN THE MORNING.            CONTINUE taking these medications        Instructions   ALLERGY D-12 PO   Take 1 Tablet by mouth 2 times a day.  Dose: 1 Tablet     escitalopram 20 MG tablet  Commonly known as: Lexapro   Take 2 Tablets by mouth every day.  Dose: 40 mg     estradiol 0.0375 MG/24HR patch  Commonly known as: Vivelle   Place 1 Patch on the skin two times a week.  Dose: 1 Patch     FreeStyle Summer 3 Sensor Misc   1 Each every 14 days.  Dose: 1 Each     hydrOXYzine HCl 25 MG Tabs  Commonly known as: Atarax   Take 1 Tablet by mouth 3 times a day as needed for Anxiety.  Dose: 25 mg     lisinopril 20 MG Tabs  Commonly known as: Prinivil   Take 1 Tablet by mouth every day.  Dose: 20 mg     metFORMIN  MG Tb24  Commonly known as: Glucophage XR   TAKE ONE TABLET BY MOUTH TWO TIMES DAILY  Dose: 750 mg     omeprazole 20 MG delayed-release capsule  Commonly known as: PriLOSEC   TAKE 1 CAPSULE BY MOUTH DAILY  Dose: 20 mg              Allergies  No Known Allergies    DIET  Orders Placed This Encounter   Procedures    Diet Order Diet: Cardiac     Standing Status:   Standing     Number of Occurrences:   1     Order Specific Question:   Diet:     Answer:   Cardiac [6]       ACTIVITY  As tolerated.  Weight bearing as tolerated    CONSULTATIONS  Neurovascular    PROCEDURES  None    LABORATORY  Lab Results   Component Value Date    SODIUM 139 11/05/2024    POTASSIUM 4.9 11/05/2024    CHLORIDE 100 11/05/2024    CO2 28 11/05/2024    GLUCOSE 115 (H) 11/05/2024    BUN 15 11/05/2024    CREATININE 0.73 11/05/2024        Lab Results   Component Value Date    WBC 8.3 11/04/2024    HEMOGLOBIN 13.9 11/04/2024    HEMATOCRIT 42.8 11/04/2024    PLATELETCT 228 11/04/2024      MR-CERVICAL SPINE-W/O   Final Result      1.  Mild degenerative disease in the cervical spine as described above.   2.   Small left thyroid nodule.   3.  It is difficult to evaluate the vasculature in this MRI cervical spine. When compared with the previous MRI dated 9/23/2022, again noted there is focal dilatation of the distal right internal carotid artery at the skull base consistent with    pseudoaneurysm.There is no large intramural hematoma. The flow voids of the internal carotid and vertebral arteries are patent in this limited evaluation. However tiny hematoma, vascular irregularities and double-lumen cannot be assessed in this study.   4.  If there is clinical concern for dissection CT angiogram is recommended.      MR-BRAIN-W/O   Final Result      MRI of the brain without contrast within normal limits.      CT-OUTSIDE IMAGES-CT HEAD   Final Result      DX-CHEST-PORTABLE (1 VIEW)   Final Result      No acute cardiac or pulmonary abnormalities are identified.            Total time of the discharge process exceeds 48 minutes.

## 2024-11-05 NOTE — THERAPY
Occupational Therapy   Initial Education     Patient Name: Mónica Keita  Age:  54 y.o., Sex:  female  Medical Record #: [id number]  Today's Date: 11/5/2024        Pt admitted for CP and RUE pain and numbness, also found to have hypokalemia and hypocalcemia. Per pt and RN, pt completing ADL/txfs w/o assistance in house and reports she is near functional baseline w/only R shoulder deficits. Educated on adaptive techniques for ADLs, exercises for shoulder strengthening, don/doff/mgmt of sling (for use when ambulating only), home safety, and safety w/R shoulder numbness. Pt reports has a son and dtr who can assist PRN. Patient will not be actively followed for occupational therapy services at this time, however may be seen if requested by physician for 1 more visit within 30 days to address any discharge or equipment needs.        11/05/24 0913   Prior Living Situation   Prior Services Home-Independent   Housing / Facility 2 Story House   Equipment Owned None   Lives with - Patient's Self Care Capacity Adult Children   Comments Pt reports lives with son and dtr who can assist PRN.   Prior Level of ADL Function   Self Feeding Independent   Grooming / Hygiene Independent   Bathing Independent   Dressing Independent   Toileting Independent   Prior Level of IADL Function   Medication Management Independent   Laundry Independent   Kitchen Mobility Independent   Finances Independent   Home Management Independent   Shopping Independent   Prior Level Of Mobility Independent Without Device in Community;Independent Without Device in Home   Driving / Transportation Driving Independent   Occupation (Pre-Hospital Vocational) Requires Sitting, Desk, Computer Tasks   Vitals   O2 Delivery Device None - Room Air   Pain 0 - 10 Group   Location Arm   Location Orientation Right   Therapist Pain Assessment During Activity;Nurse Notified  (not quantified)   Cognition    Cognition / Consciousness WDL   Level of Consciousness Alert    Comments very pleasant and cooperative; receptive to education   Passive ROM Upper Body   Passive ROM Upper Body WDL   Active ROM Upper Body   Active ROM Upper Body  X   Dominant Hand Right   Comments R abduction and flexion ~90 degrees; painful at 90 and has been using LUE for AAROM compensation   Strength Upper Body   Upper Body Strength  X   Comments R shoulder limited by numbness and pain   Sensation Upper Body   Upper Extremity Sensation  X   Comments c/o numbness/dullness from anterior mid humerus to GH joint   Coordination Upper Body   Coordination X   Comments GM limited by weakness/pain   Balance Assessment   Sitting Balance (Static) Good   Sitting Balance (Dynamic) Good   Weight Shift Sitting Good   ADL Assessment   Comments Educated on adaptive techniques for ADLs, exercises for shoulder strengthening, don/doff/mgmt of sling (for use when ambulating only), and safety w/numbness.   Functional Mobility   Comments remained sitting up in bed during session

## 2024-11-05 NOTE — THERAPY
Speech Language Pathology   Clinical Swallow Evaluation     Patient Name: Mónica Keita  AGE:  54 y.o., SEX:  female  Medical Record #: [id number]  Date of Service: 11/5/2024      History of Present Illness    55 y/o admitted on 11/4 for chest pain. Not seen by SLP before at [facility].    Dx chest 11/4:  No acute cardiac or pulmonary abnormalities are identified.    JASPAL of brain 11/4:  MRI of the brain without contrast within normal limits.      General Information:  Vitals  O2 (LPM): 0  O2 Delivery Device: None - Room Air             Prior Living Situation & Level of Function:  Housing / Facility: [address] [place] [address]  Lives with - Patient's Self Care Capacity: Alone and Able to Care For Self  Communication: WFL  Swallowing: WFL       Oral Mechanism Evaluation:  Dentition: Good   Facial Symmetry: Equal  Facial Sensation: Equal     Labial Observations: WFL   Lingual Observations: Midline  Motor Speech: WFL            Laryngeal Function:  Secretion Management: Adequate  Voice Quality: WFL         Assessment  Current Method of Nutrition: Oral diet (regular/thin liquids)  Positioning: Salmon's (60-90 degrees)  Bolus Administration: Patient  O2 (LPM): 0 O2 Delivery Device: None - Room Air  Factor(s) Affecting Performance: None       Swallowing Trials:  Swallowing Trials  Thin Liquid (TN0): WFL  Pureed (PU4): WFL  Regular (RG7): WFL      Comments:  No overt s/sx of aspiration noted with trials of thin liquids via cup sip, pudding, or solids. Pt fed self independently without obvious difficulty. Vocal quality clear following the swallow. Mastication and suspected AP propulsion timely. Pt denied globus sensation or odynophagia. No obvious oral residue noted after the swallow.        Clinical Impressions  Pt is presenting with a functional oropharyngeal swallow. Recommend continuation of a regular/thin liquid diet. No further acute SLP services are recommended at this time to address dysphagia. Please re-consult SLP with any s/sx  of aspiration or changes in swallow function.      Recommendations  Diet Consistency: regular/thin liquids  Instrumentation: None indicated at this time  Medication: As tolerated  Supervision: Independent  Positioning: Fully upright and midline during oral intake  Risk Management : None  Oral Care: BID         SLP Treatment Plan  Treatment Plan: None Indicated  SLP Frequency: N/A - Evaluation Only  Estimated Duration: N/A - Evaluation Only      Anticipated Discharge Needs  Discharge Recommendations: Anticipate that the patient will have no further speech therapy needs after discharge from the hospital   Therapy Recommendations Upon DC: Not Indicated                  Rachael Cota, SLP

## 2024-11-05 NOTE — DISCHARGE INSTRUCTIONS
Weakness  Weakness is a lack of strength. You may feel weak all over your body (generalized), or you may feel weak in one part of your body (focal). There are many potential causes of weakness. Sometimes, the cause of your weakness may not be known. Some causes of weakness can be serious, so it is important to see your doctor.  Follow these instructions at home:  Activity  Rest as needed.  Try to get enough sleep. Most adults need 7-8 hours of sleep each night. Talk to your doctor about how much sleep you need.  Do exercises, such as arm curls and leg raises, for 30 minutes at least 2 days a week or as told by your doctor.  Think about working with a physical therapist or  to help you get stronger.  General instructions    Take over-the-counter and prescription medicines only as told by your doctor.  Eat a healthy, well-balanced diet. This includes:  Proteins to build muscles, such as lean meats and fish.  Fresh fruits and vegetables.  Carbohydrates to boost energy, such as whole grains.  Drink enough fluid to keep your pee (urine) pale yellow.  Keep all follow-up visits.  Contact a doctor if:  Your weakness does not get better or it gets worse.  Your weakness affects your ability to:  Think clearly.  Do your normal daily activities.  Get help right away if:  You have sudden weakness on one side of your face or body.  You have chest pain.  You have trouble breathing or shortness of breath.  You have problems with how you see (vision).  You have trouble talking or swallowing.  You have trouble standing or walking.  You are light-headed or faint.  These symptoms may be an emergency. Get help right away. Call 911.  Do not wait to see if the symptoms will go away.  Do not drive yourself to the hospital.  Summary  Weakness is a lack of strength. You may feel weak all over your body or just in one part of your body.  There are many potential causes of weakness. Sometimes, the cause of your weakness may not be  known.  Rest as needed, and try to get enough sleep. Most adults need 7-8 hours of sleep each night.  Eat a healthy, well-balanced diet.  This information is not intended to replace advice given to you by your health care provider. Make sure you discuss any questions you have with your health care provider.  Document Revised: 11/20/2022 Document Reviewed: 11/20/2022  Elsevier Patient Education © 2023 [employer]

## 2024-11-05 NOTE — HOSPITAL COURSE
Mónica Keita is a 54-year-old female with PMHx neuroendocrine tumor s/p resection, history of right carotid artery dissection 2022, HTN.  Admitted for right carotid artery dissection, vertebral dissection.    Per history-patient presented to [facility] for right shoulder and neck pain.  CT workup was ordered.  Completed in the outpatient setting.  [facility] called patient to present to the emergency room for further evaluation.  CT scan showing possible right carotid artery dissection.    Neurology was consulted on admission.  Recommending MRI for further evaluation.  MRI brain: Without acute processes.  MRI cervical spine: Not adequate for evaluation of vasculature.  Multiple bulging disks noted at C3-C7.    Discussed with neurovascular prior to discharge.  CTA head and neck was reviewed and showed chronic changes secondary to known right carotid artery dissection.  They are recommending continuing aspirin therapy for the next 3 to 6 months.  Patient will need outpatient follow-up with neurovascular-this referral has been placed.    I have also placed referrals for patient to follow-up with PT and neurosurgery for her cervical spine with bulging disc.

## 2024-11-05 NOTE — PROGRESS NOTES
Pt unable to sign AVS prior to dc home. Pt was dc'd in unit when pt arrived to dcl  PIV DC'd. Pt mychart active & said will review AVS. Called pt to review dc instructions.

## 2024-11-05 NOTE — THERAPY
Physical Therapy   Initial Evaluation     Patient Name: Mónica Keita  Age:  54 y.o., Sex:  female  Medical Record #: [id number]  Today's Date: 11/5/2024          Assessment  Patient is 54 y.o. female presenting with right shoulder and neck pain with weakness. Pt was discharged home then called and told to return d/t right vertebral carotid dissection noted on CT. Per neurology, dissections are chronic from 2022. Pt with PMH including neuroendocrine tumor s/p resection currently in remission, previous history of right carotid dissection in 2022, HTN, DM2. Pt is independent with functional mobility at baseline using no AD. 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     During current session, pt presents near functional baseline requiring overall SPV for mobility as detailed below. Able to walk 600 ft with no AD and navigate 16 stairs, no issues other than mild RUE discomfort in unsupported position. Ordered RUE sling and educated pt about wearing sling for comfort as below. Encouraged pt to continue ambulating to the toilet and in the hallway with nursing as tolerated. Recommend d/c home with no further PT needs. Pt denies any mobility concerns with d/c home. Patient will not be actively followed for physical therapy services at this time, however may be seen if requested by physician for 1 more visit within 30 days to address any discharge or equipment needs.    Plan    Physical Therapy Initial Treatment Plan   Duration: Discharge Needs Only    DC Equipment Recommendations: None  Discharge Recommendations: Anticipate that the patient will have no further physical therapy needs after discharge from the hospital       Subjective    Pt received resting in bed, agreeable to participate.      Objective       11/05/24 0838   Initial Contact Note    Initial Contact Note Order Received and Verified, Evaluation Only - Patient Does Not Require Further Acute Physical Therapy at this Time.   However, May Benefit from Post Acute Therapy for Higher Level Functional Deficits.   Vitals   O2 Delivery Device None - Room Air   Pain 0 - 10 Group   Therapist Pain Assessment Prior to Activity;During Activity;Post Activity Pain Same as Prior to Activity;Nurse Notified  (c/o ongoing right shoulder pain)   Prior Living Situation   Prior Services Home-Independent   Housing / Facility 2 Story House   Steps Into Home 0   Steps In Home   (FOS)   Equipment Owned Front-Wheel Walker   Lives with - Patient's Self Care Capacity Alone and Able to Care For Self   Comments Lives in [place]. 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 Pt drives   Prior Level of Functional Mobility   Bed Mobility Independent   Transfer Status Independent   Ambulation Independent   Ambulation Distance community   Assistive Devices Used None   Stairs Independent   Cognition    Cognition / Consciousness WDL   Level of Consciousness Alert   Comments very pleasant and cooperative, receptive to edu   Passive ROM Lower Body   Passive ROM Lower Body WDL   Active ROM Lower Body    Active ROM Lower Body  WDL   Strength Lower Body   Lower Body Strength  WDL   Comments functional for ambulation and stairs   Sensation Lower Body   Comments no c/o altered sensation BLE   Coordination Lower Body    Coordination Lower Body  WDL   Balance Assessment   Sitting Balance (Static) Good   Sitting Balance (Dynamic) Good   Standing Balance (Static) Good   Standing Balance (Dynamic) Good   Weight Shift Sitting Good   Weight Shift Standing Good   Comments no AD   Bed Mobility    Supine to Sit Supervised   Sit to Supine Supervised   Scooting Supervised   Rolling Supervised   Comments HOBE slightly   Gait Analysis   Gait Level Of Assist Supervised   Assistive Device None   Distance (Feet) 600   # of Times Distance was Traveled 1   Deviation Other (Comment)  (slightly slowed pace)   # of Stairs Climbed 16   Level of Assist with Stairs Supervised   Functional Mobility    Sit to Stand Supervised   Bed, Chair, Wheelchair Transfer Supervised   Transfer Method Stand Step   Mobility bed>up around unit no AD>stairs>bed   6 Clicks Assessment - How much HELP from from another person do you currently need... (If the patient hasn't done an activity recently, how much help from another person do you think he/she would need if he/she tried?)   Turning from your back to your side while in a flat bed without using bedrails? 4   Moving from lying on your back to sitting on the side of a flat bed without using bedrails? 4   Moving to and from a bed to a chair (including a wheelchair)? 4   Standing up from a chair using your arms (e.g., wheelchair, or bedside chair)? 4   Walking in hospital room? 4   Climbing 3-5 steps with a railing? 4   6 clicks Mobility Score 24   Education Group   Education Provided Role of Physical Therapist   Role of Physical Therapist Patient Response Patient;Acceptance;Explanation;Demonstration;Verbal Demonstration;Action Demonstration   Additional Comments educated about wearing RUE sling for comfort, pt agreeable   Physical Therapy Initial Treatment Plan    Duration Discharge Needs Only   Problem List    Problems None   Anticipated Discharge Equipment and Recommendations   DC Equipment Recommendations None   Discharge Recommendations Anticipate that the patient will have no further physical therapy needs after discharge from the hospital   Interdisciplinary Plan of Care Collaboration   IDT Collaboration with  Nursing;Occupational Therapist   Patient Position at End of Therapy Seated;Edge of Bed;Bed Alarm On;Call Light within Reach;Tray Table within Reach;Phone within Reach   Collaboration Comments RN and OT updated   Session Information   Date / Session Number  11/5- d/c needs only

## 2024-11-05 NOTE — H&P
Hospital Medicine History & Physical Note    Date of Service  11/4/2024    Primary Care Physician  Carlos Tatum D.N.P.    Consultants  neurology    Specialist Names: Dr. Ivy    Code Status  Full Code    Chief Complaint  Chief Complaint   Patient presents with    Chest Pain     Pt found to possible R carotid dissection and R vetebral dissection from urgent care findings. Pain in chest radiating to R arm and back numbness in R arm     Neck Pain       History of Presenting Illness  Mónica Keita is a 54 y.o. female who presented 11/4/2024 with history of neuroendocrine tumor s/p resection currently in remission previous history of right carotid dissection in 2022 hypertension presented to the [facility] ED for evaluation of right shoulder and neck pain associated with weakness with abduction of her right shoulder.  She had a workup and was discharged home however this morning she was called and told to present to the emergency department for dissection noted on CT. patient reports that she is still having pain in her right shoulder associated with intermittent tingling and numbness.  She denies any anterior chest pain or dyspnea.  No loss of consciousness no headache no fever no chills.    I discussed the plan of care with patient and ED physician .    Review of Systems  Review of Systems   All other systems reviewed and are negative.      Past Medical History   has a past medical history of Adverse effect of anesthesia, Anemia, Anxiety, Bronchitis (2009), Diabetes (HCC), Dissection of cervical artery (HCC), Elevated liver enzymes, Hypertension, Neuroendocrine tumor (11/2021), and Pneumonia (2009).    Surgical History   has a past surgical history that includes hysterectomy, vaginal (06/04/2014); pelvic exam under anesthesia (06/17/2014); vaginal hysterectomy total (06/17/2014); appendectomy (2014); pr upper gi endoscopy,diagnosis (N/A, 11/01/2021); and egd w/endoscopic ultrasound (N/A, 11/01/2021).      Family History  family history includes Heart Attack in her brother; Heart Disease in her father; No Known Problems in her daughter, sister, and son.       Social History   reports that she has never smoked. She has never used smokeless tobacco. She reports that she does not drink alcohol and does not use drugs.    Allergies  No Known Allergies    Medications  Prior to Admission Medications   Prescriptions Last Dose Informant Patient Reported? Taking?   Cetirizine-Pseudoephedrine (ALLERGY D-12 PO) 2024 Morning Patient Yes Yes   Sig: Take 1 Tablet by mouth 2 times a day.   Continuous Blood Gluc Sensor (FREESTYLE MIN 3 SENSOR) [facility]  Patient No No   Si Each every 14 days.   buPROPion (WELLBUTRIN XL) 300 MG XL tablet 2024 Morning Patient No Yes   Sig: TAKE 1 TABLET BY MOUTH EVERY DAY IN THE MORNING.   Patient taking differently: Take 300 mg by mouth every morning. TAKE 1 TABLET BY MOUTH EVERY DAY IN THE MORNING.   escitalopram (LEXAPRO) 20 MG tablet 2024 Morning Patient No Yes   Sig: Take 2 Tablets by mouth every day.   Patient taking differently: Take 40 mg by mouth every day. 2 tablets= 40mg   estradiol (VIVELLE) 0.0375 MG/24HR patch 10/31/2024 Patient No No   Sig: Place 1 Patch on the skin two times a week.   hydrOXYzine HCl (ATARAX) 25 MG Tab  Patient No No   Sig: Take 1 Tablet by mouth 3 times a day as needed for Anxiety.   lisinopril (PRINIVIL) 20 MG Tab 2024 Morning Patient No Yes   Sig: Take 1 Tablet by mouth every day.   metFORMIN ER (GLUCOPHAGE XR) 750 MG TABLET SR 24 HR 2024 Morning Patient No Yes   Sig: TAKE ONE TABLET BY MOUTH TWO TIMES DAILY   omeprazole (PRILOSEC) 20 MG delayed-release capsule 2024 Morning Patient No Yes   Sig: TAKE 1 CAPSULE BY MOUTH DAILY      Facility-Administered Medications: None       Physical Exam  Temp:  [36.7 °C (98.1 °F)] 36.7 °C (98.1 °F)  Pulse:  [80-95] 89  Resp:  [12-18] 16  BP: (126-148)/(68-90) 147/85  SpO2:  [91 %-98 %] 95 %  Blood  Pressure: (!) 147/85   Temperature: 36.7 °C (98.1 °F)   Pulse: 89   Respiration: 16   Pulse Oximetry: 95 %       Physical Exam  Vitals and nursing note reviewed.   Constitutional:       General: She is not in acute distress.  HENT:      Head: Normocephalic and atraumatic.      Nose: Nose normal. No rhinorrhea.      Mouth/Throat:      Pharynx: No oropharyngeal exudate or posterior oropharyngeal erythema.   Eyes:      General:         Right eye: No discharge.         Left eye: No discharge.   Cardiovascular:      Rate and Rhythm: Normal rate and regular rhythm.      Heart sounds: Normal heart sounds. No murmur heard.     No friction rub. No gallop.   Pulmonary:      Effort: Pulmonary effort is normal. No respiratory distress.      Breath sounds: Normal breath sounds. No stridor. No wheezing, rhonchi or rales.   Chest:      Chest wall: No tenderness.   Abdominal:      General: Bowel sounds are normal. There is no distension.      Palpations: Abdomen is soft. There is no mass.      Tenderness: There is no abdominal tenderness. There is no rebound.   Musculoskeletal:         General: No swelling or tenderness.      Cervical back: Neck supple. No rigidity.   Skin:     General: Skin is warm and dry.      Coloration: Skin is not cyanotic or jaundiced.      Findings: Rash (Rosacea) present.      Nails: There is no clubbing.   Neurological:      Mental Status: She is alert and oriented to person, place, and time.      Cranial Nerves: No cranial nerve deficit.      Motor: Weakness (Right shoulder abduction) present.   Psychiatric:         Mood and Affect: Mood normal.         Behavior: Behavior normal.         Laboratory:  Recent Labs     11/04/24  1330   WBC 8.3   RBC 5.31   HEMOGLOBIN 13.9   HEMATOCRIT 42.8   MCV 80.6*   MCH 26.2*   MCHC 32.5   RDW 38.3   PLATELETCT 228   MPV 9.4     Recent Labs     11/04/24  1209   SODIUM 141   POTASSIUM 3.2*   CHLORIDE 115*   CO2 16*   GLUCOSE 104*   BUN 13   CREATININE 0.46*   CALCIUM  6.7*     Recent Labs     11/04/24  1209   ALTSGPT 35   ASTSGOT 31   ALKPHOSPHAT 38   TBILIRUBIN 0.3   GLUCOSE 104*         Recent Labs     11/04/24  1209   NTPROBNP 57         Recent Labs     11/04/24  1209 11/04/24  1330   TROPONINT <6 <6       Imaging:  CT-OUTSIDE IMAGES-CT HEAD   Final Result      DX-CHEST-PORTABLE (1 VIEW)   Final Result      No acute cardiac or pulmonary abnormalities are identified.      MR-BRAIN-W/O    (Results Pending)   MR-CERVICAL SPINE-W/O    (Results Pending)           Assessment/Plan:  Justification for Admission Status  I anticipate this patient will require at least two midnights for appropriate medical management, necessitating inpatient admission because weakness    Patient will need a Telemetry bed on NEUROLOGY service .  The need is secondary to weakness.    * Weakness- (present on admission)  Assessment & Plan  Right shoulder    Etiology is not clear no acute abnormalities on chest x-ray  Will check MRI of brain and C-spine      Hypocalcemia  Assessment & Plan  IV and p.o. repletion ordered  Check ionized calcium  Check PTH    Hypokalemia- (present on admission)  Assessment & Plan  I ordered p.o. repletion and repeat levels    Carotid artery dissection (HCC)- (present on admission)  Assessment & Plan  Chronic  Blood pressure control  Low-dose aspirin  Follow-up on MRI results  Patient evaluated by neurology    Neuroendocrine neoplasm of stomach- (present on admission)  Assessment & Plan  In remission per patient    Type 2 diabetes mellitus with hyperglycemia, without long-term current use of insulin (HCC)- (present on admission)  Assessment & Plan  Will hold metformin for now  I ordered insulin sliding scale  Monitor CBGs and adjust accordingly    Hypertension- (present on admission)  Assessment & Plan  Continue lisinopril and monitor blood pressure        VTE prophylaxis: enoxaparin ppx

## 2024-11-05 NOTE — ASSESSMENT & PLAN NOTE
Right shoulder    Etiology is not clear no acute abnormalities on chest x-ray  Will check MRI of brain and C-spine

## 2024-11-05 NOTE — CARE PLAN
The patient is Stable - Low risk of patient condition declining or worsening    Shift Goals  Clinical Goals: Stable neuro exams  Patient Goals: Sleep,find out what's going on  Family Goals: ANISA    Progress made toward(s) clinical / shift goals:  Patient is alert and oriented and able to communicate her needs. Education provided on safety and using her call light for assistance. All questions answered, Bed low, locked and call light in reach.    Problem: Pain - Standard  Goal: Alleviation of pain or a reduction in pain to the patient’s comfort goal  Outcome: Progressing   Patient medicated for pain per MAR, pain reassessment in place. Education on non-pharmacological ways to relieve pain given to patient, all questions answered.  Problem: Knowledge Deficit - Stroke Education  Goal: Patient's knowledge of stroke and risk factors will improve  Outcome: Progressing   Education provided on strokes, stroke signs and symptoms and risk factors.  Problem: Mobility - Stroke  Goal: Patient's capacity to carry out activities will improve  Outcome: Progressing   Patient has weakness to right arm and some numbness but she is able to move it against gravity.    Problem: Optimal Care of the Stroke Patient  Goal: Optimal acute care for the stroke patient  Outcome: Progressing  Note: - Vital signs and neuro checks performed and documented per order  - NIHSS completed and documented per order  - Continuous telemetry monitoring for 72 hours or until discontinued by provider  - Head CT without contrast obtained  - Consideration of MRI/MRA  - MRI screening form completed in worklist if MRI ordered  - Echocardiogram with Bubble Study ordered/completed with consideration of LEXI  - Carotid Doppler ordered/completed (Not required if CTA of neck completed in ED)  - Lipid Panel obtained within 48 hours of admission  - PT, PTT, INR obtained per Anticoagulation orders (if applicable)  - Antithrombotic therapy by end of hospital day 2 for  ischemic stroke. Provider must document reason if contraindicated.  - Venous Thromboembolism (VTE) Prophylaxis by end of hospital day 2 for ischemic and hemorrhagic stroke. Provider must document reason if contraindicated  - Dysphagia screen completed and documented prior to any PO intake. Patient to remain NPO until Speech Therapy evaluation if thrombolytic or thrombectomy performed  - Rehabilitation assessment including PT/OT/SLP evaluations for referral to Physical Medicine and Rehabilitation services. If none needed, provider needs to document reason  - Neurology consult placed  - Consideration of cardiology consult for cryptogenic strokes    Patient is not progressing towards the following goals: N/A

## 2024-11-07 ENCOUNTER — PHYSICAL THERAPY (OUTPATIENT)
Dept: PHYSICAL THERAPY | Facility: REHABILITATION | Age: 54
End: 2024-11-07
Attending: STUDENT IN AN ORGANIZED HEALTH CARE EDUCATION/TRAINING PROGRAM
Payer: COMMERCIAL

## 2024-11-07 DIAGNOSIS — M50.90 CERVICAL DISC DISEASE: ICD-10-CM

## 2024-11-07 DIAGNOSIS — R20.0 ARM NUMBNESS: ICD-10-CM

## 2024-11-07 DIAGNOSIS — R53.1 WEAKNESS: ICD-10-CM

## 2024-11-07 PROCEDURE — 97162 PT EVAL MOD COMPLEX 30 MIN: CPT

## 2024-11-07 PROCEDURE — 97110 THERAPEUTIC EXERCISES: CPT

## 2024-11-07 NOTE — OP THERAPY EVALUATION
Outpatient Physical Therapy  INITIAL EVALUATION    [facility]  [address] [facility], [address]  [facility] [address]  Phone:  [phone]    Date of Evaluation: 11/07/2024    Patient: Mónica Keita  YOB: 1970  MRN: [id number]     Referring Provider: Aby Taveras M.D.  [address] [place] [address]   Referring Diagnosis Arm numbness [R20.0];Weakness [R53.1];Cervical disc disease [M50.90]     Time Calculation  Start time: 1106  Stop time: 1145 Time Calculation (min): 39 minutes         Chief Complaint: No chief complaint on file.    Visit Diagnoses     ICD-10-CM   1. Weakness  R53.1   2. Arm numbness  R20.0   3. Cervical disc disease  M50.90       Date of onset of impairment: Multiple active episodes found    Subjective:   History of Present Illness:     Mechanism of injury:  5 day onset of right sided cervical pain which is peripheral down her arm upon waking upPatient went to ER Sunday  - for carotid artery dissection.  Woke up Monday arm would not move normally, taken by ambulance to ER.  MRI showed no change in dissection status but has bulging discs C2-C7.  No dizziness -5Ds, -3 Ns, no trauma  PMH:  left Carotid artery dissection 6/2022-didn't need surgery, DM2, HTN controlled, +headaches  Current symptoms: 5/10 VAS right CONSTANT cervical pain/stiffness, shoulder 3/10 vas, paresthesia shoulder down  to wrist , weakness right UE CONSTANT   Activity:  sitting and typing business-able to work with right arm supported  Exercise:  not exercising  Aggs:  prolonged sitting, walking with arm hanging down, lifting or reaching above shoulder level.   Relieves:  lying down with arm supported         Past Medical History:   Diagnosis Date    Adverse effect of anesthesia     Laryngospasm in the past with extubation    Anemia     Anxiety     Bronchitis 2009    Diabetes (HCC)     type 2    Dissection of cervical artery (HCC)     Elevated liver enzymes     unknown cause     Hypertension     Neuroendocrine tumor 11/2021    abdomen    Pneumonia 2009     Past Surgical History:   Procedure Laterality Date    FL UPPER GI ENDOSCOPY,DIAGNOSIS N/A 11/01/2021    Procedure: GASTROSCOPY- WITH ENDOSCOPIC MUCOSAL RESECTION;  Surgeon: Edwin Aguillon M.D.;  Location: [facility];  Service: EUS    EGD W/ENDOSCOPIC ULTRASOUND N/A 11/01/2021    Procedure: EGD, WITH ENDOSCOPIC US - UPPER RADIAL;  Surgeon: Edwin Aguillon M.D.;  Location: SURGERY [facility];  Service: EUS    PELVIC EXAM UNDER ANESTHESIA  06/17/2014    Performed by Enedina Grant M.D. at SURGERY [facility]    VAGINAL HYSTERECTOMY TOTAL  06/17/2014    Performed by Enedina Grant M.D. at SURGERY [facility]    HYSTERECTOMY, VAGINAL  06/04/2014    [facility]    APPENDECTOMY  2014     Social History     Tobacco Use    Smoking status: Never    Smokeless tobacco: Never   Substance Use Topics    Alcohol use: No     Family and Occupational History     Socioeconomic History    Marital status:      Spouse name: Not on file    Number of children: Not on file    Years of education: Not on file    Highest education level: 12th grade   Occupational History    Not on file       Objective     Neurological Testing     Reflexes   Left   Babinski sign: negative  Washington's reflex: negative    Right   Biceps (C5/C6): normal (2+)  Brachioradialis (C6): normal (2+)  Triceps (C7): normal (2+)  Washington's reflex: negative    Myotome testing   Cervical (left)   All left cervical myotomes within normal limits    Cervical (right)   C0-1 (flexion): 5  C1-2 (neck flexion): 5  C3 (neck sidebend): 5  C4 (shoulder shrug): 5  C5 (deltoid): 3+  C6 (biceps): 3+  C7 (triceps): 5  C8 (thumb extension): 5  T1 (intrinsics): 5    Dermatome testing   Cervical (right)   C4 (top of AC joint): decreased  C5 (lateral deltoid): decreased  C6 (thumb): intact  C7 (middle finger): intact  C8 (little finger): intact  T1 (ulnar  antecubital): intact    Active Range of Motion     Additional Active Range of Motion Details        Cervical AROM:  Flexion:  Extension:  mod loss  SBR WNL//central cervical no worse after  SBL WNL//no effect  Rotation R 55 increase pain cervical/arm  Rotation L 55 no effect    Cervical retraction: increase/peripheralize        Tests     Additional testing details: + cervical traction : decrease right arm symptoms   Spurlings TBA    Right Shoulder   Positive ULTT2.         Therapeutic Exercises (CPT 52456):     1. HEP listed below      Therapeutic Exercise Summary: Repeated cervical retraction supine off table 5-6 reps 3 x day  Repeated cervical retraction seated 5-6 reps 3 x day      Time-based treatments/modalities:           Assessment, Response and Plan:   Assessment details:  Patient presents with symptoms consistent with cervical derangement syndrome with C5-6 myotomal weakness, +cervical traction, +ULNT and paresthesia C5-6 dermatomes.  Patient experienced a vaso-vagal response when cervical right lateral flexion PROM was being assessed. Patient was positioned supine-/70 and symptoms resolved after 2-3 min.  Patient reported that she has had this response to stress several times before.  Symptoms centralized with a DP of extension with supine cervical retraction/extension and then with sitting cervical retraction.  Recommend continued PT to meet goals stated below.     Barriers to therapy:  None  Prognosis: good    Goals:   Short Term Goals:   Centralization of symptoms to cervical region   Patient demonstrates 25% improvement with cervical rotation and extension      Long Term Goals:    Patient able to work and complete ADLS with >50% decreased symptoms  Patient able to demonstrate full painfree AROM  Long term goal time span:  6-8 weeks    Plan:   Therapy options:  Physical therapy treatment to continue  Planned therapy interventions:  Neuromuscular Re-education (CPT 93954), Manual Therapy (CPT  89307), E Stim Unattended (CPT 65120), Therapeutic Exercise (CPT 44035) and Mechanical Traction (CPT 91085)  Frequency:  2x week  Discussed with:  Patient  Plan details:  1-2 x week x 8 weeks      Functional Assessment Used  PT Functional Assessment Tool Used: UEFI  PT Functional Assessment Score: 56/80  Neck Disability Total: 11     Referring provider co-signature:  I have reviewed this plan of care and my co-signature certifies the need for services.    Certification Period: 11/07/2024 to  01/02/25    Physician Signature: ________________________________ Date: ______________

## 2024-11-10 DIAGNOSIS — E11.65 TYPE 2 DIABETES MELLITUS WITH HYPERGLYCEMIA, WITHOUT LONG-TERM CURRENT USE OF INSULIN (HCC): ICD-10-CM

## 2024-11-11 RX ORDER — ACYCLOVIR 800 MG/1
TABLET ORAL
Qty: 6 EACH | Refills: 3 | Status: SHIPPED | OUTPATIENT
Start: 2024-11-11

## 2024-11-11 NOTE — TELEPHONE ENCOUNTER
Received request via: Pharmacy    Was the patient seen in the last year in this department? Yes    Does the patient have an active prescription (recently filled or refills available) for medication(s) requested? No    [facility]    Does the patient have retirement Plus and need 100-day supply? (This applies to ALL medications) Patient does not have SCP

## 2024-11-13 ENCOUNTER — OFFICE VISIT (OUTPATIENT)
Dept: MEDICAL GROUP | Facility: PHYSICIAN GROUP | Age: 54
End: 2024-11-13
Payer: COMMERCIAL

## 2024-11-13 VITALS
DIASTOLIC BLOOD PRESSURE: 70 MMHG | SYSTOLIC BLOOD PRESSURE: 110 MMHG | HEART RATE: 84 BPM | WEIGHT: 183.6 LBS | OXYGEN SATURATION: 92 % | BODY MASS INDEX: 28.82 KG/M2 | HEIGHT: 67 IN | TEMPERATURE: 96.3 F

## 2024-11-13 DIAGNOSIS — Z23 NEED FOR VACCINATION: ICD-10-CM

## 2024-11-13 DIAGNOSIS — Z09 HOSPITAL DISCHARGE FOLLOW-UP: ICD-10-CM

## 2024-11-13 DIAGNOSIS — E11.65 TYPE 2 DIABETES MELLITUS WITH HYPERGLYCEMIA, WITHOUT LONG-TERM CURRENT USE OF INSULIN (HCC): ICD-10-CM

## 2024-11-13 DIAGNOSIS — M50.90 CERVICAL DISC DISEASE: ICD-10-CM

## 2024-11-13 PROCEDURE — 90656 IIV3 VACC NO PRSV 0.5 ML IM: CPT

## 2024-11-13 PROCEDURE — 92250 FUNDUS PHOTOGRAPHY W/I&R: CPT | Mod: 26

## 2024-11-13 PROCEDURE — 99214 OFFICE O/P EST MOD 30 MIN: CPT | Mod: 25

## 2024-11-13 PROCEDURE — 90471 IMMUNIZATION ADMIN: CPT

## 2024-11-13 RX ORDER — CYCLOBENZAPRINE HCL 5 MG
5-10 TABLET ORAL 3 TIMES DAILY PRN
Qty: 30 TABLET | Refills: 0 | Status: SHIPPED | OUTPATIENT
Start: 2024-11-13

## 2024-11-13 RX ORDER — MELOXICAM 15 MG/1
15 TABLET ORAL DAILY
Qty: 30 TABLET | Refills: 0 | Status: SHIPPED | OUTPATIENT
Start: 2024-11-13

## 2024-11-13 ASSESSMENT — FIBROSIS 4 INDEX: FIB4 SCORE: 1.2

## 2024-11-13 ASSESSMENT — ENCOUNTER SYMPTOMS
DIARRHEA: 0
VOMITING: 0
HEADACHES: 0
MYALGIAS: 0
DIZZINESS: 0
BLURRED VISION: 0
CONSTIPATION: 0
NAUSEA: 0
WEAKNESS: 0
COUGH: 0
ABDOMINAL PAIN: 0
FEVER: 0
CHILLS: 0
NECK PAIN: 1
SHORTNESS OF BREATH: 0
WEIGHT LOSS: 0

## 2024-11-14 ENCOUNTER — PHYSICAL THERAPY (OUTPATIENT)
Dept: PHYSICAL THERAPY | Facility: REHABILITATION | Age: 54
End: 2024-11-14
Attending: STUDENT IN AN ORGANIZED HEALTH CARE EDUCATION/TRAINING PROGRAM
Payer: COMMERCIAL

## 2024-11-14 DIAGNOSIS — R53.1 WEAKNESS: ICD-10-CM

## 2024-11-14 DIAGNOSIS — R20.0 ARM NUMBNESS: ICD-10-CM

## 2024-11-14 LAB — RETINAL SCREEN: NEGATIVE

## 2024-11-14 PROCEDURE — 97140 MANUAL THERAPY 1/> REGIONS: CPT

## 2024-11-14 PROCEDURE — 97110 THERAPEUTIC EXERCISES: CPT

## 2024-11-14 PROCEDURE — 97014 ELECTRIC STIMULATION THERAPY: CPT

## 2024-11-14 NOTE — PROGRESS NOTES
Verbal consent was acquired by the patient to use [employer] ambient listening note generation during this visit  Subjective:     CC: hospital follow up    HPI:   Mónica presents today with  History of Present Illness  The patient presents for evaluation of multiple medical concerns.    She was hospitalized from 11/04/2024 to 11/05/2024 due to chest pain, during which a potential right carotid dissection with vertebral dissection was identified. She also experienced neck pain, which she initially attributed to an awkward sleeping position. This pain extended down her shoulder and the right side of her body. Despite a CT scan at [facility] showing no abnormalities, she was later informed of a dissection. Her pain persists, and while a massage provided temporary relief, it has since returned. Norco was ineffective in managing her pain. She has not tried muscle relaxants. The pain is so severe at times that it induces a feeling of illness. She has been taking Aleve, but it has not provided significant relief. She has a history of intermittent neck pain, but it has never lasted more than a few days. This time, however, the pain has not subsided and has progressively worsened. She has a history of dissection dating back several years. She reports full strength but has limited range of motion in her arm.    She reports no wounds but experiences tingling and nerve pain, indicative of neuropathy in her feet. She continues to use her glucose monitor.      No problems updated.      ROS:  Review of Systems   Constitutional:  Negative for chills, fever, malaise/fatigue and weight loss.   Eyes:  Negative for blurred vision.   Respiratory:  Negative for cough and shortness of breath.    Cardiovascular:  Negative for chest pain.   Gastrointestinal:  Negative for abdominal pain, constipation, diarrhea, nausea and vomiting.   Musculoskeletal:  Positive for joint pain and neck pain. Negative for myalgias.   Neurological:   "Negative for dizziness, weakness and headaches.       Objective:     Exam:  /70 (BP Location: Left arm, Patient Position: Sitting, BP Cuff Size: Adult)   Pulse 84   Temp (!) 35.7 °C (96.3 °F) (Temporal)   Ht 1.702 m (5' 7\")   Wt 83.3 kg (183 lb 9.6 oz)   LMP 2012   SpO2 92%   BMI 28.76 kg/m²  Body mass index is 28.76 kg/m².    Physical Exam  Constitutional:       General: She is not in acute distress.     Appearance: Normal appearance. She is not ill-appearing or toxic-appearing.   HENT:      Head: Normocephalic.   Eyes:      Conjunctiva/sclera: Conjunctivae normal.   Pulmonary:      Effort: Pulmonary effort is normal.   Musculoskeletal:         General: Tenderness present.   Skin:     General: Skin is warm and dry.   Neurological:      General: No focal deficit present.      Mental Status: She is alert and oriented to person, place, and time.   Psychiatric:         Mood and Affect: Mood normal.         Behavior: Behavior normal.       Examination of the Cspine: No visible deformity. No spinal tenderness to palpation.Spurling's test positive. No nuchal rigidity. Decreased motion of the c-spine especially with rotation to the right side. No midline spinous process tenderness. Tenderness to the paraspinous muscles of the c-spine on right side especially trapezius. 5/5 strength of the biceps, 5/5 strength of the triceps, 5/5  strength.  Upper Extremity DTR's intact.  Sensation is grossly intact but report RUE feel dull to touch.  2+ radial pulse.         Labs:   Admission on 2024, Discharged on 2024   Component Date Value    Report 2024                      Value:[facility] Emergency Dept.    Test Date:  2024  Pt Name:    ELVIS CARRASCO               Department: ER  MRN:        [id number]                      Room:       BL 17  Gender:     Female                       Technician: [id number]  :        1970                   Requested By:ER TRIAGE " PROTOCOL  Order #:    [id number]                    Reading MD:    Measurements  Intervals                                Axis  Rate:       82                           P:          31  DE:         169                          QRS:        6  QRSD:       80                           T:          33  QT:         374  QTc:        437    Interpretive Statements  Sinus rhythm  Probable left atrial enlargement  Probable anterior infarct, old  Baseline wander in lead(s) II  Compared to ECG 2022 11:52:49  Myocardial infarct finding now present  Sinus tachycardia no longer present      Report 2024                      Value:[facility] Emergency Dept.    Test Date:  2024  Pt Name:    ELVIS CARRASCO               Department: ER  MRN:        [id number]                      Room:       [facility]  Gender:     Female                       Technician: [id number]  :        1970                   Requested By:ER TRIAGE PROTOCOL  Order #:    [id number]                    Reading MD:    Measurements  Intervals                                Axis  Rate:       82                           P:          31  DE:         169                          QRS:        6  QRSD:       80                           T:          33  QT:         374  QTc:        437    Interpretive Statements  Sinus rhythm  Probable left atrial enlargement  Probable anterior infarct, old  Baseline wander in lead(s) II  Compared to ECG 2022 11:52:49  Myocardial infarct finding now present  Sinus tachycardia no longer present      Troponin T 2024 <6     Sodium 2024 141     Potassium 2024 3.2 (L)     Chloride 2024 115 (H)     Co2 2024 16 (L)     Anion Gap 2024 10.0     Glucose 2024 104 (H)     Bun 2024 13     Creatinine 2024 0.46 (L)     Calcium 2024 6.7 (LL)     Correct Calcium 2024 7.5 (L)     AST(SGOT) 2024 31     ALT(SGPT) 2024 35     Alkaline Phosphatase  11/04/2024 38     Total Bilirubin 11/04/2024 0.3     Albumin 11/04/2024 3.0 (L)     Total Protein 11/04/2024 5.0 (L)     Globulin 11/04/2024 2.0     A-G Ratio 11/04/2024 1.5     NT-proBNP 11/04/2024 57     Troponin T 11/04/2024 <6     GFR (CKD-EPI) 11/04/2024 114     WBC 11/04/2024 8.3     RBC 11/04/2024 5.31     Hemoglobin 11/04/2024 13.9     Hematocrit 11/04/2024 42.8     MCV 11/04/2024 80.6 (L)     MCH 11/04/2024 26.2 (L)     MCHC 11/04/2024 32.5     RDW 11/04/2024 38.3     Platelet Count 11/04/2024 228     MPV 11/04/2024 9.4     Neutrophils-Polys 11/04/2024 68.40     Lymphocytes 11/04/2024 23.00     Monocytes 11/04/2024 5.10     Eosinophils 11/04/2024 2.80     Basophils 11/04/2024 0.50     Immature Granulocytes 11/04/2024 0.20     Nucleated RBC 11/04/2024 0.00     Neutrophils (Absolute) 11/04/2024 5.66     Lymphs (Absolute) 11/04/2024 1.90     Monos (Absolute) 11/04/2024 0.42     Eos (Absolute) 11/04/2024 0.23     Baso (Absolute) 11/04/2024 0.04     Immature Granulocytes (a* 11/04/2024 0.02     NRBC (Absolute) 11/04/2024 0.00     Glycohemoglobin 11/04/2024 6.8 (H)     Est Avg Glucose 11/04/2024 148     Cholesterol,Tot 11/05/2024 111     Triglycerides 11/05/2024 58     HDL 11/05/2024 56     LDL 11/05/2024 43     Sodium 11/05/2024 139     Potassium 11/05/2024 4.9     Chloride 11/05/2024 100     Co2 11/05/2024 28     Glucose 11/05/2024 115 (H)     Bun 11/05/2024 15     Creatinine 11/05/2024 0.73     Calcium 11/05/2024 10.2     Anion Gap 11/05/2024 11.0     Pth, Intact 11/05/2024 13.2 (L)     Ionized Calcium 11/05/2024 1.2     Magnesium 11/05/2024 2.1     Phosphorus 11/05/2024 5.7 (H)     GFR (CKD-EPI) 11/05/2024 98          Assessment & Plan: Medical Decision Making     54 y.o. female with the following -   Assessment & Plan  1. Multilevel cervical degeneration and bulging disc.  The patient has mild degenerative disc disease in the spine and multiple bulging discs at C3-C7, contributing to her neck pain. She  reports persistent pain despite taking Norco. Flexeril 5 mg will be prescribed, to be taken 5 to 10 mg up to three times a day, preferably at night initially. Mobic will be prescribed to be taken once daily with food, and can be taken with Tylenol but not with other NSAIDs like Aleve. A referral to a physiatrist will be made for further evaluation and management. She is advised to keep her neurovascular appointment next month.     2. Right carotid artery dissection.  The patient has a known right carotid artery dissection, which has been stable. She is advised to continue aspirin therapy for the next 3 to 6 months. Outpatient follow-up with neurovascular has been scheduled for next month.    3. Small left thyroid nodule.  An 11 x 8 mm thyroid nodule was noted. A thyroid ultrasound will be conducted in 6 months to reassess the nodule.    4. Diabetes.  Her last A1c was 6.8. A monofilament test on her feet and an eye screening will be conducted today. Her A1c will be checked at the next visit.  Monofilament testing with a 10 gram force: sensation intact: intact bilaterally  Visual Inspection: Feet without maceration, ulcers, fissures.  Pedal pulses: intact bilaterally      Follow-up  Patient is scheduled for a follow-up visit in January 2025.    Problem List Items Addressed This Visit       Type 2 diabetes mellitus with hyperglycemia, without long-term current use of insulin (HCC)    Relevant Orders    Diabetic Monofilament LE Exam (Completed)    POCT Retinal Eye Exam     Other Visit Diagnoses       Hospital discharge follow-up        Need for vaccination        Relevant Orders    INFLUENZA VACCINE TRI INJ (PF)    Cervical disc disease        Relevant Medications    meloxicam (MOBIC) 15 MG tablet    cyclobenzaprine (FLEXERIL) 5 mg tablet    Other Relevant Orders    Referral to Pain Clinic            Differential diagnosis, natural history, supportive care, and indications for immediate follow-up discussed.  Shared  decision making approach utilized, and patient is amendable with plan of care.  Patient understands to return to clinic or go to the emergency department if symptoms worsen. All questions and concerns addressed to the best of my knowledge.    Return in about 2 months (around 1/13/2025), or if symptoms worsen or fail to improve.    Please note that this dictation was created using voice recognition software. I have made every reasonable attempt to correct obvious errors, but I expect that there are errors of grammar and possibly content that I did not discover before finalizing the note.

## 2024-11-14 NOTE — OP THERAPY DAILY TREATMENT
Outpatient Physical Therapy  DAILY TREATMENT     [facility] Outpatient Physical Therapy [place] [id number] [facility], [address]  [place] [address]  Phone:  [phone]    Date: 11/14/2024    Patient: Mónica Keita  YOB: 1970  MRN: [id number]     Time Calculation    Start time: 1015  Stop time: 1100 Time Calculation (min): 45 minutes         Chief Complaint: cervical  Visit #: 2    SUBJECTIVE:  4/10 right cervical radiating to shoulder.  Had massage yesterday that provided temporary relief    OBJECTIVE:  Current objective measures: 1st rib elevated symptoms centralizing with repeated thoracic extension and foam roller shoulder AROM          Therapeutic Exercises (CPT 30011):     1. HEP listed below    2. row    3. foam roller    4. repeated thoracic extension chairback overpressure    5. foam roller alt shoulder flexion, horizontal abduction, pec stretch    6. retraction supine      Therapeutic Exercise Summary: Repeated cervical retraction supine off table 5-6 reps 3 x day  Repeated cervical retraction seated 5-6 reps 3 x day  Access Code: [email or web address]  URL: [email or web address]  Date: 11/14/2024  Prepared by: Karla Torres    Exercises  - Seated Thoracic Lumbar Extension with Pectoralis Stretch  - 1 x daily - 7 x weekly - 3 sets - 10 reps  - Thoracic Extension Mobilization on Foam Roll  - 3 x daily - 7 x weekly - 1 sets - 10 reps  - Standing Shoulder Row with Anchored Resistance  - 1 x daily - 7 x weekly - 3 sets - 10 reps  - Shoulder extension with resistance - Neutral  - 1 x daily - 7 x weekly - 3 sets - 10 reps  - Thoracic Foam Roll Mobilization Backstroke  - 1 x daily - 7 x weekly - 3 sets - 10 reps  - Supine Static Chest Stretch on Foam Roll  - 1 x daily - 7 x weekly - 3 sets - 10 reps    Therapeutic Treatments and Modalities:     1. Manual Therapy (CPT 93065), 1 st rib mobilization right, traction/retraction/extension supine x 5, cervical manual traction 4 x 2 min, decrease//no better    2. E  Stim Unattended (CPT 60733), IFC with MHP to right cervical and shoulder supine x 15 min    Time-based treatments/modalities:       ASSESSMENT:   Response to treatment: Patient responding well with centralizing right arm symptoms  to thoracic mobility on roller and repeated thoracic extension on roller.  Continue with current POC.  Consider IDN    PLAN/RECOMMENDATIONS:   Plan for treatment: therapy treatment to continue next visit.  Planned interventions for next visit: continue with current treatment.

## 2024-11-19 ENCOUNTER — OFFICE VISIT (OUTPATIENT)
Dept: PHYSICAL MEDICINE AND REHAB | Facility: MEDICAL CENTER | Age: 54
End: 2024-11-19
Payer: COMMERCIAL

## 2024-11-19 VITALS
TEMPERATURE: 97.5 F | HEIGHT: 67 IN | BODY MASS INDEX: 28.62 KG/M2 | OXYGEN SATURATION: 96 % | DIASTOLIC BLOOD PRESSURE: 72 MMHG | HEART RATE: 89 BPM | SYSTOLIC BLOOD PRESSURE: 118 MMHG | WEIGHT: 182.32 LBS

## 2024-11-19 DIAGNOSIS — M47.812 ARTHROPATHY OF CERVICAL FACET JOINT: ICD-10-CM

## 2024-11-19 DIAGNOSIS — M54.12 CERVICAL RADICULOPATHY: ICD-10-CM

## 2024-11-19 DIAGNOSIS — M47.9 SPONDYLOSIS: ICD-10-CM

## 2024-11-19 DIAGNOSIS — Z71.82 EXERCISE COUNSELING: ICD-10-CM

## 2024-11-19 DIAGNOSIS — M43.12 SPONDYLOLISTHESIS, CERVICAL REGION: ICD-10-CM

## 2024-11-19 DIAGNOSIS — G56.03 BILATERAL CARPAL TUNNEL SYNDROME: ICD-10-CM

## 2024-11-19 PROCEDURE — 99204 OFFICE O/P NEW MOD 45 MIN: CPT | Performed by: GENERAL PRACTICE

## 2024-11-19 PROCEDURE — 3078F DIAST BP <80 MM HG: CPT | Performed by: GENERAL PRACTICE

## 2024-11-19 PROCEDURE — 3074F SYST BP LT 130 MM HG: CPT | Performed by: GENERAL PRACTICE

## 2024-11-19 PROCEDURE — 1125F AMNT PAIN NOTED PAIN PRSNT: CPT | Performed by: GENERAL PRACTICE

## 2024-11-19 RX ORDER — GABAPENTIN 100 MG/1
100-300 CAPSULE ORAL NIGHTLY PRN
Qty: 90 CAPSULE | Refills: 3 | Status: SHIPPED | OUTPATIENT
Start: 2024-11-19

## 2024-11-19 ASSESSMENT — FIBROSIS 4 INDEX: FIB4 SCORE: 1.2

## 2024-11-19 ASSESSMENT — PAIN SCALES - GENERAL: PAINLEVEL_OUTOF10: 4=SLIGHT-MODERATE PAIN

## 2024-11-19 ASSESSMENT — PATIENT HEALTH QUESTIONNAIRE - PHQ9: CLINICAL INTERPRETATION OF PHQ2 SCORE: 0

## 2024-11-19 NOTE — Clinical Note
Carlos Sales Manuela Keita was evaluated for her cervical radiculopathy and opted for conservative managementwith medications before injections .  Discussed  with the patient to discontinue Flexeril and trial gabapentin and continue physical therapy.  Consider vit d level with next labs.   Thank you for the referral.   Please text, voalte, or call me if further questions.       Best Regards,   Jaxson Laws, DO   Physical Medicine and Rehabilitation 
20

## 2024-11-19 NOTE — PROGRESS NOTES
"Physiatry (Physical Medicine and  Rehabilitation)       Patient Name: Mónica Keita   Patient : 1970  PCP: Carlos Tatum D.N.P.  MRN: [id number]     Date of service: See epic    Referring provider: Carlos Tatum D.N.P.    CHIEF COMPLAINT  Chief Complaint   Patient presents with    New Patient     Neck pain         Mónica Keita is a RIGHT hand dominant 54 y.o. very pleasant female  who presents today with Diagnoses of Spondylosis, Spondylolisthesis, cervical region, Exercise counseling, Dietary counseling, Arthropathy of cervical facet joint, Bilateral carpal tunnel syndrome, and Cervical radiculopathy were pertinent to this visit.      Medical records review:  I reviewed the note from the referring provider Carlos Tatum D.N.P. including the note dated 24.    Prior Relevant MSK/Interventional Procedures:   Patient has not attempted prior ortho/joint injections.   Patient has not had prior ortho/joint surgery.     HPI:    2024 acute neck pain onset 2 weeks ago.  Reported \"sleeping wrong' and progressed.  Reported referred/radicular pain down her arm for her right carotid artery dissection concern.  Prior hx of left carotid artery dissection in .  Started PT Thursday.  Reported improvement and performing HEP.  Re[ported numbness of deltoid.   Pain right now is 4/10 on the numeric pain scale. Her pain at its best-worse level during the course of the day is typically 4-8/10, respectively.  Pain improves with laying down and worsens with nothing. Her pain interferes somewhat with ADLs such as work.   No recent injury, falls, or trauma. Associated Symptoms: above.     The patient is currently doing a provider driven physical therapy program for this problem.  Prefers pharmacological management before injections.  No interest in surgery at this time.    Hx of carpal tunnel syndrome and wears nightly brace which is effective    Patient has tried the following " medications:  Flexeril  Mobic  naproxen  Aleve ineffective  Norco ineffective for controlling pain    Previous or Current Therapeutic modalities and interventional therapies:  -Prior prednisone: no   -Home exercises:?yes    -Heat and ice:?yes, ineffective   -Topicals:?no   -TENS unit:?no   -Chiropractic manipulation:?no, not interested   -Acupuncture:?no   -Massage:?yes with temporary relief with cupping    ROS:   Fever, Chills, Sweats: Denies  Involuntary Weight Loss: Denies  Bowel/bladder issues: denies   See HPI.   All other systems reviewed and negative.     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 improve function.      Psychological testing for pain as depression and pain commonly coexist and need to both be treated.     Opioid Risk Score: No value filed.     Interpretation of Opioid Risk Score   Score 0-3 = Low risk of abuse. Do UDS at least once per year.  Score 4-7 = Moderate risk of abuse. Do UDS 1-4 times per year.  Score 8+ = High risk of abuse. Refer to specialist.    PHQ      1/9/2024     2:00 PM 11/4/2024     8:03 PM 11/19/2024    11:20 AM   Depression Screen (PHQ-2/PHQ-9)   PHQ-2 Total Score  0    PHQ-2 Total Score 0  0       Interpretation of PHQ-9 Total Score   Score Severity   1-4 No Depression   5-9 Mild Depression   10-14 Moderate Depression   15-19 Moderately Severe Depression   20-27 Severe Depression      PMHx:   Past Medical History:   Diagnosis Date    Adverse effect of anesthesia     Laryngospasm in the past with extubation    Anemia     Anxiety     Bronchitis 2009    Diabetes (HCC)     type 2    Dissection of cervical artery (HCC)     Elevated liver enzymes     unknown cause    Hypertension     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 at SURGERY [facility]    VAGINAL HYSTERECTOMY TOTAL  06/17/2014    Performed by Enedina Grant M.D. at SURGERY [facility]    HYSTERECTOMY, VAGINAL  06/04/2014    [facility]    APPENDECTOMY  2014       Family history   Family History   Problem Relation Age of Onset    Heart Disease Father     No Known Problems Sister     Heart Attack Brother         CAD. 3 MI's.     No Known Problems Son     No Known Problems Daughter        Medications: reviewed on epic.   Outpatient Medications Marked as Taking for the 11/19/24 encounter (Office Visit) with Jaxson Laws D.O.   Medication Sig Dispense Refill    gabapentin (NEURONTIN) 100 MG Cap Take 1-3 Capsules by mouth at bedtime as needed (pain). 90 Capsule 3    meloxicam (MOBIC) 15 MG tablet Take 1 Tablet by mouth every day. 30 Tablet 0    cyclobenzaprine (FLEXERIL) 5 mg tablet Take 1-2 Tablets by mouth 3 times a day as needed for Muscle Spasms. 30 Tablet 0    Continuous Glucose Sensor (FREESTYLE MIN 3 SENSOR) [facility] USE 1 Each every 14 days. 6 Each 3    aspirin (ASA) 81 MG Chew Tab chewable tablet Chew 1 Tablet every evening. 100 Tablet 1    estradiol (VIVELLE) 0.0375 MG/24HR patch Place 1 Patch on the skin two times a week. 16 Patch 2    Cetirizine-Pseudoephedrine (ALLERGY D-12 PO) Take 1 Tablet by mouth 2 times a day.      escitalopram (LEXAPRO) 20 MG tablet Take 2 Tablets by mouth every day. (Patient taking differently: Take 40 mg by mouth every day. 2 tablets= 40mg) 160 Tablet 3    metFORMIN ER (GLUCOPHAGE XR) 750 MG TABLET SR 24 HR TAKE ONE TABLET BY  MOUTH TWO TIMES DAILY 180 Tablet 3    omeprazole (PRILOSEC) 20 MG delayed-release capsule TAKE 1 CAPSULE BY MOUTH DAILY 90 Capsule 3    buPROPion (WELLBUTRIN XL) 300 MG XL tablet TAKE 1 TABLET BY MOUTH EVERY DAY IN THE MORNING. (Patient taking differently: Take 300 mg by mouth every morning. TAKE 1 TABLET BY MOUTH EVERY DAY IN THE MORNING.) 90 Tablet 3    lisinopril (PRINIVIL) 20 MG Tab Take 1 Tablet by mouth every day. 90 Tablet 3        Allergies:   No Known Allergies    Social Hx:   Social History     Socioeconomic History    Marital status:      Spouse name: Not on file    Number of children: Not on file    Years of education: Not on file    Highest education level: 12th grade   Occupational History    Not on file   Tobacco Use    Smoking status: Never    Smokeless tobacco: Never   Vaping Use    Vaping status: Never Used   Substance and Sexual Activity    Alcohol use: No    Drug use: No    Sexual activity: Yes     Partners: Male     Birth control/protection: Surgical     Comment: .    Other Topics Concern    Not on file   Social History Narrative    Not on file     Social Drivers of Health     Financial Resource Strain: Low Risk  (11/4/2024)    Overall Financial Resource Strain (CARDIA)     Difficulty of Paying Living Expenses: Not hard at all   Food Insecurity: No Food Insecurity (11/4/2024)    Hunger Vital Sign     Worried About Running Out of Food in the Last Year: Never true     Ran Out of Food in the Last Year: Never true   Transportation Needs: No Transportation Needs (11/4/2024)    PRAPARE - Transportation     Lack of Transportation (Medical): No     Lack of Transportation (Non-Medical): No   Physical Activity: Insufficiently Active (11/4/2024)    Exercise Vital Sign     Days of Exercise per Week: 2 days     Minutes of Exercise per Session: 20 min   Stress: No Stress Concern Present (11/4/2024)    [employer] - Occupational Stress Questionnaire     Feeling of  "Stress : Only a little   Social Connections: Moderately Isolated (11/4/2024)    Social Connection and Isolation Panel [NHANES]     Frequency of Communication with Friends and Family: Twice a week     Frequency of Social Gatherings with Friends and Family: Once a week     Attends Cheondoism Services: Never     Active Member of Clubs or Organizations: Yes     Attends Club or Organization Meetings: More than 4 times per year     Marital Status:    Intimate Partner Violence: Not At Risk (11/4/2024)    Humiliation, Afraid, Rape, and Kick questionnaire     Fear of Current or Ex-Partner: No     Emotionally Abused: No     Physically Abused: No     Sexually Abused: No   Housing Stability: Low Risk  (11/4/2024)    Housing Stability Vital Sign     Unable to Pay for Housing in the Last Year: No     Number of Times Moved in the Last Year: 0     Homeless in the Last Year: No         EXAMINATION   Vitals: /72 (BP Location: Right arm, Patient Position: Sitting, BP Cuff Size: Adult)   Pulse 89   Temp 36.4 °C (97.5 °F) (Temporal)   Ht 1.702 m (5' 7\")   Wt 82.7 kg (182 lb 5.1 oz)   SpO2 96%   Physical Exam:     Body Habitus: Body mass index is 28.56 kg/m².  Appearance: Well-groomed, well-nourished, not disheveled  Eyes: No scleral icterus to suggest severe liver disease, no proptosis to suggest severe hyperthyroid  ENT -no obvious auditory deficits, no external lesions, moist mucus membranes   Skin -no rashes or lesions noted. No appreciable skin breakdown on exposed skin areas.    Respiratory-  breathing comfortably on room air, no audible wheezing, full sentences  Cardiovascular- No lower extremity edema noted.   Psychiatric- alert and oriented, calm, comfortable, cooperative     Musculoskeletal and Neuro:  Gait and station - normal gait with reciprocal pattern,  no presence/use of ambulatory device, no arm assistance with sit-to-stand, nonantalgic. no loss of balance during exam.  No change in patient's demeanor " with exam.    Grossly normal cranial nerve exam  Coordination grossly intact  Shoulder:  protracted shoulders    Cervical spine   Inspection: No deformities of the skin over the cervical spine. No rashes or lesions.  Anterior leaning head: yes  full   active range of motion in all directions, with  pain    Spurling’s sign: positive right, negative left  No signs of muscular atrophy in bilateral upper extremities   No tenderness to palpation of the cervical spine  No tenderness to palpation on the BILATERAL side in the cervical facets.   No tenderness of paraspinal muscles  Key points for the international standards for neurological classification of spinal cord injury (ISNCSCI) to light touch.   Dermatome R L   C4 2 2   C5 2 2   C6 2 2   C7 2 2   C8 2 2   T1 2 2   T2 2 2     Nerve: neurovascularly intact radial, median, ulnar, and AIN     Motor Exam Upper Extremities   ? Myotome R L   Shoulder flexion C5 5 5   Elbow flexion C5 5 5   Wrist extension C6 5 5   Elbow extension C7 5 5   Finger flexion C8 5 5   Finger abduction T1 5 5     Reflexes  ?  R L   Biceps  2+ 2+   Brachioradialis  2+ 2+   Washington’s sign negative bilaterally        MEDICAL DECISION MAKING    Medical records review: see under HPI section.     DATA    Labs:   Lab Results   Component Value Date/Time    SODIUM 139 11/05/2024 03:27 AM    POTASSIUM 4.9 11/05/2024 03:27 AM    CHLORIDE 100 11/05/2024 03:27 AM    CO2 28 11/05/2024 03:27 AM    ANION 11.0 11/05/2024 03:27 AM    GLUCOSE 115 (H) 11/05/2024 03:27 AM    BUN 15 11/05/2024 03:27 AM    CREATININE 0.73 11/05/2024 03:27 AM    CALCIUM 10.2 11/05/2024 03:27 AM    ASTSGOT 31 11/04/2024 12:09 PM    ALTSGPT 35 11/04/2024 12:09 PM    TBILIRUBIN 0.3 11/04/2024 12:09 PM    ALBUMIN 3.0 (L) 11/04/2024 12:09 PM    TOTPROTEIN 5.0 (L) 11/04/2024 12:09 PM    GLOBULIN 2.0 11/04/2024 12:09 PM    AGRATIO 1.5 11/04/2024 12:09 PM   ]    Lab Results   Component Value Date/Time    PROTHROMBTM 14.1 12/06/2022 09:17 AM     INR 1.10 12/06/2022 09:17 AM        Lab Results   Component Value Date/Time    WBC 8.3 11/04/2024 01:30 PM    RBC 5.31 11/04/2024 01:30 PM    HEMOGLOBIN 13.9 11/04/2024 01:30 PM    HEMATOCRIT 42.8 11/04/2024 01:30 PM    MCV 80.6 (L) 11/04/2024 01:30 PM    MCH 26.2 (L) 11/04/2024 01:30 PM    MCHC 32.5 11/04/2024 01:30 PM    MPV 9.4 11/04/2024 01:30 PM    NEUTSPOLYS 68.40 11/04/2024 01:30 PM    LYMPHOCYTES 23.00 11/04/2024 01:30 PM    MONOCYTES 5.10 11/04/2024 01:30 PM    EOSINOPHILS 2.80 11/04/2024 01:30 PM    BASOPHILS 0.50 11/04/2024 01:30 PM    HYPOCHROMIA 1+ 07/26/2021 11:25 AM    ANISOCYTOSIS 1+ 12/05/2022 01:50 PM        Lab Results   Component Value Date/Time    HBA1C 6.8 (H) 11/04/2024 01:30 PM        Imaging:   I personally reviewed following images, these are my reads  Cervical MRI 11/4/2024: Straightening of cervical lordosis, mild degenerative changes, multiple disc bulges, mild central canal stenosis at C4-C5 C5-C6 C6-C7; neuroforaminal narrowing mild bilaterally C3-C4  &C4-C5, mild right and moderate left C5-C6, mild bilaterally C6-C7; facet arthropathy    IMAGING radiology reads. I reviewed the following radiology reads      Results for orders placed during the hospital encounter of 02/06/22    TQ-GHCCPQT-K/O    Impression  1.  Sludge is noted in the gallbladder.    2.  No biliary ductal dilatation. No filling defects or strictures identified.    3.  No pancreatic ductal dilatation is identified.    4.  Findings are consistent with pancreatitis involving the tail of the pancreas.   Results for orders placed during the hospital encounter of 11/04/24    MR-BRAIN-W/O    Impression  MRI of the brain without contrast within normal limits.             Results for orders placed during the hospital encounter of 11/04/24    MR-CERVICAL SPINE-W/O    Impression  1.  Mild degenerative disease in the cervical spine as described above.  2.  Small left thyroid nodule.  3.  It is difficult to evaluate the vasculature  in this MRI cervical spine. When compared with the previous MRI dated 9/23/2022, again noted there is focal dilatation of the distal right internal carotid artery at the skull base consistent with  pseudoaneurysm.There is no large intramural hematoma. The flow voids of the internal carotid and vertebral arteries are patent in this limited evaluation. However tiny hematoma, vascular irregularities and double-lumen cannot be assessed in this study.  4.  If there is clinical concern for dissection CT angiogram is recommended.                Results for orders placed during the hospital encounter of 09/23/22    MR-MRA NECK-W/O    Impression  Overall stable appearance of the right carotid dissection involving the right ICA segment just below the skull base.There is no significant luminal compromise identified. Proximal and distal vessel also demonstrate normal caliber.    The false lumen/aneurysm component measures approximately 4 x 6 mm in the axial plane.    Results for orders placed during the hospital encounter of 06/23/22    MR-MRA NECK-WITH & W/O AND SEQUENCES    Impression  Acute-subacute dissection with intramural hematoma involving the distal left cervical ICA just below the skull base. There is at least 50% luminal narrowing involving this segment.    Fusiform dilatation/pseudoaneurysm of the right distal cervical ICA just below the skull base likely related to remote dissection. No significant luminal compromise.    Small pseudoaneurysm involving the distal V2 segment of the right vertebral artery also likely related to remote dissection. No significant luminal compromise.                                    Results for orders placed during the hospital encounter of 12/05/22    DX-CHEST-2 VIEWS    Impression  Worsening bilateral multifocal pulmonary consolidation again could be due to pneumonia or pulmonary edema with bilateral pleural effusions.                                 Results for orders placed during  the hospital encounter of 01/15/21    DX-SHOULDER 2+ RIGHT    Impression  Mild AC joint osteoarthritis    Mild humeral head cystic change may indicate internal impingement                ASSESSMENT AND PLAN:  Mónica Keita   : 1970   Past medical history of type 2 diabetes, transaminitis, insomnia, pancreatitis, neuroendocrine neoplasm of stomach, leukocytosis, electrolyte abnormalities, chronic nonalcoholic liver disease, carotid artery dissection, anxiety, allergic rhinitis, acute stress reaction, chronic anticoagulation secondary to carotid artery dissection right    Diagnoses and all orders for this visit:  1. Spondylosis        2. Spondylolisthesis, cervical region        3. Exercise counseling        4. Dietary counseling        5. Arthropathy of cervical facet joint        6. Bilateral carpal tunnel syndrome        7. Cervical radiculopathy  gabapentin (NEURONTIN) 100 MG Cap          Patient with historical and clinical evidence consistent with cervical radiculopathy right supported by positive Spurling's test and MRI findings.  Patient would like to attempt pharmacological management with physical therapy before injections.  No interest in surgery.  No signs of acute central canal syndrome  -History of carpal tunnel syndrome: Effective with bilateral nightly hand braces; will continue to monitor  -Recommend vitamin D screening; Hypovitaminosis D can contribute to diffuse musculoskeletal aches, fatigue, and malaise and can affect exercise and therapy  -Lifestyle: exercise counseling discussed.  Extensive discussion regarding treatment options, at this time recommending the following:    Orders Placed This Encounter    gabapentin (NEURONTIN) 100 MG Cap       -Medications/Modalities: Gabapentin 100-300mg nightly as needed. Do not increase gabapentin any faster to minimize potential side effects. A possible side effect is drowsiness while your body is adjusting to the medicine. If you experience  any unwanted side effects, decrease the gabapentin to the previous dose that you did not have side effects on. Gabapentin is a relatively slow acting medicine so you may not feel immediate relief. For chronic use, an adequate trial with gabapentin may require 2 months or more.  increase dose weekly based on response and tolerability to a target dose range of 300 mg to 1.2 g 3 times daily  Meloxicam PRN for severe pain  Discontinue Flexeril since ineffective  -Therapy (PT/OT/Aquatherapy): Engage in encouraged to continue  on strengthening and stretching.    -Home exercise program: encouraged    -Diagnostic workup: reviewed today as above;   -Interventional program: will consider at a later time and Informed refusal by patient  -Referrals: none required at this time    Follow-up:   Return to clinic in 12 weeks for follow-up of symptomatology  Patient expressed understanding of the management plan. Patient (and Family Members) was/were encouraged to call if any worries, issues, problems or concerns prior to the next visit     Please note that this dictation was created using voice recognition software. I have made every reasonable attempt to correct obvious errors but there may be errors of grammar and content that I may have overlooked prior to finalization of this note.      Jaxson Laws, DO  Physical Medicine and Rehabilitation  [facility]         DANIELLE Tatum, TANISHA.N.P.   Carlos Diaz D.N.P.

## 2024-12-03 ENCOUNTER — PHYSICAL THERAPY (OUTPATIENT)
Dept: PHYSICAL THERAPY | Facility: REHABILITATION | Age: 54
End: 2024-12-03
Attending: STUDENT IN AN ORGANIZED HEALTH CARE EDUCATION/TRAINING PROGRAM
Payer: COMMERCIAL

## 2024-12-03 DIAGNOSIS — R53.1 WEAKNESS: ICD-10-CM

## 2024-12-03 DIAGNOSIS — R20.0 ARM NUMBNESS: ICD-10-CM

## 2024-12-03 PROCEDURE — 97140 MANUAL THERAPY 1/> REGIONS: CPT

## 2024-12-03 PROCEDURE — 97110 THERAPEUTIC EXERCISES: CPT

## 2024-12-03 NOTE — OP THERAPY DAILY TREATMENT
Outpatient Physical Therapy  DAILY TREATMENT     [facility] [place] [address] [facility], [address]  [place] [address]  Phone:  [phone]    Date: 12/03/2024    Patient: Mónica Keita  YOB: 1970  MRN: [id number]     Time Calculation    Start time: 0800  Stop time: 0845 Time Calculation (min): 45 minutes         Chief Complaint: cervical radic  Visit #: 13    SUBJECTIVE:  Gabapentin is helping with arm symptoms, bought a roller and has been doin g home program, 0/10 VAS , slight numbess right deltoid region , shoulder feels stuck and weakness    OBJECTIVE:  Current objective measures: shoulder flexion 0-90 myotomal weakness C5 ,,increase myotomal power with repeated thoracic extension in sitting           Therapeutic Exercises (CPT 89455):     1. stepper L3    2. row    3. foam roller repeated thoracic extension, 3 x 5 reps    4. repeated thoracic extension chairback overpressure, 3 x 5 re    5. foam roller alt shoulder flexion, horizontal abduction, pec stretch, x10    7. flasher with blue tband, 2 x 30 sec    8. prone on ball ski jumper, 2 x30 sec    9. prone T on ball, 2 x 30 sec      Therapeutic Exercise Summary: Access Code: [email or web address]  URL: [email or web address]  Date: 12/03/2024  Prepared by: Karla Torres    Exercises  - Prone Scapular Retraction  - 1 x daily - 7 x weekly - 3 sets - 10 reps - 30 hold  - Prone Scapular Retraction Arms at Side  - 1 x daily - 7 x weekly - 1 sets - 10 reps - 30 hold  - Shoulder External Rotation and Scapular Retraction with Resistance  - 1 x daily - 7 x weekly - 1 sets - 10 reps - 30 hold      Repeated cervical retraction supine off table 5-6 reps 3 x day  Repeated cervical retraction seated 5-6 reps 3 x day  Access Code: [email or web address]  URL: [email or web address]  Date: 11/14/2024  Prepared by: Karla Torres    Exercises  - Seated Thoracic Lumbar Extension with Pectoralis Stretch  - 1 x daily - 7 x weekly - 3 sets - 10 reps  -  Thoracic Extension Mobilization on Foam Roll  - 3 x daily - 7 x weekly - 1 sets - 10 reps  - Standing Shoulder Row with Anchored Resistance  - 1 x daily - 7 x weekly - 3 sets - 10 reps  - Shoulder extension with resistance - Neutral  - 1 x daily - 7 x weekly - 3 sets - 10 reps  - Thoracic Foam Roll Mobilization Backstroke  - 1 x daily - 7 x weekly - 3 sets - 10 reps  - Supine Static Chest Stretch on Foam Roll  - 1 x daily - 7 x weekly - 3 sets - 10 reps    Therapeutic Treatments and Modalities:     1. Manual Therapy (CPT 87477), thoracic extension mob prone, cervical retraction and thoracic extension mob in sitting, decrease//no better    Time-based treatments/modalities:    Physical Therapy Timed Treatment Charges  Manual therapy minutes (CPT 87175): 10 minutes  Therapeutic exercise minutes (CPT 05781): 30 minutes    ASSESSMENT:   Response to treatment: improved shoulder power and AROM with repated thoracic extension in sitting with overpressure.  Patient tolerated progression of scapular/thoracic strength work well.    PLAN/RECOMMENDATIONS:   Plan for treatment: therapy treatment to continue next visit.  Planned interventions for next visit: continue with current treatment.

## 2024-12-05 ENCOUNTER — PHYSICAL THERAPY (OUTPATIENT)
Dept: PHYSICAL THERAPY | Facility: REHABILITATION | Age: 54
End: 2024-12-05
Attending: STUDENT IN AN ORGANIZED HEALTH CARE EDUCATION/TRAINING PROGRAM
Payer: COMMERCIAL

## 2024-12-05 DIAGNOSIS — R53.1 WEAKNESS: ICD-10-CM

## 2024-12-05 PROCEDURE — 97140 MANUAL THERAPY 1/> REGIONS: CPT

## 2024-12-05 PROCEDURE — 97110 THERAPEUTIC EXERCISES: CPT

## 2024-12-05 NOTE — OP THERAPY DAILY TREATMENT
Outpatient Physical Therapy  DAILY TREATMENT     [facility] Outpatient Physical Therapy [place] [address] [address] [facility], [address]  [place] [address]  Phone:  [phone]    Date: 12/05/2024    Patient: Mónica Keita  YOB: 1970  MRN: [id number]     Time Calculation    Start time: 0800  Stop time: 0845 Time Calculation (min): 45 minutes         Chief Complaint: cervical radiculopathy   Visit #: 14    SUBJECTIVE:  About the same, right arm is improving in mobility slowly, still very weak    OBJECTIVE:  Current objective measures: standing/full AROM  post repeated thoracic extension in lying over roller and in chair          Therapeutic Exercises (CPT 68777):     1. stepper L3    2. row    3. foam roller repeated thoracic extension, 3 x 5 reps    4. repeated thoracic extension chairback overpressure, 3 x 5 re    5. foam roller alt shoulder flexion, horizontal abduction, pec stretch, 2 x 15 3lb dumbells Bilateral    7. flasher with blue tband, 2 x 30 sec, NT    8. prone on ball ski jumper, 2 x30 sec, no ball/off table instead    9. prone T on ball, 2 x 30 sec, no ball off table instead    10. low trap Y with lift off on wall, orange tband single arm x10 each      Therapeutic Exercise Summary: Access Code: [email or web address]  URL: [email or web address]  Date: 12/03/2024  Prepared by: Karla Torres    Exercises  - Prone Scapular Retraction  - 1 x daily - 7 x weekly - 3 sets - 10 reps - 30 hold  - Prone Scapular Retraction Arms at Side  - 1 x daily - 7 x weekly - 1 sets - 10 reps - 30 hold  - Shoulder External Rotation and Scapular Retraction with Resistance  - 1 x daily - 7 x weekly - 1 sets - 10 reps - 30 hold      Repeated cervical retraction supine off table 5-6 reps 3 x day  Repeated cervical retraction seated 5-6 reps 3 x day  Access Code: [email or web address]  URL: [email or web address]  Date: 11/14/2024  Prepared by: Karla Torres    Exercises  - Seated Thoracic Lumbar Extension with Pectoralis Stretch  - 1 x  daily - 7 x weekly - 3 sets - 10 reps  - Thoracic Extension Mobilization on Foam Roll  - 3 x daily - 7 x weekly - 1 sets - 10 reps  - Standing Shoulder Row with Anchored Resistance  - 1 x daily - 7 x weekly - 3 sets - 10 reps  - Shoulder extension with resistance - Neutral  - 1 x daily - 7 x weekly - 3 sets - 10 reps  - Thoracic Foam Roll Mobilization Backstroke  - 1 x daily - 7 x weekly - 3 sets - 10 reps  - Supine Static Chest Stretch on Foam Roll  - 1 x daily - 7 x weekly - 3 sets - 10 reps    Therapeutic Treatments and Modalities:     1. Manual Therapy (CPT 74269), thoracic extension mob prone, cervical retraction and thoracic extension mob in sitting, decrease//no better    Time-based treatments/modalities:    Physical Therapy Timed Treatment Charges  Manual therapy minutes (CPT 58424): 10 minutes  Therapeutic exercise minutes (CPT 18968): 30 minutes    ASSESSMENT:   Response to treatment: patient continues to demonstrate right arm weakness but it is improving with patient achieving full shoulder flexion/abduction post treatment.  Continue with progressive loading and thoracic extension mobility    PLAN/RECOMMENDATIONS:   Plan for treatment: therapy treatment to continue next visit.  Planned interventions for next visit: continue with current treatment.

## 2024-12-10 ENCOUNTER — PHYSICAL THERAPY (OUTPATIENT)
Dept: PHYSICAL THERAPY | Facility: REHABILITATION | Age: 54
End: 2024-12-10
Attending: STUDENT IN AN ORGANIZED HEALTH CARE EDUCATION/TRAINING PROGRAM
Payer: COMMERCIAL

## 2024-12-10 ENCOUNTER — OFFICE VISIT (OUTPATIENT)
Dept: NEUROLOGY | Facility: MEDICAL CENTER | Age: 54
End: 2024-12-10
Attending: PSYCHIATRY & NEUROLOGY
Payer: COMMERCIAL

## 2024-12-10 VITALS
DIASTOLIC BLOOD PRESSURE: 76 MMHG | OXYGEN SATURATION: 92 % | HEART RATE: 81 BPM | SYSTOLIC BLOOD PRESSURE: 118 MMHG | BODY MASS INDEX: 28.86 KG/M2 | HEIGHT: 67 IN | WEIGHT: 183.86 LBS | RESPIRATION RATE: 14 BRPM | TEMPERATURE: 97.3 F

## 2024-12-10 DIAGNOSIS — M50.90 CERVICAL DISC DISEASE: ICD-10-CM

## 2024-12-10 DIAGNOSIS — I77.71 CAROTID ARTERY DISSECTION (HCC): ICD-10-CM

## 2024-12-10 DIAGNOSIS — R20.0 ARM NUMBNESS: ICD-10-CM

## 2024-12-10 DIAGNOSIS — R53.1 WEAKNESS: ICD-10-CM

## 2024-12-10 PROCEDURE — 3078F DIAST BP <80 MM HG: CPT | Performed by: PSYCHIATRY & NEUROLOGY

## 2024-12-10 PROCEDURE — 97140 MANUAL THERAPY 1/> REGIONS: CPT

## 2024-12-10 PROCEDURE — 3074F SYST BP LT 130 MM HG: CPT | Performed by: PSYCHIATRY & NEUROLOGY

## 2024-12-10 PROCEDURE — 99212 OFFICE O/P EST SF 10 MIN: CPT | Performed by: PSYCHIATRY & NEUROLOGY

## 2024-12-10 PROCEDURE — 99213 OFFICE O/P EST LOW 20 MIN: CPT | Performed by: PSYCHIATRY & NEUROLOGY

## 2024-12-10 PROCEDURE — 97110 THERAPEUTIC EXERCISES: CPT

## 2024-12-10 ASSESSMENT — FIBROSIS 4 INDEX: FIB4 SCORE: 1.2

## 2024-12-10 ASSESSMENT — PATIENT HEALTH QUESTIONNAIRE - PHQ9: CLINICAL INTERPRETATION OF PHQ2 SCORE: 0

## 2024-12-10 NOTE — PROGRESS NOTES
[facility]  GENERAL NEUROLOGY  FOLLOW-UP VISIT    CC: carotid artery dissection    INTERVAL HISTORY:  Mónica Keita is a 54 y.o. woman with a history of carotid artery dissection as well as elevated liver enzymes, HTN, DM, and anxiety.  I last saw her in the clinic on 8/19/2022.  At that time I recommended she continue ASA.  Today, she was unaccompanied, and she provided the following interval history:    Ms. Keita developed right neck and arm pain and went to the ED.  Workup included imaging.  She was discharged, but early the next morning when the images were reviewed more carefully there was concern for carotid artery dissection.  When the ED couldn't reach her and ambulance was sent to collect her at her home.  She was taken to [facility] where additional imaging showed unchanged dissection.  She was restarted on ASA.    With physical therapy the right arm feels better.    MEDICATIONS:  Current Outpatient Medications   Medication Sig    gabapentin (NEURONTIN) 100 MG Cap Take 1-3 Capsules by mouth at bedtime as needed (pain).    meloxicam (MOBIC) 15 MG tablet Take 1 Tablet by mouth every day.    cyclobenzaprine (FLEXERIL) 5 mg tablet Take 1-2 Tablets by mouth 3 times a day as needed for Muscle Spasms.    Continuous Glucose Sensor (FREESTYLE MIN 3 SENSOR) [facility] USE 1 Each every 14 days.    aspirin (ASA) 81 MG Chew Tab chewable tablet Chew 1 Tablet every evening.    estradiol (VIVELLE) 0.0375 MG/24HR patch Place 1 Patch on the skin two times a week.    Cetirizine-Pseudoephedrine (ALLERGY D-12 PO) Take 1 Tablet by mouth 2 times a day.    escitalopram (LEXAPRO) 20 MG tablet Take 2 Tablets by mouth every day. (Patient taking differently: Take 40 mg by mouth every day. 2 tablets= 40mg)    metFORMIN ER (GLUCOPHAGE XR) 750 MG TABLET SR 24 HR TAKE ONE TABLET BY MOUTH TWO TIMES DAILY    omeprazole (PRILOSEC) 20 MG delayed-release capsule TAKE 1 CAPSULE BY MOUTH DAILY    buPROPion (WELLBUTRIN XL) 300 MG XL tablet  TAKE 1 TABLET BY MOUTH EVERY DAY IN THE MORNING. (Patient taking differently: Take 300 mg by mouth every morning. TAKE 1 TABLET BY MOUTH EVERY DAY IN THE MORNING.)    lisinopril (PRINIVIL) 20 MG Tab Take 1 Tablet by mouth every day.    hydrOXYzine HCl (ATARAX) 25 MG Tab Take 1 Tablet by mouth 3 times a day as needed for Anxiety.     MEDICAL, SOCIAL, AND FAMILY HISTORY:  There is no change in the patient's ROS or medical, social, or family histories since the previous visit on 8/19/2022.    REVIEW OF SYSTEMS:  A ROS was completed.  Pertinent positives and negatives were included in the HPI, above.  All other systems were reviewed and are negative.    PHYSICAL EXAM:  General/Medical:  - NAD    Neuro:  MENTAL STATUS: awake and alert; no deficits of speech or language; oriented to conversation; affect was appropriate to situation; pleasant, cooperative    CRANIAL NERVES:    II: acuity: NT, fields: NT, pupils: 5/5 to 4/4, discs: NT    III/IV/VI: versions: grossly intact    V: facial sensation: NT    VII: facial expression: symmetric    VIII: hearing: intact to voice    IX/X: palate: NT    XI: shoulder shrug: NT    XII: tongue: NT    MOTOR:  - bulk: NT  - tone: NT  Upper Extremity Strength (R/L)    NT   Elbow flexion NT   Elbow extension NT   Shoulder abduction NT     Lower Extremity Strength  (R/L)   Hip flexion NT   Knee extension NT   Knee flexion NT   Ankle dorsiflexion NT   Ankle plantarflexion NT     - pronator drift: NT  - abnormal movements: none    SENSATION:  - light touch: NT  - vibration (R/L, seconds): NT at the great toes  - pinprick: NT  - proprioception: NT  - Romberg: NT    COORDINATION:  - finger to nose: NT  - finger tapping: NT    REFLEXES:  Reflex Right Left   BR NT NT   Biceps NT NT   Triceps NT NT   Patellae NT NT   Achilles NT NT   Toes NT NT     GAIT:  - NT    REVIEW OF IMAGING STUDIES: I reviewed the following studies:  MRI Cervical Spine:  Date: 11/4/2024  W/o and w/ contrast?:  "without  Indication: \"headache, right arm numbness\"  Comparison: 8/23/2022  Impression:  \"1) Mild degenerative disease in the cervical spine as described above.  2) Small left thyroid nodule.  3) It is difficult to evaluate the vasculature in this MRI cervical spine. When compared with the previous MRI dated 9/23/2022, again noted there is focal dilatation of the distal right internal carotid artery at the skull base consistent with   pseudoaneurysm.There is no large intramural hematoma. The flow voids of the internal carotid and vertebral arteries are patent in this limited evaluation. However tiny hematoma, vascular irregularities and double-lumen cannot be assessed in this study.  4) If there is clinical concern for dissection CT angiogram is recommended.    REVIEW OF LABORATORY STUDIES:  11/4/2024:  - CBC w/ diff: within acceptable limits  - CMP: notable for glucose: 115    ASSESSMENT:  Mónica Keita is a 54 y.o. woman with a history of carotid artery dissection as well as elevated liver enzymes, HTN, DM, and anxiety.  The dissection appears stable.  I discussed this case with one of the other neurologists in our practice.  Thera are limited data regarding the use of aspirin in cases like this.  Since she tolerates this well without GI upset, etc., it seems reasonable for her to continue this for now.    PLAN:  History of Carotid Artery Dissection:  - ok to continue ASA    Follow-Up:  - Return if symptoms worsen or fail to improve.    Signed: Jerry Pacheco M.D.      "

## 2024-12-10 NOTE — OP THERAPY DAILY TREATMENT
Outpatient Physical Therapy  DAILY TREATMENT     [facility] Outpatient Physical Therapy [place] [address] [facility], [address]  [place] [address]  Phone:  [phone]    Date: 12/10/2024    Patient: Mónica Keita  YOB: 1970  MRN: [id number]     Time Calculation    Start time: 0800  Stop time: 0845 Time Calculation (min): 45 minutes         Chief Complaint: cervical radiculopathy   Visit #: 15    SUBJECTIVE:  She still has difficulty lifting her R arm to the side and straight in front of her, but it's getting better. Washing her hair is still difficult.     OBJECTIVE:  Current objective measures: standing/full AROM  post repeated thoracic extension in lying over roller and in chair          Therapeutic Exercises (CPT 99996):     1. stepper L3    2. row, 2x15 pinkTB    3. foam roller repeated thoracic extension, 3 x 5 reps    4. repeated thoracic extension chairback overpressure, 3 x 5 re    5. foam roller alt shoulder flexion, horizontal abduction, pec stretch, 2 x 15 3lb dumbells Bilateral    7. flasher with blue tband, 2 x 30 sec, NT    8. prone on ball ski jumper, 2 x30 sec, no ball/off table instead    9. prone T on ball, 2 x 30 sec, no ball off table instead    10. low trap Y with lift off on wall, orange tband single arm x10 each    11. ball up the wall stretch, 10x    12. AAROM abduction stretch, 20x R UE      Therapeutic Exercise Summary: Access Code: [email or web address]  URL: [email or web address]  Date: 12/03/2024  Prepared by: Karla Torres    Exercises  - Prone Scapular Retraction  - 1 x daily - 7 x weekly - 3 sets - 10 reps - 30 hold  - Prone Scapular Retraction Arms at Side  - 1 x daily - 7 x weekly - 1 sets - 10 reps - 30 hold  - Shoulder External Rotation and Scapular Retraction with Resistance  - 1 x daily - 7 x weekly - 1 sets - 10 reps - 30 hold      Repeated cervical retraction supine off table 5-6 reps 3 x day  Repeated cervical retraction seated 5-6 reps 3 x day  Access Code:  [email or web address]  URL: [email or web address]  Date: 11/14/2024  Prepared by: Karla Torres    Exercises  - Seated Thoracic Lumbar Extension with Pectoralis Stretch  - 1 x daily - 7 x weekly - 3 sets - 10 reps  - Thoracic Extension Mobilization on Foam Roll  - 3 x daily - 7 x weekly - 1 sets - 10 reps  - Standing Shoulder Row with Anchored Resistance  - 1 x daily - 7 x weekly - 3 sets - 10 reps  - Shoulder extension with resistance - Neutral  - 1 x daily - 7 x weekly - 3 sets - 10 reps  - Thoracic Foam Roll Mobilization Backstroke  - 1 x daily - 7 x weekly - 3 sets - 10 reps  - Supine Static Chest Stretch on Foam Roll  - 1 x daily - 7 x weekly - 3 sets - 10 reps    Therapeutic Treatments and Modalities:     1. Manual Therapy (CPT 92019), thoracic extension mob prone, cervical retraction and thoracic extension mob in sitting, decrease//no better    Time-based treatments/modalities:    Physical Therapy Timed Treatment Charges  Manual therapy minutes (CPT 09402): 10 minutes  Therapeutic exercise minutes (CPT 45767): 35 minutes    ASSESSMENT:   Response to treatment:   Added overhead stretch today for R shoulder mobility and thoracic extension and rows for postural strengthening, she was able to complete all exercises and stretches without increased discomfort or adverse reactions. She would benefit from continued physical therapy in order to improve functional mobility.     PLAN/RECOMMENDATIONS:   Plan for treatment: therapy treatment to continue next visit.  Planned interventions for next visit: continue with current treatment.

## 2024-12-11 DIAGNOSIS — M50.90 CERVICAL DISC DISEASE: ICD-10-CM

## 2024-12-12 ENCOUNTER — PHYSICAL THERAPY (OUTPATIENT)
Dept: PHYSICAL THERAPY | Facility: REHABILITATION | Age: 54
End: 2024-12-12
Attending: STUDENT IN AN ORGANIZED HEALTH CARE EDUCATION/TRAINING PROGRAM
Payer: COMMERCIAL

## 2024-12-12 DIAGNOSIS — R53.1 WEAKNESS: ICD-10-CM

## 2024-12-12 PROCEDURE — 97110 THERAPEUTIC EXERCISES: CPT

## 2024-12-12 PROCEDURE — 97140 MANUAL THERAPY 1/> REGIONS: CPT

## 2024-12-12 RX ORDER — MELOXICAM 15 MG/1
15 TABLET ORAL DAILY
Qty: 30 TABLET | Refills: 0 | Status: SHIPPED | OUTPATIENT
Start: 2024-12-12

## 2024-12-12 NOTE — TELEPHONE ENCOUNTER
Received request via: Pharmacy    Was the patient seen in the last year in this department? Yes    Does the patient have an active prescription (recently filled or refills available) for medication(s) requested? No    [facility]    Does the patient have [facility] Plus and need 100-day supply? (This applies to ALL medications) Patient does not have SCP

## 2024-12-12 NOTE — OP THERAPY DAILY TREATMENT
Outpatient Physical Therapy  DAILY TREATMENT     [facility] Outpatient Physical Therapy [place] [address] [address] [facility], [address]  [place] [address]  Phone:  [phone]    Date: 12/12/2024    Patient: Mónica Keita  YOB: 1970  MRN: [id number]     Time Calculation    Start time: 0800  Stop time: 0845 Time Calculation (min): 45 minutes         Chief Complaint:   Visit #: 6    SUBJECTIVE:  Shoulder abduction is weak especially in the morning    OBJECTIVE:  Current objective measures: 3-/5 shoulder abduction right shoulder AROM 0-115 shoulder abduction           Therapeutic Exercises (CPT 09143):     1. stepper L3    2. row, 2x15 purple band    3. foam roller repeated thoracic extension, 3 x 5 reps    4. repeated thoracic extension chairback overpressure, 3 x 5 re    5. foam roller alt shoulder flexion, horizontal abduction, pec stretch, 2 x 10 3lb dumbells Bilateral    6. foam roller shoulder abduction, 2 x 10 3lb dumbells    7. flasher with blue tband, 2 x 30 sec, NT    8. prone on ball ski jumper, 2 x30 sec, no ball/off table instead    9. prone T on ball, 2 x 30 sec, no ball off table instead    10. low trap Y with lift off on wall, orange tband single arm x10 each    11. ball up the wall stretch, 10x    12. AAROM abduction stretch, 20x R UE      Therapeutic Exercise Summary: Access Code: [email or web address]  URL: [email or web address]  Date: 12/03/2024  Prepared by: Karla Torres    Exercises  - Prone Scapular Retraction  - 1 x daily - 7 x weekly - 3 sets - 10 reps - 30 hold  - Prone Scapular Retraction Arms at Side  - 1 x daily - 7 x weekly - 1 sets - 10 reps - 30 hold  - Shoulder External Rotation and Scapular Retraction with Resistance  - 1 x daily - 7 x weekly - 1 sets - 10 reps - 30 hold      Repeated cervical retraction supine off table 5-6 reps 3 x day  Repeated cervical retraction seated 5-6 reps 3 x day  Access Code: [email or web address]  URL: [email or web address]  Date: 11/14/2024  Prepared by:  Karla Torres    Exercises  - Seated Thoracic Lumbar Extension with Pectoralis Stretch  - 1 x daily - 7 x weekly - 3 sets - 10 reps  - Thoracic Extension Mobilization on Foam Roll  - 3 x daily - 7 x weekly - 1 sets - 10 reps  - Standing Shoulder Row with Anchored Resistance  - 1 x daily - 7 x weekly - 3 sets - 10 reps  - Shoulder extension with resistance - Neutral  - 1 x daily - 7 x weekly - 3 sets - 10 reps  - Thoracic Foam Roll Mobilization Backstroke  - 1 x daily - 7 x weekly - 3 sets - 10 reps  - Supine Static Chest Stretch on Foam Roll  - 1 x daily - 7 x weekly - 3 sets - 10 reps    Therapeutic Treatments and Modalities:     1. Manual Therapy (CPT 34334), thoracic extension mob prone, cervical retraction and thoracic extension mob in sitting, decrease better shoulder AROM right abduction and flexion    Time-based treatments/modalities:    Physical Therapy Timed Treatment Charges  Manual therapy minutes (CPT 93397): 15 minutes  Therapeutic exercise minutes (CPT 77787): 30 minutes  ASSESSMENT:   Response to treatment: shoulder abduction remains weak especially in the morning, patient educated on sleeping position to maintain neutral cervical spine with towel roll pillow.  COntinue with current POC    PLAN/RECOMMENDATIONS:   Plan for treatment: therapy treatment to continue next visit.  Planned interventions for next visit: continue with current treatment.

## 2024-12-17 ENCOUNTER — APPOINTMENT (OUTPATIENT)
Dept: PHYSICAL THERAPY | Facility: REHABILITATION | Age: 54
End: 2024-12-17
Attending: STUDENT IN AN ORGANIZED HEALTH CARE EDUCATION/TRAINING PROGRAM
Payer: COMMERCIAL

## 2024-12-19 ENCOUNTER — APPOINTMENT (OUTPATIENT)
Dept: PHYSICAL THERAPY | Facility: REHABILITATION | Age: 54
End: 2024-12-19
Attending: STUDENT IN AN ORGANIZED HEALTH CARE EDUCATION/TRAINING PROGRAM
Payer: COMMERCIAL

## 2024-12-24 ENCOUNTER — PHYSICAL THERAPY (OUTPATIENT)
Dept: PHYSICAL THERAPY | Facility: REHABILITATION | Age: 54
End: 2024-12-24
Attending: STUDENT IN AN ORGANIZED HEALTH CARE EDUCATION/TRAINING PROGRAM
Payer: COMMERCIAL

## 2024-12-24 DIAGNOSIS — R20.0 ARM NUMBNESS: ICD-10-CM

## 2024-12-24 DIAGNOSIS — M50.90 CERVICAL DISC DISEASE: ICD-10-CM

## 2024-12-24 DIAGNOSIS — R53.1 WEAKNESS: ICD-10-CM

## 2024-12-24 PROCEDURE — 97140 MANUAL THERAPY 1/> REGIONS: CPT

## 2024-12-24 PROCEDURE — 97110 THERAPEUTIC EXERCISES: CPT

## 2024-12-24 NOTE — OP THERAPY DAILY TREATMENT
Outpatient Physical Therapy  DAILY TREATMENT     [facility] Outpatient Physical Therapy [place] [address] [facility], [address]  [place] [address]  Phone:  [phone]    Date: 12/24/2024    Patient: Mónica Keita  YOB: 1970  MRN: [id number]     Time Calculation    Start time: 1145  Stop time: 1230 Time Calculation (min): 45 minutes         Chief Complaint:   Visit #: 16    SUBJECTIVE:  She has been feeling better and she is happy that her R shoulder has been moving better as well.     OBJECTIVE:  Current objective measures: 3-/5 shoulder abduction right shoulder AROM 0-115 shoulder abduction           Therapeutic Exercises (CPT 78726):     1. stepper L3    2. row, 2x15 purple band    3. foam roller repeated thoracic extension, 3 x 5 reps    4. repeated thoracic extension chairback overpressure, 3 x 5 re    5. foam roller alt shoulder flexion, horizontal abduction, pec stretch, 2 x 10 3lb dumbells Bilateral    6. foam roller shoulder abduction, 2 x 10 3lb dumbells    7. flasher with blue tband, 2 x 30 sec, NT    8. prone on ball ski jumper, 2 x30 sec, no ball/off table instead    9. prone T on ball, 2 x 30 sec, no ball off table instead    10. low trap Y with lift off on wall, orange tband single arm 2x10 each    11. ball up the wall stretch, 10x    12. AAROM abduction stretch, 20x R UE    13. foam roller alt UE flexion stretch, 15x ea    14. pull backs/shoulder extensions, 2x15 purple band      Therapeutic Exercise Summary: Access Code: [email or web address]  URL: [email or web address]  Date: 12/03/2024  Prepared by: Karla Torres    Exercises  - Prone Scapular Retraction  - 1 x daily - 7 x weekly - 3 sets - 10 reps - 30 hold  - Prone Scapular Retraction Arms at Side  - 1 x daily - 7 x weekly - 1 sets - 10 reps - 30 hold  - Shoulder External Rotation and Scapular Retraction with Resistance  - 1 x daily - 7 x weekly - 1 sets - 10 reps - 30 hold      Repeated cervical retraction supine off table 5-6 reps 3 x  day  Repeated cervical retraction seated 5-6 reps 3 x day  Access Code: [email or web address]  URL: [email or web address]  Date: 11/14/2024  Prepared by: Karla Torres    Exercises  - Seated Thoracic Lumbar Extension with Pectoralis Stretch  - 1 x daily - 7 x weekly - 3 sets - 10 reps  - Thoracic Extension Mobilization on Foam Roll  - 3 x daily - 7 x weekly - 1 sets - 10 reps  - Standing Shoulder Row with Anchored Resistance  - 1 x daily - 7 x weekly - 3 sets - 10 reps  - Shoulder extension with resistance - Neutral  - 1 x daily - 7 x weekly - 3 sets - 10 reps  - Thoracic Foam Roll Mobilization Backstroke  - 1 x daily - 7 x weekly - 3 sets - 10 reps  - Supine Static Chest Stretch on Foam Roll  - 1 x daily - 7 x weekly - 3 sets - 10 reps    Therapeutic Treatments and Modalities:     1. Manual Therapy (CPT 99150), thoracic extension mob prone, cervical retraction and thoracic extension mob in sitting, decrease better shoulder AROM right abduction and flexion    Time-based treatments/modalities:    Physical Therapy Timed Treatment Charges  Manual therapy minutes (CPT 31152): 10 minutes  Therapeutic exercise minutes (CPT 88338): 35 minutes  ASSESSMENT:   Response to treatment:  Pt. Not ready to increase repetition for weighted exercises this visit due to fatigue but will attempt next visit. No adverse reactions noted during session, will continue to progress as able.     PLAN/RECOMMENDATIONS:   Plan for treatment: therapy treatment to continue next visit.  Planned interventions for next visit: continue with current treatment.

## 2024-12-26 ENCOUNTER — PHYSICAL THERAPY (OUTPATIENT)
Dept: PHYSICAL THERAPY | Facility: REHABILITATION | Age: 54
End: 2024-12-26
Attending: STUDENT IN AN ORGANIZED HEALTH CARE EDUCATION/TRAINING PROGRAM
Payer: COMMERCIAL

## 2024-12-26 DIAGNOSIS — R20.0 ARM NUMBNESS: ICD-10-CM

## 2024-12-26 DIAGNOSIS — M50.90 CERVICAL DISC DISEASE: ICD-10-CM

## 2024-12-26 DIAGNOSIS — R53.1 WEAKNESS: ICD-10-CM

## 2024-12-26 PROCEDURE — 97140 MANUAL THERAPY 1/> REGIONS: CPT

## 2024-12-26 PROCEDURE — 97110 THERAPEUTIC EXERCISES: CPT

## 2024-12-26 NOTE — OP THERAPY DAILY TREATMENT
Outpatient Physical Therapy  DAILY TREATMENT     [facility] Physical Therapy [place] [address] [address] [facility], [address]  [place] [address]  Phone:  [phone]    Date: 12/26/2024    Patient: Mónica Keita  YOB: 1970  MRN: [id number]     Time Calculation    Start time: 0805  Stop time: 0845 Time Calculation (min): 40 minutes         Chief Complaint:   Visit #: 17    SUBJECTIVE:  Not really having any recent n/t. PT and Hep continue to help. Lifting my arm is still very difficult, but not painful.     OBJECTIVE:  Current objective measures: 3-/5 shoulder abduction right shoulder AROM 0-115 shoulder abduction   Positive Drop arm test          Therapeutic Exercises (CPT 23424):     1. stepper L5, 5' UE/LE warm-up    2. row, 2x10 purple band    3. foam roller repeated thoracic extension, NT    4. repeated thoracic extension chairback overpressure, NT    5. foam roller alt shoulder flexion, horizontal abduction, pec stretch, NT    6. foam roller shoulder abduction, NT    7. flasher with blue tband, 2 x 10    8. prone on ball ski jumper, NT, no ball/off table instead    9. prone T/abd on ball, 2x10, no ball off table instead    10. low trap Y with lift off on wall, NT    11. ball up the wall stretch, NT    12. AAROM abduction stretch, NT    13. foam roller alt UE flexion stretch, NT    14. pull backs/shoulder extensions, 2x10 purple band    15. pull aparts, 2x10 green band      Therapeutic Exercise Summary: Access Code: [email or web address]  URL: [email or web address]  Date: 12/03/2024  Prepared by: Karla Torres    Exercises  - Prone Scapular Retraction  - 1 x daily - 7 x weekly - 3 sets - 10 reps - 30 hold  - Prone Scapular Retraction Arms at Side  - 1 x daily - 7 x weekly - 1 sets - 10 reps - 30 hold  - Shoulder External Rotation and Scapular Retraction with Resistance  - 1 x daily - 7 x weekly - 1 sets - 10 reps - 30 hold      Repeated cervical retraction supine off table 5-6 reps 3 x day  Repeated  cervical retraction seated 5-6 reps 3 x day  Access Code: [email or web address]  URL: [email or web address]  Date: 11/14/2024  Prepared by: Karla Torres    Exercises  - Seated Thoracic Lumbar Extension with Pectoralis Stretch  - 1 x daily - 7 x weekly - 3 sets - 10 reps  - Thoracic Extension Mobilization on Foam Roll  - 3 x daily - 7 x weekly - 1 sets - 10 reps  - Standing Shoulder Row with Anchored Resistance  - 1 x daily - 7 x weekly - 3 sets - 10 reps  - Shoulder extension with resistance - Neutral  - 1 x daily - 7 x weekly - 3 sets - 10 reps  - Thoracic Foam Roll Mobilization Backstroke  - 1 x daily - 7 x weekly - 3 sets - 10 reps  - Supine Static Chest Stretch on Foam Roll  - 1 x daily - 7 x weekly - 3 sets - 10 reps    Therapeutic Treatments and Modalities:     1. Manual Therapy (CPT 35354), cervical traction x 8', grade 3 a/p, p/a, and inferior GHJ mobs and glides x 5'    Time-based treatments/modalities:    Physical Therapy Timed Treatment Charges  Manual therapy minutes (CPT 07472): 20 minutes  Therapeutic exercise minutes (CPT 70142): 20 minutes  ASSESSMENT:   Response to treatment: Did well today, no exacerbations.     No current referral symptoms reported. No current cervical spine pains or limitations reported. Moderate to severe shoulder abduction weakness continues. Progressing as able. UT dominance does persist, needs several verbal, visual, and tactile cues to reduce. Continue POC per primary therapist.     PLAN/RECOMMENDATIONS:   Plan for treatment: therapy treatment to continue next visit.  Planned interventions for next visit: continue with current treatment.

## 2025-01-02 ENCOUNTER — HOSPITAL ENCOUNTER (OUTPATIENT)
Dept: RADIOLOGY | Facility: MEDICAL CENTER | Age: 55
End: 2025-01-02
Attending: NURSE PRACTITIONER
Payer: COMMERCIAL

## 2025-01-02 ENCOUNTER — OFFICE VISIT (OUTPATIENT)
Dept: MEDICAL GROUP | Facility: PHYSICIAN GROUP | Age: 55
End: 2025-01-02
Payer: COMMERCIAL

## 2025-01-02 VITALS
TEMPERATURE: 98.1 F | HEIGHT: 67 IN | SYSTOLIC BLOOD PRESSURE: 114 MMHG | WEIGHT: 183.2 LBS | HEART RATE: 100 BPM | DIASTOLIC BLOOD PRESSURE: 62 MMHG | BODY MASS INDEX: 28.75 KG/M2 | OXYGEN SATURATION: 96 %

## 2025-01-02 DIAGNOSIS — J06.9 VIRAL UPPER RESPIRATORY TRACT INFECTION: ICD-10-CM

## 2025-01-02 DIAGNOSIS — J18.9 COMMUNITY ACQUIRED PNEUMONIA OF LEFT LOWER LOBE OF LUNG: ICD-10-CM

## 2025-01-02 LAB
FLUAV RNA SPEC QL NAA+PROBE: NEGATIVE
FLUBV RNA SPEC QL NAA+PROBE: NEGATIVE
RSV RNA SPEC QL NAA+PROBE: POSITIVE
SARS-COV-2 RNA RESP QL NAA+PROBE: NEGATIVE

## 2025-01-02 PROCEDURE — 99213 OFFICE O/P EST LOW 20 MIN: CPT | Performed by: NURSE PRACTITIONER

## 2025-01-02 PROCEDURE — 71046 X-RAY EXAM CHEST 2 VIEWS: CPT

## 2025-01-02 PROCEDURE — 3078F DIAST BP <80 MM HG: CPT | Performed by: NURSE PRACTITIONER

## 2025-01-02 PROCEDURE — 3074F SYST BP LT 130 MM HG: CPT | Performed by: NURSE PRACTITIONER

## 2025-01-02 PROCEDURE — 0241U POCT CEPHEID COV-2, FLU A/B, RSV - PCR: CPT | Performed by: NURSE PRACTITIONER

## 2025-01-02 RX ORDER — ALBUTEROL SULFATE 90 UG/1
2 INHALANT RESPIRATORY (INHALATION) EVERY 4 HOURS PRN
Qty: 8.5 G | Refills: 0 | Status: SHIPPED | OUTPATIENT
Start: 2025-01-02 | End: 2025-01-28

## 2025-01-02 RX ORDER — AZITHROMYCIN 250 MG/1
TABLET, FILM COATED ORAL
Qty: 6 TABLET | Refills: 0 | Status: SHIPPED | OUTPATIENT
Start: 2025-01-02 | End: 2025-01-07

## 2025-01-02 ASSESSMENT — FIBROSIS 4 INDEX: FIB4 SCORE: 1.2

## 2025-01-02 ASSESSMENT — PATIENT HEALTH QUESTIONNAIRE - PHQ9: CLINICAL INTERPRETATION OF PHQ2 SCORE: 0

## 2025-01-02 NOTE — PROGRESS NOTES
Chief Complaint   Patient presents with    Congestion     Cough, body aches, headache, sinus pressure. Symptoms started last Friday      OLIVERIO used outside room.    HPI: Patient is a 54 y.o. female complaining of 6 days of illness including: productive cough, shortness of breath, fever, nasal congestion, wheezing.   Mucus is: thick.  Similarly ill exposures: no.  Treatments tried: taking pseudophed  She  reports that she has never smoked. She has never used smokeless tobacco..    Pneumonia 4-5x in her life, some concern for underlying issue.    Intermittent dysphagia, defer to PCP    History of Present Illness  The patient is a 54-year-old female who presents today with viral upper respiratory symptoms.    History is reported by another person in the presence of the patient.    Viral Upper Respiratory Symptoms  Her symptoms began last Friday, characterized by nasal and chest congestion, difficulty in deep breathing, intermittent wheezing, and a cough that is occasionally productive. She also experiences shortness of breath during physical exertion, necessitating frequent pauses to catch her breath. There is no report of chest pain or pleuritic chest pain. Her lung sounds are notably diminished, with no evidence of crackling. A rubbing sound was detected, although it is uncertain whether this was due to her clothing. She developed a new-onset fever yesterday, peaking at 102.7 degrees. Her ears appear swollen, but there is no presence of pus. She does not report an unpleasant taste in her mouth upon waking. She has a history of pneumonia, having contracted it 4 to 5 times in her lifetime. She does not have a history of asthma and is a non-smoker. She reports no recent travel or exposure to sick individuals.  - Onset: Symptoms began last Friday.  - Location: Nasal and chest congestion.  - Duration: Since last Friday.  - Character: Nasal and chest congestion, difficulty in deep breathing, intermittent wheezing,  occasionally productive cough, shortness of breath during physical exertion, diminished lung sounds, rubbing sound, new-onset fever peaking at 102.7 degrees, swollen ears without pus.  - Alleviating/Aggravating Factors: Shortness of breath necessitates frequent pauses during physical exertion.  - Timing: Intermittent wheezing, occasional productive cough, new-onset fever developed yesterday.  - Severity: Fever peaking at 102.7 degrees, shortness of breath during physical exertion.    Additional Information  She reports long-standing difficulty swallowing, which has not been previously investigated.    SOCIAL HISTORY  She is a non-smoker.        ROS:  Positive fever, cough, no nausea, changes in bowel movements or skin rash.      I reviewed the patient's medications, allergies and medical history:  Current Outpatient Medications   Medication Sig Dispense Refill    meloxicam (MOBIC) 15 MG tablet Take 1 Tablet by mouth every day. 30 Tablet 0    gabapentin (NEURONTIN) 100 MG Cap Take 1-3 Capsules by mouth at bedtime as needed (pain). 90 Capsule 3    cyclobenzaprine (FLEXERIL) 5 mg tablet Take 1-2 Tablets by mouth 3 times a day as needed for Muscle Spasms. 30 Tablet 0    Continuous Glucose Sensor (FREESTYLE MIN 3 SENSOR) [facility] USE 1 Each every 14 days. 6 Each 3    aspirin (ASA) 81 MG Chew Tab chewable tablet Chew 1 Tablet every evening. 100 Tablet 1    estradiol (VIVELLE) 0.0375 MG/24HR patch Place 1 Patch on the skin two times a week. 16 Patch 2    Cetirizine-Pseudoephedrine (ALLERGY D-12 PO) Take 1 Tablet by mouth 2 times a day.      escitalopram (LEXAPRO) 20 MG tablet Take 2 Tablets by mouth every day. (Patient taking differently: Take 40 mg by mouth every day. 2 tablets= 40mg) 160 Tablet 3    metFORMIN ER (GLUCOPHAGE XR) 750 MG TABLET SR 24 HR TAKE ONE TABLET BY MOUTH TWO TIMES DAILY 180 Tablet 3    omeprazole (PRILOSEC) 20 MG delayed-release capsule TAKE 1 CAPSULE BY MOUTH DAILY 90 Capsule 3    buPROPion  "(WELLBUTRIN XL) 300 MG XL tablet TAKE 1 TABLET BY MOUTH EVERY DAY IN THE MORNING. (Patient taking differently: Take 300 mg by mouth every morning. TAKE 1 TABLET BY MOUTH EVERY DAY IN THE MORNING.) 90 Tablet 3    lisinopril (PRINIVIL) 20 MG Tab Take 1 Tablet by mouth every day. 90 Tablet 3    hydrOXYzine HCl (ATARAX) 25 MG Tab Take 1 Tablet by mouth 3 times a day as needed for Anxiety. 90 Tablet 3     No current facility-administered medications for this visit.     Patient has no known allergies.  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  Eyes: PERRL, conjunctiva slightly injected, no eye discharge.  Ears: Normal pinnae,TM's bulging bilaterally.  Nares: Patent with clear mucus.  Sinuses: non tender over maxillary / frontal sinuses.  Throat: Erythematous injection without exudate.   Neck: Supple, with mildly enlarged cervical nodes.  Lungs: Clear to auscultation bilaterally, no wheeze, crackles or rhonchi.   Heart: Regular rate without murmur.  Skin: Warm and dry without rash.     ASSESSMENT:   1. Viral upper respiratory tract infection          PLAN:    Assessment & Plan  1. Viral upper respiratory infection - Symptoms include congestion, occasional productive cough, dyspnea on exertion, and wheezing. She had a new onset fever of 102.7°F yesterday. Given her history of pneumonia and current symptoms, there is a concern for a " secondary bacterial infection.  - A chest x-ray will be ordered to rule out pneumonia.  - Azithromycin 500 mg PO/250 mg PO and augmentin 875/125 mg PO daily x 5 days d/t risk for pneumococcal infection  - consider referral to pulmonology at follow-up  -Albuterol inhaler prescription 2 puffs up to every 6 hours for shortness of breath     My recommendations for an upper respiratory infection:  - Use a humidifier, especially at night. Cold or warm water humidifiers have the same effect.  - Feng Med squeeze bottle sinus rinses twice a day.  - Hot tea + honey + fresh lemon juice  - Throat Coat herbal tea  - Honey by itself has been shown to help fight bugs and provide cough relief  - Chicken noodle soup has also been shown to help fight bugs    Effective treatments for an upper respiratory infection:    Acetaminophen 500-1000  mg Single dose Improves nasal congestion & runny nose   Antihistamine + decongestant Varies Varies Improves congestion   NSAIDs (ibuprofen, advil, motrin, aleve, etc) Varies Single dose to 7 day course Improves sneezing, headache, ear pain, muscle pain, joint pain   Zinc acetate or gluconate 80-92 mg per day Start within 3 days & continue as long as symptoms persist Can shorten duration of symptoms

## 2025-01-02 NOTE — Clinical Note
Patient has an appointment with you on 1/20/2025.  History of pneumonia x 5 may want to consider pulmonology referral.  Patient also briefly mentions some intermittent dysphagia :)

## 2025-01-07 ENCOUNTER — PHYSICAL THERAPY (OUTPATIENT)
Dept: PHYSICAL THERAPY | Facility: REHABILITATION | Age: 55
End: 2025-01-07
Attending: PHYSICIAN ASSISTANT
Payer: COMMERCIAL

## 2025-01-07 DIAGNOSIS — R53.1 WEAKNESS: ICD-10-CM

## 2025-01-07 PROCEDURE — 97110 THERAPEUTIC EXERCISES: CPT

## 2025-01-07 NOTE — OP THERAPY DAILY TREATMENT
Outpatient Physical Therapy  DAILY TREATMENT     [facility] Physical Therapy [place] [id number] [facility] [address], [address]  [place] [address]  Phone:  [phone]    Date: 01/07/2025    Patient: Mónica Keita  YOB: 1970  MRN: [id number]     Time Calculation    Start time: 1430  Stop time: 1515 Time Calculation (min): 45 minutes         Chief Complaint: No chief complaint on file.    Visit #: 18    SUBJECTIVE:  Right shoulder is stronger /cervical soreness post massage    OBJECTIVE:  Current objective measures: WNL right shoulder flexion and abd with palm down.3-/5 thumb up position -drop arm,           Therapeutic Exercises (CPT 66154):     1. stepper L5, 5' UE/LE warm-up    2. row/shoulder ext, 2x10 purple tubing    3. foam roller repeated thoracic extension, NT    4. repeated thoracic extension chairback overpressure, NT    5. foam roller alt shoulder flexion, horizontal abduction, pec stretch, NT    6. foam roller shoulder horizontal abduction/abduction, 3 x 10 2lb weight    8. prone on ball ski jumper, 30 sec    9. prone T/abd on ball, 1x10    10. shoulder ER/IR with green band, neutral standing 3 x 10    11. wall scap push up, x10    12. wall triceps push up, x10    13. ball roll on wall shoulder flexion stretch    14. serratus press 2lb right/5lb left    15. head , 2lb right/5lb left 2 x 10, NT      Therapeutic Exercise Summary: Access Code: [email or web address]  URL: [email or web address]  Date: 01/07/2025  Prepared by: Karla Torres    Exercises  - Shoulder External Rotation with Anchored Resistance  - 1 x daily - 7 x weekly - 3 sets - 10 reps  - Shoulder Internal Rotation with Resistance  - 1 x daily - 7 x weekly - 3 sets - 10 reps  - Supine Shoulder Horizontal Abduction with Dumbbells  - 1 x daily - 7 x weekly - 3 sets - 10 reps  - Supine Shoulder Abduction AROM  - 1 x daily - 7 x weekly - 3 sets - 10 reps    Access Code: [email or web address]  URL: [email or web address]  Date:  12/03/2024  Prepared by: Karla Torres    Exercises  - Prone Scapular Retraction  - 1 x daily - 7 x weekly - 3 sets - 10 reps - 30 hold  - Prone Scapular Retraction Arms at Side  - 1 x daily - 7 x weekly - 1 sets - 10 reps - 30 hold  - Shoulder External Rotation and Scapular Retraction with Resistance  - 1 x daily - 7 x weekly - 1 sets - 10 reps - 30 hold      Repeated cervical retraction supine off table 5-6 reps 3 x day  Repeated cervical retraction seated 5-6 reps 3 x day  Access Code: [email or web address]  URL: [email or web address]  Date: 11/14/2024  Prepared by: Karla Torres    Exercises  - Seated Thoracic Lumbar Extension with Pectoralis Stretch  - 1 x daily - 7 x weekly - 3 sets - 10 reps  - Thoracic Extension Mobilization on Foam Roll  - 3 x daily - 7 x weekly - 1 sets - 10 reps  - Standing Shoulder Row with Anchored Resistance  - 1 x daily - 7 x weekly - 3 sets - 10 reps  - Shoulder extension with resistance - Neutral  - 1 x daily - 7 x weekly - 3 sets - 10 reps  - Thoracic Foam Roll Mobilization Backstroke  - 1 x daily - 7 x weekly - 3 sets - 10 reps  - Supine Static Chest Stretch on Foam Roll  - 1 x daily - 7 x weekly - 3 sets - 10 reps    Therapeutic Treatments and Modalities:     1. Manual Therapy (CPT 42203), cervical traction x 8', grade 3 a/p, p/a, and inferior GHJ mobs and glides x 5'    Time-based treatments/modalities:       ASSESSMENT:   Response to treatment: improving strength but still weak C5-6myotomes.  Continue with current POC    PLAN/RECOMMENDATIONS:   Plan for treatment: therapy treatment to continue next visit.  Planned interventions for next visit: continue with current treatment.

## 2025-01-14 ENCOUNTER — PHYSICAL THERAPY (OUTPATIENT)
Dept: PHYSICAL THERAPY | Facility: REHABILITATION | Age: 55
End: 2025-01-14
Attending: PHYSICIAN ASSISTANT
Payer: COMMERCIAL

## 2025-01-14 DIAGNOSIS — R53.1 WEAKNESS: ICD-10-CM

## 2025-01-14 PROCEDURE — 97110 THERAPEUTIC EXERCISES: CPT

## 2025-01-14 PROCEDURE — 97014 ELECTRIC STIMULATION THERAPY: CPT

## 2025-01-14 PROCEDURE — 97140 MANUAL THERAPY 1/> REGIONS: CPT

## 2025-01-14 NOTE — OP THERAPY DAILY TREATMENT
Outpatient Physical Therapy  DAILY TREATMENT     [facility] Outpatient Physical Therapy [place] [address], [address]  [place] [address]  Phone:  [phone]    Date: 01/14/2025    Patient: Mónica Keita  YOB: 1970  MRN: [id number]     Time Calculation    Start time: 1430  Stop time: 1530 Time Calculation (min): 60 minutes         Chief Complaint: chronic cervicalpain/weakness  Visit #: 19    SUBJECTIVE: signifcant worsening of symptoms this past week.   Couldn't sleep at all, increasing right lateral cervical ,post cervical , scapular pain, no new symptoms in right UE. 7-8 /10 VAS , constant pain , getting worse , patient having difficulty with managing work/ADLS    OBJECTIVE:  Current objective measures: tender with palpation articular pillar mid c-spine, decrease//no better repeated retraction seated and with cervical manual traction, full cervical AROM, DTR equal bilaterally biceps and brachioradialis, -ULNT 1, 2A,2B  -red flag screen      Therapeutic Exercises (CPT 60978):     1. stepper L5, 5' UE/LE warm-up, NT    2. row/shoulder ext, 2x10 purple tubing, NT    3. foam roller repeated thoracic extension, NT, NT    4. repeated thoracic extension chairback overpressure, 3 x 5 reps    5. cervical retraction setaed and prone on elbows, 3 x 5 reps    6. foam roller shoulder horizontal abduction/abduction, 3 x 10 2lb weight, NT    8. prone on ball ski jumper, 30 sec, NT    9. prone T/abd on ball, 1x10, NT    10. shoulder ER/IR with green band, neutral standing 3 x 10, NT    11. wall scap push up, x10, NT    15. head , 2lb right/5lb left 2 x 10, NT      Therapeutic Exercise Summary: Access Code: [email or web address]  URL: [email or web address]  Date: 01/07/2025  Prepared by: Karla Torres    Exercises  - Shoulder External Rotation with Anchored Resistance  - 1 x daily - 7 x weekly - 3 sets - 10 reps  - Shoulder Internal Rotation with Resistance  - 1 x daily - 7 x weekly - 3 sets - 10 reps  -  Supine Shoulder Horizontal Abduction with Dumbbells  - 1 x daily - 7 x weekly - 3 sets - 10 reps  - Supine Shoulder Abduction AROM  - 1 x daily - 7 x weekly - 3 sets - 10 reps    Access Code: [email or web address]  URL: [email or web address]  Date: 12/03/2024  Prepared by: Karla Torres    Exercises  - Prone Scapular Retraction  - 1 x daily - 7 x weekly - 3 sets - 10 reps - 30 hold  - Prone Scapular Retraction Arms at Side  - 1 x daily - 7 x weekly - 1 sets - 10 reps - 30 hold  - Shoulder External Rotation and Scapular Retraction with Resistance  - 1 x daily - 7 x weekly - 1 sets - 10 reps - 30 hold      Repeated cervical retraction supine off table 5-6 reps 3 x day  Repeated cervical retraction seated 5-6 reps 3 x day  Access Code: [email or web address]  URL: [email or web address]  Date: 11/14/2024  Prepared by: Karla Torres    Exercises  - Seated Thoracic Lumbar Extension with Pectoralis Stretch  - 1 x daily - 7 x weekly - 3 sets - 10 reps  - Thoracic Extension Mobilization on Foam Roll  - 3 x daily - 7 x weekly - 1 sets - 10 reps  - Standing Shoulder Row with Anchored Resistance  - 1 x daily - 7 x weekly - 3 sets - 10 reps  - Shoulder extension with resistance - Neutral  - 1 x daily - 7 x weekly - 3 sets - 10 reps  - Thoracic Foam Roll Mobilization Backstroke  - 1 x daily - 7 x weekly - 3 sets - 10 reps  - Supine Static Chest Stretch on Foam Roll  - 1 x daily - 7 x weekly - 3 sets - 10 reps    Therapeutic Treatments and Modalities:     1. Manual Therapy (CPT 71871), cervical traction x 8', grade 3 a/p, p/a, and inferior GHJ mobs and glides x 5'    2. E Stim Unattended (CPT 89252), ifc with cold pack cervical x15 min, cervical ps and UT region //decrease to 3/10 VAS post treatment    Time-based treatments/modalities:    Physical Therapy Timed Treatment Charges  Manual therapy minutes (CPT 45266): 15 minutes  Therapeutic exercise minutes (CPT 61555): 15 minutes  ASSESSMENT: Paitent unable to tolerate loading today due  to severity of cervical symptoms.  Retraction and cervical manual traction were somewhat relieving but only short term .  Response to treatment: slight improvement in symptoms post manual and estim but constant pain ranging from 3/10-8/10 VAS right post/lateral cervical region.  Patient urged to refer back to pain management MD for additional evaluation and treatment if symptoms increase or remain severe.      PLAN/RECOMMENDATIONS:   Plan for treatment: therapy treatment to continue next visit.  Planned interventions for next visit: continue with current treatment.

## 2025-01-16 ENCOUNTER — OFFICE VISIT (OUTPATIENT)
Dept: PHYSICAL MEDICINE AND REHAB | Facility: MEDICAL CENTER | Age: 55
End: 2025-01-16
Payer: COMMERCIAL

## 2025-01-16 ENCOUNTER — PHYSICAL THERAPY (OUTPATIENT)
Dept: PHYSICAL THERAPY | Facility: REHABILITATION | Age: 55
End: 2025-01-16
Attending: PHYSICIAN ASSISTANT
Payer: COMMERCIAL

## 2025-01-16 VITALS
HEIGHT: 67 IN | BODY MASS INDEX: 28.25 KG/M2 | WEIGHT: 180 LBS | SYSTOLIC BLOOD PRESSURE: 118 MMHG | OXYGEN SATURATION: 95 % | DIASTOLIC BLOOD PRESSURE: 74 MMHG | HEART RATE: 81 BPM | TEMPERATURE: 97.3 F

## 2025-01-16 DIAGNOSIS — Z71.82 EXERCISE COUNSELING: ICD-10-CM

## 2025-01-16 DIAGNOSIS — R53.1 WEAKNESS: ICD-10-CM

## 2025-01-16 DIAGNOSIS — M47.812 ARTHROPATHY OF CERVICAL FACET JOINT: ICD-10-CM

## 2025-01-16 DIAGNOSIS — M47.9 SPONDYLOSIS: ICD-10-CM

## 2025-01-16 DIAGNOSIS — M54.12 CERVICAL RADICULOPATHY: ICD-10-CM

## 2025-01-16 DIAGNOSIS — G56.03 BILATERAL CARPAL TUNNEL SYNDROME: ICD-10-CM

## 2025-01-16 DIAGNOSIS — M43.12 SPONDYLOLISTHESIS, CERVICAL REGION: ICD-10-CM

## 2025-01-16 DIAGNOSIS — M50.90 CERVICAL DISC DISEASE: ICD-10-CM

## 2025-01-16 PROCEDURE — 1125F AMNT PAIN NOTED PAIN PRSNT: CPT | Performed by: GENERAL PRACTICE

## 2025-01-16 PROCEDURE — 97140 MANUAL THERAPY 1/> REGIONS: CPT

## 2025-01-16 PROCEDURE — 3074F SYST BP LT 130 MM HG: CPT | Performed by: GENERAL PRACTICE

## 2025-01-16 PROCEDURE — 97014 ELECTRIC STIMULATION THERAPY: CPT

## 2025-01-16 PROCEDURE — 3078F DIAST BP <80 MM HG: CPT | Performed by: GENERAL PRACTICE

## 2025-01-16 PROCEDURE — 99214 OFFICE O/P EST MOD 30 MIN: CPT | Performed by: GENERAL PRACTICE

## 2025-01-16 PROCEDURE — 97110 THERAPEUTIC EXERCISES: CPT

## 2025-01-16 RX ORDER — MELOXICAM 15 MG/1
15 TABLET ORAL DAILY
Qty: 30 TABLET | Refills: 0 | Status: SHIPPED | OUTPATIENT
Start: 2025-01-16

## 2025-01-16 ASSESSMENT — PAIN SCALES - GENERAL: PAINLEVEL_OUTOF10: 5=MODERATE PAIN

## 2025-01-16 ASSESSMENT — PATIENT HEALTH QUESTIONNAIRE - PHQ9: CLINICAL INTERPRETATION OF PHQ2 SCORE: 0

## 2025-01-16 ASSESSMENT — FIBROSIS 4 INDEX: FIB4 SCORE: 1.2

## 2025-01-16 NOTE — OP THERAPY DAILY TREATMENT
Outpatient Physical Therapy  DAILY TREATMENT     [facility] Physical Therapy [place] [address], [address]  [facility] [address]  Phone:  [phone]    Date: 01/16/2025    Patient: Mónica Keita  YOB: 1970  MRN: [id number]     Time Calculation                   Chief Complaint: No chief complaint on file.    Visit #: 20    SUBJECTIVE:  ***    OBJECTIVE:  Current objective measures: ***          Therapeutic Exercises (CPT 33664):     1. stepper L5, 5' UE/LE warm-up, NT    2. row/shoulder ext, 2x10 purple tubing, NT    3. foam roller repeated thoracic extension, NT, NT    4. repeated thoracic extension chairback overpressure, 3 x 5 reps    5. cervical retraction setaed and prone on elbows, 3 x 5 reps    6. foam roller shoulder horizontal abduction/abduction, 3 x 10 2lb weight, NT    8. prone on ball ski jumper, 30 sec, NT    9. prone T/abd on ball, 1x10, NT    10. shoulder ER/IR with green band, neutral standing 3 x 10, NT    11. wall scap push up, x10, NT    15. head , 2lb right/5lb left 2 x 10, NT      Therapeutic Exercise Summary: Access Code: [email or web address]  URL: [email or web address]  Date: 01/07/2025  Prepared by: Karla Torres    Exercises  - Shoulder External Rotation with Anchored Resistance  - 1 x daily - 7 x weekly - 3 sets - 10 reps  - Shoulder Internal Rotation with Resistance  - 1 x daily - 7 x weekly - 3 sets - 10 reps  - Supine Shoulder Horizontal Abduction with Dumbbells  - 1 x daily - 7 x weekly - 3 sets - 10 reps  - Supine Shoulder Abduction AROM  - 1 x daily - 7 x weekly - 3 sets - 10 reps    Access Code: [email or web address]  URL: [email or web address]  Date: 12/03/2024  Prepared by: Karla Torres    Exercises  - Prone Scapular Retraction  - 1 x daily - 7 x weekly - 3 sets - 10 reps - 30 hold  - Prone Scapular Retraction Arms at Side  - 1 x daily - 7 x weekly - 1 sets - 10 reps - 30 hold  - Shoulder External Rotation and Scapular Retraction with Resistance  - 1  "x daily - 7 x weekly - 1 sets - 10 reps - 30 hold      Repeated cervical retraction supine off table 5-6 reps 3 x day  Repeated cervical retraction seated 5-6 reps 3 x day  Access Code: [email or web address]  URL: [email or web address]  Date: 11/14/2024  Prepared by: Karla Torres    Exercises  - Seated Thoracic Lumbar Extension with Pectoralis Stretch  - 1 x daily - 7 x weekly - 3 sets - 10 reps  - Thoracic Extension Mobilization on Foam Roll  - 3 x daily - 7 x weekly - 1 sets - 10 reps  - Standing Shoulder Row with Anchored Resistance  - 1 x daily - 7 x weekly - 3 sets - 10 reps  - Shoulder extension with resistance - Neutral  - 1 x daily - 7 x weekly - 3 sets - 10 reps  - Thoracic Foam Roll Mobilization Backstroke  - 1 x daily - 7 x weekly - 3 sets - 10 reps  - Supine Static Chest Stretch on Foam Roll  - 1 x daily - 7 x weekly - 3 sets - 10 reps    Therapeutic Treatments and Modalities:     1. Manual Therapy (CPT 39388), cervical traction x 8', grade 3 a/p, p/a, and inferior GHJ mobs and glides x 5'    2. E Stim Unattended (CPT 27601), ifc with cold pack cervical x15 min, cervical ps and UT region //decrease to 3/10 VAS post treatment    Time-based treatments/modalities:       ASSESSMENT:   Response to treatment: ***    PLAN/RECOMMENDATIONS:   Plan for treatment: {AMB OP THERAPY - THERAPY PLAN:[id number]::\"therapy treatment to continue next visit\"}.  Planned interventions for next visit: {PT PLANNED THERAPY INTERVENTIONS:[id number]::\"continue with current treatment\"}.         "

## 2025-01-16 NOTE — OP THERAPY DAILY TREATMENT
Outpatient Physical Therapy  DAILY TREATMENT     [facility] Outpatient Physical Therapy [place] [id number] [facility] [facility], [address]  [place] [address]  Phone:  [phone]    Date: 01/16/2025    Patient: Mónica Keita  YOB: 1970  MRN: [id number]     Time Calculation    Start time: 1300  Stop time: 1345 Time Calculation (min): 45 minutes         Chief Complaint: chronic cervical/right arm weakness  Visit #: 20    SUBJECTIVE:  Patient is seeing pain management specialist this afternoon.  Cervical symptoms remain constant but are lower intensity than last visit averaging 4/10 VAS central cervical region/slight headache yesterday but improved after eating and drinking fluid    OBJECTIVE:  Current objective measures: full painfree AROM in sitting and supine//-spurlings/-ULNT          Therapeutic Exercises (CPT 13902):     1. gentle cervical retraction supine, 10 x 10 sec    2. repeated cervical retraction/rotation supine, 10x each    3. scapular retraction seated, x 10    4. repeated thoracic extension with chairback op, x6    Therapeutic Treatments and Modalities:     1. Manual Therapy (CPT 58994), IDN see below/cervical manual traction 4 x 2 min, suboccipital release    2. E Stim Unattended (CPT 01803), IFC with cold pack cervical paraspinals/UT x 15 min    Therapeutic Treatment and Modalities Summary: IDN informed consent received and signed by patient.  Skin cleansed with isopropyl alcohol swabs posterior cervical region.  IDN to cervical paravertebrals bilateral C5-C6 region.  Patient verbalized that she was feeling faint so needles were removed.  Patient continued feeling diaphoretic but improved to baseline within 5 min.     Time-based treatments/modalities:         ASSESSMENT:   Response to treatment: Patient experienced an immediat autonomic NS reaction with dry needling so treatment was stopped.  Decreasing level of irritability compared with a few days ago.  Will be seeing pain management today.  Progress  right UE strength if tolerated next session    PLAN/RECOMMENDATIONS:   Plan for treatment: therapy treatment to continue next visit.  Planned interventions for next visit: continue with current treatment.

## 2025-01-17 NOTE — PROGRESS NOTES
Physiatry (Physical Medicine and  Rehabilitation)       Patient Name: Mónica Keita   Patient : 1970  PCP: Carlos Tatum D.N.P.  MRN: [id number]     Date of service: See epic    Referring provider: Carlos Tatum D.N.P.    CHIEF COMPLAINT  Chief Complaint   Patient presents with    Follow-Up     Fv pain worse         Mónica Keita is a RIGHT hand dominant 54 y.o. very pleasant female  who presents today with Diagnoses of Cervical radiculopathy, Spondylosis, Spondylolisthesis, cervical region, Exercise counseling, Arthropathy of cervical facet joint, and Bilateral carpal tunnel syndrome were pertinent to this visit.    Verbal consent was acquired by the patient to use [employer] ambient listening note generation during this visit Yes       Medical records review:  I reviewed the note from the referring provider Carlos Tatum D.N.P. including the note dated 24.    Prior Relevant MSK/Interventional Procedures:   Patient has not attempted prior ortho/joint injections.   Patient has not had prior ortho/joint surgery.     HPI:    History of Present Illness  The patient is a 54-year-old female who presents for follow-up regarding cervicalgia.    She reports that her neck pain had subsided until she contracted respiratory syncytial virus (RSV), which led to paroxysmal coughing and subsequently exacerbated her cervical and shoulder pain. The pain is not reproducible upon palpation and does not radiate to her fingers or hands. She rates her pain as an 8 on a scale of 1 to 10, indicating severe discomfort. The pain has been disrupting her sleep as she struggles to find a comfortable position. She has been managing her pain with physical therapy, but during her last session on Tuesday, the pain was so intense that she was unable to perform any exercises. Her therapist attempted to alleviate the pain through manipulation of her back and neck. She has been using gabapentin 300 mg, which initially provided  relief, but its efficacy has diminished over time. She has also tried cryotherapy, which provided minimal relief, and thermotherapy, which was ineffective. She applied a lidocaine patch for 8 hours overnight, which improved her sleep quality. She has a home exercise program in place. She attempted dry needling today, but it was unsuccessful and intolerable. She prefers pharmacological interventions over injections. She requires a refill of her meloxicam prescription as she only has 2 pills remaining.    Supplemental Information  She has a history of carpal tunnel syndrome.    MEDICATIONS  Current: Gabapentin, meloxicam, lidocaine patch.        Prior notes  Prefers pharmacological management before injections.  No interest in surgery at this time.  Hx of carpal tunnel syndrome and wears nightly brace which is effective  Patient has tried the following medications:  Flexeril  Mobic  naproxen  Aleve ineffective  Norco ineffective for controlling pain    Previous or Current Therapeutic modalities and interventional therapies:  -Prior prednisone: no   -Home exercises:?yes    -Heat and ice:?yes, ineffective   -Topicals:?no   -TENS unit:?no   -Chiropractic manipulation:?no, not interested   -Acupuncture:?no   -Massage:?yes with temporary relief with cupping    ROS:   Fever, Chills, Sweats: Denies  Involuntary Weight Loss: Denies  Bowel/bladder issues: denies   See HPI.   All other systems reviewed and negative.     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 improve function.      Psychological testing for pain as depression and pain commonly coexist and need to both be treated.     Opioid Risk Score: No value filed.     Interpretation of Opioid Risk Score   Score 0-3 = Low risk of abuse. Do UDS at least once per year.  Score 4-7 = Moderate risk of abuse. Do UDS 1-4 times per year.  Score 8+ = High risk of abuse. Refer to  specialist.    PHQ      12/10/2024     3:00 PM 1/2/2025    11:20 AM 1/16/2025     4:20 PM   Depression Screen (PHQ-2/PHQ-9)   PHQ-2 Total Score 0 0 0       Interpretation of PHQ-9 Total Score   Score Severity   1-4 No Depression   5-9 Mild Depression   10-14 Moderate Depression   15-19 Moderately Severe Depression   20-27 Severe Depression      PMHx:   Past Medical History:   Diagnosis Date    Adverse effect of anesthesia     Laryngospasm in the past with extubation    Anemia     Anxiety     Bronchitis 2009    Diabetes (HCC)     type 2    Dissection of cervical artery (HCC)     Elevated liver enzymes     unknown cause    Hypertension     Neuroendocrine 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 at SURGERY [facility] ORS    VAGINAL HYSTERECTOMY TOTAL  06/17/2014    Performed by Enedina Grant M.D. at SURGERY [facility] ORS    HYSTERECTOMY, VAGINAL  06/04/2014    [facility]    APPENDECTOMY  2014       Family history   Family History   Problem Relation Age of Onset    Heart Disease Father     No Known Problems Sister     Heart Attack Brother         CAD. 3 MI's.     No Known Problems Son     No Known Problems Daughter        Medications: reviewed on epic.   Outpatient Medications Marked as Taking for the 1/16/25 encounter (Office Visit) with Jaxson Laws D.O.   Medication Sig Dispense Refill    albuterol (PROAIR HFA) 108 (90 Base) MCG/ACT Aero Soln inhalation aerosol Inhale 2  Puffs every four hours as needed for Shortness of Breath. 8.5 g 0    meloxicam (MOBIC) 15 MG tablet Take 1 Tablet by mouth every day. 30 Tablet 0    gabapentin (NEURONTIN) 100 MG Cap Take 1-3 Capsules by mouth at bedtime as needed (pain). 90 Capsule 3    cyclobenzaprine (FLEXERIL) 5 mg tablet Take 1-2 Tablets by mouth 3 times a day as needed for Muscle Spasms. 30 Tablet 0    Continuous Glucose Sensor (FREESTYLE MIN 3 SENSOR) [facility] USE 1 Each every 14 days. 6 Each 3    aspirin (ASA) 81 MG Chew Tab chewable tablet Chew 1 Tablet every evening. 100 Tablet 1    estradiol (VIVELLE) 0.0375 MG/24HR patch Place 1 Patch on the skin two times a week. 16 Patch 2    Cetirizine-Pseudoephedrine (ALLERGY D-12 PO) Take 1 Tablet by mouth 2 times a day.      escitalopram (LEXAPRO) 20 MG tablet Take 2 Tablets by mouth every day. (Patient taking differently: Take 40 mg by mouth every day. 2 tablets= 40mg) 160 Tablet 3    metFORMIN ER (GLUCOPHAGE XR) 750 MG TABLET SR 24 HR TAKE ONE TABLET BY MOUTH TWO TIMES DAILY 180 Tablet 3    omeprazole (PRILOSEC) 20 MG delayed-release capsule TAKE 1 CAPSULE BY MOUTH DAILY 90 Capsule 3    buPROPion (WELLBUTRIN XL) 300 MG XL tablet TAKE 1 TABLET BY MOUTH EVERY DAY IN THE MORNING. (Patient taking differently: Take 300 mg by mouth every morning. TAKE 1 TABLET BY MOUTH EVERY DAY IN THE MORNING.) 90 Tablet 3    lisinopril (PRINIVIL) 20 MG Tab Take 1 Tablet by mouth every day. 90 Tablet 3    hydrOXYzine HCl (ATARAX) 25 MG Tab Take 1 Tablet by mouth 3 times a day as needed for Anxiety. 90 Tablet 3        Allergies:   No Known Allergies    Social Hx:   Social History     Socioeconomic History    Marital status:      Spouse name: Not on file    Number of children: Not on file    Years of education: Not on file    Highest education level: 12th grade   Occupational History    Not on file   Tobacco Use    Smoking status: Never    Smokeless tobacco: Never   Vaping Use    Vaping status: Never Used    Substance and Sexual Activity    Alcohol use: No    Drug use: No    Sexual activity: Yes     Partners: Male     Birth control/protection: Surgical     Comment: .    Other Topics Concern    Not on file   Social History Narrative    Not on file     Social Drivers of Health     Financial Resource Strain: Low Risk  (11/4/2024)    Overall Financial Resource Strain (CARDIA)     Difficulty of Paying Living Expenses: Not hard at all   Food Insecurity: No Food Insecurity (11/4/2024)    Hunger Vital Sign     Worried About Running Out of Food in the Last Year: Never true     Ran Out of Food in the Last Year: Never true   Transportation Needs: No Transportation Needs (11/4/2024)    PRAPARE - Transportation     Lack of Transportation (Medical): No     Lack of Transportation (Non-Medical): No   Physical Activity: Insufficiently Active (11/4/2024)    Exercise Vital Sign     Days of Exercise per Week: 2 days     Minutes of Exercise per Session: 20 min   Stress: No Stress Concern Present (11/4/2024)    [employer] [place] [employer] - Occupational Stress Questionnaire     Feeling of Stress : Only a little   Social Connections: Moderately Isolated (11/4/2024)    Social Connection and Isolation Panel [NHANES]     Frequency of Communication with Friends and Family: Twice a week     Frequency of Social Gatherings with Friends and Family: Once a week     Attends Muslim Services: Never     Active Member of Clubs or Organizations: Yes     Attends Club or Organization Meetings: More than 4 times per year     Marital Status:    Intimate Partner Violence: Not At Risk (11/4/2024)    Humiliation, Afraid, Rape, and Kick questionnaire     Fear of Current or Ex-Partner: No     Emotionally Abused: No     Physically Abused: No     Sexually Abused: No   Housing Stability: Low Risk  (11/4/2024)    Housing Stability Vital Sign     Unable to Pay for Housing in the Last Year: No     Number of Times Moved in the Last Year: 0  "    Homeless in the Last Year: No         EXAMINATION   Vitals: /74 (BP Location: Right arm, Patient Position: Sitting, BP Cuff Size: Adult)   Pulse 81   Temp 36.3 °C (97.3 °F) (Temporal)   Ht 1.702 m (5' 7\")   Wt 81.6 kg (180 lb)   SpO2 95%   Physical Exam:     Body Habitus: Body mass index is 28.19 kg/m².  Appearance: Well-groomed, well-nourished, not disheveled  Eyes: No scleral icterus to suggest severe liver disease, no proptosis to suggest severe hyperthyroid  ENT -no obvious auditory deficits, no external lesions, moist mucus membranes   Skin -no rashes or lesions noted. No appreciable skin breakdown on exposed skin areas.    Respiratory-  breathing comfortably on room air, no audible wheezing, full sentences  Cardiovascular- No lower extremity edema noted.   Psychiatric- alert and oriented, calm, comfortable, cooperative     Musculoskeletal and Neuro:  Gait and station - normal gait with reciprocal pattern,  no presence/use of ambulatory device, no arm assistance with sit-to-stand, nonantalgic. no loss of balance during exam.  No change in patient's demeanor with exam.    Grossly normal cranial nerve exam  Coordination grossly intact  Shoulder:  protracted shoulders    Physical Exam  - Cervical range of motion is normal  - No reproducible tenderness to palpation midline and paraspinal muscles or trapezius  - spurling test bilateral  No tenderness to palpation of the cervical spine  No tenderness to palpation on the BILATERAL side in the cervical facets.   No tenderness of paraspinal muscles  Key points for the international standards for neurological classification of spinal cord injury (ISNCSCI) to light touch.   Dermatome R L   C4 2 2   C5 2 2   C6 2 2   C7 2 2   C8 2 2   T1 2 2   T2 2 2     Nerve: neurovascularly intact radial, median, ulnar, and AIN     Motor Exam Upper Extremities   ? Myotome R L   Shoulder flexion C5 5 5   Elbow flexion C5 5 5   Wrist extension C6 5 5   Elbow extension C7 5 " 5   Finger flexion C8 5 5   Finger abduction T1 5 5     Reflexes  ?  R L   Biceps  2+ 2+   Brachioradialis  2+ 2+   Washington’s sign negative bilaterally        MEDICAL DECISION MAKING    Medical records review: see under HPI section.     DATA    Labs:   Lab Results   Component Value Date/Time    SODIUM 139 11/05/2024 03:27 AM    POTASSIUM 4.9 11/05/2024 03:27 AM    CHLORIDE 100 11/05/2024 03:27 AM    CO2 28 11/05/2024 03:27 AM    ANION 11.0 11/05/2024 03:27 AM    GLUCOSE 115 (H) 11/05/2024 03:27 AM    BUN 15 11/05/2024 03:27 AM    CREATININE 0.73 11/05/2024 03:27 AM    CALCIUM 10.2 11/05/2024 03:27 AM    ASTSGOT 31 11/04/2024 12:09 PM    ALTSGPT 35 11/04/2024 12:09 PM    TBILIRUBIN 0.3 11/04/2024 12:09 PM    ALBUMIN 3.0 (L) 11/04/2024 12:09 PM    TOTPROTEIN 5.0 (L) 11/04/2024 12:09 PM    GLOBULIN 2.0 11/04/2024 12:09 PM    AGRATIO 1.5 11/04/2024 12:09 PM   ]    Lab Results   Component Value Date/Time    PROTHROMBTM 14.1 12/06/2022 09:17 AM    INR 1.10 12/06/2022 09:17 AM        Lab Results   Component Value Date/Time    WBC 8.3 11/04/2024 01:30 PM    RBC 5.31 11/04/2024 01:30 PM    HEMOGLOBIN 13.9 11/04/2024 01:30 PM    HEMATOCRIT 42.8 11/04/2024 01:30 PM    MCV 80.6 (L) 11/04/2024 01:30 PM    MCH 26.2 (L) 11/04/2024 01:30 PM    MCHC 32.5 11/04/2024 01:30 PM    MPV 9.4 11/04/2024 01:30 PM    NEUTSPOLYS 68.40 11/04/2024 01:30 PM    LYMPHOCYTES 23.00 11/04/2024 01:30 PM    MONOCYTES 5.10 11/04/2024 01:30 PM    EOSINOPHILS 2.80 11/04/2024 01:30 PM    BASOPHILS 0.50 11/04/2024 01:30 PM    HYPOCHROMIA 1+ 07/26/2021 11:25 AM    ANISOCYTOSIS 1+ 12/05/2022 01:50 PM        Lab Results   Component Value Date/Time    HBA1C 6.8 (H) 11/04/2024 01:30 PM        Imaging:   I personally reviewed following images, these are my reads  Cervical MRI 11/4/2024: Straightening of cervical lordosis, mild degenerative changes, multiple disc bulges, mild central canal stenosis at C4-C5 C5-C6 C6-C7; neuroforaminal narrowing mild bilaterally  C3-C4  &C4-C5, mild right and moderate left C5-C6, mild bilaterally C6-C7; facet arthropathy    IMAGING radiology reads. I reviewed the following radiology reads      Results for orders placed during the hospital encounter of 02/06/22    YX-YOESSAP-Z/O    Impression  1.  Sludge is noted in the gallbladder.    2.  No biliary ductal dilatation. No filling defects or strictures identified.    3.  No pancreatic ductal dilatation is identified.    4.  Findings are consistent with pancreatitis involving the tail of the pancreas.   Results for orders placed during the hospital encounter of 11/04/24    MR-BRAIN-W/O    Impression  MRI of the brain without contrast within normal limits.             Results for orders placed during the hospital encounter of 11/04/24    MR-CERVICAL SPINE-W/O    Impression  1.  Mild degenerative disease in the cervical spine as described above.  2.  Small left thyroid nodule.  3.  It is difficult to evaluate the vasculature in this MRI cervical spine. When compared with the previous MRI dated 9/23/2022, again noted there is focal dilatation of the distal right internal carotid artery at the skull base consistent with  pseudoaneurysm.There is no large intramural hematoma. The flow voids of the internal carotid and vertebral arteries are patent in this limited evaluation. However tiny hematoma, vascular irregularities and double-lumen cannot be assessed in this study.  4.  If there is clinical concern for dissection CT angiogram is recommended.                Results for orders placed during the hospital encounter of 09/23/22    MR-MRA NECK-W/O    Impression  Overall stable appearance of the right carotid dissection involving the right ICA segment just below the skull base.There is no significant luminal compromise identified. Proximal and distal vessel also demonstrate normal caliber.    The false lumen/aneurysm component measures approximately 4 x 6 mm in the axial plane.    Results for orders  placed during the hospital encounter of 22    MR-MRA NECK-WITH & W/O AND SEQUENCES    Impression  Acute-subacute dissection with intramural hematoma involving the distal left cervical ICA just below the skull base. There is at least 50% luminal narrowing involving this segment.    Fusiform dilatation/pseudoaneurysm of the right distal cervical ICA just below the skull base likely related to remote dissection. No significant luminal compromise.    Small pseudoaneurysm involving the distal V2 segment of the right vertebral artery also likely related to remote dissection. No significant luminal compromise.                                    Results for orders placed during the hospital encounter of 22    DX-CHEST-2 VIEWS    Impression  Worsening bilateral multifocal pulmonary consolidation again could be due to pneumonia or pulmonary edema with bilateral pleural effusions.                                 Results for orders placed during the hospital encounter of 01/15/21    DX-SHOULDER 2+ RIGHT    Impression  Mild AC joint osteoarthritis    Mild humeral head cystic change may indicate internal impingement                ASSESSMENT AND PLAN:  Mónica Keita   : 1970   Past medical history of type 2 diabetes, transaminitis, insomnia, pancreatitis, neuroendocrine neoplasm of stomach, leukocytosis, electrolyte abnormalities, chronic nonalcoholic liver disease, carotid artery dissection, anxiety, allergic rhinitis, acute stress reaction, chronic anticoagulation secondary to carotid artery dissection right    Diagnoses and all orders for this visit:  1. Cervical radiculopathy        2. Spondylosis        3. Spondylolisthesis, cervical region        4. Exercise counseling        5. Arthropathy of cervical facet joint        6. Bilateral carpal tunnel syndrome            Assessment & Plan  Cervicalgia  - Symptoms suggest recurrence of muscle irritation due to forceful coughing from a recent respiratory  infection  - Pain localized to neck and back of shoulders, no radicular pain or pain down the arms  - Current treatment: gabapentin 300 mg and meloxicam as needed  - Using lidocaine patches, which provide some relief  - Continue using lidocaine patches for up to 12 hours at a time, monitor for skin irritation  - Reserve meloxicam for severe pain  - Gabapentin dosage can be increased to 400 mg or 500 mg to improve sleep quality and manage pain  - Inform provider if increasing gabapentin dose to 600 mg to 700 mg  - Maintain home exercise program for the next 3 months  - Prescription for meloxicam to be sent to [facility] pharmacy  - Contact provider if no improvement within the next few weeks  - Arrange earlier appointment for trigger point injection if specific painful spot is identified    Follow-up  - Patient to follow up in 3 months    -Lifestyle: exercise counseling discussed.  Extensive discussion regarding treatment options, at this time recommending the following:    No orders of the defined types were placed in this encounter.      -Medications/Modalities: Previously prescribed medications  -Therapy (PT/OT/Aquatherapy):  encouraged to continue  on strengthening and stretching.    -Home exercise program: encouraged    -Diagnostic workup: reviewed today as above;   -Interventional program: will consider at a later time and Informed refusal by patient  -Referrals: none required at this time    Follow-up:   Return to clinic in 12 weeks for follow-up of symptomatology  Patient expressed understanding of the management plan. Patient (and Family Members) was/were encouraged to call if any worries, issues, problems or concerns prior to the next visit     Please note that this dictation was created using voice recognition software. I have made every reasonable attempt to correct obvious errors but there may be errors of grammar and content that I may have overlooked prior to finalization of this note.      Jaxson Laws,  DO  Physical Medicine and Rehabilitation  [facility]         DANIELLE Tatum D.N.P.   Carlos Diaz D.N.P.

## 2025-01-20 ENCOUNTER — HOSPITAL ENCOUNTER (OUTPATIENT)
Dept: RADIOLOGY | Facility: MEDICAL CENTER | Age: 55
End: 2025-01-20
Payer: COMMERCIAL

## 2025-01-20 DIAGNOSIS — Z12.31 VISIT FOR SCREENING MAMMOGRAM: ICD-10-CM

## 2025-01-20 PROCEDURE — 77067 SCR MAMMO BI INCL CAD: CPT

## 2025-01-21 ENCOUNTER — PHYSICAL THERAPY (OUTPATIENT)
Dept: PHYSICAL THERAPY | Facility: REHABILITATION | Age: 55
End: 2025-01-21
Attending: PHYSICIAN ASSISTANT
Payer: COMMERCIAL

## 2025-01-21 DIAGNOSIS — R53.1 WEAKNESS: ICD-10-CM

## 2025-01-21 PROCEDURE — 97140 MANUAL THERAPY 1/> REGIONS: CPT

## 2025-01-21 PROCEDURE — 97110 THERAPEUTIC EXERCISES: CPT

## 2025-01-21 NOTE — OP THERAPY PROGRESS SUMMARY
Outpatient Physical Therapy  PROGRESS SUMMARY NOTE      [facility] Physical Therapy [place] [address], [address]  [place] [address]  Phone:  [phone]    Date of Visit: 01/21/2025    Patient: Mónica Keita  YOB: 1970  MRN: [id number]     Referring Provider: Aby Taveras M.D.  [address] [place] [address]   Referring Diagnosis Osteoarthritis of AC (acromioclavicular) joint [M19.019]     Visit Diagnoses     ICD-10-CM   1. Weakness  R53.1       Rehab Potential: good    Progress Report Period: 1/21/202    Functional Assessment Used  NDI  DASH          Objective Findings and Assessment:   Patient progression towards goals: Centralization of symptoms to cervical region   Patient demonstrates 25% improvement with cervical rotation and extension MET      Long Term Goals:    Patient able to work and complete ADLS with >50% decreased symptoms Partially MET  Patient able to demonstrate full painfree AROM Partially MET      Objective findings and assessment details: Patient is demonstrating good progress toward goals but recently had a new episode of severe right cervical pain after being sick with a virus with cough.  Pain has steadily decreased over the past week but remains constant at 3/10 VAS.  Right UE weakness C5/C6 continues but patient is demonstrating slow improvements with strength:  3+/5 shoulder flexion/abduction and ER/IR.  Recommend continued PT to meet goals stated below .   Shoulder AROM and cervical AROM WNL in all planes  Upper thoracic hypomobility with PA mobility testing          Goals:   Short Term Goals:   Patient able to perform ADLS with >50% decreased cervical pain  Patient able to demonstrate >4/5 shoulder strength lola able to use right UE for lifting and reaching ADLS  Short term goal time span:  2-4 weeks      Long Term Goals:    NDI < 30% or MCID  QUICK DASH  change MCID  Long term goal time span:  2-4 months    Plan:   Planned therapy interventions:   Neuromuscular Re-education (CPT 25713), Manual Therapy (CPT 18395), E Stim Unattended (CPT 59280) and Therapeutic Exercise (CPT 77033)  Frequency:  1x week  Duration in weeks:  8  Plan details:  8 visits      Referring provider co-signature:  I have reviewed this plan of care and my co-signature certifies the need for services.     Certification Period: 01/21/2025 to 03/18/25    Physician Signature: ________________________________ Date: ______________

## 2025-01-21 NOTE — OP THERAPY DAILY TREATMENT
Outpatient Physical Therapy  DAILY TREATMENT     [facility] Physical Therapy [place]  [address], [address]  [place] [address]  Phone:  [phone]    Date: 01/21/2025    Patient: Mónica Keita  YOB: 1970  MRN: [id number]     Time Calculation    Start time: 1100  Stop time: 1145 Time Calculation (min): 45 minutes         Chief Complaint: chronic cervical   Visit #: 12    SUBJECTIVE:  Cervical symptoms are decreasing <3/10 VAS, been doing HEP     OBJECTIVE: excessive shoulder hiking with standing AROM abduction and flexion 0-85 right elevation, full   Current objective measures:     Therapeutic Exercises (CPT 86474):     1. gentle cervical retraction supine, 10 x 10 sec    2. repeated cervical retraction/rotation supine, 10x each    3. scapular retraction seated, x 10    4. repeated thoracic extension with chairback op, x6    5. supine flexion, horizontal abduction 5lb dumbells, 3 x 10 each    6. bent row 8lb, 3 x 10    7. prone on ball ski jumber, 2 x 1 min    8. prone on ball ys, 2 x 10, 2 x 30 sec    9. ER/IR with resistance tubing green tubing, 3 x 10    Therapeutic Treatments and Modalities:     1. Manual Therapy (CPT 72131), cervical manual traction 4 x 2 min, STM right cervical paraspinals    Time-based treatments/modalities:         ASSESSMENT: UPOC completed   Response to treatment: improving cervical pain but still constant <3/10 VAS, able to tolerate increased UE loading today    PLAN/RECOMMENDATIONS:   Plan for treatment: therapy treatment to continue next visit.  Planned interventions for next visit: continue with current treatment.

## 2025-01-23 ENCOUNTER — HOSPITAL ENCOUNTER (OUTPATIENT)
Dept: LAB | Facility: MEDICAL CENTER | Age: 55
End: 2025-01-23
Payer: COMMERCIAL

## 2025-01-23 DIAGNOSIS — E11.65 TYPE 2 DIABETES MELLITUS WITH HYPERGLYCEMIA, WITHOUT LONG-TERM CURRENT USE OF INSULIN (HCC): ICD-10-CM

## 2025-01-23 LAB
ALBUMIN SERPL BCP-MCNC: 3.9 G/DL (ref 3.2–4.9)
ALBUMIN/GLOB SERPL: 1.4 G/DL
ALP SERPL-CCNC: 51 U/L (ref 30–99)
ALT SERPL-CCNC: 40 U/L (ref 2–50)
ANION GAP SERPL CALC-SCNC: 10 MMOL/L (ref 7–16)
AST SERPL-CCNC: 31 U/L (ref 12–45)
BILIRUB SERPL-MCNC: 0.3 MG/DL (ref 0.1–1.5)
BUN SERPL-MCNC: 15 MG/DL (ref 8–22)
CALCIUM ALBUM COR SERPL-MCNC: 8.9 MG/DL (ref 8.5–10.5)
CALCIUM SERPL-MCNC: 8.8 MG/DL (ref 8.5–10.5)
CHLORIDE SERPL-SCNC: 105 MMOL/L (ref 96–112)
CO2 SERPL-SCNC: 27 MMOL/L (ref 20–33)
CREAT SERPL-MCNC: 0.72 MG/DL (ref 0.5–1.4)
EST. AVERAGE GLUCOSE BLD GHB EST-MCNC: 143 MG/DL
GFR SERPLBLD CREATININE-BSD FMLA CKD-EPI: 100 ML/MIN/1.73 M 2
GLOBULIN SER CALC-MCNC: 2.8 G/DL (ref 1.9–3.5)
GLUCOSE SERPL-MCNC: 62 MG/DL (ref 65–99)
HBA1C MFR BLD: 6.6 % (ref 4–5.6)
POTASSIUM SERPL-SCNC: 4.1 MMOL/L (ref 3.6–5.5)
PROT SERPL-MCNC: 6.7 G/DL (ref 6–8.2)
SODIUM SERPL-SCNC: 142 MMOL/L (ref 135–145)

## 2025-01-23 PROCEDURE — 36415 COLL VENOUS BLD VENIPUNCTURE: CPT

## 2025-01-23 PROCEDURE — 80053 COMPREHEN METABOLIC PANEL: CPT

## 2025-01-23 PROCEDURE — 83036 HEMOGLOBIN GLYCOSYLATED A1C: CPT

## 2025-01-27 NOTE — OP THERAPY DAILY TREATMENT
Outpatient Physical Therapy  DAILY TREATMENT     [facility] Physical Therapy [place] [id number] [facility], [address]  [place] [address]  Phone:  [phone]    Date: 01/28/2025    Patient: Mónica Keita  YOB: 1970  MRN: [id number]     Time Calculation    Start time: 1021  Stop time: 1100 Time Calculation (min): 39 minutes         Chief Complaint: cervical   Visit #: 21    SUBJECTIVE:  Cervical symptoms still constant but improving, <3/10 VAS     OBJECTIVE:  Current objective measures: 3/5 shoulder flex/abduction/elbow flexion //5/5 serratus anterior           Therapeutic Exercises (CPT 96479):     1. gentle cervical retraction supine, 10 x 10 sec    2. repeated cervical retraction/rotation supine, 10x each, NT    3. scapular retraction seated, x 10    4. repeated thoracic extension with chairback op, x6    5. supine flexion, horizontal abduction , abduction, 3 x 10 each: 6lb/5lbs/3lbs    6. bent row 8lb, 3 x 10, progress to 9-10 lbs next    7. prone on ball ski jumber, 2 x 1 min, NT    8. prone on ball ys, 2 x 10, 2 x 30 sec, NT    9. ER/IR with resistance tubing green tubing, 3 x 10, NT    10. bicep curls standing, 3lb 3 x 10, progressing to 5lb last set    11. TRX rows, x15    Therapeutic Treatments and Modalities:     1. Manual Therapy (CPT 56599), cervical manual traction 4 x 2 min, STM right cervical paraspinals    Time-based treatments/modalities:    Physical Therapy Timed Treatment Charges  Manual therapy minutes (CPT 83688): 10 minutes  Therapeutic exercise minutes (CPT 16047): 30 minutes    ASSESSMENT:   Response to treatment: continued myotomal weakness C5, constant but decreased pain intensity right mid cervical region    PLAN/RECOMMENDATIONS:   Plan for treatment: therapy treatment to continue next visit.  Planned interventions for next visit: continue with current treatment.

## 2025-01-28 ENCOUNTER — PHYSICAL THERAPY (OUTPATIENT)
Dept: PHYSICAL THERAPY | Facility: REHABILITATION | Age: 55
End: 2025-01-28
Attending: PHYSICIAN ASSISTANT
Payer: COMMERCIAL

## 2025-01-28 ENCOUNTER — OFFICE VISIT (OUTPATIENT)
Dept: MEDICAL GROUP | Facility: PHYSICIAN GROUP | Age: 55
End: 2025-01-28
Payer: COMMERCIAL

## 2025-01-28 VITALS
HEIGHT: 67 IN | RESPIRATION RATE: 16 BRPM | HEART RATE: 82 BPM | OXYGEN SATURATION: 96 % | DIASTOLIC BLOOD PRESSURE: 78 MMHG | SYSTOLIC BLOOD PRESSURE: 116 MMHG | TEMPERATURE: 97.5 F | BODY MASS INDEX: 29.1 KG/M2 | WEIGHT: 185.4 LBS

## 2025-01-28 DIAGNOSIS — E83.119 HEMOCHROMATOSIS, UNSPECIFIED HEMOCHROMATOSIS TYPE: ICD-10-CM

## 2025-01-28 DIAGNOSIS — N95.1 MENOPAUSAL HOT FLUSHES: ICD-10-CM

## 2025-01-28 DIAGNOSIS — Z00.00 HEALTHCARE MAINTENANCE: ICD-10-CM

## 2025-01-28 DIAGNOSIS — I10 PRIMARY HYPERTENSION: ICD-10-CM

## 2025-01-28 DIAGNOSIS — E11.65 TYPE 2 DIABETES MELLITUS WITH HYPERGLYCEMIA, WITHOUT LONG-TERM CURRENT USE OF INSULIN (HCC): ICD-10-CM

## 2025-01-28 DIAGNOSIS — R53.1 WEAKNESS: ICD-10-CM

## 2025-01-28 PROCEDURE — 97110 THERAPEUTIC EXERCISES: CPT

## 2025-01-28 PROCEDURE — 97140 MANUAL THERAPY 1/> REGIONS: CPT

## 2025-01-28 PROCEDURE — 3078F DIAST BP <80 MM HG: CPT

## 2025-01-28 PROCEDURE — 3074F SYST BP LT 130 MM HG: CPT

## 2025-01-28 PROCEDURE — 99214 OFFICE O/P EST MOD 30 MIN: CPT

## 2025-01-28 ASSESSMENT — ENCOUNTER SYMPTOMS
MYALGIAS: 0
CONSTIPATION: 0
NAUSEA: 0
DIARRHEA: 0
CHILLS: 0
VOMITING: 0
WEIGHT LOSS: 0
WEAKNESS: 0
DIZZINESS: 0
ABDOMINAL PAIN: 0
COUGH: 1
HEADACHES: 0
SHORTNESS OF BREATH: 0
BLURRED VISION: 0
FEVER: 0

## 2025-01-28 ASSESSMENT — FIBROSIS 4 INDEX: FIB4 SCORE: 1.14

## 2025-01-28 NOTE — PROGRESS NOTES
Verbal consent was acquired by the patient to use appsplit ambient listening note generation during this visit  Subjective:     CC:  Diagnoses of Hemochromatosis, unspecified hemochromatosis type, Type 2 diabetes mellitus with hyperglycemia, without long-term current use of insulin (HCC), Primary hypertension, Menopausal hot flushes, and Healthcare maintenance were pertinent to this visit.    HISTORY OF THE PRESENT ILLNESS: Patient is a 55 y.o. female.   No problems updated.    History of Present Illness  The patient presents for a follow-up visit.    She was recently diagnosed with Respiratory Syncytial Virus (RSV) after initially mistaking it for a common cold. Her symptoms included a persistent cough and a fever peaking at 102.7 degrees. She sought medical attention the following day and was prescribed antibiotics due to the presence of rales in her lungs. Her condition improved shortly thereafter. She has a history of pneumonia.    She continues to use a Continuous Glucose Monitor (CGM) for diabetes management, which she finds beneficial. She is currently on metformin 750 mg twice daily for diabetes management and has not yet initiated any injectable treatments.    She is on lisinopril 20 mg daily for hypertension management.    She is on omeprazole 20 mg daily for GERD management, which has been effective in controlling her symptoms.    She takes meloxicam once daily for pain relief, ensuring it is taken with food to prevent stomach upset.    She has not required hydroxyzine recently, which she attributes to the initiation of hormone therapy. She has been managing anxiety since the age of 28 or 29.    She is on gabapentin 100-300 mg nightly for nerve pain associated with bulging discs. She experienced a severe exacerbation of her condition a few weeks ago, likely due to coughing, but has found relief through physical therapy.    She is on estradiol 0.375 patch twice weekly, which has reduced her hot flashes  "to 2-3 episodes per week. She does not have a uterus.    She is on Lexapro 40 mg daily, which has been effective in managing her anxiety, depression, and mood.    She is on Flexeril 5 mg daily for aches and pains.    She is on Allegra for allergies.    She is on Wellbutrin 300 mg in the morning.    She takes aspirin daily.        She has hemochromatosis, which is not hereditary.    SOCIAL HISTORY  She works as a .    FAMILY HISTORY  She reports no family history of hemochromatosis.    MEDICATIONS  Current: Metformin, lisinopril, omeprazole, meloxicam, gabapentin, estradiol patch, Lexapro, Flexeril, Allegra, Wellbutrin, aspirin, ProAir.  Discontinued: Hydroxyzine.    ROS:   Review of Systems   Constitutional:  Negative for chills, fever, malaise/fatigue and weight loss.   Eyes:  Negative for blurred vision.   Respiratory:  Positive for cough. Negative for shortness of breath.    Cardiovascular:  Negative for chest pain.   Gastrointestinal:  Negative for abdominal pain, constipation, diarrhea, nausea and vomiting.   Musculoskeletal:  Negative for myalgias.   Neurological:  Negative for dizziness, weakness and headaches.         Objective:     Exam: /78 (BP Location: Left arm, Patient Position: Sitting, BP Cuff Size: Adult)   Pulse 82   Temp 36.4 °C (97.5 °F) (Temporal)   Resp 16   Ht 1.702 m (5' 7\")   Wt 84.1 kg (185 lb 6.4 oz)   SpO2 96%  Body mass index is 29.04 kg/m².    Physical Exam  Constitutional:       General: She is not in acute distress.     Appearance: Normal appearance. She is not ill-appearing or toxic-appearing.   HENT:      Head: Normocephalic.   Eyes:      Conjunctiva/sclera: Conjunctivae normal.   Cardiovascular:      Rate and Rhythm: Normal rate and regular rhythm.      Pulses: Normal pulses.      Heart sounds: Normal heart sounds. No murmur heard.  Pulmonary:      Effort: Pulmonary effort is normal. No respiratory distress.      Breath sounds: Normal breath sounds. "   Skin:     General: Skin is warm and dry.   Neurological:      General: No focal deficit present.      Mental Status: She is alert and oriented to person, place, and time.   Psychiatric:         Mood and Affect: Mood normal.         Behavior: Behavior normal.           Labs:   Hospital Outpatient Visit on 01/23/2025   Component Date Value    Sodium 01/23/2025 142     Potassium 01/23/2025 4.1     Chloride 01/23/2025 105     Co2 01/23/2025 27     Anion Gap 01/23/2025 10.0     Glucose 01/23/2025 62 (L)     Bun 01/23/2025 15     Creatinine 01/23/2025 0.72     Calcium 01/23/2025 8.8     Correct Calcium 01/23/2025 8.9     AST(SGOT) 01/23/2025 31     ALT(SGPT) 01/23/2025 40     Alkaline Phosphatase 01/23/2025 51     Total Bilirubin 01/23/2025 0.3     Albumin 01/23/2025 3.9     Total Protein 01/23/2025 6.7     Globulin 01/23/2025 2.8     A-G Ratio 01/23/2025 1.4     Glycohemoglobin 01/23/2025 6.6 (H)     Est Avg Glucose 01/23/2025 143     GFR (CKD-EPI) 01/23/2025 100    Office Visit on 01/02/2025   Component Date Value    SARS-CoV-2 by PCR 01/02/2025 Negative     Influenza virus A RNA 01/02/2025 Negative     Influenza virus B, PCR 01/02/2025 Negative     RSV, PCR 01/02/2025 Positive (A)          Assessment & Plan: Medical Decision Making   55 y.o. female with the following -    Assessment & Plan  1. Respiratory Syncytial Virus (RSV) infection.  She was treated with antibiotics due to the risk of pneumonia and has since recovered. No further treatment is necessary at this time.    2. Diabetes Mellitus.  Her A1c level has decreased to 6.6, indicating good control of her diabetes. She will continue her current regimen of metformin 750 mg twice daily. An A1c test will be ordered to monitor her condition.    3. Hypertension.  Her blood pressure is well-controlled at 116/78. She will continue taking lisinopril 20 mg daily.    4. Gastroesophageal Reflux Disease (GERD).  Her GERD is well-managed with omeprazole 20 mg daily. She  will continue this medication.    5. Pain Management.  She takes meloxicam once daily for pain relief and has been advised to take it with food to buffer her stomach. She will continue this regimen.    6. Anxiety.  Her anxiety is well-managed with Lexapro 40 mg daily and Wellbutrin 300 mg in the morning. She will continue these medications.    7. Nerve Pain.  She takes gabapentin 100-300 mg nightly for nerve pain due to bulging disks. She has been doing physical therapy, which has been effective. She will continue this treatment.    8. Hormone Replacement Therapy.  She uses an estradiol 0.375 patch twice a week, which has minimized her hot flashes to 2-3 times a week. If her hot flashes become more frequent or bothersome, she may increase the dose to 0.05. Her estrogen levels will be checked at her next lab draw.    9. Hemochromatosis.  A CBC will be added to her lab orders to monitor her condition.    10. Health Maintenance.  A lipid panel and metabolic panel will be ordered. A microalbumin test will be scheduled for July.    Follow-up  The patient is scheduled for a follow-up visit in 6 months.      Problem List Items Addressed This Visit       Hemochromatosis    Relevant Orders    CBC WITH DIFFERENTIAL    Hypertension    Relevant Orders    Comp Metabolic Panel    Type 2 diabetes mellitus with hyperglycemia, without long-term current use of insulin (HCC)    Relevant Orders    HEMOGLOBIN A1C    Lipid Profile    Comp Metabolic Panel    CBC WITH DIFFERENTIAL    MICROALBUMIN CREAT RATIO URINE    Menopausal hot flushes    Relevant Orders    ESTRADIOL     Other Visit Diagnoses       Healthcare maintenance        Relevant Orders    HEMOGLOBIN A1C    ESTRADIOL    Lipid Profile    Comp Metabolic Panel    CBC WITH DIFFERENTIAL    MICROALBUMIN CREAT RATIO URINE            Differential diagnosis, natural history, supportive care, and indications for immediate follow-up discussed.  Shared decision making approach was utilized,  and patient is amendable with plan of care.  Patient understands to return to clinic or go to the emergency department if symptoms worsen. All questions and concerns addressed to the best of my knowledge.      Return in about 6 months (around 7/28/2025), or if symptoms worsen or fail to improve.    Please note that this dictation was created using voice recognition software. I have made every reasonable attempt to correct obvious errors, but I expect that there are errors of grammar and possibly content that I did not discover before finalizing the note.

## 2025-01-31 DIAGNOSIS — I10 PRIMARY HYPERTENSION: ICD-10-CM

## 2025-01-31 NOTE — TELEPHONE ENCOUNTER
Received request via: Pharmacy    Was the patient seen in the last year in this department? Yes    Does the patient have an active prescription (recently filled or refills available) for medication(s) requested? No    [facility]    Does the patient have snf Plus and need 100-day supply? (This applies to ALL medications) Patient does not have SCP

## 2025-02-02 RX ORDER — LISINOPRIL 20 MG/1
20 TABLET ORAL DAILY
Qty: 90 TABLET | Refills: 3 | Status: SHIPPED | OUTPATIENT
Start: 2025-02-02

## 2025-02-03 ENCOUNTER — PHYSICAL THERAPY (OUTPATIENT)
Dept: PHYSICAL THERAPY | Facility: REHABILITATION | Age: 55
End: 2025-02-03
Attending: STUDENT IN AN ORGANIZED HEALTH CARE EDUCATION/TRAINING PROGRAM
Payer: COMMERCIAL

## 2025-02-03 DIAGNOSIS — R53.1 WEAKNESS: ICD-10-CM

## 2025-02-03 PROCEDURE — 97110 THERAPEUTIC EXERCISES: CPT

## 2025-02-03 NOTE — OP THERAPY DAILY TREATMENT
Outpatient Physical Therapy  DAILY TREATMENT     [facility] Outpatient Physical Therapy [place] [address], [address]  [place] [address]  Phone:  [phone]    Date: 02/03/2025    Patient: Mónica Keita  YOB: 1970  MRN: [id number]     Time Calculation    Start time: 1100  Stop time: 1145 Time Calculation (min): 45 minutes         Chief Complaint: cervical  Visit #: 22    SUBJECTIVE:  Arm feels like it is getting a little stronger, dull ache constant right arm     OBJECTIVE:Current objective measures: full painfree cervical AROM in all planes, -ULNTA, 3/5 shoulder abduction and 3-3+shoulder flexion           Therapeutic Exercises (CPT 27013):     1. roller pec stretch, alt shoulder flexion horizontal abduction,    2. repeated cervical retraction/rotation supine, 10x each, NT    3. scapular retraction seated, x 10    4. repeated thoracic extension with chairback op, x6    5. supine flexion, horizontal abduction , abduction, 3 x 10 each: 6lb/5lbs/3lbs, NT    6. bent row 8lb, 3 x 10, progress to 9-10 lbs next    7. prone on ball ski jumber, 2 x 1 min, NT    8. prone on ball ys, 2 x 10, 2 x 30 sec, NT    9. ER/IR with resistance, 3 x 10 red tubing    10. bicep curls standing, 3lb 3 x 10, progressing to 5lb last set    11. TRX rows, 3 x 10    12. deep flexor endurance, 5 x 10 sec,  1 x 24 sec, 37 sec    13. overhead press with weighted ball 2.2 /5lb/3lb dumbells, x 10 each B but single arm    14. carry overhead: 2.2lb ball, 2 x 1 min right UE    15. shoulder circles with ball CCW, CW, 2 x 15 each      Time-based treatments/modalities:    Physical Therapy Timed Treatment Charges  Therapeutic exercise minutes (CPT 79330): 40 minutes  ASSESSMENT:   Response to treatment: slight improvement with right shoulder strength but C5 myotome remains weak.  Improving scapular and cervical endurance.  Continue with current POC    PLAN/RECOMMENDATIONS:   Plan for treatment: therapy treatment to continue next  visit.  Planned interventions for next visit: continue with current treatment.

## 2025-02-05 DIAGNOSIS — M50.90 CERVICAL DISC DISEASE: ICD-10-CM

## 2025-02-06 RX ORDER — MELOXICAM 15 MG/1
15 TABLET ORAL DAILY
Qty: 30 TABLET | Refills: 0 | Status: SHIPPED | OUTPATIENT
Start: 2025-02-06

## 2025-02-06 NOTE — TELEPHONE ENCOUNTER
Meloxicam 15 Mg Tab Zydu     Received request via: Pharmacy    Was the patient seen in the last year in this department? Yes    Does the patient have an active prescription (recently filled or refills available) for medication(s) requested?  Yes    [facility]    Does the patient have skilled nursing Plus and need 100-day supply? (This applies to ALL medications) Patient does not have SCP

## 2025-02-13 ENCOUNTER — PHYSICAL THERAPY (OUTPATIENT)
Dept: PHYSICAL THERAPY | Facility: REHABILITATION | Age: 55
End: 2025-02-13
Attending: STUDENT IN AN ORGANIZED HEALTH CARE EDUCATION/TRAINING PROGRAM
Payer: COMMERCIAL

## 2025-02-13 DIAGNOSIS — R53.1 WEAKNESS: ICD-10-CM

## 2025-02-13 PROCEDURE — 97110 THERAPEUTIC EXERCISES: CPT

## 2025-02-13 PROCEDURE — 97140 MANUAL THERAPY 1/> REGIONS: CPT

## 2025-02-13 NOTE — OP THERAPY DAILY TREATMENT
Outpatient Physical Therapy  DAILY TREATMENT     [facility] Outpatient Physical Therapy [place] [address], [address]  [place] [address]  Phone:  [phone]    Date: 02/13/2025    Patient: Mónica Keita  YOB: 1970  MRN: [id number]     Time Calculation    Start time: 1100  Stop time: 1145 Time Calculation (min): 45 minutes         Chief Complaint: cervical radiculopathy  Visit #: 23    SUBJECTIVE:  Sanded cabinets aggravate neck yesterday    OBJECTIVE:  Current objective measures: 3+5 shoulder flexion/abd(3+/5) cerv rotation left 60 /right 75 degrees          Therapeutic Exercises (CPT 85189):     2. repeated cervical retraction/rotation supine, 10x each    4. repeated thoracic extension with chairback op, x6    5. supine flexion, horizontal abduction , abduction, 3 x 10 each: 6lb/5lbs/3lbs    6. bent row 8lb, 3 x 10, progress to 9-10 lbs next    7. chest press 6, 3 x 10    8. prone on ball ys, 2 x 10, 2 x 30 sec, NT    9. ER/IR with resistance, 3 x 10 red tubing    10. bicep curls standing, 3lb 3 x 10 NT, progressing to 5lb last set    11. TRX rows, 3 x 10    12. deep flexor endurance, 5 x 10 sec,  1 x 24 sec, 37 sec    13. overhead press with weighted ball 2.2 /5lb/3lb dumbells, x 10 each B but single arm    14. carry overhead: 2.2lb ball, 2 x 1 min right UE    15. shoulder circles with ball CCW, CW, 2 x 15 each    16. shoulder abduction sl, 3 x 10 3lb right only    Therapeutic Treatments and Modalities:     1. Manual Therapy (CPT 06702), cervical manual traction intermittent 2 min/60 sec off  min, PA mobs CT junction, upper T-spine, cervical rotation mobilization    Time-based treatments/modalities:    Physical Therapy Timed Treatment Charges  Manual therapy minutes (CPT 21437): 15 minutes  Therapeutic exercise minutes (CPT 18954): 30 minutes    ASSESSMENT:   Response to treatment: no significant change in strength C5 myotome, decrease //better cervical pain post treatment      PLAN/RECOMMENDATIONS:   Plan for treatment: therapy treatment to continue next visit.  Planned interventions for next visit: continue with current treatment.

## 2025-03-10 DIAGNOSIS — F43.0 ACUTE STRESS REACTION: ICD-10-CM

## 2025-03-10 RX ORDER — BUPROPION HYDROCHLORIDE 300 MG/1
300 TABLET ORAL EVERY MORNING
Qty: 90 TABLET | Refills: 3 | Status: SHIPPED | OUTPATIENT
Start: 2025-03-10

## 2025-03-10 NOTE — TELEPHONE ENCOUNTER
Received request via: Pharmacy    Was the patient seen in the last year in this department? Yes    Does the patient have an active prescription (recently filled or refills available) for medication(s) requested? No    [facility]    Does the patient have jail Plus and need 100-day supply? (This applies to ALL medications) Patient does not have SCP

## 2025-03-11 DIAGNOSIS — M54.12 CERVICAL RADICULOPATHY: ICD-10-CM

## 2025-03-14 DIAGNOSIS — K21.9 GASTROESOPHAGEAL REFLUX DISEASE WITHOUT ESOPHAGITIS: ICD-10-CM

## 2025-03-14 NOTE — TELEPHONE ENCOUNTER
Gabapentin 100 Mg Cap Nort     Received request via: Pharmacy    Was the patient seen in the last year in this department? Yes    Does the patient have an active prescription (recently filled or refills available) for medication(s) requested?  Yes    [facility]     Does the patient have jail Plus and need 100-day supply? (This applies to ALL medications) Patient does not have SCP

## 2025-03-14 NOTE — TELEPHONE ENCOUNTER
Received request via: Pharmacy    Was the patient seen in the last year in this department? Yes    Does the patient have an active prescription (recently filled or refills available) for medication(s) requested? No    [facility]    Does the patient have retirement Plus and need 100-day supply? (This applies to ALL medications) Patient does not have SCP

## 2025-03-17 RX ORDER — OMEPRAZOLE 20 MG/1
20 CAPSULE, DELAYED RELEASE ORAL DAILY
Qty: 90 CAPSULE | Refills: 3 | Status: SHIPPED | OUTPATIENT
Start: 2025-03-17

## 2025-03-19 RX ORDER — GABAPENTIN 100 MG/1
CAPSULE ORAL
Qty: 90 CAPSULE | Refills: 0 | Status: SHIPPED | OUTPATIENT
Start: 2025-03-19

## 2025-04-15 ENCOUNTER — APPOINTMENT (OUTPATIENT)
Dept: PHYSICAL MEDICINE AND REHAB | Facility: MEDICAL CENTER | Age: 55
End: 2025-04-15
Payer: COMMERCIAL

## 2025-04-15 VITALS
HEIGHT: 67 IN | TEMPERATURE: 98.2 F | WEIGHT: 187.39 LBS | SYSTOLIC BLOOD PRESSURE: 128 MMHG | HEART RATE: 96 BPM | DIASTOLIC BLOOD PRESSURE: 88 MMHG | OXYGEN SATURATION: 92 % | BODY MASS INDEX: 29.41 KG/M2

## 2025-04-15 DIAGNOSIS — M47.812 ARTHROPATHY OF CERVICAL FACET JOINT: ICD-10-CM

## 2025-04-15 DIAGNOSIS — M47.9 SPONDYLOSIS: ICD-10-CM

## 2025-04-15 DIAGNOSIS — M54.12 CERVICAL RADICULOPATHY: ICD-10-CM

## 2025-04-15 DIAGNOSIS — M50.90 CERVICAL DISC DISEASE: ICD-10-CM

## 2025-04-15 DIAGNOSIS — M43.12 SPONDYLOLISTHESIS, CERVICAL REGION: ICD-10-CM

## 2025-04-15 DIAGNOSIS — Z71.82 EXERCISE COUNSELING: ICD-10-CM

## 2025-04-15 PROCEDURE — 3079F DIAST BP 80-89 MM HG: CPT | Performed by: GENERAL PRACTICE

## 2025-04-15 PROCEDURE — 3074F SYST BP LT 130 MM HG: CPT | Performed by: GENERAL PRACTICE

## 2025-04-15 PROCEDURE — 1126F AMNT PAIN NOTED NONE PRSNT: CPT | Performed by: GENERAL PRACTICE

## 2025-04-15 PROCEDURE — 99214 OFFICE O/P EST MOD 30 MIN: CPT | Performed by: GENERAL PRACTICE

## 2025-04-15 ASSESSMENT — FIBROSIS 4 INDEX: FIB4 SCORE: 1.14

## 2025-04-15 ASSESSMENT — PAIN SCALES - GENERAL: PAINLEVEL_OUTOF10: NO PAIN

## 2025-04-15 ASSESSMENT — PATIENT HEALTH QUESTIONNAIRE - PHQ9: CLINICAL INTERPRETATION OF PHQ2 SCORE: 0

## 2025-04-15 NOTE — PROGRESS NOTES
Physiatry (Physical Medicine and  Rehabilitation)       Patient Name: Mónica Keita   Patient : 1970  PCP: Carlos Tatum D.N.P.  MRN: [id number]     Date of service: See epic    Referring provider: Carlos Tatum D.N.P.    CHIEF COMPLAINT  Chief Complaint   Patient presents with    Follow-Up     Cervical radiculopathy         Mónica Keita is a RIGHT hand dominant 54 y.o. very pleasant female  who presents today with Diagnoses of Cervical radiculopathy, Cervical disc disease, Spondylosis, Spondylolisthesis, cervical region, Exercise counseling, Arthropathy of cervical facet joint, and Bilateral carpal tunnel syndrome were pertinent to this visit.    Verbal consent was acquired by the patient to use [employer] ambient listening note generation during this visit Yes       Medical records review:  I reviewed the note from the referring provider Carlos Tatum D.N.P. including the note dated 24.    Prior Relevant MSK/Interventional Procedures:   Patient has not attempted prior ortho/joint injections.   Patient has not had prior ortho/joint surgery.     HPI:    History of Present Illness  The patient, a 55-year-old female, presents for a follow-up evaluation of cervical pain.    Cervical Pain  She reports a significant improvement in her cervical pain, currently rating it as 0 out of 10 on the pain scale. The patient has been adhering to her prescribed therapeutic regimen, including a current dosage of gabapentin 200 mg twice daily, with plans to reduce the dosage to 100 mg twice daily starting Thursday. She denies any radiculopathy. Additionally, she has incorporated online yoga into her routine.  - Onset: Not specified.  - Location: Cervical region.  - Duration: Not specified.  - Character: Significant improvement, currently 0 out of 10 on the pain scale.  - Alleviating Factors: Adhering to prescribed therapeutic regimen, gabapentin, online yoga.  - Severity: Currently 0 out of 10 on the pain  scale.    The patient reports no systemic symptoms such as unintentional weight loss, fever, chills, or night sweats. She denies any bowel or bladder dysfunction. Her sleep pattern is stable, averaging 6 to 7 hours per night.    MEDICATIONS  Current: Gabapentin        Prior notes  Prefers pharmacological management before injections.  No interest in surgery at this time.  Hx of carpal tunnel syndrome and wears nightly brace which is effective  Patient has tried the following medications:  Flexeril  Mobic  naproxen  Aleve ineffective  Norco ineffective for controlling pain    Previous or Current Therapeutic modalities and interventional therapies:  -Prior prednisone: no   -Home exercises:?yes    -Heat and ice:?yes, ineffective   -Topicals:?no   -TENS unit:?no   -Chiropractic manipulation:?no, not interested   -Acupuncture:?no   -Massage:?yes with temporary relief with cupping    ROS:   Fever, Chills, Sweats: Denies  Involuntary Weight Loss: Denies  Bowel/bladder issues: denies   See HPI.   All other systems reviewed and negative.     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 improve function.      Psychological testing for pain as depression and pain commonly coexist and need to both be treated.     Opioid Risk Score: No value filed.     Interpretation of Opioid Risk Score   Score 0-3 = Low risk of abuse. Do UDS at least once per year.  Score 4-7 = Moderate risk of abuse. Do UDS 1-4 times per year.  Score 8+ = High risk of abuse. Refer to specialist.    PHQ      1/2/2025    11:20 AM 1/16/2025     4:20 PM 4/15/2025     2:40 PM   Depression Screen (PHQ-2/PHQ-9)   PHQ-2 Total Score 0 0 0       Interpretation of PHQ-9 Total Score   Score Severity   1-4 No Depression   5-9 Mild Depression   10-14 Moderate Depression   15-19 Moderately Severe Depression   20-27 Severe Depression      PMHx:   Past Medical History:    Diagnosis Date    Adverse effect of anesthesia     Laryngospasm in the past with extubation    Anemia     Anxiety     Bronchitis 2009    Diabetes (HCC)     type 2    Dissection of cervical artery (HCC)     Elevated liver enzymes     unknown cause    Hypertension     Neuroendocrine 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 at SURGERY [facility]S    VAGINAL HYSTERECTOMY TOTAL  06/17/2014    Performed by Enedina Grant M.D. at SURGERY [facility] ORS    HYSTERECTOMY, VAGINAL  06/04/2014    [facility]    APPENDECTOMY  2014       Family history   Family History   Problem Relation Age of Onset    Heart Disease Father     No Known Problems Sister     Heart Attack Brother         CAD. 3 MI's.     No Known Problems Son     No Known Problems Daughter        Medications: reviewed on epic.   Outpatient Medications Marked as Taking for the 4/15/25 encounter (Office Visit) with Jaxson Laws D.O.   Medication Sig Dispense Refill    gabapentin (NEURONTIN) 100 MG Cap Take 1 to 3 Capsules by mouth at bedtime as needed (pain). 90 Capsule 0    omeprazole (PRILOSEC) 20 MG delayed-release capsule TAKE 1 CAPSULE BY MOUTH DAILY 90 Capsule 3    buPROPion (WELLBUTRIN XL) 300 MG XL tablet TAKE 1 TABLET BY MOUTH EVERY DAY IN THE MORNING. 90 Tablet 3    meloxicam (MOBIC) 15 MG tablet TAKE ONE TABLET BY MOUTH ONE TIME DAILY 30 Tablet 0    lisinopril (PRINIVIL) 20 MG Tab TAKE ONE TABLET BY MOUTH ONE  TIME DAILY 90 Tablet 3    cyclobenzaprine (FLEXERIL) 5 mg tablet Take 1-2 Tablets by mouth 3 times a day as needed for Muscle Spasms. 30 Tablet 0    Continuous Glucose Sensor (FREESTYLE MIN 3 SENSOR) [facility] USE 1 Each every 14 days. 6 Each 3    aspirin (ASA) 81 MG Chew Tab chewable tablet Chew 1 Tablet every evening. 100 Tablet 1    estradiol (VIVELLE) 0.0375 MG/24HR patch Place 1 Patch on the skin two times a week. 16 Patch 2    Cetirizine-Pseudoephedrine (ALLERGY D-12 PO) Take 1 Tablet by mouth 2 times a day.      escitalopram (LEXAPRO) 20 MG tablet Take 2 Tablets by mouth every day. (Patient taking differently: Take 40 mg by mouth every day. 2 tablets= 40mg) 160 Tablet 3    metFORMIN ER (GLUCOPHAGE XR) 750 MG TABLET SR 24 HR TAKE ONE TABLET BY MOUTH TWO TIMES DAILY 180 Tablet 3    hydrOXYzine HCl (ATARAX) 25 MG Tab Take 1 Tablet by mouth 3 times a day as needed for Anxiety. 90 Tablet 3        Allergies:   No Known Allergies    Social Hx:   Social History     Socioeconomic History    Marital status:      Spouse name: Not on file    Number of children: Not on file    Years of education: Not on file    Highest education level: 12th grade   Occupational History    Not on file   Tobacco Use    Smoking status: Never    Smokeless tobacco: Never   Vaping Use    Vaping status: Never Used   Substance and Sexual Activity    Alcohol use: No    Drug use: No    Sexual activity: Yes     Partners: Male     Birth control/protection: Surgical     Comment: .    Other Topics Concern    Not on file   Social History Narrative    Not on file     Social Drivers of Health     Financial Resource Strain: Low Risk  (11/4/2024)    Overall Financial Resource Strain (CARDIA)     Difficulty of Paying Living Expenses: Not hard at all   Food Insecurity: No Food Insecurity (11/4/2024)    Hunger Vital Sign     Worried About Running Out of Food in the Last Year: Never true     Ran Out of Food in the Last Year: Never true  "  Transportation Needs: No Transportation Needs (11/4/2024)    PRAPARE - Transportation     Lack of Transportation (Medical): No     Lack of Transportation (Non-Medical): No   Physical Activity: Insufficiently Active (11/4/2024)    Exercise Vital Sign     Days of Exercise per Week: 2 days     Minutes of Exercise per Session: 20 min   Stress: No Stress Concern Present (11/4/2024)    [employer] [place] [employer] - Occupational Stress Questionnaire     Feeling of Stress : Only a little   Social Connections: Moderately Isolated (11/4/2024)    Social Connection and Isolation Panel [NHANES]     Frequency of Communication with Friends and Family: Twice a week     Frequency of Social Gatherings with Friends and Family: Once a week     Attends Episcopal Services: Never     Active Member of Clubs or Organizations: Yes     Attends Club or Organization Meetings: More than 4 times per year     Marital Status:    Intimate Partner Violence: Not At Risk (11/4/2024)    Humiliation, Afraid, Rape, and Kick questionnaire     Fear of Current or Ex-Partner: No     Emotionally Abused: No     Physically Abused: No     Sexually Abused: No   Housing Stability: Low Risk  (11/4/2024)    Housing Stability Vital Sign     Unable to Pay for Housing in the Last Year: No     Number of Times Moved in the Last Year: 0     Homeless in the Last Year: No         EXAMINATION   Vitals: /88 (BP Location: Right arm, Patient Position: Sitting, BP Cuff Size: Adult)   Pulse 96   Temp 36.8 °C (98.2 °F) (Temporal)   Ht 1.702 m (5' 7\")   Wt 85 kg (187 lb 6.3 oz)   SpO2 92%   Physical Exam:     Body Habitus: Body mass index is 29.35 kg/m².  Appearance: Well-groomed, well-nourished, not disheveled  Eyes: No scleral icterus to suggest severe liver disease, no proptosis to suggest severe hyperthyroid  ENT -no obvious auditory deficits, no external lesions, moist mucus membranes   Skin -no rashes or lesions noted. No appreciable skin " breakdown on exposed skin areas.    Respiratory-  breathing comfortably on room air, no audible wheezing, full sentences  Cardiovascular- No lower extremity edema noted.   Psychiatric- alert and oriented, calm, comfortable, cooperative     Musculoskeletal and Neuro:  Gait and station - normal gait with reciprocal pattern,  no presence/use of ambulatory device, no arm assistance with sit-to-stand, nonantalgic. no loss of balance during exam.  No change in patient's demeanor with exam.    Grossly normal cranial nerve exam  Coordination grossly intact  Shoulder:  protracted shoulders    Physical Exam  - Active cervical range of motion is normal  - No tenderness to palpation of the cervical paraspinal muscles, trapezius muscles, or midline cervical region  - Negative Spurling's test bilaterally  - C5-T2 dermatomes to light touch are intact and symmetrical bilaterally  - Manual motor testing is 5 out of 5 from C5-T1  - Negative [place] test  - Deep tendon reflexes are equal and symmetrical       MEDICAL DECISION MAKING    Medical records review: see under HPI section.     DATA    Labs:   Lab Results   Component Value Date/Time    SODIUM 142 01/23/2025 01:15 PM    POTASSIUM 4.1 01/23/2025 01:15 PM    CHLORIDE 105 01/23/2025 01:15 PM    CO2 27 01/23/2025 01:15 PM    ANION 10.0 01/23/2025 01:15 PM    GLUCOSE 62 (L) 01/23/2025 01:15 PM    BUN 15 01/23/2025 01:15 PM    CREATININE 0.72 01/23/2025 01:15 PM    CALCIUM 8.8 01/23/2025 01:15 PM    ASTSGOT 31 01/23/2025 01:15 PM    ALTSGPT 40 01/23/2025 01:15 PM    TBILIRUBIN 0.3 01/23/2025 01:15 PM    ALBUMIN 3.9 01/23/2025 01:15 PM    TOTPROTEIN 6.7 01/23/2025 01:15 PM    GLOBULIN 2.8 01/23/2025 01:15 PM    AGRATIO 1.4 01/23/2025 01:15 PM   ]    Lab Results   Component Value Date/Time    PROTHROMBTM 14.1 12/06/2022 09:17 AM    INR 1.10 12/06/2022 09:17 AM        Lab Results   Component Value Date/Time    WBC 8.3 11/04/2024 01:30 PM    RBC 5.31 11/04/2024 01:30 PM    HEMOGLOBIN  13.9 11/04/2024 01:30 PM    HEMATOCRIT 42.8 11/04/2024 01:30 PM    MCV 80.6 (L) 11/04/2024 01:30 PM    MCH 26.2 (L) 11/04/2024 01:30 PM    MCHC 32.5 11/04/2024 01:30 PM    MPV 9.4 11/04/2024 01:30 PM    NEUTSPOLYS 68.40 11/04/2024 01:30 PM    LYMPHOCYTES 23.00 11/04/2024 01:30 PM    MONOCYTES 5.10 11/04/2024 01:30 PM    EOSINOPHILS 2.80 11/04/2024 01:30 PM    BASOPHILS 0.50 11/04/2024 01:30 PM    HYPOCHROMIA 1+ 07/26/2021 11:25 AM    ANISOCYTOSIS 1+ 12/05/2022 01:50 PM        Lab Results   Component Value Date/Time    HBA1C 6.6 (H) 01/23/2025 01:15 PM        Imaging:   I personally reviewed following images, these are my reads  Cervical MRI 11/4/2024: Straightening of cervical lordosis, mild degenerative changes, multiple disc bulges, mild central canal stenosis at C4-C5 C5-C6 C6-C7; neuroforaminal narrowing mild bilaterally C3-C4  &C4-C5, mild right and moderate left C5-C6, mild bilaterally C6-C7; facet arthropathy    IMAGING radiology reads. I reviewed the following radiology reads      Results for orders placed during the hospital encounter of 02/06/22    [id number]-R/O    Impression  1.  Sludge is noted in the gallbladder.    2.  No biliary ductal dilatation. No filling defects or strictures identified.    3.  No pancreatic ductal dilatation is identified.    4.  Findings are consistent with pancreatitis involving the tail of the pancreas.   Results for orders placed during the hospital encounter of 11/04/24    MR-BRAIN-W/O    Impression  MRI of the brain without contrast within normal limits.             Results for orders placed during the hospital encounter of 11/04/24    MR-CERVICAL SPINE-W/O    Impression  1.  Mild degenerative disease in the cervical spine as described above.  2.  Small left thyroid nodule.  3.  It is difficult to evaluate the vasculature in this MRI cervical spine. When compared with the previous MRI dated 9/23/2022, again noted there is focal dilatation of the distal right internal  carotid artery at the skull base consistent with  pseudoaneurysm.There is no large intramural hematoma. The flow voids of the internal carotid and vertebral arteries are patent in this limited evaluation. However tiny hematoma, vascular irregularities and double-lumen cannot be assessed in this study.  4.  If there is clinical concern for dissection CT angiogram is recommended.                Results for orders placed during the hospital encounter of 09/23/22    MR-MRA NECK-W/O    Impression  Overall stable appearance of the right carotid dissection involving the right ICA segment just below the skull base.There is no significant luminal compromise identified. Proximal and distal vessel also demonstrate normal caliber.    The false lumen/aneurysm component measures approximately 4 x 6 mm in the axial plane.    Results for orders placed during the hospital encounter of 06/23/22    MR-MRA NECK-WITH & W/O AND SEQUENCES    Impression  Acute-subacute dissection with intramural hematoma involving the distal left cervical ICA just below the skull base. There is at least 50% luminal narrowing involving this segment.    Fusiform dilatation/pseudoaneurysm of the right distal cervical ICA just below the skull base likely related to remote dissection. No significant luminal compromise.    Small pseudoaneurysm involving the distal V2 segment of the right vertebral artery also likely related to remote dissection. No significant luminal compromise.                                    Results for orders placed during the hospital encounter of 12/05/22    DX-CHEST-2 VIEWS    Impression  Worsening bilateral multifocal pulmonary consolidation again could be due to pneumonia or pulmonary edema with bilateral pleural effusions.                                 Results for orders placed during the hospital encounter of 01/15/21    DX-SHOULDER 2+ RIGHT    Impression  Mild AC joint osteoarthritis    Mild humeral head cystic change may  indicate internal impingement                ASSESSMENT AND PLAN:  Mónica Keita   : 1970   Past medical history of type 2 diabetes, transaminitis, insomnia, pancreatitis, neuroendocrine neoplasm of stomach, leukocytosis, electrolyte abnormalities, chronic nonalcoholic liver disease, carotid artery dissection, anxiety, allergic rhinitis, acute stress reaction, chronic anticoagulation secondary to carotid artery dissection right    Diagnoses and all orders for this visit:  1. Cervical radiculopathy        2. Cervical disc disease        3. Spondylosis        4. Spondylolisthesis, cervical region        5. Exercise counseling        6. Arthropathy of cervical facet joint        7. Bilateral carpal tunnel syndrome              Assessment & Plan  Cervicalgia  - Significant improvement attributed to adherence to physical therapy and use of gabapentin  - Proactive symptom management through regular exercises expected to prevent further complications  - Gradual reduction of gabapentin dosage deemed appropriate    - 50% reduction considered safe due to low initial dose    - Abrupt discontinuation may cause transient and mild withdrawal symptoms  - Encouraged to continue current management strategies    - Temporary increase in gabapentin dosage before resuming tapering is acceptable if necessary  - Advised to monitor for symptoms such as radiating pain down the arms, localized pain, trauma, or injury    - Contact for reassessment if symptoms occur    Follow-up  - Patient will follow up as needed    -Lifestyle: exercise counseling discussed.  Extensive discussion regarding treatment options, at this time recommending the following:    No orders of the defined types were placed in this encounter.      -Medications/Modalities: Previously prescribed medications  -Therapy (PT/OT/Aquatherapy):  encouraged to continue  on strengthening and stretching.    -Home exercise program: encouraged    -Diagnostic workup:  reviewed today as above;   -Interventional program: not indicated at this time and will consider at a later time  -Referrals: none required at this time    Follow-up:   Return to clinic in 12 weeks for follow-up of symptomatology  Patient expressed understanding of the management plan. Patient (and Family Members) was/were encouraged to call if any worries, issues, problems or concerns prior to the next visit     Please note that this dictation was created using voice recognition software. I have made every reasonable attempt to correct obvious errors but there may be errors of grammar and content that I may have overlooked prior to finalization of this note.      Jaxson Laws, DO  Physical Medicine and Rehabilitation  [facility]         DANIELLE Tatum, TANISHA.N.P.   Carlos Diaz D.KATIE.P.

## 2025-04-16 DIAGNOSIS — N95.1 VASOMOTOR SYMPTOMS DUE TO MENOPAUSE: ICD-10-CM

## 2025-04-16 DIAGNOSIS — R23.2 HOT FLASHES: ICD-10-CM

## 2025-04-16 RX ORDER — ESTRADIOL 0.04 MG/D
PATCH, EXTENDED RELEASE TRANSDERMAL
Qty: 16 PATCH | Refills: 3 | Status: SHIPPED | OUTPATIENT
Start: 2025-04-16

## 2025-04-16 NOTE — TELEPHONE ENCOUNTER
Received request via: Pharmacy    Was the patient seen in the last year in this department? Yes    Does the patient have an active prescription (recently filled or refills available) for medication(s) requested? No    [facility]    Does the patient have [facility] Plus and need 100-day supply? (This applies to ALL medications) Patient does not have SCP

## 2025-05-02 DIAGNOSIS — E11.65 TYPE 2 DIABETES MELLITUS WITH HYPERGLYCEMIA, WITHOUT LONG-TERM CURRENT USE OF INSULIN (HCC): ICD-10-CM

## 2025-05-02 NOTE — TELEPHONE ENCOUNTER
Received request via: Pharmacy    Was the patient seen in the last year in this department? Yes    Does the patient have an active prescription (recently filled or refills available) for medication(s) requested? No    [facility]    Does the patient have [facility] and need 100-day supply? (This applies to ALL medications) Patient does not have SCP

## 2025-05-04 RX ORDER — METFORMIN HYDROCHLORIDE 750 MG/1
750 TABLET, EXTENDED RELEASE ORAL 2 TIMES DAILY
Qty: 180 TABLET | Refills: 2 | Status: SHIPPED | OUTPATIENT
Start: 2025-05-04

## 2025-05-19 RX ORDER — ESCITALOPRAM OXALATE 20 MG/1
40 TABLET ORAL DAILY
Qty: 160 TABLET | Refills: 0 | Status: SHIPPED | OUTPATIENT
Start: 2025-05-19

## 2025-05-19 NOTE — TELEPHONE ENCOUNTER
Received request via: Pharmacy    Was the patient seen in the last year in this department? Yes    Does the patient have an active prescription (recently filled or refills available) for medication(s) requested? No    [facility]    Does the patient have [facility] and need 100-day supply? (This applies to ALL medications) Patient does not have SCP    Last office visit  01/28/2025  Last labs 01/23/2025

## 2025-06-18 ENCOUNTER — HOSPITAL ENCOUNTER (OUTPATIENT)
Facility: MEDICAL CENTER | Age: 55
End: 2025-06-18
Attending: NURSE PRACTITIONER
Payer: COMMERCIAL

## 2025-06-18 ENCOUNTER — OFFICE VISIT (OUTPATIENT)
Dept: URGENT CARE | Facility: CLINIC | Age: 55
End: 2025-06-18
Payer: COMMERCIAL

## 2025-06-18 VITALS
RESPIRATION RATE: 16 BRPM | BODY MASS INDEX: 29.44 KG/M2 | HEIGHT: 67 IN | OXYGEN SATURATION: 97 % | TEMPERATURE: 97.2 F | DIASTOLIC BLOOD PRESSURE: 78 MMHG | SYSTOLIC BLOOD PRESSURE: 102 MMHG | HEART RATE: 107 BPM | WEIGHT: 187.6 LBS

## 2025-06-18 DIAGNOSIS — S81.801A OPEN WOUND OF RIGHT LOWER LEG, INITIAL ENCOUNTER: ICD-10-CM

## 2025-06-18 DIAGNOSIS — L08.9 SKIN INFECTION: ICD-10-CM

## 2025-06-18 PROCEDURE — 87070 CULTURE OTHR SPECIMN AEROBIC: CPT

## 2025-06-18 PROCEDURE — 99213 OFFICE O/P EST LOW 20 MIN: CPT | Performed by: NURSE PRACTITIONER

## 2025-06-18 PROCEDURE — 3074F SYST BP LT 130 MM HG: CPT | Performed by: NURSE PRACTITIONER

## 2025-06-18 PROCEDURE — 87077 CULTURE AEROBIC IDENTIFY: CPT

## 2025-06-18 PROCEDURE — 87205 SMEAR GRAM STAIN: CPT

## 2025-06-18 PROCEDURE — 3078F DIAST BP <80 MM HG: CPT | Performed by: NURSE PRACTITIONER

## 2025-06-18 RX ORDER — DOXYCYCLINE HYCLATE 100 MG
100 TABLET ORAL 2 TIMES DAILY
Qty: 14 TABLET | Refills: 0 | Status: SHIPPED | OUTPATIENT
Start: 2025-06-18 | End: 2025-06-25

## 2025-06-18 ASSESSMENT — ENCOUNTER SYMPTOMS
FEVER: 0
CONSTITUTIONAL NEGATIVE: 1

## 2025-06-18 ASSESSMENT — VISUAL ACUITY: OU: 1

## 2025-06-18 ASSESSMENT — FIBROSIS 4 INDEX: FIB4 SCORE: 1.14

## 2025-06-18 NOTE — PROGRESS NOTES
"Subjective:     Mónica Keita is a 55 y.o. female who presents for Sore (RT leg x3 wks)       Other  This is a new problem. Pertinent negatives include no fever.     Ambient listening software ([employer]) used during this encounter with the consent of the patient. The following text is AI-assisted:    History of Present Illness  The patient presents with a non-healing leg sore for 3 weeks, likely from a cat scratch. Mild pain reported, no systemic symptoms or calf pain. Managed with hydrogen peroxide and covered.    Tdap 9/29/2023.    Review of Systems   Constitutional: Negative.  Negative for fever.   All other systems reviewed and are negative.  Refer to HPI for additional details.    During this visit, appropriate PPE was worn, and hand hygiene was performed.    PMH:  has a past medical history of Adverse effect of anesthesia, Anemia, Anxiety, Bronchitis (2009), Diabetes (HCC), Dissection of cervical artery (HCC), Elevated liver enzymes, Hypertension, Neuroendocrine tumor (11/2021), and Pneumonia (2009).    MEDS: Current Medications[1]    ALLERGIES: Allergies[2]  SURGHX: Past Surgical History[3]  SOCHX:  reports that she has never smoked. She has never used smokeless tobacco. She reports that she does not drink alcohol and does not use drugs.    FH: Per HPI as applicable/pertinent.      Objective:     /78 (BP Location: Left arm, Patient Position: Sitting, BP Cuff Size: Adult)   Pulse (!) 107   Temp 36.2 °C (97.2 °F) (Temporal)   Resp 16   Ht 1.702 m (5' 7\")   Wt 85.1 kg (187 lb 9.6 oz)   LMP 06/14/2012   SpO2 97%   BMI 29.38 kg/m²     Physical Exam  Nursing note reviewed.   Constitutional:       General: She is not in acute distress.     Appearance: She is well-developed. She is not ill-appearing or toxic-appearing.   Eyes:      General: Vision grossly intact.   Cardiovascular:      Rate and Rhythm: Normal rate.      Pulses: Normal pulses.   Pulmonary:      Effort: Pulmonary effort is " normal. No respiratory distress.   Musculoskeletal:         General: No deformity. Normal range of motion.      Right foot: Normal capillary refill. Normal pulse.   Skin:     General: Skin is warm and dry.      Capillary Refill: Capillary refill takes less than 2 seconds.      Coloration: Skin is not pale.             Comments: Approx 1.5 cm round ulceration at anterior right lower leg with central slough with surrounding erythema, minimal swelling; no fluctuance or discharge, no streaking; distal NVI   Neurological:      Mental Status: She is alert and oriented to person, place, and time.      Motor: No weakness.   Psychiatric:         Behavior: Behavior normal. Behavior is cooperative.       Assessment/Plan:     1. Open wound of right lower leg, initial encounter  - CULTURE WOUND W/ GRAM STAIN; Future  - doxycycline (VIBRAMYCIN) 100 MG Tab; Take 1 Tablet by mouth 2 times a day for 7 days.  Dispense: 14 Tablet; Refill: 0    2. Skin infection  - CULTURE WOUND W/ GRAM STAIN; Future  - doxycycline (VIBRAMYCIN) 100 MG Tab; Take 1 Tablet by mouth 2 times a day for 7 days.  Dispense: 14 Tablet; Refill: 0    Wound care performed by MA. Cx obtained. Dressing applied.    Rx as above sent electronically.     Continue basic wound care, dressing changes PRN.    Monitor. Warning signs reviewed. Immediate/return precautions advised.     Differential diagnosis, natural history, supportive care, over-the-counter symptom management per 's instructions, close monitoring, and indications for immediate follow-up discussed.     All questions answered. Patient agrees with the plan of care.    [facility]         [1]   Current Outpatient Medications:     doxycycline (VIBRAMYCIN) 100 MG Tab, Take 1 Tablet by mouth 2 times a day for 7 days., Disp: 14 Tablet, Rfl: 0    escitalopram (LEXAPRO) 20 MG tablet, Take 2 Tablets by mouth every day., Disp: 160 Tablet, Rfl: 0    metFORMIN ER (GLUCOPHAGE XR) 750  MG TABLET SR 24 HR, TAKE ONE TABLET BY MOUTH TWO TIMES DAILY, Disp: 180 Tablet, Rfl: 2    estradiol (VIVELLE) 0.0375 MG/24HR patch, apply one patch topically two times a week, Disp: 16 Patch, Rfl: 3    gabapentin (NEURONTIN) 100 MG Cap, Take 1 to 3 Capsules by mouth at bedtime as needed (pain)., Disp: 90 Capsule, Rfl: 0    omeprazole (PRILOSEC) 20 MG delayed-release capsule, TAKE 1 CAPSULE BY MOUTH DAILY, Disp: 90 Capsule, Rfl: 3    buPROPion (WELLBUTRIN XL) 300 MG XL tablet, TAKE 1 TABLET BY MOUTH EVERY DAY IN THE MORNING., Disp: 90 Tablet, Rfl: 3    meloxicam (MOBIC) 15 MG tablet, TAKE ONE TABLET BY MOUTH ONE TIME DAILY, Disp: 30 Tablet, Rfl: 0    lisinopril (PRINIVIL) 20 MG Tab, TAKE ONE TABLET BY MOUTH ONE TIME DAILY, Disp: 90 Tablet, Rfl: 3    cyclobenzaprine (FLEXERIL) 5 mg tablet, Take 1-2 Tablets by mouth 3 times a day as needed for Muscle Spasms., Disp: 30 Tablet, Rfl: 0    Continuous Glucose Sensor (FREESTYLE MIN 3 SENSOR) Misc, USE 1 Each every 14 days., Disp: 6 Each, Rfl: 3    aspirin (ASA) 81 MG Chew Tab chewable tablet, Chew 1 Tablet every evening., Disp: 100 Tablet, Rfl: 1    Cetirizine-Pseudoephedrine (ALLERGY D-12 PO), Take 1 Tablet by mouth 2 times a day., Disp: , Rfl:     hydrOXYzine HCl (ATARAX) 25 MG Tab, Take 1 Tablet by mouth 3 times a day as needed for Anxiety., Disp: 90 Tablet, Rfl: 3  [2] No Known Allergies  [3]   Past Surgical History:  Procedure Laterality Date    [place] UPPER GI ENDOSCOPY,DIAGNOSIS N/A 11/01/2021    Procedure: GASTROSCOPY- WITH ENDOSCOPIC MUCOSAL RESECTION;  Surgeon: Edwin Aguillon M.D.;  Location: [facility];  Service: EUS    EGD W/ENDOSCOPIC ULTRASOUND N/A 11/01/2021    Procedure: EGD, WITH ENDOSCOPIC US - UPPER RADIAL;  Surgeon: Edwin Aguillon M.D.;  Location: [facility];  Service: EUS    PELVIC EXAM UNDER ANESTHESIA  06/17/2014    Performed by Enedina Grant M.D. at SURGERY [facility] ORS    VAGINAL HYSTERECTOMY TOTAL   06/17/2014    Performed by Enedina Grant M.D. at SURGERY [facility]    HYSTERECTOMY, VAGINAL  06/04/2014    [facility]    APPENDECTOMY  2014

## 2025-06-19 LAB
GRAM STN SPEC: NORMAL
SIGNIFICANT IND 70042: NORMAL
SITE SITE: NORMAL
SOURCE SOURCE: NORMAL

## 2025-06-22 LAB
BACTERIA WND AEROBE CULT: ABNORMAL
BACTERIA WND AEROBE CULT: ABNORMAL
GRAM STN SPEC: ABNORMAL
SIGNIFICANT IND 70042: ABNORMAL
SITE SITE: ABNORMAL
SOURCE SOURCE: ABNORMAL

## 2025-06-23 ENCOUNTER — RESULTS FOLLOW-UP (OUTPATIENT)
Dept: URGENT CARE | Facility: PHYSICIAN GROUP | Age: 55
End: 2025-06-23
Payer: COMMERCIAL

## 2025-06-23 DIAGNOSIS — S81.801A OPEN WOUND OF RIGHT LOWER LEG, INITIAL ENCOUNTER: ICD-10-CM

## 2025-06-23 DIAGNOSIS — L08.9 SKIN INFECTION: ICD-10-CM

## 2025-06-24 RX ORDER — CEPHALEXIN 500 MG/1
500 CAPSULE ORAL 4 TIMES DAILY
Qty: 28 CAPSULE | Refills: 0 | Status: SHIPPED | OUTPATIENT
Start: 2025-06-24 | End: 2025-07-01

## 2025-06-25 NOTE — Clinical Note
REFERRAL APPROVAL NOTICE         Sent on June 25, 2025                   Mónica Keita  [address] [place] [address]                   Dear Ms. Keita,    After a careful review of the medical information and benefit coverage, [facility] has processed your referral. See below for additional details.    If applicable, you must be actively enrolled with your insurance for coverage of the authorized service. If you have any questions regarding your coverage, please contact your insurance directly.    REFERRAL INFORMATION   Referral #:  [id number]  Referred-To Department    Referred-By Provider:  Wound Care    ANDI Morel   [facility] [place]      [address]  [address]  [place] [id number] [phone] [address]  [place] [id number] [phone]    Referral Start Date:  06/24/2025  Referral End Date:   06/24/2026             SCHEDULING  If you do not already have an appointment, please call [phone] to make an appointment.     MORE INFORMATION  If you do not already have a [employer] account, sign up at: [email or web address]  You can access your medical information, make appointments, see lab results, billing information, and more.  If you have questions regarding this referral, please contact  the [facility] Referrals department at:             [phone]. Monday - Friday 8:00AM - 5:00PM.     Sincerely,    [facility]

## 2025-07-02 ENCOUNTER — OFFICE VISIT (OUTPATIENT)
Dept: WOUND CARE | Facility: MEDICAL CENTER | Age: 55
End: 2025-07-02
Attending: STUDENT IN AN ORGANIZED HEALTH CARE EDUCATION/TRAINING PROGRAM
Payer: COMMERCIAL

## 2025-07-02 DIAGNOSIS — F41.9 ANXIETY: ICD-10-CM

## 2025-07-02 DIAGNOSIS — E11.65 TYPE 2 DIABETES MELLITUS WITH HYPERGLYCEMIA, WITHOUT LONG-TERM CURRENT USE OF INSULIN (HCC): ICD-10-CM

## 2025-07-02 DIAGNOSIS — E11.622 DIABETIC LEG ULCER (HCC): Primary | ICD-10-CM

## 2025-07-02 DIAGNOSIS — L97.909 DIABETIC LEG ULCER (HCC): Primary | ICD-10-CM

## 2025-07-02 PROCEDURE — 11042 DBRDMT SUBQ TIS 1ST 20SQCM/<: CPT | Performed by: STUDENT IN AN ORGANIZED HEALTH CARE EDUCATION/TRAINING PROGRAM

## 2025-07-02 PROCEDURE — 99214 OFFICE O/P EST MOD 30 MIN: CPT | Mod: 25 | Performed by: STUDENT IN AN ORGANIZED HEALTH CARE EDUCATION/TRAINING PROGRAM

## 2025-07-02 PROCEDURE — 99214 OFFICE O/P EST MOD 30 MIN: CPT

## 2025-07-02 PROCEDURE — 11042 DBRDMT SUBQ TIS 1ST 20SQCM/<: CPT

## 2025-07-02 NOTE — WOUND TEAM
Advanced Wound Care   [facility]   [address]   [address]   [place] [address]   [phone] Fax: [phone]        DME: [facility]  Duration of Supply Order: 60 days  Dispense as written.        Wound 07/02/25 Leg Anterior;Lower Right full thickness (Active)   Wound Image    07/02/25 1600   Site Assessment Pink;Yellow 07/02/25 1600   Periwound Assessment Blanchable erythema 07/02/25 1600   Margins Unattached edges 07/02/25 1600   Closure Secondary intention 07/02/25 1600   Drainage Amount moderate 07/02/25 1600   Drainage Description Serosanguineous 07/02/25 1600   Treatments Cleansed;Topical Lidocaine;Provider debridement;Site care 07/02/25 1600   Wound Cleansing Foam Cleanser/Washcloth;Hypochlorus Acid 07/02/25 1600   Periwound Protectant No-sting Skin Prep 07/02/25 1600   Dressing Status Clean;Dry;Intact 07/02/25 1600   Dressing Changed New 07/02/25 1600   Dressing Cleansing/Solutions Not Applicable 07/02/25 1600   Dressing Options Honey Gel;Hydrofiber;Silicone Adhesive Foam 07/02/25 1600   Dressing Change/Treatment Frequency Every 48 hrs, and As Needed 07/02/25 1600   Wound Team Following Weekly 07/02/25 1600   Non-staged Wound Description Full thickness 07/02/25 1600   Wound Length (cm) 1.3 cm 07/02/25 1600   Wound Width (cm) 1.2 cm 07/02/25 1600   Wound Depth (cm) 0.2 cm 07/02/25 1600   Wound Surface Area (cm^2) 1.23 cm^2 07/02/25 1600   Wound Volume (cm^3) 0.163 cm^3 07/02/25 1600   Post-Procedure Length (cm) 1.4 cm 07/02/25 1600   Post-Procedure Width (cm) 1.2 cm 07/02/25 1600   Post-Procedure Depth (cm) 0.2 cm 07/02/25 1600   Post-Procedure Surface Area (cm^2) 1.32 cm^2 07/02/25 1600   Post-Procedure Volume (cm^3) 0.176 cm^3 07/02/25 1600   Tunneling (cm) 0 cm 07/02/25 1600   Undermining (cm) 0 cm 07/02/25 1600   Wound Odor None 07/02/25 1600   Pulses Left;Right;DP;PT;2+ 07/02/25 1600   Right Foot Monofilament 10-point exam (Sensate) 7/10 07/02/25 1600   Left  Foot Monofilament 10-point exam (Sensate) 10/10 07/02/25 1600   Exposed Structures Adipose 07/02/25 1600         PROCEDURE: Provider debridement using curette to remove nonviable tissue from wound bed(s). 2% topical Lidocaine applied prior to debridement with ~5 minute dwell time. Patient tolerated well; denied pain.    ASSESSMENT AND PLAN:   There are no diagnoses linked to this encounter.    Patient to change dressing 3 times per week. Cleanse wound(s) with saline and gauze. Patient/caregiver to apply dressing as instructed.

## 2025-07-02 NOTE — PATIENT INSTRUCTIONS
"The following information is a summary of the education provided in the clinic today. This is not an exhaustive list of the education provided during your appointment.       DRESSING CHANGES    Keep your wound dressing clean, dry, and intact. Change your dressing every 2 days AND/OR if the dressing becomes soiled, leaks, gets wet, or falls off.      You may  shower with the dressing(s) off on dressing change days. Please do not take baths, or swim in the ocean, lakes, rivers, pools, or hot tubs.      Wounds do not need to \"air out\" or \"breathe\". Gently dry your wound before placing a new dressing.     After you get out of the shower, wash the wound a second time (with soap and water, wound cleanser, or saline). Gently dry the wound before you place a new dressing.       If you need to change your dressings at home, you should wash your wound. Use normal saline, wound cleanser, hypochlorous acid, or unscented soap and water. Do not use hydrogen peroxide or rubbing alcohol to clean your wounds. Hydrogen peroxide and rubbing alcohol will damage new cells and tissue. Do not use betadine or iodine unless told to by your wound care team.    Do not soak your wounds in epsom salt baths. This can worsen your wound(s) or delay wound healing. It can also lead to infection or maceration (tissue is too wet).     If you do not have home health, the clinic will give you with leftover supplies from your appointment. We do not give out extra dressing supplies. We will order you supplies through a [employer] company. Your insurance may or may not pay for all these supplies. The company will reach out to you if insurance does not cover supplies. These supplies will be sent to your home within a few days. If you do have home health, they will provide wound care supplies.     The dressings we use may change as your wound changes.       GENERAL HEALTH ADVICE   -High blood sugar, like with diabetes, can delay or reverse wound healing by " impacting your immune system. It also increases the chances of infection. Wounds heal best when your blood sugar is consistently less than 140 mg/dL. It is important to check your blood sugar regularly.       CLINIC INFORMATION  The clinic's hours are Monday-Friday, 7:30 AM to 5:00 PM. We are closed most holidays and on weekends. If you leave us a message, please allow 24 hours for someone to return your call. If you have concerns or are having a medical emergency, call 911 or go to the hospital emergency room.     You might not see the same nurse or provider every visit.     If you notice any large changes in your wound(s), or signs of infection (redness, swelling, localized heat, increased pain, fever > 101 F, chills, nausea/vomiting) or have any questions about your home care instructions, please call the wound center at [phone]. If it's after hours, contact your primary care physician or go to the hospital emergency room. If you are admitted to any hospital, you will need a new referral to come back to the wound clinic. Any wound care appointments that you already have may be cancelled.    If you are 5 or more minutes late for an appointment, we reserve the right to cancel and reschedule that appointment. For example, if your appointment is at 1:00 PM, and you arrive at 1:06 PM, you are more than five minutes late and might not be seen. If you are consistently late or not coming to your appointments (typically 3 late cancellations and/or no shows), we reserve the right to cancel your future appointments or discharge you from the clinic. It is then your responsibility to obtain a new referral if wound care is still needed.

## 2025-07-02 NOTE — WOUND TEAM
"The following information is a summary of the education provided in the clinic today. This is not an exhaustive list of the education provided during your appointment.   Patient verbalized understanding to all instructions.    DRESSING CHANGES    Keep your wound dressing clean, dry, and intact. Change your dressing as instructed every 2 days AND/OR if the dressing becomes soiled, leaks, gets wet, or falls off.      You may  shower with the dressing(s) off on dressing change days. Please do not take baths, or swim in the ocean, lakes, rivers, pools, or hot tubs.      Wounds do not need to \"air out\" or \"breathe\". Gently dry your wound before placing a new dressing.     After you get out of the shower, wash the wound a second time (with soap and water, wound cleanser, or saline). Gently dry the wound before you place a new dressing.       If you need to change your dressings at home, you should wash your wound. Use normal saline, wound cleanser, hypochlorous acid, or unscented soap and water. Do not use hydrogen peroxide or rubbing alcohol to clean your wounds. Hydrogen peroxide and rubbing alcohol will damage new cells and tissue. Do not use betadine or iodine unless told to by your wound care team.    Do not soak your wounds in epsom salt baths. This can worsen your wound(s) or delay wound healing. It can also lead to infection or maceration (tissue is too wet).     If you do not have home health, the clinic will give you with leftover supplies from your appointment. We do not give out extra dressing supplies. We will order you supplies through a [employer] company. Your insurance may or may not pay for all these supplies. The company will reach out to you if insurance does not cover supplies. These supplies will be sent to your home within a few days. If you do have home health, they will provide wound care supplies.     The dressings we use may change as your wound changes.       GENERAL HEALTH ADVICE   -High blood sugar, " like with diabetes, can delay or reverse wound healing by impacting your immune system. It also increases the chances of infection. Wounds heal best when your blood sugar is consistently less than 140 mg/dL. It is important to check your blood sugar regularly.       *Patient became diaphoretic during debridement today due to stated c/o anxiety, was concerned she may pass out, denied pain. Patient laid back supine in chair, given cold washcloth and episode passed by end of appointment and walked out of clinic independently without complaint.

## 2025-07-03 ASSESSMENT — ENCOUNTER SYMPTOMS
FEVER: 0
CLAUDICATION: 0
CHILLS: 0

## 2025-07-03 NOTE — PROGRESS NOTES
Provider Encounter- Diabetic Leg Ulcer      HISTORY OF PRESENT ILLNESS  Wound History:    START OF CARE IN CLINIC: 7/2/2025    REFERRING PROVIDER: Sonu Johnston      WOUND- Diabetic Lower Extremity Ulcer   LOCATION: Right lower extremity ulcer   HISTORY:  55-year-old female with past medical history of diabetes mellitus type 2, hypertension, anxiety. Patient reports wound, likely from cat scratch 5 weeks ago. She was treating wound at home with hydrogen peroxide and neosporin. She was seen at urgent care for concerns of infection, wound culture positive for beta hemolytic strep group A and was started on Doxycycline by urgent care. Patient was referred to [facility] for further wound care.    Pertinent Medical History: See above      TOBACCO USE:   Denies using    Patient's problem list, allergies, and current medications reviewed and updated in Epic    Interval History:  7/2/2025: Clinic visit with Isiah Brewer MD. patient reports feeling normal state of health, denies any fever chills.  Patient wound is chronic and nonhealing, she denies significant drainage.  She reports pain and periwound erythema has improved significantly after completing antibiotics as prescribed by urgent care. She denies any previous ulcers and denies any chronic lower extremity edema.    REVIEW OF SYSTEMS:   Review of Systems   Constitutional:  Negative for chills and fever.   Cardiovascular:  Negative for claudication and leg swelling.   Skin:         Right lower extremity ulcer       PHYSICAL EXAMINATION:   [facility] 06/14/2012     Physical Exam  Constitutional:       General: She is not in acute distress.     Appearance: Normal appearance.   Cardiovascular:      Comments: Easily palpable pedal pulses  Pulmonary:      Effort: Pulmonary effort is normal. No respiratory distress.   Musculoskeletal:      Right lower leg: No edema.      Left lower leg: No edema.   Skin:     Comments: Right lower extremity diabetic ulcer: Nonhealing wound with  thick fibrotic slough / base. Moderate serous drainage. No odor. Mild periwound erythema, which appears to be due to her pale complexion and inflammation rather than infection.   Neurological:      Mental Status: She is alert.         Monofilament testing with a 10 gram force: sensation intact: decreased on right  Visual Inspection: Feet without maceration, ulcers, fissures.  Pedal pulses: intact bilaterally     WOUND ASSESSMENT  Wound 07/02/25 Leg Anterior;Lower Right full thickness (Active)   Wound Image    07/02/25 1600   Site Assessment Pink;Yellow 07/02/25 1600   Periwound Assessment Blanchable erythema 07/02/25 1600   Margins Unattached edges 07/02/25 1600   Closure Secondary intention 07/02/25 1600   Drainage Amount Moderate 07/02/25 1600   Drainage Description Serosanguineous 07/02/25 1600   Treatments Cleansed;Topical Lidocaine;Provider debridement;Site care 07/02/25 1600   Wound Cleansing Foam Cleanser/Washcloth;Hypochlorus Acid 07/02/25 1600   Periwound Protectant No-sting Skin Prep 07/02/25 1600   Dressing Status Clean;Dry;Intact 07/02/25 1600   Dressing Changed New 07/02/25 1600   Dressing Cleansing/Solutions Not Applicable 07/02/25 1600   Dressing Options Honey Gel;Hydrofiber;Silicone Adhesive Foam 07/02/25 1600   Dressing Change/Treatment Frequency Every 48 hrs, and As Needed 07/02/25 1600   Wound Team Following Weekly 07/02/25 1600   Non-staged Wound Description Full thickness 07/02/25 1600   Wound Length (cm) 1.3 cm 07/02/25 1600   Wound Width (cm) 1.2 cm 07/02/25 1600   Wound Depth (cm) 0.2 cm 07/02/25 1600   Wound Surface Area (cm^2) 1.23 cm^2 07/02/25 1600   Wound Volume (cm^3) 0.163 cm^3 07/02/25 1600   Post-Procedure Length (cm) 1.4 cm 07/02/25 1600   Post-Procedure Width (cm) 1.2 cm 07/02/25 1600   Post-Procedure Depth (cm) 0.2 cm 07/02/25 1600   Post-Procedure Surface Area (cm^2) 1.32 cm^2 07/02/25 1600   Post-Procedure Volume (cm^3) 0.176 cm^3 07/02/25 1600   Tunneling (cm) 0 cm 07/02/25  1600   Undermining (cm) 0 cm 07/02/25 1600   Wound Odor None 07/02/25 1600   Pulses Left;Right;DP;PT;2+ 07/02/25 1600   Right Foot Monofilament 10-point exam (Sensate) 7/10 07/02/25 1600   Left Foot Monofilament 10-point exam (Sensate) 10/10 07/02/25 1600   Exposed Structures Adipose 07/02/25 1600   Number of days: 1       PROCEDURE: Excisional debridement of right lower extremity wound  -2% viscous lidocaine applied topically to wound bed for approximately 5 minutes prior to debridement  -Curette used to debride wound bed and periwound callus.  Excisional debridement was performed to remove devitalized tissue until healthy, bleeding tissue was visualized.   Entire surface of wound, 1.32 cm2 debrided.  Tissue debrided into the subcutaneous layer.  -Bleeding controlled with manual pressure.    -Wound care completed by wound RN, refer to flowsheet  -Patient tolerated the procedure well, without c/o pain or discomfort.       Pertinent Labs and Diagnostics:         Labs:     A1c:   Lab Results   Component Value Date/Time    HBA1C 6.6 (H) 01/23/2025 01:15 PM          IMAGING: No results found.    VASCULAR STUDIES: No results found.    LAST  WOUND CULTURE:   Lab Results   Component Value Date/Time    CULTRSULT Rare growth usual skin yoan. (A) 06/18/2025 02:55 PM    CULTRSULT (A) 06/18/2025 02:55 PM     Beta Hemolytic Streptococcus group A  Rare growth             ASSESSMENT AND PLAN:     1. Diabetic leg ulcer (HCC)    7/2/2025  - Patient with non healing ulcer following minor trauma, maybe cat scratch?  - Excisional debridement was performed in clinic, medically necessary to promote wound healing.  - Discussed avoiding hydrogen peroxide as is cytotoxic  - Counseled on advanced wound care  - No evidence of current infection  - No evidence of lower extremity edema.   Wound Care: Honey Gel, Hydrofiber, Silicone adhesive foam    2. Type 2 diabetes mellitus with hyperglycemia, without long-term current use of insulin  ([facility])    7/2/2025  - Patient reports DM well controlled, most recent A1c 6.6  - Reports glucose is consistently below 150  - Patient with mild neuropathy, 7/10 right foot  - Counseled on the importance of glucose control on wound healing and preventing infection.    3. Anxiety    7/2/2025  - Patient became diaphoretic during appointment and felt faint  - Responded well to lowering head of chair  - She reports that she will be able to manage dressings herself at home    PATIENT EDUCATION  - Importance of tight glucose control for wound healing   - Implications of loss of protective sensation (LOPS) discussed with patient- including increased risk for amputation.  - Advised to check feet at least daily, moisturize feet, and to always wear protective foot wear.   -  Importance of offloading foot to assist with wound healing  - Advised pt not to trim nails or calluses, seek foot/nail care from podiatrist or certified foot/nail nurse  - Importance of adequate nutrition for wound healing    My total time spent caring for the patient on the day of the encounter was 30 minutes, reviewing history, assessment, counseling and education, and coordination of care.  This does not include time spent on separately billable procedures/tests.    Please note that this note may have been created using voice recognition software. I have worked with technical experts from [employer] to optimize the interface.  I have made every reasonable attempt to correct obvious errors, but there may be errors of grammar and possibly content that I did not discover before finalizing the note.    N

## 2025-07-07 DIAGNOSIS — M50.90 CERVICAL DISC DISEASE: ICD-10-CM

## 2025-07-07 RX ORDER — MELOXICAM 15 MG/1
15 TABLET ORAL DAILY
Qty: 30 TABLET | Refills: 0 | Status: SHIPPED | OUTPATIENT
Start: 2025-07-07

## 2025-07-07 NOTE — TELEPHONE ENCOUNTER
Received request via: Pharmacy    Was the patient seen in the last year in this department? Yes    Does the patient have an active prescription (recently filled or refills available) for medication(s) requested? No    Pharm[facility] Name: [facility]    Does the patient have [facility] and need 100-day supply? (This applies to ALL medications) Patient does not have SCP

## 2025-07-08 ENCOUNTER — NON-PROVIDER VISIT (OUTPATIENT)
Dept: WOUND CARE | Facility: MEDICAL CENTER | Age: 55
End: 2025-07-08
Attending: STUDENT IN AN ORGANIZED HEALTH CARE EDUCATION/TRAINING PROGRAM
Payer: COMMERCIAL

## 2025-07-08 DIAGNOSIS — L97.909 DIABETIC LEG ULCER (HCC): Primary | ICD-10-CM

## 2025-07-08 DIAGNOSIS — E11.622 DIABETIC LEG ULCER (HCC): Primary | ICD-10-CM

## 2025-07-08 PROCEDURE — 99213 OFFICE O/P EST LOW 20 MIN: CPT

## 2025-07-08 PROCEDURE — 97597 DBRDMT OPN WND 1ST 20 CM/<: CPT

## 2025-07-08 NOTE — PROCEDURES
Prior to debridement patients head was reclined and patient instructed not to look.  Patient reportedly feels faint when she sees blood.  Reinforced education regarding dressing changes and rationale for dressing choice and addition of light compression. Patient verbalizes understanding of education.     Debridement   Wound 07/02/25 Atypical Full Thickness Leg Anterior;Lower Right      Debridement Details  Debridement type: selective  Pain control: lidocaine gel    Pre-debridement measurements  Length (cm): 1.2  Width (cm): 1  Depth (cm): 0.1  Surface Area (cm^2): 0.94  Volume (cm^3): 0.1      Post-debridement measurements  Length (cm): 1.2  Width (cm): 1  Depth (cm): 0.1  Percent debrided: 100%  Surface Area (cm^2): 0.1  Area Debrided (cm^2): 0.1  Volume (cm^3): 0.1    Devitalized tissue debrided: biofilm  Instrument(s) utilized: curette  Bleeding: small  Hemostasis obtained with: pressure  Response to treatment: procedure was tolerated well

## 2025-07-08 NOTE — PATIENT INSTRUCTIONS
Keep dressings clean and dry. Change dressings every 3-4 days, and if they become over saturated, soiled or fall off.     On the days you are not changing your dressings, avoid getting the dressings wet. It is not harmful to get your wound wet when you are washing your wound before applying a new dressing.    If you need to change your dressings at home: Wash your wound with normal saline, wound cleanser, or unscented soap and water prior to applying your new dressings. Please avoid cleansing with hydrogen peroxide or rubbing alcohol. Hydrogen peroxide and rubbing alcohol are toxic to new tissue and skin cells.    Avoid prolonged standing or sitting without elevating your legs.    Remove your compression garments if you have severe pain, severe swelling, numbness/color change/temperature change in your toes, if the wrap gets wet, or if the wrap slips down. If you need to remove your compression garments, do so by unrolling them. Do not cut the compression garments off, this is to prevent cutting yourself on accident.    Should you experience any significant changes in your wound(s), such as signs of infection (increasing redness, swelling, localized heat, increased pain, fever > 101 F, chills) or have any questions regarding your home care instructions, please contact the wound center at [phone]. If after hours, contact your primary care physician or go to the hospital emergency room.     If you are admitted to any hospital, you will need a new referral to come back to the wound clinic and any scheduled appointments that you already have, may be cancelled.     If you are 5 or more minutes late for an appointment, we reserve the right to cancel and reschedule that appointment. Additionally, if you are habitually late or not showing (3 late cancellations and/or no shows), we reserve the right to cancel your remaining appointments and it will be your responsibility to obtain a new referral if services are still  needed.

## 2025-07-15 ENCOUNTER — NON-PROVIDER VISIT (OUTPATIENT)
Dept: WOUND CARE | Facility: MEDICAL CENTER | Age: 55
End: 2025-07-15
Attending: STUDENT IN AN ORGANIZED HEALTH CARE EDUCATION/TRAINING PROGRAM
Payer: COMMERCIAL

## 2025-07-15 PROCEDURE — 97597 DBRDMT OPN WND 1ST 20 CM/<: CPT

## 2025-07-15 PROCEDURE — 99213 OFFICE O/P EST LOW 20 MIN: CPT

## 2025-07-15 NOTE — PATIENT INSTRUCTIONS
Keep dressings clean and dry. Change dressings every 3-4 days, and if they become over saturated, soiled or fall off.     On the days you are not changing your dressings, avoid getting the dressings wet. It is not harmful to get your wound wet when you are washing your wound before applying a new dressing.    If you need to change your dressings at home: Wash your wound with normal saline, wound cleanser, or unscented soap and water prior to applying your new dressings. Please avoid cleansing with hydrogen peroxide or rubbing alcohol. Hydrogen peroxide and rubbing alcohol are toxic to new tissue and skin cells.    Avoid prolonged standing or sitting without elevating your legs.    Should you experience any significant changes in your wound(s), such as signs of infection (increasing redness, swelling, localized heat, increased pain, fever > 101 F, chills) or have any questions regarding your home care instructions, please contact the wound center at [phone]. If after hours, contact your primary care physician or go to the hospital emergency room.     If you are admitted to any hospital, you will need a new referral to come back to the wound clinic and any scheduled appointments that you already have, may be cancelled.     If you are 5 or more minutes late for an appointment, we reserve the right to cancel and reschedule that appointment. Additionally, if you are habitually late or not showing (3 late cancellations and/or no shows), we reserve the right to cancel your remaining appointments and it will be your responsibility to obtain a new referral if services are still needed.

## 2025-07-15 NOTE — PROGRESS NOTES
Patient's head leaned back prior to debridement.  Viscous lidocaine dwell time approximately 3 minutes prior to debridement. Conservative sharp wound debridement with curette to remove approximately 1 cm² of non viable tissue from wound bed.   Patient's wound has not made any significant change in amount of slough or size with use of Medihoney. Changed dressing to Iodoflex to better break down slough. Patient educated on rationale for change in dressing selection. Instructed patient to change dressing once in between clinic visits. Patient verbalized understanding of instruction.

## 2025-07-22 ENCOUNTER — NON-PROVIDER VISIT (OUTPATIENT)
Dept: WOUND CARE | Facility: MEDICAL CENTER | Age: 55
End: 2025-07-22
Attending: STUDENT IN AN ORGANIZED HEALTH CARE EDUCATION/TRAINING PROGRAM
Payer: COMMERCIAL

## 2025-07-22 ENCOUNTER — HOSPITAL ENCOUNTER (OUTPATIENT)
Dept: LAB | Facility: MEDICAL CENTER | Age: 55
End: 2025-07-22
Payer: COMMERCIAL

## 2025-07-22 DIAGNOSIS — I10 PRIMARY HYPERTENSION: ICD-10-CM

## 2025-07-22 DIAGNOSIS — N95.1 MENOPAUSAL HOT FLUSHES: ICD-10-CM

## 2025-07-22 DIAGNOSIS — E11.65 TYPE 2 DIABETES MELLITUS WITH HYPERGLYCEMIA, WITHOUT LONG-TERM CURRENT USE OF INSULIN (HCC): ICD-10-CM

## 2025-07-22 DIAGNOSIS — Z00.00 HEALTHCARE MAINTENANCE: ICD-10-CM

## 2025-07-22 DIAGNOSIS — E83.119 HEMOCHROMATOSIS, UNSPECIFIED HEMOCHROMATOSIS TYPE: ICD-10-CM

## 2025-07-22 LAB
ALBUMIN SERPL BCP-MCNC: 4.3 G/DL (ref 3.2–4.9)
ALBUMIN/GLOB SERPL: 1.5 G/DL
ALP SERPL-CCNC: 54 U/L (ref 30–99)
ALT SERPL-CCNC: 41 U/L (ref 2–50)
ANION GAP SERPL CALC-SCNC: 12 MMOL/L (ref 7–16)
AST SERPL-CCNC: 29 U/L (ref 12–45)
BASOPHILS # BLD AUTO: 0.5 % (ref 0–1.8)
BASOPHILS # BLD: 0.03 K/UL (ref 0–0.12)
BILIRUB SERPL-MCNC: 0.4 MG/DL (ref 0.1–1.5)
BUN SERPL-MCNC: 12 MG/DL (ref 8–22)
CALCIUM ALBUM COR SERPL-MCNC: 9.2 MG/DL (ref 8.5–10.5)
CALCIUM SERPL-MCNC: 9.4 MG/DL (ref 8.5–10.5)
CHLORIDE SERPL-SCNC: 102 MMOL/L (ref 96–112)
CHOLEST SERPL-MCNC: 111 MG/DL (ref 100–199)
CO2 SERPL-SCNC: 26 MMOL/L (ref 20–33)
CREAT SERPL-MCNC: 0.76 MG/DL (ref 0.5–1.4)
CREAT UR-MCNC: 189 MG/DL
EOSINOPHIL # BLD AUTO: 0.12 K/UL (ref 0–0.51)
EOSINOPHIL NFR BLD: 2 % (ref 0–6.9)
ERYTHROCYTE [DISTWIDTH] IN BLOOD BY AUTOMATED COUNT: 40.7 FL (ref 35.9–50)
EST. AVERAGE GLUCOSE BLD GHB EST-MCNC: 134 MG/DL
ESTRADIOL SERPL-MCNC: 25.6 PG/ML
GFR SERPLBLD CREATININE-BSD FMLA CKD-EPI: 93 ML/MIN/1.73 M 2
GLOBULIN SER CALC-MCNC: 2.9 G/DL (ref 1.9–3.5)
GLUCOSE SERPL-MCNC: 98 MG/DL (ref 65–99)
HBA1C MFR BLD: 6.3 % (ref 4–5.6)
HCT VFR BLD AUTO: 46 % (ref 37–47)
HDLC SERPL-MCNC: 56 MG/DL
HGB BLD-MCNC: 14.9 G/DL (ref 12–16)
IMM GRANULOCYTES # BLD AUTO: 0.02 K/UL (ref 0–0.11)
IMM GRANULOCYTES NFR BLD AUTO: 0.3 % (ref 0–0.9)
LDLC SERPL CALC-MCNC: 50 MG/DL
LYMPHOCYTES # BLD AUTO: 1.87 K/UL (ref 1–4.8)
LYMPHOCYTES NFR BLD: 31.8 % (ref 22–41)
MCH RBC QN AUTO: 26.7 PG (ref 27–33)
MCHC RBC AUTO-ENTMCNC: 32.4 G/DL (ref 32.2–35.5)
MCV RBC AUTO: 82.4 FL (ref 81.4–97.8)
MICROALBUMIN UR-MCNC: <1.2 MG/DL
MICROALBUMIN/CREAT UR: NORMAL MG/G (ref 0–30)
MONOCYTES # BLD AUTO: 0.43 K/UL (ref 0–0.85)
MONOCYTES NFR BLD AUTO: 7.3 % (ref 0–13.4)
NEUTROPHILS # BLD AUTO: 3.41 K/UL (ref 1.82–7.42)
NEUTROPHILS NFR BLD: 58.1 % (ref 44–72)
NRBC # BLD AUTO: 0 K/UL
NRBC BLD-RTO: 0 /100 WBC (ref 0–0.2)
PLATELET # BLD AUTO: 259 K/UL (ref 164–446)
PMV BLD AUTO: 9.9 FL (ref 9–12.9)
POTASSIUM SERPL-SCNC: 5.1 MMOL/L (ref 3.6–5.5)
PROT SERPL-MCNC: 7.2 G/DL (ref 6–8.2)
RBC # BLD AUTO: 5.58 M/UL (ref 4.2–5.4)
SODIUM SERPL-SCNC: 140 MMOL/L (ref 135–145)
TRIGL SERPL-MCNC: 24 MG/DL (ref 0–149)
WBC # BLD AUTO: 5.9 K/UL (ref 4.8–10.8)

## 2025-07-22 PROCEDURE — 99213 OFFICE O/P EST LOW 20 MIN: CPT

## 2025-07-22 PROCEDURE — 80053 COMPREHEN METABOLIC PANEL: CPT

## 2025-07-22 PROCEDURE — 82570 ASSAY OF URINE CREATININE: CPT

## 2025-07-22 PROCEDURE — 82043 UR ALBUMIN QUANTITATIVE: CPT

## 2025-07-22 PROCEDURE — 97597 DBRDMT OPN WND 1ST 20 CM/<: CPT

## 2025-07-22 PROCEDURE — 80061 LIPID PANEL: CPT

## 2025-07-22 PROCEDURE — 85025 COMPLETE CBC W/AUTO DIFF WBC: CPT

## 2025-07-22 PROCEDURE — 82670 ASSAY OF TOTAL ESTRADIOL: CPT

## 2025-07-22 PROCEDURE — 36415 COLL VENOUS BLD VENIPUNCTURE: CPT

## 2025-07-22 PROCEDURE — 83036 HEMOGLOBIN GLYCOSYLATED A1C: CPT

## 2025-07-22 NOTE — PATIENT INSTRUCTIONS
Keep dressings clean and dry. Change dressings every 3-4 days, and if they become over saturated, soiled or fall off.     On the days you are not changing your dressings, avoid getting the dressings wet. It is not harmful to get your wound wet when you are washing your wound before applying a new dressing.    If you need to change your dressings at home: Wash your wound with normal saline, wound cleanser, or unscented soap and water prior to applying your new dressings. Please avoid cleansing with hydrogen peroxide or rubbing alcohol. Hydrogen peroxide and rubbing alcohol are toxic to new tissue and skin cells.    Avoid prolonged standing or sitting without elevating your legs.    Should you experience any significant changes in your wound, such as signs of infection (increasing redness, swelling, localized heat, increased pain, fever > 101 F, chills) or have any questions regarding your home care instructions, please contact the wound center at [phone]. If after hours, contact your primary care physician or go to the hospital emergency room.     If you are admitted to any hospital, you will need a new referral to come back to the wound clinic and any scheduled appointments that you already have, may be cancelled.     If you are 5 or more minutes late for an appointment, we reserve the right to cancel and reschedule that appointment. Additionally, if you are habitually late or not showing (3 late cancellations and/or no shows), we reserve the right to cancel your remaining appointments and it will be your responsibility to obtain a new referral if services are still needed.

## 2025-07-22 NOTE — PROGRESS NOTES
RN Wound Care Procedures 7/22/2025:  Head of the chair leaned back prior to treatment, patient reported that she can become lightheaded when she sees blood.    Viscous lidocaine 2% applied to the right anterior lower leg wound for approximately 3 minutes prior to procedure.    Selective debridement with curette to remove ~1 cm² slough from the right anterior lower leg wound. Patient tolerated the procedure well.    Increased granulation buds noted in wound bed today, continued Iodoflex as the primary dressing. Educated patient regarding wound cleansing, dressing selections, dressing change procedure, and recommended frequency of dressing changes. Patient verbalized understanding of instructions.

## 2025-07-28 ENCOUNTER — OFFICE VISIT (OUTPATIENT)
Dept: MEDICAL GROUP | Facility: PHYSICIAN GROUP | Age: 55
End: 2025-07-28
Payer: COMMERCIAL

## 2025-07-28 VITALS
OXYGEN SATURATION: 96 % | DIASTOLIC BLOOD PRESSURE: 60 MMHG | TEMPERATURE: 95 F | HEIGHT: 67 IN | WEIGHT: 187 LBS | BODY MASS INDEX: 29.35 KG/M2 | HEART RATE: 91 BPM | SYSTOLIC BLOOD PRESSURE: 100 MMHG

## 2025-07-28 DIAGNOSIS — N95.1 VASOMOTOR SYMPTOMS DUE TO MENOPAUSE: Primary | ICD-10-CM

## 2025-07-28 DIAGNOSIS — Z00.00 HEALTHCARE MAINTENANCE: ICD-10-CM

## 2025-07-28 DIAGNOSIS — I10 PRIMARY HYPERTENSION: ICD-10-CM

## 2025-07-28 DIAGNOSIS — E11.65 TYPE 2 DIABETES MELLITUS WITH HYPERGLYCEMIA, WITHOUT LONG-TERM CURRENT USE OF INSULIN (HCC): ICD-10-CM

## 2025-07-28 PROCEDURE — 99214 OFFICE O/P EST MOD 30 MIN: CPT

## 2025-07-28 PROCEDURE — 3074F SYST BP LT 130 MM HG: CPT

## 2025-07-28 PROCEDURE — 3078F DIAST BP <80 MM HG: CPT

## 2025-07-28 RX ORDER — HYDROCHLOROTHIAZIDE 12.5 MG/1
1 CAPSULE ORAL
Qty: 6 EACH | Refills: 3 | Status: SHIPPED | OUTPATIENT
Start: 2025-07-28

## 2025-07-28 RX ORDER — ESTRADIOL 0.05 MG/D
1 PATCH, EXTENDED RELEASE TRANSDERMAL
Qty: 24 PATCH | Refills: 3 | Status: SHIPPED | OUTPATIENT
Start: 2025-07-28

## 2025-07-28 ASSESSMENT — ENCOUNTER SYMPTOMS
HEADACHES: 0
COUGH: 0
WEIGHT LOSS: 0
CHILLS: 0
MYALGIAS: 0
VOMITING: 0
NAUSEA: 0
WEAKNESS: 0
DIZZINESS: 0
SHORTNESS OF BREATH: 0
FEVER: 0
CONSTIPATION: 0
ABDOMINAL PAIN: 0
DIARRHEA: 0
BLURRED VISION: 0

## 2025-07-28 ASSESSMENT — FIBROSIS 4 INDEX: FIB4 SCORE: 0.93

## 2025-07-28 NOTE — PROGRESS NOTES
Verbal consent was acquired by the patient to use [employer] ambient listening note generation during this visit  Subjective:     CC:  The primary encounter diagnosis was Vasomotor symptoms due to menopause. Diagnoses of Type 2 diabetes mellitus with hyperglycemia, without long-term current use of insulin (HCC), Primary hypertension, and Healthcare maintenance were also pertinent to this visit.    HISTORY OF THE PRESENT ILLNESS: Patient is a 55 y.o. female.   No problems updated.    History of Present Illness  The patient presents for a follow-up regarding her diabetes mellitus and menopausal symptoms.    Menopausal Symptoms  - She reports that her vasomotor symptoms, specifically hot flashes, have been well-managed with transdermal estrogen therapy, although she has recently observed an increase in their frequency.  - She is contemplating an adjustment in the dosage of her estrogen patch.  - She is not currently receiving progesterone therapy.    Diabetes Mellitus  - The patient utilizes the FreeStyle glucose monitoring system to manage her diabetes mellitus, which she finds highly effective.  - Her insurance covers the majority of the cost, resulting in an out-of-pocket expense of several hundred dollars every three months.  - She expresses interest in upgrading to the new FreeStyle 3+ model.  - She has one sensor remaining and typically receives six sensors per shipment.    Hemochromatosis  - The patient was diagnosed with hemochromatosis several years ago, following continued iron supplementation post-hysterectomy.  - She underwent four to five therapeutic phlebotomies, which successfully reduced her iron levels.  - Since then, she has not experienced any further elevations in iron levels and has been informed that she is more anemic than anything else.  - A genetic test was performed, yielding no significant findings.  - She was previously under the care of Dr. Lawrence until his senior living and is now seeing a  "nurse practitioner.  - She was advised to return for evaluation if any changes were noted.    Social History:  Occupations: Employed in the [employer] for 18 years    PAST SURGICAL HISTORY:  Hysterectomy    ROS:   Review of Systems   Constitutional:  Negative for chills, fever, malaise/fatigue and weight loss.   Eyes:  Negative for blurred vision.   Respiratory:  Negative for cough and shortness of breath.    Cardiovascular:  Negative for chest pain.   Gastrointestinal:  Negative for abdominal pain, constipation, diarrhea, nausea and vomiting.   Musculoskeletal:  Negative for myalgias.   Neurological:  Negative for dizziness, weakness and headaches.         Objective:     Exam: /60   Pulse 91   Temp (!) 35 °C (95 °F) (Temporal)   Ht 1.702 m (5' 7\")   Wt 84.8 kg (187 lb)   SpO2 96%  Body mass index is 29.29 kg/m².    Physical Exam  Constitutional:       General: She is not in acute distress.     Appearance: Normal appearance. She is not ill-appearing or toxic-appearing.   HENT:      Head: Normocephalic.   Eyes:      Conjunctiva/sclera: Conjunctivae normal.   Cardiovascular:      Rate and Rhythm: Normal rate and regular rhythm.      Pulses: Normal pulses.      Heart sounds: Normal heart sounds. No murmur heard.  Pulmonary:      Effort: Pulmonary effort is normal. No respiratory distress.      Breath sounds: Normal breath sounds.   Skin:     General: Skin is warm and dry.   Neurological:      General: No focal deficit present.      Mental Status: She is alert and oriented to person, place, and time.   Psychiatric:         Mood and Affect: Mood normal.         Behavior: Behavior normal.           Labs:   Hospital Outpatient Visit on 07/22/2025   Component Date Value    Creatinine, Urine 07/22/2025 189.00     Microalbumin, Urine Rand* 07/22/2025 <1.2     Micro Alb Creat Ratio 07/22/2025 see below     WBC 07/22/2025 5.9     RBC 07/22/2025 5.58 (H)     Hemoglobin 07/22/2025 14.9     Hematocrit 07/22/2025 " 46.0     MCV 07/22/2025 82.4     MCH 07/22/2025 26.7 (L)     MCHC 07/22/2025 32.4     RDW 07/22/2025 40.7     Platelet Count 07/22/2025 259     MPV 07/22/2025 9.9     Neutrophils-Polys 07/22/2025 58.10     Lymphocytes 07/22/2025 31.80     Monocytes 07/22/2025 7.30     Eosinophils 07/22/2025 2.00     Basophils 07/22/2025 0.50     Immature Granulocytes 07/22/2025 0.30     Nucleated RBC 07/22/2025 0.00     Neutrophils (Absolute) 07/22/2025 3.41     Lymphs (Absolute) 07/22/2025 1.87     Monos (Absolute) 07/22/2025 0.43     Eos (Absolute) 07/22/2025 0.12     Baso (Absolute) 07/22/2025 0.03     Immature Granulocytes (a* 07/22/2025 0.02     NRBC (Absolute) 07/22/2025 0.00     Sodium 07/22/2025 140     Potassium 07/22/2025 5.1     Chloride 07/22/2025 102     Co2 07/22/2025 26     Anion Gap 07/22/2025 12.0     Glucose 07/22/2025 98     Bun 07/22/2025 12     Creatinine 07/22/2025 0.76     Calcium 07/22/2025 9.4     Correct Calcium 07/22/2025 9.2     AST(SGOT) 07/22/2025 29     ALT(SGPT) 07/22/2025 41     Alkaline Phosphatase 07/22/2025 54     Total Bilirubin 07/22/2025 0.4     Albumin 07/22/2025 4.3     Total Protein 07/22/2025 7.2     Globulin 07/22/2025 2.9     A-G Ratio 07/22/2025 1.5     Cholesterol,Tot 07/22/2025 111     Triglycerides 07/22/2025 24     HDL 07/22/2025 56     LDL 07/22/2025 50     Estradiol-E2 07/22/2025 25.6     Glycohemoglobin 07/22/2025 6.3 (H)     Est Avg Glucose 07/22/2025 134     GFR (CKD-EPI) 07/22/2025 93          Assessment & Plan: Medical Decision Making   55 y.o. female with the following -    Assessment & Plan  1. Menopause.  - Estradiol level has increased to 25, indicating a positive response to the current treatment regimen.  - Physical exam findings are consistent with controlled symptoms; no breakthrough hot flashes reported.  - Discussed increasing the dosage of the Vivelle patch to manage occasional hot flashes.  - Vivelle patch dosage increased to 0.05 mg twice weekly; prescription sent  to pharmacy.    2. Diabetes Mellitus.  - Diabetes is well-managed with an A1c of 6.3, indicating control within the prediabetic range. Continue Metformin 750 mg twice daily  - Labs including cholesterol, liver, kidneys, electrolytes, and CBC are within normal limits.  - Discussed the effectiveness of the FreeStyle device for glucose monitoring and its impact on dietary choices.  - Prescription for FreeStyle 3+ sensors will be provided; patient advised to download the new andrzej for the device.    3.Elevated RBC  -  with no current signs of elevated hemoglobin or hematocrit.  - Slightly elevated red blood cells noted, likely due to dehydration before the test.  - Reviewed past history of phlebotomies and genetic testing, which showed no abnormalities.  - Advised to increase water intake prior to the next lab visit to help normalize red blood cell count.    Follow-up: A follow-up appointment is scheduled for 01/26/2026 at 1:00 PM.      Problem List Items Addressed This Visit       Hypertension    Relevant Orders    CBC WITH DIFFERENTIAL    Type 2 diabetes mellitus with hyperglycemia, without long-term current use of insulin (HCC)    Relevant Medications    Continuous Glucose Sensor (FREESTYLE MIN 3 PLUS SENSOR) Misc    Other Relevant Orders    CBC WITH DIFFERENTIAL    Comp Metabolic Panel    Lipid Profile    HEMOGLOBIN A1C     Other Visit Diagnoses         Vasomotor symptoms due to menopause    -  Primary    Relevant Medications    estradiol (VIVELLE DOT) 0.05 MG/24HR PATCH BIWEEKLY    Other Relevant Orders    CBC WITH DIFFERENTIAL    Comp Metabolic Panel    ESTRADIOL      Healthcare maintenance        Relevant Orders    CBC WITH DIFFERENTIAL    Comp Metabolic Panel    Lipid Profile    HEMOGLOBIN A1C            Differential diagnosis, natural history, supportive care, and indications for immediate follow-up discussed.  Shared decision making approach was utilized, and patient is amendable with plan of care.  Patient  understands to return to clinic or go to the emergency department if symptoms worsen. All questions and concerns addressed to the best of my knowledge.      Return in about 26 weeks (around 1/26/2026), or if symptoms worsen or fail to improve.    Please note that this dictation was created using voice recognition software. I have made every reasonable attempt to correct obvious errors, but I expect that there are errors of grammar and possibly content that I did not discover before finalizing the note.

## 2025-07-29 ENCOUNTER — OFFICE VISIT (OUTPATIENT)
Dept: WOUND CARE | Facility: MEDICAL CENTER | Age: 55
End: 2025-07-29
Attending: STUDENT IN AN ORGANIZED HEALTH CARE EDUCATION/TRAINING PROGRAM
Payer: COMMERCIAL

## 2025-07-29 DIAGNOSIS — L97.909 DIABETIC LEG ULCER (HCC): Primary | ICD-10-CM

## 2025-07-29 DIAGNOSIS — F41.9 ANXIETY: ICD-10-CM

## 2025-07-29 DIAGNOSIS — E11.622 DIABETIC LEG ULCER (HCC): Primary | ICD-10-CM

## 2025-07-29 DIAGNOSIS — E11.65 TYPE 2 DIABETES MELLITUS WITH HYPERGLYCEMIA, WITHOUT LONG-TERM CURRENT USE OF INSULIN (HCC): ICD-10-CM

## 2025-07-29 PROCEDURE — 99214 OFFICE O/P EST MOD 30 MIN: CPT

## 2025-07-29 PROCEDURE — 11042 DBRDMT SUBQ TIS 1ST 20SQCM/<: CPT

## 2025-07-29 NOTE — PATIENT INSTRUCTIONS
"The following information is a summary of the education provided in the clinic today. This is not an exhaustive list of the education provided during your appointment.       DRESSING CHANGES    Keep your wound dressing clean, dry, and intact. Change your dressing every 2-3 days AND/OR if the dressing becomes soiled, leaks, gets wet, or falls off.      You may  shower with the dressing(s) off. Please do not take baths, or swim in the ocean, lakes, rivers, pools, or hot tubs.      Wounds do not need to \"air out\" or \"breathe\". Gently dry your wound before placing a new dressing.     After you get out of the shower, wash the wound a second time (with soap and water, wound cleanser, or saline). Gently dry the wound before you place a new dressing.       If you need to change your dressings at home, you should wash your wound. Use normal saline, wound cleanser, hypochlorous acid, or unscented soap and water. Do not use hydrogen peroxide or rubbing alcohol to clean your wounds. Hydrogen peroxide and rubbing alcohol will damage new cells and tissue. Do not use betadine or iodine unless told to by your wound care team.    Do not soak your wounds in epsom salt baths. This can worsen your wound(s) or delay wound healing. It can also lead to infection or maceration (tissue is too wet).     If you do not have home health, the clinic will give you with leftover supplies from your appointment. We do not give out extra dressing supplies. We will order you supplies through a [employer] company. Your insurance may or may not pay for all these supplies. The company will reach out to you if insurance does not cover supplies. These supplies will be sent to your home within a few days. If you do have home health, they will provide wound care supplies.     The dressings we use may change as your wound changes.     GENERAL HEALTH ADVICE   -High blood sugar, like with diabetes, can delay or reverse wound healing by impacting your immune system. It " also increases the chances of infection. Wounds heal best when your blood sugar is consistently less than 140 mg/dL. It is important to check your blood sugar regularly.      -Nutrition is important for wound healing. Unless told otherwise by your doctor, eat more protein and consider supplementing with a multi-vitamin, zinc, and vitamin C.           CLINIC INFORMATION  The clinic's hours are Monday-Friday, 7:30 AM to 5:00 PM. We are closed most holidays and on weekends. If you leave us a message, please allow 24 hours for someone to return your call. If you have concerns or are having a medical emergency, call 911 or go to the hospital emergency room.     You might not see the same nurse or provider every visit.     If you notice any large changes in your wound(s), or signs of infection (redness, swelling, localized heat, increased pain, fever > 101 F, chills, nausea/vomiting) or have any questions about your home care instructions, please call the wound center at [phone]. If it's after hours, contact your primary care physician or go to the hospital emergency room. If you are admitted to any hospital, you will need a new referral to come back to the wound clinic. Any wound care appointments that you already have may be cancelled.    If you are 5 or more minutes late for an appointment, we reserve the right to cancel and reschedule that appointment. For example, if your appointment is at 1:00 PM, and you arrive at 1:06 PM, you are more than five minutes late and might not be seen. If you are consistently late or not coming to your appointments (typically 3 late cancellations and/or no shows), we reserve the right to cancel your future appointments or discharge you from the clinic. It is then your responsibility to obtain a new referral if wound care is still needed.

## 2025-07-29 NOTE — PROGRESS NOTES
Provider Encounter- Diabetic Leg Ulcer      HISTORY OF PRESENT ILLNESS  Wound History:    START OF CARE IN CLINIC: 7/2/2025    REFERRING PROVIDER: Sonu Johnston      WOUND- Diabetic Lower Extremity Ulcer   LOCATION: Right lower extremity ulcer   HISTORY:  55-year-old female with past medical history of diabetes mellitus type 2, hypertension, anxiety. Patient reports wound, likely from cat scratch 5 weeks ago. She was treating wound at home with hydrogen peroxide and neosporin. She was seen at urgent care for concerns of infection, wound culture positive for beta hemolytic strep group A and was started on Doxycycline by urgent care. Patient was referred to [facility] for further wound care.    Pertinent Medical History: See above      TOBACCO USE:   Denies using    Patient's problem list, allergies, and current medications reviewed and updated in Epic    Interval History:  7/2/2025: Clinic visit with Isiah Brewer MD. patient reports feeling normal state of health, denies any fever chills.  Patient wound is chronic and nonhealing, she denies significant drainage.  She reports pain and periwound erythema has improved significantly after completing antibiotics as prescribed by urgent care. She denies any previous ulcers and denies any chronic lower extremity edema.    7/29/2025 : Clinic visit with MADAN Mathew, FNLACIE-BC, CWKIMN, CFCN.   Patient continues to feel well.  Her wound does measure a bit smaller.    REVIEW OF SYSTEMS:   Unchanged from previous clinic visit on 7/2/2025, except as documented in interval history above     PHYSICAL EXAMINATION:   [facility] 06/14/2012     Physical Exam  Constitutional:       General: She is not in acute distress.     Appearance: Normal appearance.   Cardiovascular:      Comments: Easily palpable pedal pulses  Pulmonary:      Effort: Pulmonary effort is normal. No respiratory distress.   Musculoskeletal:      Right lower leg: No edema.      Left lower leg: No edema.   Skin:      Comments: Right lower extremity diabetic ulcer: Wound area has decreased since last assessment.  Thin layer of slough to wound bed.  Moderate serosanguineous drainage, no odor.  Edges rolled.  No periwound erythema or induration.   Neurological:      Mental Status: She is alert.         Monofilament testing with a 10 gram force: sensation intact: decreased on right  Visual Inspection: Feet without maceration, ulcers, fissures.  Pedal pulses: intact bilaterally     WOUND ASSESSMENT  Wound 07/02/25 Atypical Full Thickness Leg Anterior;Lower Right (Active)   Wound Image    07/29/25 1319   Site Assessment Pink;Red;Early/partial granulation 07/29/25 1319   Periwound Assessment Blanchable erythema;Scar tissue 07/29/25 1319   Margins Unattached edges 07/29/25 1319   Closure Secondary intention 07/02/25 1600   Drainage Amount Moderate 07/29/25 1319   Drainage Description Serosanguineous 07/29/25 1319   Treatments Cleansed;Topical Lidocaine;Provider debridement;Site care 07/29/25 1319   Wound Cleansing Hypochlorus Acid 07/29/25 1319   Periwound Protectant No-sting Skin Prep 07/29/25 1319   Dressing Status Clean;Dry;Intact 07/02/25 1600   Dressing Changed Changed 07/22/25 1340   Dressing Cleansing/Solutions Not Applicable 07/29/25 1319   Dressing Options Hydrofera Blue Ready;Nonadhesive Foam 07/29/25 1319   Dressing Change/Treatment Frequency Every 72 hrs, and As Needed 07/29/25 1319   Wound Team Following Weekly 07/02/25 1600   Non-staged Wound Description Full thickness 07/29/25 1319   Wound Length (cm) 1 cm 07/29/25 1319   Wound Width (cm) 0.9 cm 07/29/25 1319   Wound Depth (cm) 0.2 cm 07/29/25 1319   Wound Surface Area (cm^2) 0.71 cm^2 07/29/25 1319   Wound Volume (cm^3) 0.094 cm^3 07/29/25 1319   Post-Procedure Length (cm) 1 cm 07/29/25 1319   Post-Procedure Width (cm) 1 cm 07/29/25 1319   Post-Procedure Depth (cm) 0.2 cm 07/29/25 1319   Post-Procedure Surface Area (cm^2) 0.79 cm^2 07/29/25 1319   Post-Procedure Volume  (cm^3) 0.105 cm^3 07/29/25 1319   Wound Healing % 42 07/29/25 1319   Tunneling (cm) 0 cm 07/29/25 1319   Undermining (cm) 0 cm 07/29/25 1319   Wound Odor None 07/29/25 1319   Pulses Right;DP;2+ 07/15/25 1411   Right Foot Monofilament 10-point exam (Sensate) 7/10 07/02/25 1600   Left Foot Monofilament 10-point exam (Sensate) 10/10 07/02/25 1600   Exposed Structures None 07/29/25 1319   Number of days: 27       PROCEDURE: Excisional debridement of right lower extremity wound  -2% viscous lidocaine applied topically to wound bed for approximately 5 minutes prior to debridement  -Curette used to debride wound bed and periwound callus.  Excisional debridement was performed to remove devitalized tissue until healthy, bleeding tissue was visualized.   Entire surface of wound, 0.79 cm² debrided.  Tissue debrided into the subcutaneous layer.  -Bleeding controlled with manual pressure.    -Wound care completed by wound RN, refer to flowsheet  -Patient tolerated the procedure well, without c/o pain or discomfort.       Pertinent Labs and Diagnostics:         Labs:     A1c:   Lab Results   Component Value Date/Time    HBA1C 6.3 (H) 07/22/2025 11:49 AM          IMAGING: No results found.    VASCULAR STUDIES: No results found.    LAST  WOUND CULTURE:   Lab Results   Component Value Date/Time    CULTRSULT Rare growth usual skin yoan. (A) 06/18/2025 02:55 PM    CULTRSULT (A) 06/18/2025 02:55 PM     Beta Hemolytic Streptococcus group A  Rare growth             ASSESSMENT AND PLAN:     1. Diabetic leg ulcer (HCC)    7/29/2025: Patient with non healing ulcer following minor trauma, maybe cat scratch?  -Wound is currently progressing well, area is steadily decreasing  - Excisional debridement was performed in clinic, medically necessary to promote wound healing.  - Patient to return to clinic weekly for assessment debridement     Wound Care: Hydrofera Blue, foam cover dressing    2. Type 2 diabetes mellitus with hyperglycemia, without  long-term current use of insulin (HCC)    7/29/2025: Currently well-controlled, most recent A1c 6.6%  - Reports glucose is consistently below 140  - Patient with mild neuropathy, 7/10 right foot  - Counseled on the importance of glucose control on wound healing and preventing infection.    3. Anxiety    7/29/2025: Patient was reportedly severely anxious on previous visit.  - Tolerated procedure today well with no evidence of distress    PATIENT EDUCATION  - Importance of tight glucose control for wound healing   - Implications of loss of protective sensation (LOPS) discussed with patient- including increased risk for amputation.  - Advised to check feet at least daily, moisturize feet, and to always wear protective foot wear.   -  Importance of offloading foot to assist with wound healing  - Advised pt not to trim nails or calluses, seek foot/nail care from podiatrist or certified foot/nail nurse  - Importance of adequate nutrition for wound healing    My total time spent caring for the patient on the day of the encounter was 20 minutes, reviewing history, assessment, counseling and education, and coordination of care.  This does not include time spent on separately billable procedures/tests.    Please note that this note may have been created using voice recognition software. I have worked with technical experts from [employer] to optimize the interface.  I have made every reasonable attempt to correct obvious errors, but there may be errors of grammar and possibly content that I did not discover before finalizing the note.    N

## 2025-07-29 NOTE — PROGRESS NOTES
[facility]   [facility]   [address]   [address]   [place] [address]   [phone] Fax: [phone]        DME: [facility]  Duration of Supply Order: 60 days  Dispense as written.    Wound 07/02/25 Atypical Full Thickness Leg Anterior;Lower Right (Active)   Wound Image    07/29/25 1319   Site Assessment Pink;Red;Early/partial granulation 07/29/25 1319   Periwound Assessment Blanchable erythema;Scar tissue 07/29/25 1319   Margins Unattached edges 07/29/25 1319   Closure Secondary intention 07/02/25 1600   Drainage Amount Moderate 07/29/25 1319   Drainage Description Serosanguineous 07/29/25 1319   Treatments Cleansed;Topical Lidocaine;Provider debridement;Site care 07/29/25 1319   Wound Cleansing Hypochlorus Acid 07/29/25 1319   Periwound Protectant No-sting Skin Prep 07/29/25 1319   Dressing Status Clean;Dry;Intact 07/02/25 1600   Dressing Changed Changed 07/22/25 1340   Dressing Cleansing/Solutions Not Applicable 07/29/25 1319   Dressing Options Hydrofera Blue Ready;Nonadhesive Foam 07/29/25 1319   Dressing Change/Treatment Frequency Every 72 hrs, and As Needed 07/29/25 1319   Wound Team Following Weekly 07/02/25 1600   Non-staged Wound Description Full thickness 07/29/25 1319   Wound Length (cm) 1 cm 07/29/25 1319   Wound Width (cm) 0.9 cm 07/29/25 1319   Wound Depth (cm) 0.2 cm 07/29/25 1319   Wound Surface Area (cm^2) 0.71 cm^2 07/29/25 1319   Wound Volume (cm^3) 0.094 cm^3 07/29/25 1319   Post-Procedure Length (cm) 1 cm 07/29/25 1319   Post-Procedure Width (cm) 1 cm 07/29/25 1319   Post-Procedure Depth (cm) 0.2 cm 07/29/25 1319   Post-Procedure Surface Area (cm^2) 0.79 cm^2 07/29/25 1319   Post-Procedure Volume (cm^3) 0.105 cm^3 07/29/25 1319   Wound Healing % 42 07/29/25 1319   Tunneling (cm) 0 cm 07/29/25 1319   Undermining (cm) 0 cm 07/29/25 1319   Wound Odor None 07/29/25 1319   Pulses Right;DP;2+ 07/15/25 1411   Right Foot Monofilament 10-point exam  (Sensate) 7/10 07/02/25 1600   Left Foot Monofilament 10-point exam (Sensate) 10/10 07/02/25 1600   Exposed Structures None 07/29/25 1319     PROCEDURE: Provider debridement using curette to remove nonviable tissue from wound bed(s). 2% topical Lidocaine applied prior to debridement with ~5 minute dwell time. Patient tolerated well; denied pain.    ASSESSMENT AND PLAN:   There are no diagnoses linked to this encounter.    Patient to change dressing 3 times per week. Cleanse wound(s) with saline and gauze. Patient/caregiver to apply dressing as instructed.

## 2025-07-29 NOTE — PROGRESS NOTES
Education provided on dressing application and dressing change frequency as primary dressing is now hydrofera blue ready. S/s of infection/when to go to the ER reviewed. Leftover dressing supplies sent home with patient. She verbalized understanding of all. Refer to flowsheets and AVS for further details.

## 2025-08-05 ENCOUNTER — NON-PROVIDER VISIT (OUTPATIENT)
Dept: WOUND CARE | Facility: MEDICAL CENTER | Age: 55
End: 2025-08-05
Attending: STUDENT IN AN ORGANIZED HEALTH CARE EDUCATION/TRAINING PROGRAM
Payer: COMMERCIAL

## 2025-08-05 PROCEDURE — 97602 WOUND(S) CARE NON-SELECTIVE: CPT

## 2025-08-05 PROCEDURE — 99213 OFFICE O/P EST LOW 20 MIN: CPT

## 2025-08-05 RX ORDER — ESCITALOPRAM OXALATE 20 MG/1
40 TABLET ORAL
Qty: 180 TABLET | Refills: 3 | Status: SHIPPED | OUTPATIENT
Start: 2025-08-05

## 2025-08-11 DIAGNOSIS — R30.0 DYSURIA: Primary | ICD-10-CM

## 2025-08-11 RX ORDER — NITROFURANTOIN 25; 75 MG/1; MG/1
100 CAPSULE ORAL 2 TIMES DAILY
Qty: 10 CAPSULE | Refills: 0 | Status: SHIPPED | OUTPATIENT
Start: 2025-08-11

## 2025-08-12 ENCOUNTER — NON-PROVIDER VISIT (OUTPATIENT)
Dept: WOUND CARE | Facility: MEDICAL CENTER | Age: 55
End: 2025-08-12
Attending: STUDENT IN AN ORGANIZED HEALTH CARE EDUCATION/TRAINING PROGRAM
Payer: COMMERCIAL

## 2025-08-12 PROCEDURE — 97602 WOUND(S) CARE NON-SELECTIVE: CPT

## 2025-08-12 PROCEDURE — 99213 OFFICE O/P EST LOW 20 MIN: CPT

## 2025-08-19 ENCOUNTER — NON-PROVIDER VISIT (OUTPATIENT)
Dept: WOUND CARE | Facility: MEDICAL CENTER | Age: 55
End: 2025-08-19
Attending: STUDENT IN AN ORGANIZED HEALTH CARE EDUCATION/TRAINING PROGRAM
Payer: COMMERCIAL

## 2025-08-19 PROCEDURE — 99213 OFFICE O/P EST LOW 20 MIN: CPT

## (undated) DEVICE — CATHETER IV SAFETY 20 GA X 1-1/4 (50/BX)

## (undated) DEVICE — GOWN SURGEONS LARGE - (32/CA)

## (undated) DEVICE — GLOVE, LITE (PAIR)

## (undated) DEVICE — TUBE E-T HI-LO CUFF 7.5MM (10EA/PK)

## (undated) DEVICE — LACTATED RINGERS INJ 1000 ML - (14EA/CA 60CA/PF)

## (undated) DEVICE — SPONGE GAUZE NON-STERILE 4X4 - (2000/CA 10PK/CA)

## (undated) DEVICE — MASK ANESTHESIA ADULT  - (100/CA)

## (undated) DEVICE — TUBE CONNECTING SUCTION - CLEAR PLASTIC STERILE 72 IN (50EA/CA)

## (undated) DEVICE — KIT CUSTOM PROCEDURE SINGLE FOR ENDO  (15/CA)

## (undated) DEVICE — SYRINGE SAFETY 3 ML 18 GA X 1 1/2 BLUNT LL (100/BX 8BX/CA)

## (undated) DEVICE — TUBE SUCTION YANKAUER  1/4 X 6FT (20EA/CA)"

## (undated) DEVICE — NEPTUNE 4 PORT MANIFOLD - (20/PK)

## (undated) DEVICE — SENSOR SPO2 ADULT LNCS ADTX (20/BX) ORDER ITEM #19593

## (undated) DEVICE — CANISTER SUCTION RIGID RED 1500CC (40EA/CA)

## (undated) DEVICE — SYRINGE DISP. 60 CC LL - (30/BX, 12BX/CA)**WHEN THESE ARE GONE ORDER #500206**

## (undated) DEVICE — ELECTRODE 850 FOAM ADHESIVE - HYDROGEL RADIOTRNSPRNT (50/PK)

## (undated) DEVICE — KIT  I.V. START (100EA/CA)

## (undated) DEVICE — WATER IRRIGATION STERILE 1000ML (12EA/CA)

## (undated) DEVICE — CAPTIVATOR II-10MM ROUND STIFF  (40/BX)

## (undated) DEVICE — SYRINGE SAFETY 10 ML 18 GA X 1 1/2 BLUNT LL (100/BX 4BX/CA)

## (undated) DEVICE — TRAP POLYP E-TRAP (25EA/BX)

## (undated) DEVICE — CATHERTER CLEAR SINGLE USE INJECTION THERAPY NEEDLE 25GA X 4MM  2.3MM X 240CM (5EA/BX)

## (undated) DEVICE — MASK WITH FACE SHIELD (25/BX 4BX/CA)

## (undated) DEVICE — SET EXTENSION WITH 2 PORTS (48EA/CA) ***PART #2C8610 IS A SUBSTITUTE*****

## (undated) DEVICE — BITE BLOCK ADULT 60FR (100EA/CA)

## (undated) DEVICE — FORCEP RADIAL JAW 4 STANDARD CAPACITY W/NEEDLE 240CM (40EA/BX)

## (undated) DEVICE — SYRINGE SAFETY 5 ML 18 GA X 1-1/2 BLUNT LL (100/BX 4BX/CA)

## (undated) DEVICE — KIT ANESTHESIA W/CIRCUIT & 3/LT BAG W/FILTER (20EA/CA)

## (undated) DEVICE — GEL ORISE MANUFACTURER REMOVES PRODUCTS FROM MARKET GLOBALLY

## (undated) DEVICE — TUBING CLEARLINK DUO-VENT - C-FLO (48EA/CA)